# Patient Record
Sex: FEMALE | Race: WHITE | NOT HISPANIC OR LATINO | Employment: OTHER | URBAN - METROPOLITAN AREA
[De-identification: names, ages, dates, MRNs, and addresses within clinical notes are randomized per-mention and may not be internally consistent; named-entity substitution may affect disease eponyms.]

---

## 2017-01-03 DIAGNOSIS — S29.011S MUSCLE STRAIN OF CHEST WALL, SEQUELA: ICD-10-CM

## 2017-01-03 RX ORDER — CARISOPRODOL 350 MG/1
350 TABLET ORAL
Qty: 30 TAB | Refills: 0 | Status: SHIPPED | OUTPATIENT
Start: 2017-01-03 | End: 2017-02-02 | Stop reason: SDUPTHER

## 2017-01-03 NOTE — TELEPHONE ENCOUNTER
Pt appt was cancelled due to PCP vacation time. Pt states she is in the process of reapplying for her insurance and is not sure when it will be active again. She would like to have a refill of her medication. Please advise      Was the patient seen in the last year in this department? Yes     Does patient have an active prescription for medications requested? No     Received Request Via: Patient

## 2017-02-02 ENCOUNTER — OFFICE VISIT (OUTPATIENT)
Dept: MEDICAL GROUP | Facility: MEDICAL CENTER | Age: 57
End: 2017-02-02
Attending: FAMILY MEDICINE
Payer: MEDICAID

## 2017-02-02 ENCOUNTER — HOSPITAL ENCOUNTER (OUTPATIENT)
Facility: MEDICAL CENTER | Age: 57
End: 2017-02-02
Attending: FAMILY MEDICINE
Payer: MEDICAID

## 2017-02-02 VITALS
HEART RATE: 92 BPM | SYSTOLIC BLOOD PRESSURE: 118 MMHG | TEMPERATURE: 98.3 F | RESPIRATION RATE: 16 BRPM | BODY MASS INDEX: 25.83 KG/M2 | HEIGHT: 65 IN | DIASTOLIC BLOOD PRESSURE: 80 MMHG | OXYGEN SATURATION: 94 % | WEIGHT: 155 LBS

## 2017-02-02 DIAGNOSIS — M54.50 CHRONIC LOW BACK PAIN WITHOUT SCIATICA, UNSPECIFIED BACK PAIN LATERALITY: ICD-10-CM

## 2017-02-02 DIAGNOSIS — G89.29 CHRONIC LOW BACK PAIN WITHOUT SCIATICA, UNSPECIFIED BACK PAIN LATERALITY: ICD-10-CM

## 2017-02-02 DIAGNOSIS — S29.011S MUSCLE STRAIN OF CHEST WALL, SEQUELA: ICD-10-CM

## 2017-02-02 DIAGNOSIS — G89.29 CHRONIC LUMBAR PAIN: ICD-10-CM

## 2017-02-02 DIAGNOSIS — M54.50 CHRONIC LUMBAR PAIN: ICD-10-CM

## 2017-02-02 PROCEDURE — 99213 OFFICE O/P EST LOW 20 MIN: CPT | Performed by: FAMILY MEDICINE

## 2017-02-02 PROCEDURE — 80307 DRUG TEST PRSMV CHEM ANLYZR: CPT

## 2017-02-02 RX ORDER — CARISOPRODOL 350 MG/1
350 TABLET ORAL
Qty: 30 TAB | Refills: 2 | Status: SHIPPED | OUTPATIENT
Start: 2017-02-02 | End: 2017-05-26 | Stop reason: SDUPTHER

## 2017-02-02 RX ORDER — OXYCODONE HYDROCHLORIDE 10 MG/1
10 TABLET ORAL 3 TIMES DAILY PRN
Qty: 90 TAB | Refills: 0 | Status: SHIPPED | OUTPATIENT
Start: 2017-02-02 | End: 2017-03-02 | Stop reason: SDUPTHER

## 2017-02-02 NOTE — PROGRESS NOTES
"Subjective:      Paulette Toussaint is a 56 y.o. female who presents with No chief complaint on file.            HPI 1. Mid-low back pain-patient ports is not yet been able to schedule the CT scans of her thoracic and lumbar spine ordered by Dr. Shen. 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 She reports fluttering level of mid to low back pain requiring usually 3 and occasionally to oxycodone 10 mg immediate release tablets, along with one Soma daily. She notes occasional tingling into her left thigh just recently with no consistent radiation of pain into either lower extremity.  2. URI-patient ports three-day history of head congestion along with some yellow nasal discharge. Notes no significant sore throat and no cough up to this point.  . ROS negative for urinary incontinence, fecal incontinence, recent fever       Objective:     /80 mmHg  Pulse 92  Temp(Src) 36.8 °C (98.3 °F)  Resp 16  Ht 1.651 m (5' 5\")  Wt 70.308 kg (155 lb)  BMI 25.79 kg/m2  SpO2 94%  Breastfeeding? No     Physical Exam  Gen.-alert cooperative female in no acute distress  Back-nontender palpation of the bony midline and parasacral areas bilaterally. No overlying redness or swelling.  Lower extremities-intact light touch, intact strength. Normal gait          Assessment/Plan:     1. Muscle strain of chest wall, sequela    - carisoprodol (SOMA) 350 MG Tab; Take 1 Tab by mouth 1 time daily as needed for Muscle Spasms.  Dispense: 30 Tab; Refill: 2    2. Chronic low back pain without sciatica, unspecified back pain laterality  - oxycodone immediate release (ROXICODONE) 10 MG immediate release tablet; Take 1 Tab by mouth 3 times a day as needed for Moderate Pain.  Dispense: 90 Tab; Refill: 0    Plan: 1. Renew Soma 350 mg 1 daily, immediate release oxycodone 10 mg 3 times daily  2. Patient is encouraged to complete recently referred to colonoscopy (mother history of colon cancer, gets screened every 5 " years), and ordered CT scans of the lumbar and thoracic spine  3. Revisit in 3 months  4. For anxiety patient is being seen at Dr. Lindsey's office and they are prescribing her Xanax currently at half of a 0.5 mg Xanax daily  5. UDS

## 2017-02-02 NOTE — MR AVS SNAPSHOT
"        Paulette Toussaint   2017 1:10 PM   Office Visit   MRN: [id number]    [facility]   Dept Phone:  [phone]    Description:  Female : 1960   Provider:  Savage Wright M.D.           Reason for Visit     Back Pain           Allergies as of 2017     Allergen Noted Reactions    Seasonal 2014   Runny Nose, Itching    Sulfa Drugs 2014   Nausea    Pt states \"I get very sick, I was over 20 years ago\".    Sulfa Drugs 2015         You were diagnosed with     Muscle strain of chest wall, sequela   [972331]       Chronic low back pain without sciatica, unspecified back pain laterality   [7089951]         Vital Signs     Blood Pressure Pulse Temperature Respirations Height Weight    118/80 mmHg 92 36.8 °C (98.3 °F) 16 1.651 m (5' 5\") 70.308 kg (155 lb)    Body Mass Index Oxygen Saturation Breastfeeding? Smoking Status          25.79 kg/m2 94% No Current Every Day Smoker        Basic Information     Date Of Birth Sex Race Ethnicity Preferred Language    1960 Female White Non- English      Problem List              ICD-10-CM Priority Class Noted - Resolved    Pubic ramus fracture (CMS-HCC) S32.599A Medium  2014 - Present    Fracture of ribs, two S22.49XA Medium  2014 - Present    Chronic back pain (Chronic) M54.9, G89.29 Medium  2014 - Present    Lumbar burst fracture (CMS-[facility]) S32.001A   Unknown - Present    Depression F32.9   2015 - Present    Chronic lumbar pain M54.5, G89.29   2015 - Present    Acute sinusitis J01.90   2015 - Present    Osteoporosis M81.0   2016 - Present    Chest pain R07.9 High  2016 - Present    Tobacco abuse (Chronic) Z72.0   2016 - Present      Health Maintenance        Date Due Completion Dates    IMM DTaP/Tdap/Td Vaccine (1 - Tdap) 3/10/1980 ---    IMM PNEUMOCOCCAL 19-64 (ADULT) MEDIUM RISK SERIES (1 of 1 - PPSV23) 3/10/1980 ---    COLONOSCOPY 3/10/2011 ---    IMM INFLUENZA (1) " 9/1/2016 1/14/2014    MAMMOGRAM 8/2/2017 8/2/2016, 1/15/2016, 1/12/2016    PAP SMEAR 1/20/2019 1/20/2016            Current Immunizations     Influenza TIV (IM) 1/14/2014  9:15 PM      Below and/or attached are the medications your provider expects you to take. Review all of your home medications and newly ordered medications with your provider and/or pharmacist. Follow medication instructions as directed by your provider and/or pharmacist. Please keep your medication list with you and share with your provider. Update the information when medications are discontinued, doses are changed, or new medications (including over-the-counter products) are added; and carry medication information at all times in the event of emergency situations     Allergies:  SEASONAL - Runny Nose,Itching     SULFA DRUGS - Nausea     SULFA DRUGS - (reactions not documented)               Medications  Valid as of: February 02, 2017 -  2:21 PM    Generic Name Brand Name Tablet Size Instructions for use    Acetaminophen (Tab) TYLENOL 325 MG Take 2 Tabs by mouth every 6 hours as needed for Moderate Pain.        Alendronate Sodium (Tab) FOSAMAX 70 MG Take 1 Tab by mouth every 7 days.        Carisoprodol (Tab) SOMA 350 MG Take 350 mg by mouth 2 times a day. Indications: Musculoskeletal Pain        Carisoprodol (Tab) SOMA 350 MG Take 1 Tab by mouth 4 times a day.        Carisoprodol (Tab) SOMA 350 MG Take 1 Tab by mouth 1 time daily as needed for Muscle Spasms.        Cetirizine HCl (Tab) ZYRTEC 10 MG Take 10 mg by mouth every bedtime.        Ciprofloxacin HCl (Tab) CIPRO 500 MG Take 1 Tab by mouth 2 times a day.        Cyclobenzaprine HCl (Tab) FLEXERIL 10 MG Take 1 Tab by mouth 3 times a day as needed.        Estrogens, Conjugated (Cream) PREMARIN 0.625 MG/GM Apply 0.5 gram intravaginally twice weekly.        Fluconazole (Tab) DIFLUCAN 150 MG Take 1 Tab by mouth every day.        FLUoxetine HCl (Cap) PROZAC 10 MG Take 10 mg by mouth every day.         FLUoxetine HCl (Cap) PROZAC 20 MG Take 1 Cap by mouth every day.        FLUoxetine HCl (Cap) PROZAC 40 MG Take 1 Cap by mouth every day.        Fluticasone Propionate (Suspension) FLONASE 50 MCG/ACT Spray 2 Sprays in nose 2 times a day.        Lidocaine (Patch) LIDODERM 5 % Apply 1 Patch to skin as directed every 24 hours.        Meloxicam (Tab) MOBIC 7.5 MG Take 7.5 mg by mouth every day.        Meloxicam (Tab) MOBIC 7.5 MG Take 1 Tab by mouth every day.        Meloxicam (Tab) MOBIC 7.5 MG Take 15 mg by mouth every day.        Meloxicam (Tab) MOBIC 15 MG TAKE 1 TABLET BY MOUTH EVERY DAY        Methocarbamol (Tab) ROBAXIN 500 MG Take 1 Tab by mouth 3 times a day.        Multiple Vitamins-Minerals (Tab) THERAGRAN-M  Take 1 Tab by mouth every day.        OxyCODONE HCl (Tab) ROXICODONE 10 MG Take 0.5-1 Tabs by mouth every four hours as needed for Severe Pain ((Pain Scale 7-10)).        OxyCODONE HCl (Tab) ROXICODONE 10 MG Take 1 Tab by mouth 3 times a day as needed for Moderate Pain.        OxyCODONE HCl (Tab) ROXICODONE 10 MG Take 1 Tab by mouth 3 times a day as needed for Moderate Pain.        Probiotic Product   Take 1 Cap by mouth every bedtime.        Pseudoephedrine HCl   Take 2 Tabs by mouth every four hours as needed (For congestion).        Risedronate Sodium (Tab) ACTONEL 35 MG Take 1 Tab by mouth every 7 days.        .                 Medicines prescribed today were sent to:     [facility]ACY #[id number] - [place], [place] - [address] [address] [facility] [place] [address]    Phone: [phone] Fax: [phone]    Open 24 Hours?: No      Medication refill instructions:       If your prescription bottle indicates you have medication refills left, it is not necessary to call your provider’s office. Please contact your pharmacy and they will refill your medication.    If your prescription bottle indicates you do not have any refills left, you may request refills at any time through one of the following ways:  The online SNOBSWAP system (except Urgent Care), by calling your provider’s office, or by asking your pharmacy to contact your provider’s office with a refill request. Medication refills are processed only during regular business hours and may not be available until the next business day. Your provider may request additional information or to have a follow-up visit with you prior to refilling your medication.   *Please Note: Medication refills are assigned a new Rx number when refilled electronically. Your pharmacy may indicate that no refills were authorized even though a new prescription for the same medication is available at the pharmacy. Please request the medicine by name with the pharmacy before contacting your provider for a refill.        Your To Do List     Future Labs/Procedures Complete By [facility]    PAIN MANAGEMENT CONNIE, UR  As directed 2/2/2018    Comments:    Current Meds (name, sig, last dose):   Current outpatient prescriptions:   •  carisoprodol, 350 mg, Oral, QDAY PRN  •  oxycodone immediate release, 10 mg, Oral, TID PRN  •  oxycodone immediate release, 10 mg, Oral, TID PRN  •  meloxicam, TAKE 1 TABLET BY MOUTH EVERY DAY  •  methocarbamol, 500 mg, Oral, TID  •  cyclobenzaprine, 10 mg, Oral, TID PRN  •  lidocaine, 1 Patch, Transdermal, Q24HRS  •  risedronate, 35 mg, Oral, Q7 DAYS  •  fluconazole, 150 mg, Oral, DAILY  •  ciprofloxacin, 500 mg, Oral, BID  •  fluoxetine, 40 mg, Oral, DAILY  •  fluoxetine, 20 mg, Oral, DAILY  •  carisoprodol, 350 mg, Oral, 4X/DAY  •  alendronate, 70 mg, Oral, Q7 DAYS  •  meloxicam, 15 mg, Oral, DAILY, 3/31/2016 at 0900  •  therapeutic multivitamin-minerals, 1 Tab, Oral, DAILY, 3/31/2016 at 0900  •  fluticasone, 2 Spray, Nasal, BID, 3/31/2016 at 0900  •  cetirizine, 10 mg, Oral, QHS, 3/30/2016 at 2000  •  Probiotic Product (PROBIOTIC DAILY PO), 1 Cap, Oral, QHS, 3/30/2016 at 2000  •  Pseudoephedrine HCl (SUDAFED PO), 2 Tab, Oral, Q4HRS PRN, 3/31/2016 at 0900  •   acetaminophen, 650 mg, Oral, Q6HRS PRN  •  conjugated estrogen, Apply 0.5 gram intravaginally twice weekly.  •  meloxicam, 7.5 mg, Oral, DAILY  •  meloxicam, 7.5 mg, Oral, DAILY  •  oxycodone immediate release, 5-10 mg, Oral, Q4HRS PRN, 3/31/2016 at 1200  •  carisoprodol, 350 mg, Oral, BID, 3/31/2016 at 0900  •  fluoxetine, 10 mg, Oral, DAILY, 3/31/2016 at 0900         MyChart Status: Patient Declined

## 2017-02-06 ENCOUNTER — TELEPHONE (OUTPATIENT)
Dept: MEDICAL GROUP | Facility: MEDICAL CENTER | Age: 57
End: 2017-02-06

## 2017-02-06 RX ORDER — AZITHROMYCIN 250 MG/1
TABLET, FILM COATED ORAL
Qty: 6 TAB | Refills: 0 | Status: SHIPPED | OUTPATIENT
Start: 2017-02-06 | End: 2017-03-30

## 2017-02-07 NOTE — TELEPHONE ENCOUNTER
1. Caller Name: Yessica                                        Call Back Number: [phone] (home)         Patient approves a detailed voicemail message: yes    Pt called stating she is not feeling any better and would like to have an antibiotic called into her pharmacy. Please advise     Thank you

## 2017-02-08 LAB
6MAM UR QL: NOT DETECTED
7AMINOCLONAZEPAM UR QL: NOT DETECTED
A-OH ALPRAZ UR QL: PRESENT
ALPRAZ UR QL: PRESENT
AMPHET UR QL SCN: NOT DETECTED
ANNOTATION COMMENT IMP: NORMAL
ANNOTATION COMMENT IMP: NORMAL
BARBITURATES UR QL: NOT DETECTED
BUPRENORPHINE UR QL: NOT DETECTED
BZE UR QL: NOT DETECTED
CARBOXYTHC UR QL: PRESENT
CARISOPRODOL UR QL: PRESENT
CLONAZEPAM UR QL: NOT DETECTED
CODEINE UR QL: NOT DETECTED
DIAZEPAM UR QL: NOT DETECTED
ETHYL GLUCURONIDE UR QL: NORMAL
FENTANYL UR QL: NOT DETECTED
HYDROCODONE UR QL: NOT DETECTED
HYDROMORPHONE UR QL: NOT DETECTED
LORAZEPAM UR QL: NOT DETECTED
MDA UR QL: NOT DETECTED
MDEA UR QL: NOT DETECTED
MDMA UR QL: NOT DETECTED
MEPERIDINE UR QL: NOT DETECTED
METHADONE UR QL: NOT DETECTED
METHAMPHET UR QL: NOT DETECTED
MIDAZOLAM UR QL SCN: NOT DETECTED
MORPHINE UR QL: NOT DETECTED
NORBUPRENORPHINE UR QL CFM: NOT DETECTED
NORDIAZEPAM UR QL: PRESENT
NORFENTANYL UR QL: NOT DETECTED
NORHYDROCODONE UR QL CFM: NOT DETECTED
NOROXYCODONE UR QL CFM: PRESENT
NOROXYMORPH CO100 Q0458: NOT DETECTED
OXAZEPAM UR QL: PRESENT
OXYCODONE UR QL: PRESENT
OXYMORPHONE UR QL: PRESENT
PATHOLOGY STUDY: NORMAL
PCP UR QL: NOT DETECTED
PHENTERMINE UR QL: NOT DETECTED
PPAA UR QL: NOT DETECTED
PROPOXYPH UR QL: NOT DETECTED
SERVICE CMNT-IMP: NORMAL
TAPENADOL OSULF CO200 Q0473: NOT DETECTED
TAPENTADOL UR QL SCN: NOT DETECTED
TEMAZEPAM UR QL: PRESENT
TRAMADOL UR QL: NOT DETECTED
ZOLPIDEM UR QL: NOT DETECTED

## 2017-03-02 DIAGNOSIS — M54.50 CHRONIC LOW BACK PAIN WITHOUT SCIATICA, UNSPECIFIED BACK PAIN LATERALITY: ICD-10-CM

## 2017-03-02 DIAGNOSIS — G89.29 CHRONIC LOW BACK PAIN WITHOUT SCIATICA, UNSPECIFIED BACK PAIN LATERALITY: ICD-10-CM

## 2017-03-02 RX ORDER — OXYCODONE HYDROCHLORIDE 10 MG/1
10 TABLET ORAL 3 TIMES DAILY PRN
Qty: 90 TAB | Refills: 0 | Status: SHIPPED | OUTPATIENT
Start: 2017-03-02 | End: 2017-03-30 | Stop reason: SDUPTHER

## 2017-03-03 NOTE — TELEPHONE ENCOUNTER
Patient's urine drug screen shows metabolites of Xanax but also metabolites of Valium a second separate benzodiazepine. Combining benzodiazepine's along with opiates such as oxycodone results in a markedly elevated risk of respiratory depression. Patient will need to decide whether she is going to continue the benzodiazepines or the opiates. Use of a alternative anxiety medicine called buspirone would be medically safer in combination with the oxycodone. (She could ask her psychiatrist to substitute this for the Xanax, and she definitely not be using Xanax and Valium concurrently in any situation). Oxycodone prescription is refilled for the next 30 days, have patient come visit me prior to any subsequent refills.

## 2017-03-27 ENCOUNTER — HOSPITAL ENCOUNTER (OUTPATIENT)
Dept: RADIOLOGY | Facility: MEDICAL CENTER | Age: 57
End: 2017-03-27
Attending: NEUROLOGICAL SURGERY
Payer: MEDICAID

## 2017-03-27 DIAGNOSIS — M54.5 LOW BACK PAIN, UNSPECIFIED BACK PAIN LATERALITY, UNSPECIFIED CHRONICITY, WITH SCIATICA PRESENCE UNSPECIFIED: ICD-10-CM

## 2017-03-27 PROCEDURE — 72128 CT CHEST SPINE W/O DYE: CPT

## 2017-03-27 PROCEDURE — 72131 CT LUMBAR SPINE W/O DYE: CPT

## 2017-03-30 ENCOUNTER — OFFICE VISIT (OUTPATIENT)
Dept: MEDICAL GROUP | Facility: MEDICAL CENTER | Age: 57
End: 2017-03-30
Attending: FAMILY MEDICINE
Payer: MEDICAID

## 2017-03-30 VITALS
BODY MASS INDEX: 25.49 KG/M2 | DIASTOLIC BLOOD PRESSURE: 76 MMHG | WEIGHT: 153 LBS | HEIGHT: 65 IN | HEART RATE: 88 BPM | OXYGEN SATURATION: 96 % | TEMPERATURE: 98 F | SYSTOLIC BLOOD PRESSURE: 118 MMHG | RESPIRATION RATE: 16 BRPM

## 2017-03-30 DIAGNOSIS — G89.29 CHRONIC LOW BACK PAIN WITHOUT SCIATICA, UNSPECIFIED BACK PAIN LATERALITY: ICD-10-CM

## 2017-03-30 DIAGNOSIS — M54.50 CHRONIC LOW BACK PAIN WITHOUT SCIATICA, UNSPECIFIED BACK PAIN LATERALITY: ICD-10-CM

## 2017-03-30 DIAGNOSIS — S29.011S MUSCLE STRAIN OF CHEST WALL, SEQUELA: ICD-10-CM

## 2017-03-30 PROCEDURE — 99213 OFFICE O/P EST LOW 20 MIN: CPT | Performed by: FAMILY MEDICINE

## 2017-03-30 RX ORDER — BUPROPION HYDROCHLORIDE 150 MG/1
150 TABLET, EXTENDED RELEASE ORAL 2 TIMES DAILY
COMMUNITY
End: 2017-08-14

## 2017-03-30 RX ORDER — GABAPENTIN 300 MG/1
CAPSULE ORAL
Qty: 120 CAP | Refills: 6 | Status: SHIPPED
Start: 2017-03-30 | End: 2017-08-14

## 2017-03-30 RX ORDER — OXYCODONE HYDROCHLORIDE 10 MG/1
10 TABLET ORAL 3 TIMES DAILY PRN
Qty: 90 TAB | Refills: 0 | Status: SHIPPED | OUTPATIENT
Start: 2017-03-30 | End: 2017-05-01 | Stop reason: SDUPTHER

## 2017-03-30 RX ORDER — CARISOPRODOL 350 MG/1
350 TABLET ORAL
Qty: 30 TAB | Refills: 0 | Status: SHIPPED | OUTPATIENT
Start: 2017-03-30 | End: 2017-05-01 | Stop reason: SDUPTHER

## 2017-03-30 NOTE — MR AVS SNAPSHOT
"Paulette Toussaint   3/30/2017 2:10 PM   Office Visit   MRN: [id number]    Department:  [facility]   Dept Phone:  [phone]    Description:  Female : 1960   Provider:  Savage Wright M.D.           Reason for Visit     Follow-Up           Allergies as of 3/30/2017     Allergen Noted Reactions    Seasonal 2014   Runny Nose, Itching    Sulfa [facility] 2014   Nausea    Pt states \"I get very sick, I was over 20 years ago\".    Sulfa Drugs 2015         You were diagnosed with     Muscle strain of chest wall, sequela   [034136]       Chronic low back pain without sciatica, unspecified back pain laterality   [[id number]]         Vital Signs     Blood Pressure Pulse Temperature Respirations Height Weight    118/76 mmHg 88 36.7 °C (98 °F) 16 1.651 m (5' 5\") 69.4 kg (153 lb)    Body Mass Index Oxygen Saturation Breastfeeding? Smoking Status          25.46 kg/m2 96% No Current Every Day Smoker        Basic Information     Date Of Birth Sex Race Ethnicity Preferred Language    1960 Female White Non- English      Your appointments     May 01, 2017  2:10 PM   Established Patient with Savage Wright M.D.   [facility]    [address] [place] [id number] [phone]           You will be receiving a confirmation call a few days before your appointment from our automated call confirmation system.              Problem List              ICD-10-CM Priority Class Noted - Resolved    Pubic ramus fracture (CMS-HCC) S32.599A Medium  2014 - Present    Fracture of ribs, two S22.49XA Medium  2014 - Present    Chronic back pain (Chronic) M54.9, G89.29 Medium  2014 - Present    Lumbar burst fracture (CMS-HCC) S32.001A   Unknown - Present    Depression F32.9   2015 - Present    Chronic lumbar pain M54.5, G89.29   2015 - Present    Acute sinusitis J01.90   2015 - Present    Osteoporosis M81.0   2016 - Present    Chest " pain R07.9 High  4/1/2016 - Present    Tobacco abuse (Chronic) Z72.0   4/1/2016 - Present      Health Maintenance        Date Due Completion Dates    IMM DTaP/Tdap/Td Vaccine (1 - Tdap) 3/10/1980 ---    IMM PNEUMOCOCCAL 19-64 (ADULT) MEDIUM RISK SERIES (1 of 1 - PPSV23) 3/10/1980 ---    COLONOSCOPY 3/10/2011 ---    IMM INFLUENZA (1) 9/1/2016 1/14/2014    MAMMOGRAM 8/2/2017 8/2/2016, 1/15/2016, 1/12/2016    PAP SMEAR 1/20/2019 1/20/2016            Current Immunizations     Influenza TIV (IM) 1/14/2014  9:15 PM      Below and/or attached are the medications your provider expects you to take. Review all of your home medications and newly ordered medications with your provider and/or pharmacist. Follow medication instructions as directed by your provider and/or pharmacist. Please keep your medication list with you and share with your provider. Update the information when medications are discontinued, doses are changed, or new medications (including over-the-counter products) are added; and carry medication information at all times in the event of emergency situations     Allergies:  SEASONAL - Runny Nose,Itching     SULFA DRUGS - Nausea     SULFA DRUGS - (reactions not documented)               Medications  Valid as of: March 30, 2017 -  3:11 PM    Generic Name Brand Name Tablet Size Instructions for use    Acetaminophen (Tab) TYLENOL 325 MG Take 2 Tabs by mouth every 6 hours as needed for Moderate Pain.        Alendronate Sodium (Tab) FOSAMAX 70 MG Take 1 Tab by mouth every 7 days.        BuPROPion HCl (TABLET SR 12 HR) WELLBUTRIN- MG Take 150 mg by mouth 2 times a day.        Carisoprodol (Tab) SOMA 350 MG Take 350 mg by mouth 2 times a day. Indications: Musculoskeletal Pain        Carisoprodol (Tab) SOMA 350 MG Take 1 Tab by mouth 1 time daily as needed for Muscle Spasms.        Carisoprodol (Tab) SOMA 350 MG Take 1 Tab by mouth 1 time daily as needed for Muscle Spasms.        Cetirizine HCl (Tab) ZYRTEC 10 MG  Take 10 mg by mouth every bedtime.        Estrogens, Conjugated (Cream) PREMARIN 0.625 MG/GM Apply 0.5 gram intravaginally twice weekly.        Fluconazole (Tab) DIFLUCAN 150 MG Take 1 Tab by mouth every day.        FLUoxetine HCl (Cap) PROZAC 10 MG Take 10 mg by mouth every day.        Fluticasone Propionate (Suspension) FLONASE 50 MCG/ACT Spray 2 Sprays in nose 2 times a day.        Gabapentin (Cap) NEURONTIN 300 MG 1 by mouth daily at bedtime ×5 days, 1 by mouth twice a day ×5 days, 1 by mouth every morning and 2 at bedtime ×5 days, then 2 by mouth twice a day        Lidocaine (Patch) LIDODERM 5 % Apply 1 Patch to skin as directed every 24 hours.        Meloxicam (Tab) MOBIC 7.5 MG Take 15 mg by mouth every day.        Meloxicam (Tab) MOBIC 15 MG TAKE 1 TABLET BY MOUTH EVERY DAY        Multiple Vitamins-Minerals (Tab) THERAGRAN-M  Take 1 Tab by mouth every day.        OxyCODONE HCl (Tab) ROXICODONE 10 MG Take 1 Tab by mouth 3 times a day as needed for Moderate Pain.        OxyCODONE HCl (Tab) ROXICODONE 10 MG Take 1 Tab by mouth 3 times a day as needed for Moderate Pain.        Probiotic Product   Take 1 Cap by mouth every bedtime.        Pseudoephedrine HCl   Take 2 Tabs by mouth every four hours as needed (For congestion).        Risedronate Sodium (Tab) ACTONEL 35 MG Take 1 Tab by mouth every 7 days.        .                 Medicines prescribed today were sent to:     [facility]/PHARMACY #[id number] - [place] - [address] [address]    Phone: [phone] Fax: [phone]    Open 24 Hours?: No      Medication refill instructions:       If your prescription bottle indicates you have medication refills left, it is not necessary to call your provider’s office. Please contact your pharmacy and they will refill your medication.    If your prescription bottle indicates you do not have any refills left, you may request refills at any time through one of the following ways: The online  mSnapt system (except Urgent Care), by calling your provider’s office, or by asking your pharmacy to contact your provider’s office with a refill request. Medication refills are processed only during regular business hours and may not be available until the next business day. Your provider may request additional information or to have a follow-up visit with you prior to refilling your medication.   *Please Note: Medication refills are assigned a new Rx number when refilled electronically. Your pharmacy may indicate that no refills were authorized even though a new prescription for the same medication is available at the pharmacy. Please request the medicine by name with the pharmacy before contacting your provider for a refill.           MyChart Status: Patient Declined        Quit Tobacco Information     Do you want to quit using tobacco?    Quitting tobacco decreases risks of cancer, heart and lung disease, increases life expectancy, improves sense of taste and smell, and increases spending money, among other benefits.    If you are thinking about quitting, we can help.  • [employer] Quit Tobacco Program: [phone]  o Program occurs weekly for four weeks and includes pharmacist consultation on products to support quitting smoking or chewing tobacco. A provider referral is needed for pharmacist consultation.  • Tobacco Users Help Hotline: 9-991-QUIT-NOW (996-9881) or https://[place].quitlogix.org/  o Free, confidential telephone and online coaching for [place] residents. Sessions are designed on a schedule that is convenient for you. Eligible clients receive free nicotine replacement therapy.  • Nationally: www.smokefree.gov  o Information and professional assistance to support both immediate and long-term needs as you become, and remain, a non-smoker. Smokefree.gov allows you to choose the help that best fits your needs.

## 2017-03-30 NOTE — PROGRESS NOTES
"Subjective:      Paulette Toussaint is a 57 y.o. female who presents with Follow-Up            HPI 1. Chronic back pain-patient reports fluctuating level of mid to low back pain ranging from 6-10. She reports on occasion she is tearful and for several hours cannot complete her activities daily living until the back pain subsides. Overall she reports fair pain relief taking the oxycodone 10 mg immediate release 3 times daily along with a single Soma daily. Previous trials of Flexeril and Robaxin were ineffective. She has never had gabapentin. She met with Dr. Gregory's her back surgeon in November. Due to various events she just got her thoracic and lumbar CT scans completed several days ago.    ROS negative for urinary incontinence, fecal incontinence, shortness of breath       Objective:     /76 mmHg  Pulse 88  Temp(Src) 36.7 °C (98 °F)  Resp 16  Ht 1.651 m (5' 5\")  Wt 69.4 kg (153 lb)  BMI 25.46 kg/m2  SpO2 96%  Breastfeeding? No     Physical Exam  Gen.- alert, cooperative, in no acute distress  Neck- midline trachea, thyroid not enlarged or tender,supple, no cervical adenopathy  Chest-clear to auscultation and percussion with normal breath sounds. No retractions. Chest wall nontender  Cardiac- regular rhythm and rate. No murmur, thrill, or heave  Back-nontender to external palpation of the bony midline and paraspinous areas. Area of indicated tenderness currently is in the interscapular region bilaterally          Assessment/Plan:     1. Muscle strain of chest wall, sequela    - carisoprodol (SOMA) 350 MG Tab; Take 1 Tab by mouth 1 time daily as needed for Muscle Spasms.  Dispense: 30 Tab; Refill: 0    2. Chronic low back pain without sciatica, unspecified back pain laterality    - oxycodone immediate release (ROXICODONE) 10 MG immediate release tablet; Take 1 Tab by mouth 3 times a day as needed for Moderate Pain.  Dispense: 90 Tab; Refill: 0    Plan: 1. Renew oxycodone 10 mg immediate release 3 " times daily  2. Renew Soma 350 mg once daily  3. Thoracic spine and lumbar CTs were reviewed-patient will also review these with Dr. Gregory's  4. Revisit in one month  5. Trial of gabapentin 300 mg at at bedtime with gradual escalation of dose to 600 mg twice daily as tolerated

## 2017-05-01 ENCOUNTER — OFFICE VISIT (OUTPATIENT)
Dept: MEDICAL GROUP | Facility: MEDICAL CENTER | Age: 57
End: 2017-05-01
Attending: FAMILY MEDICINE
Payer: MEDICAID

## 2017-05-01 VITALS
RESPIRATION RATE: 16 BRPM | DIASTOLIC BLOOD PRESSURE: 68 MMHG | WEIGHT: 156 LBS | BODY MASS INDEX: 25.99 KG/M2 | OXYGEN SATURATION: 96 % | SYSTOLIC BLOOD PRESSURE: 112 MMHG | HEART RATE: 96 BPM | HEIGHT: 65 IN | TEMPERATURE: 99.1 F

## 2017-05-01 DIAGNOSIS — S29.011S MUSCLE STRAIN OF CHEST WALL, SEQUELA: ICD-10-CM

## 2017-05-01 DIAGNOSIS — G89.29 CHRONIC LOW BACK PAIN WITHOUT SCIATICA, UNSPECIFIED BACK PAIN LATERALITY: ICD-10-CM

## 2017-05-01 DIAGNOSIS — M54.50 CHRONIC LOW BACK PAIN WITHOUT SCIATICA, UNSPECIFIED BACK PAIN LATERALITY: ICD-10-CM

## 2017-05-01 PROCEDURE — 99213 OFFICE O/P EST LOW 20 MIN: CPT | Performed by: FAMILY MEDICINE

## 2017-05-01 RX ORDER — CARISOPRODOL 350 MG/1
350 TABLET ORAL
Qty: 30 TAB | Refills: 2 | Status: SHIPPED | OUTPATIENT
Start: 2017-05-01 | End: 2018-04-05 | Stop reason: SDUPTHER

## 2017-05-01 RX ORDER — OLANZAPINE 2.5 MG/1
2.5 TABLET, FILM COATED ORAL NIGHTLY
COMMUNITY
End: 2017-08-14

## 2017-05-01 RX ORDER — OXYCODONE HYDROCHLORIDE 10 MG/1
10 TABLET ORAL 3 TIMES DAILY PRN
Qty: 90 TAB | Refills: 0 | Status: SHIPPED | OUTPATIENT
Start: 2017-05-01 | End: 2017-05-26 | Stop reason: SDUPTHER

## 2017-05-01 NOTE — MR AVS SNAPSHOT
"Paulette Toussaint   2017 2:10 PM   Office Visit   MRN: [id number]    [facility]   Dept Phone:  [phone]    Description:  Female : 1960   Provider:  Savage Wright M.D.           Reason for Visit     Follow-Up           Allergies as of 2017     Allergen Noted Reactions    Seasonal 2014   Runny Nose, Itching    [facility] 2014   Nausea    Pt states \"I get very sick, I was over 20 years ago\".    [facility] 2015         You were diagnosed with     Chronic low back pain without sciatica, unspecified back pain laterality   [7743909]       Muscle strain of chest wall, sequela   [682117]         Vital Signs     Blood Pressure Pulse Temperature Respirations Height Weight    112/68 mmHg 96 37.3 °C (99.1 °F) 16 1.651 m (5' 5\") 70.761 kg (156 lb)    Body Mass Index Oxygen Saturation Breastfeeding? Smoking Status          25.96 kg/m2 96% No Current Every Day Smoker        Basic Information     Date Of Birth Sex Race Ethnicity Preferred Language    1960 Female White Non- English      Your appointments     May 26, 2017  2:10 PM   Established Patient with Savage Wright M.D.   [facility] ([facility])    [address] [place] [id number]   [phone]           You will be receiving a confirmation call a few days before your appointment from our automated call confirmation system.              Problem List              ICD-10-CM Priority Class Noted - Resolved    Pubic ramus fracture (CMS-HCC) S32.599A Medium  2014 - Present    Fracture of ribs, two S22.49XA Medium  2014 - Present    Chronic back pain (Chronic) M54.9, G89.29 Medium  2014 - Present    Lumbar burst fracture (CMS-HCC) S32.001A   Unknown - Present    Depression F32.9   2015 - Present    Chronic lumbar pain M54.5, G89.29   2015 - Present    Acute sinusitis J01.90   2015 - Present    Osteoporosis M81.0   2016 - Present    Chest " pain R07.9 High  4/1/2016 - Present    Tobacco abuse (Chronic) Z72.0   4/1/2016 - Present      Health Maintenance        Date Due Completion Dates    IMM DTaP/Tdap/Td Vaccine (1 - Tdap) 3/10/1980 ---    IMM PNEUMOCOCCAL 19-64 (ADULT) MEDIUM RISK SERIES (1 of 1 - PPSV23) 3/10/1980 ---    COLONOSCOPY 3/10/2011 ---    MAMMOGRAM 8/2/2017 8/2/2016, 1/15/2016    PAP SMEAR 1/20/2019 1/20/2016            Current Immunizations     Influenza TIV (IM) 1/14/2014  9:15 PM      Below and/or attached are the medications your provider expects you to take. Review all of your home medications and newly ordered medications with your provider and/or pharmacist. Follow medication instructions as directed by your provider and/or pharmacist. Please keep your medication list with you and share with your provider. Update the information when medications are discontinued, doses are changed, or new medications (including over-the-counter products) are added; and carry medication information at all times in the event of emergency situations     Allergies:  SEASONAL - Runny Nose,Itching     SULFA DRUGS - Nausea     SULFA DRUGS - (reactions not documented)               Medications  Valid as of: May 01, 2017 -  2:50 PM    Generic Name Brand Name Tablet Size Instructions for use    Acetaminophen (Tab) TYLENOL 325 MG Take 2 Tabs by mouth every 6 hours as needed for Moderate Pain.        Alendronate Sodium (Tab) FOSAMAX 70 MG Take 1 Tab by mouth every 7 days.        BuPROPion HCl (TABLET SR 12 HR) WELLBUTRIN- MG Take 150 mg by mouth 2 times a day.        Carisoprodol (Tab) SOMA 350 MG Take 350 mg by mouth 2 times a day. Indications: Musculoskeletal Pain        Carisoprodol (Tab) SOMA 350 MG Take 1 Tab by mouth 1 time daily as needed for Muscle Spasms.        Carisoprodol (Tab) SOMA 350 MG Take 1 Tab by mouth 1 time daily as needed for Muscle Spasms.        Cetirizine HCl (Tab) ZYRTEC 10 MG Take 10 mg by mouth every bedtime.        Estrogens,  Conjugated (Cream) PREMARIN 0.625 MG/GM Apply 0.5 gram intravaginally twice weekly.        Fluconazole (Tab) DIFLUCAN 150 MG Take 1 Tab by mouth every day.        FLUoxetine HCl (Cap) PROZAC 10 MG Take 10 mg by mouth every day.        Fluticasone Propionate (Suspension) FLONASE 50 MCG/ACT Spray 2 Sprays in nose 2 times a day.        Gabapentin (Cap) NEURONTIN 300 MG 1 by mouth daily at bedtime ×5 days, 1 by mouth twice a day ×5 days, 1 by mouth every morning and 2 at bedtime ×5 days, then 2 by mouth twice a day        Lidocaine (Patch) LIDODERM 5 % Apply 1 Patch to skin as directed every 24 hours.        Meloxicam (Tab) MOBIC 7.5 MG Take 15 mg by mouth every day.        Meloxicam (Tab) MOBIC 15 MG TAKE 1 TABLET BY MOUTH EVERY DAY        Multiple Vitamins-Minerals (Tab) THERAGRAN-M  Take 1 Tab by mouth every day.        OxyCODONE HCl (Tab) ROXICODONE 10 MG Take 1 Tab by mouth 3 times a day as needed for Moderate Pain.        OxyCODONE HCl (Tab) ROXICODONE 10 MG Take 1 Tab by mouth 3 times a day as needed for Moderate Pain.        Probiotic Product   Take 1 Cap by mouth every bedtime.        Pseudoephedrine HCl   Take 2 Tabs by mouth every four hours as needed (For congestion).        Risedronate Sodium (Tab) ACTONEL 35 MG Take 1 Tab by mouth every 7 days.        .                 Medicines prescribed today were sent to:     [facility]/PHARMACY #[id number] - [place], [place] - [address] [address]    Phone: [phone] Fax: [phone]    Open 24 Hours?: No      Medication refill instructions:       If your prescription bottle indicates you have medication refills left, it is not necessary to call your provider’s office. Please contact your pharmacy and they will refill your medication.    If your prescription bottle indicates you do not have any refills left, you may request refills at any time through one of the following ways: The online Freebee system (except Urgent Care), by calling your  provider’s office, or by asking your pharmacy to contact your provider’s office with a refill request. Medication refills are processed only during regular business hours and may not be available until the next business day. Your provider may request additional information or to have a follow-up visit with you prior to refilling your medication.   *Please Note: Medication refills are assigned a new Rx number when refilled electronically. Your pharmacy may indicate that no refills were authorized even though a new prescription for the same medication is available at the pharmacy. Please request the medicine by name with the pharmacy before contacting your provider for a refill.           MyChart Status: Patient Declined        Quit Tobacco Information     Do you want to quit using tobacco?    Quitting tobacco decreases risks of cancer, heart and lung disease, increases life expectancy, improves sense of taste and smell, and increases spending money, among other benefits.    If you are thinking about quitting, we can help.  • [facility] Quit Tobacco Program: 302.181.7608  o Program occurs weekly for four weeks and includes pharmacist consultation on products to support quitting smoking or chewing tobacco. A provider referral is needed for pharmacist consultation.  • Tobacco Users Help Hotline: 6-769-QUIT-NOW (161-1089) or https://[place].quitlogix.org/  o Free, confidential telephone and online coaching for [place] residents. Sessions are designed on a schedule that is convenient for you. Eligible clients receive free nicotine replacement therapy.  • Nationally: www.smokefree.gov  o Information and professional assistance to support both immediate and long-term needs as you become, and remain, a non-smoker. Smokefree.gov allows you to choose the help that best fits your needs.

## 2017-05-02 NOTE — PROGRESS NOTES
"Subjective:      Paulette Toussaint is a 57 y.o. female who presents with Follow-Up            HPI1.Chronic back pain- pt met with Dr Boateng, and his note is also reviewed. He notes stablility, no evidence of hoped for fusion at 2 points, but the hardware is secure. He is not enthusiastic over more surgery, and pt even less so. She did not start trial of gabapentin yet since her psych meds recently changed, adding olanzapine to her fluoxetine and Wellbutrin. PT initial visit is set for this week.  Notes pain mid to lower spine, without sciatica. Pain worse with more activity. Pt starting Nutrisystem to lose 15lbs.    ROSNeg for urinary or fecal incontinence, constipation,  sweats       Objective:     /68 mmHg  Pulse 96  Temp(Src) 37.3 °C (99.1 °F)  Resp 16  Ht 1.651 m (5' 5\")  Wt 70.761 kg (156 lb)  BMI 25.96 kg/m2  SpO2 96%  Breastfeeding? No     Physical Exam  Gen- alert cooperative female in no acute distress  Back- 1 +tender T6-L5 in midline.   Psych- briefly tearful discussing narcotic reduction. Ox 4, normal grooming, normal speech, good eye contact          Assessment/Plan:     1. Chronic low back pain without sciatica, unspecified back pain laterality-  No effective pain management referral contractually available to manage pain medications.    - oxycodone immediate release (ROXICODONE) 10 MG immediate release tablet; Take 1 Tab by mouth 3 times a day as needed for Moderate Pain.  Dispense: 90 Tab; Refill: 0    2. Muscle strain of chest wall, sequela    - carisoprodol (SOMA) 350 MG Tab; Take 1 Tab by mouth 1 time daily as needed for Muscle Spasms.  Dispense: 30 Tab; Refill: 2      Plan: 1. Begin PT trial  2. Begin gabapentin trial  3. Renew TID oxycodone this month, quantity reduction next month  4. Renew Soma 1 daily  "

## 2017-05-26 ENCOUNTER — OFFICE VISIT (OUTPATIENT)
Dept: MEDICAL GROUP | Facility: MEDICAL CENTER | Age: 57
End: 2017-05-26
Attending: FAMILY MEDICINE
Payer: MEDICAID

## 2017-05-26 VITALS
HEIGHT: 65 IN | OXYGEN SATURATION: 96 % | SYSTOLIC BLOOD PRESSURE: 120 MMHG | WEIGHT: 158 LBS | DIASTOLIC BLOOD PRESSURE: 72 MMHG | RESPIRATION RATE: 16 BRPM | BODY MASS INDEX: 26.33 KG/M2 | HEART RATE: 88 BPM | TEMPERATURE: 97.9 F

## 2017-05-26 DIAGNOSIS — G89.29 CHRONIC LOW BACK PAIN WITHOUT SCIATICA, UNSPECIFIED BACK PAIN LATERALITY: ICD-10-CM

## 2017-05-26 DIAGNOSIS — E78.2 MIXED HYPERLIPIDEMIA: ICD-10-CM

## 2017-05-26 DIAGNOSIS — M54.50 CHRONIC LOW BACK PAIN WITHOUT SCIATICA, UNSPECIFIED BACK PAIN LATERALITY: ICD-10-CM

## 2017-05-26 DIAGNOSIS — R53.83 FATIGUE, UNSPECIFIED TYPE: ICD-10-CM

## 2017-05-26 DIAGNOSIS — R73.01 FASTING HYPERGLYCEMIA: ICD-10-CM

## 2017-05-26 DIAGNOSIS — M54.50 CHRONIC MIDLINE LOW BACK PAIN WITHOUT SCIATICA: ICD-10-CM

## 2017-05-26 DIAGNOSIS — D64.9 NORMOCYTIC NORMOCHROMIC ANEMIA: ICD-10-CM

## 2017-05-26 DIAGNOSIS — S29.011S MUSCLE STRAIN OF CHEST WALL, SEQUELA: ICD-10-CM

## 2017-05-26 DIAGNOSIS — G89.29 CHRONIC MIDLINE LOW BACK PAIN WITHOUT SCIATICA: ICD-10-CM

## 2017-05-26 PROCEDURE — 99213 OFFICE O/P EST LOW 20 MIN: CPT | Performed by: FAMILY MEDICINE

## 2017-05-26 RX ORDER — CARISOPRODOL 350 MG/1
350 TABLET ORAL
Qty: 30 TAB | Refills: 2 | Status: SHIPPED | OUTPATIENT
Start: 2017-05-26 | End: 2017-07-28 | Stop reason: SDUPTHER

## 2017-05-26 RX ORDER — OXYCODONE HYDROCHLORIDE 10 MG/1
10 TABLET ORAL 3 TIMES DAILY PRN
Qty: 80 TAB | Refills: 0 | Status: SHIPPED | OUTPATIENT
Start: 2017-05-26 | End: 2017-06-26 | Stop reason: SDUPTHER

## 2017-05-26 ASSESSMENT — PAIN SCALES - GENERAL: PAINLEVEL: 3=SLIGHT PAIN

## 2017-05-26 NOTE — MR AVS SNAPSHOT
"Paulette Toussaint   2017 2:10 PM   Office Visit   MRN: [id number]    Department:  [facility]   Dept Phone:  [phone]    Description:  Female : 1960   Provider:  Savage Wright M.D.           Reason for Visit     Follow-Up med refill      Allergies as of 2017     Allergen Noted Reactions    Seasonal 2014   Runny Nose, Itching    [facility] 2014   Nausea    Pt states \"I get very sick, I was over 20 years ago\".    [facility] 2015         You were diagnosed with     Mixed hyperlipidemia   [272.2.ICD-9-CM]       Chronic midline low back pain without sciatica   [2037447]       Fasting hyperglycemia   [229231]       Normocytic normochromic anemia   [161229]       Fatigue, unspecified type   [1772443]       Muscle strain of chest wall, sequela   [778189]       Chronic low back pain without sciatica, unspecified back pain laterality   [3004309]         Vital Signs     Blood Pressure Pulse Temperature Respirations Height Weight    120/72 mmHg 88 36.6 °C (97.9 °F) 16 1.651 m (5' 5\") 71.668 kg (158 lb)    Body Mass Index Oxygen Saturation Breastfeeding? Smoking Status          26.29 kg/m2 96% No Current Every Day Smoker        Basic Information     Date Of Birth Sex Race Ethnicity Preferred Language    1960 Female White Non- English      Your appointments     2017  4:30 PM   Established Patient with Savage Wright M.D.   The [facility] ([facility])    [address] [place] [id number]   [phone]           You will be receiving a confirmation call a few days before your appointment from our automated call confirmation system.              Problem List              ICD-10-CM Priority Class Noted - Resolved    Pubic ramus fracture (CMS-HCC) S32.599A Medium  2014 - Present    Fracture of ribs, two S22.49XA Medium  2014 - Present    Chronic back pain (Chronic) M54.9, G89.29 Medium  2014 - Present    Lumbar burst " fracture (CMS-[facility]) S32.001A   Unknown - Present    Depression F32.9   12/11/2015 - Present    Chronic lumbar pain M54.5, G89.29   12/11/2015 - Present    Acute sinusitis J01.90   12/13/2015 - Present    Osteoporosis M81.0   2/18/2016 - Present    Chest pain R07.9 High  4/1/2016 - Present    Tobacco abuse (Chronic) Z72.0   4/1/2016 - Present    Mixed hyperlipidemia E78.2   5/26/2017 - Present      Health Maintenance        Date Due Completion Dates    IMM DTaP/Tdap/Td Vaccine (1 - Tdap) 3/10/1980 ---    IMM PNEUMOCOCCAL 19-64 (ADULT) MEDIUM RISK SERIES (1 of 1 - PPSV23) 3/10/1980 ---    COLONOSCOPY 3/10/2011 ---    MAMMOGRAM 8/2/2017 8/2/2016, 1/15/2016    PAP SMEAR 1/20/2019 1/20/2016            Current Immunizations     Influenza TIV (IM) 1/14/2014  9:15 PM      Below and/or attached are the medications your provider expects you to take. Review all of your home medications and newly ordered medications with your provider and/or pharmacist. Follow medication instructions as directed by your provider and/or pharmacist. Please keep your medication list with you and share with your provider. Update the information when medications are discontinued, doses are changed, or new medications (including over-the-counter products) are added; and carry medication information at all times in the event of emergency situations     Allergies:  SEASONAL - Runny Nose,Itching     SULFA DRUGS - Nausea     SULFA DRUGS - (reactions not documented)               Medications  Valid as of: May 26, 2017 -  3:03 PM    Generic Name Brand Name Tablet Size Instructions for use    Acetaminophen (Tab) TYLENOL 325 MG Take 2 Tabs by mouth every 6 hours as needed for Moderate Pain.        Alendronate Sodium (Tab) FOSAMAX 70 MG Take 1 Tab by mouth every 7 days.        BuPROPion HCl (TABLET SR 12 HR) WELLBUTRIN- MG Take 150 mg by mouth 2 times a day.        Carisoprodol (Tab) SOMA 350 MG Take 350 mg by mouth 2 times a day. Indications:  Musculoskeletal Pain        Carisoprodol (Tab) SOMA 350 MG Take 1 Tab by mouth 1 time daily as needed for Muscle Spasms.        Carisoprodol (Tab) SOMA 350 MG Take 1 Tab by mouth 1 time daily as needed for Muscle Spasms.        Cetirizine HCl (Tab) ZYRTEC 10 MG Take 10 mg by mouth every bedtime.        Estrogens, Conjugated (Cream) PREMARIN 0.625 MG/GM Apply 0.5 gram intravaginally twice weekly.        Fluconazole (Tab) DIFLUCAN 150 MG Take 1 Tab by mouth every day.        FLUoxetine HCl (Cap) PROZAC 10 MG Take 10 mg by mouth every day.        Fluticasone Propionate (Suspension) FLONASE 50 MCG/ACT Spray 2 Sprays in nose 2 times a day.        Gabapentin (Cap) NEURONTIN 300 MG 1 by mouth daily at bedtime ×5 days, 1 by mouth twice a day ×5 days, 1 by mouth every morning and 2 at bedtime ×5 days, then 2 by mouth twice a day        Lidocaine (Patch) LIDODERM 5 % Apply 1 Patch to skin as directed every 24 hours.        Meloxicam (Tab) MOBIC 7.5 MG Take 15 mg by mouth every day.        Meloxicam (Tab) MOBIC 15 MG TAKE 1 TABLET BY MOUTH EVERY DAY        Multiple Vitamins-Minerals (Tab) THERAGRAN-M  Take 1 Tab by mouth every day.        OLANZapine (Tab) ZYPREXA 2.5 MG Take 2.5 mg by mouth every evening.        OxyCODONE HCl (Tab) ROXICODONE 10 MG Take 1 Tab by mouth 3 times a day as needed for Moderate Pain.        OxyCODONE HCl (Tab) ROXICODONE 10 MG Take 1 Tab by mouth 3 times a day as needed for Moderate Pain.        Probiotic Product   Take 1 Cap by mouth every bedtime.        Pseudoephedrine HCl   Take 2 Tabs by mouth every four hours as needed (For congestion).        Risedronate Sodium (Tab) ACTONEL 35 MG Take 1 Tab by mouth every 7 days.        .                 Medicines prescribed today were sent to:     [facility]/PHARMACY #[id number] - [place], [place] - [address] [facility]    [address] [place] [address]    Phone: [phone] Fax: [phone]    Open 24 Hours?: No      Medication refill instructions:       If your  prescription bottle indicates you have medication refills left, it is not necessary to call your provider’s office. Please contact your pharmacy and they will refill your medication.    If your prescription bottle indicates you do not have any refills left, you may request refills at any time through one of the following ways: The online [employer] system (except Urgent Care), by calling your provider’s office, or by asking your pharmacy to contact your provider’s office with a refill request. Medication refills are processed only during regular business hours and may not be available until the next business day. Your provider may request additional information or to have a follow-up visit with you prior to refilling your medication.   *Please Note: Medication refills are assigned a new Rx number when refilled electronically. Your pharmacy may indicate that no refills were authorized even though a new prescription for the same medication is available at the pharmacy. Please request the medicine by name with the pharmacy before contacting your provider for a refill.        Your To Do List     Future Labs/Procedures Complete By Expires    CBC WITH DIFFERENTIAL  As directed 5/27/2018    COMP METABOLIC PANEL  As directed 5/27/2018    LIPID PROFILE  As directed 5/27/2018    TSH  As directed 5/27/2018    VITAMIN D,25 HYDROXY  As directed 5/26/2018         Appscend Access Code: [email or web address]  Expires: 6/25/2017  3:03 PM    App[employer]end  A secure, online tool to manage your health information     [employer]’s Appscend® is a secure, online tool that connects you to your personalized health information from the privacy of your home -- day or night - making it very easy for you to manage your healthcare. Once the activation process is completed, you can even access your medical information using the Appscend elaine, which is available for free in the Apple Elaine store or Google Play store.     Appscend provides the following levels of  access (as shown below):   My Chart Features   [facility] Doctor [facility]  Specialists [facility]  Urgent  Care Non-[facility]  Primary Care  Doctor   Email your healthcare team securely and privately 24/7 X X X    Manage appointments: schedule your next appointment; view details of past/upcoming appointments X      Request prescription refills. X      View recent personal medical records, including lab and immunizations X X X X   View health record, including health history, allergies, medications X X X X   Read reports about your outpatient visits, procedures, consult and ER notes X X X X   See your discharge summary, which is a recap of your hospital and/or ER visit that includes your diagnosis, lab results, and care plan. X X       How to register for "[employer]:  1. Go to  [email or web address].  2. Click on the Sign Up Now box, which takes you to the New Member Sign Up page. You will need to provide the following information:  a. Enter your "[employer] Access Code exactly as it appears at the top of this page. (You will not need to use this code after you’ve completed the sign-up process. If you do not sign up before the expiration date, you must request a new code.)   b. Enter your date of birth.   c. Enter your home email address.   d. Click Submit, and follow the next screen’s instructions.  3. Create a "[employer] ID. This will be your "[employer] login ID and cannot be changed, so think of one that is secure and easy to remember.  4. Create a "[employer] password. You can change your password at any time.  5. Enter your Password Reset Question and Answer. This can be used at a later time if you forget your password.   6. Enter your e-mail address. This allows you to receive e-mail notifications when new information is available in "[employer].  7. Click Sign Up. You can now view your health information.    For assistance activating your "[employer] account, call [phone]        Quit Tobacco Information     Do you want to  quit using tobacco?    Quitting tobacco decreases risks of cancer, heart and lung disease, increases life expectancy, improves sense of taste and smell, and increases spending money, among other benefits.    If you are thinking about quitting, we can help.  • [facility] Quit Tobacco Program: 700.457.7492  o Program occurs weekly for four weeks and includes pharmacist consultation on products to support quitting smoking or chewing tobacco. A provider referral is needed for pharmacist consultation.  • Tobacco Users Help Hotline: 4-220-QUIT-NOW (707-9561) or https://[place].quitlogix.org/  o Free, confidential telephone and online coaching for [place] residents. Sessions are designed on a schedule that is convenient for you. Eligible clients receive free nicotine replacement therapy.  • Nationally: www.smokefree.gov  o Information and professional assistance to support both immediate and long-term needs as you become, and remain, a non-smoker. Smokefree.gov allows you to choose the help that best fits your needs.

## 2017-05-27 NOTE — PROGRESS NOTES
"Subjective:      Paulette Toussaint is a 57 y.o. female who presents with Follow-Up            HPI 1. Chronic back pain-patient reports that possibly 10 days a month she is getting by with 2 instead of 3 doses of oxycodone 10 mg immediate release. She is still taking a single dose of Soma at bedtime which assists with relaxation and sleep by her report. She denies current constipation, confusion, unexplained diaphoresis. She has been attending physical therapy 3 visits so far which she finds is helpful. They're working on her core strengthening along with giving her electrical stimulation and heat at the end of each session.    ROS negative for urinary incontinence, near syncope, chest pain       Objective:     /72 mmHg  Pulse 88  Temp(Src) 36.6 °C (97.9 °F)  Resp 16  Ht 1.651 m (5' 5\")  Wt 71.668 kg (158 lb)  BMI 26.29 kg/m2  SpO2 96%  Breastfeeding? No     Physical Exam  Gen.- alert, cooperative, in no acute distress  Neck- midline trachea, thyroid not enlarged or tender,supple, no cervical adenopathy  Chest-clear to auscultation and percussion with normal breath sounds. No retractions. Chest wall nontender  Cardiac- regular rhythm and rate. No murmur, thrill, or heave  Back-1+ tender from T6-T10 in the midline and 1+ tender in the parathoracic areas bilaterally          Assessment/Plan:     1. Mixed hyperlipidemia    - LIPID PROFILE; Future  - COMP METABOLIC PANEL; Future    2. Chronic midline low back pain without sciatica    - VITAMIN D,25 HYDROXY; Future    3. Fasting hyperglycemia    4. Normocytic normochromic anemia CBC WITH DIFFERENTIAL; Future    5. Fatigue, unspecified type    - TSH; Future    6. Muscle strain of chest wall, sequela    - carisoprodol (SOMA) 350 MG Tab; Take 1 Tab by mouth 1 time daily as needed for Muscle Spasms.  Dispense: 30 Tab; Refill: 2    7. Chronic low back pain without sciatica, unspecified back pain laterality    - oxycodone immediate release (ROXICODONE) 10 MG " immediate release tablet; Take 1 Tab by mouth 3 times a day as needed for Moderate Pain.  Dispense: 80 Tab; Refill: 0     Plan: 1. Reduce oxycodone 10 mg to 80 tablets per month, continue Soma 30 tablets per month  2. Continue physical therapy  3. Update numerous labs-previous findings of anemia, hyperglycemia, elevated LDL  4. Revisit one month

## 2017-06-19 RX ORDER — MELOXICAM 15 MG/1
TABLET ORAL
Qty: 30 TAB | Refills: 3 | Status: SHIPPED | OUTPATIENT
Start: 2017-06-19 | End: 2019-08-07

## 2017-06-26 ENCOUNTER — OFFICE VISIT (OUTPATIENT)
Dept: MEDICAL GROUP | Facility: MEDICAL CENTER | Age: 57
End: 2017-06-26
Attending: FAMILY MEDICINE
Payer: MEDICAID

## 2017-06-26 VITALS
TEMPERATURE: 98.8 F | SYSTOLIC BLOOD PRESSURE: 140 MMHG | WEIGHT: 155 LBS | BODY MASS INDEX: 25.83 KG/M2 | HEART RATE: 84 BPM | HEIGHT: 65 IN | RESPIRATION RATE: 16 BRPM | OXYGEN SATURATION: 98 % | DIASTOLIC BLOOD PRESSURE: 78 MMHG

## 2017-06-26 DIAGNOSIS — G89.29 CHRONIC LOW BACK PAIN WITHOUT SCIATICA, UNSPECIFIED BACK PAIN LATERALITY: ICD-10-CM

## 2017-06-26 DIAGNOSIS — S32.001S LUMBAR BURST FRACTURE, SEQUELA: ICD-10-CM

## 2017-06-26 DIAGNOSIS — Z79.891 USE OF OPIATES FOR THERAPEUTIC PURPOSES: ICD-10-CM

## 2017-06-26 DIAGNOSIS — M54.50 CHRONIC LOW BACK PAIN WITHOUT SCIATICA, UNSPECIFIED BACK PAIN LATERALITY: ICD-10-CM

## 2017-06-26 PROCEDURE — 99212 OFFICE O/P EST SF 10 MIN: CPT | Performed by: FAMILY MEDICINE

## 2017-06-26 PROCEDURE — 99214 OFFICE O/P EST MOD 30 MIN: CPT | Performed by: FAMILY MEDICINE

## 2017-06-26 RX ORDER — OXYCODONE HYDROCHLORIDE 10 MG/1
10 TABLET ORAL 2 TIMES DAILY PRN
Qty: 60 TAB | Refills: 0 | Status: SHIPPED | OUTPATIENT
Start: 2017-06-26 | End: 2017-07-13 | Stop reason: SDUPTHER

## 2017-06-26 ASSESSMENT — PAIN SCALES - GENERAL: PAINLEVEL: NO PAIN

## 2017-06-26 NOTE — MR AVS SNAPSHOT
"Paulette Toussaint   2017 4:30 PM   Office Visit   MRN: [id number]    [facility]   Dept Phone:  [phone]    Description:  Female : 1960   Provider:  Savage Wright M.D.           Reason for Visit     Follow-Up           Allergies as of 2017     Allergen Noted Reactions    Seasonal 2014   Runny Nose, Itching    Sulfa Drugs 2014   Nausea    Pt states \"I get very sick, I was over 20 years ago\".    Sulfa Drugs 2015         You were diagnosed with     Chronic low back pain without sciatica, unspecified back pain laterality   [[id number]]         Vital Signs     Blood Pressure Pulse Temperature Respirations Height Weight    140/78 mmHg 84 37.1 °C (98.8 °F) 16 1.651 m (5' 5\") 70.308 kg (155 lb)    Body Mass Index Oxygen Saturation Smoking Status             25.79 kg/m2 98% Current Every Day Smoker         Basic Information     Date Of Birth Sex Race Ethnicity Preferred Language    1960 Female White Non- English      Problem List              ICD-10-CM Priority Class Noted - Resolved    Pubic ramus fracture (CMS-HCC) S32.599A Medium  2014 - Present    Fracture of ribs, two S22.49XA Medium  2014 - Present    Chronic back pain (Chronic) M54.9, G89.29 Medium  2014 - Present    Lumbar burst fracture (CMS-HCC) S32.001A   Unknown - Present    Depression F32.9   2015 - Present    Chronic lumbar pain M54.5, G89.29   2015 - Present    Acute sinusitis J01.90   2015 - Present    Osteoporosis M81.0   2016 - Present    Chest pain R07.9 High  2016 - Present    Tobacco abuse (Chronic) Z72.0   2016 - Present    Mixed hyperlipidemia E78.2   2017 - Present      Health Maintenance        Date Due Completion Dates    IMM DTaP/Tdap/Td Vaccine (1 - Tdap) 3/10/1980 ---    IMM PNEUMOCOCCAL 19-64 (ADULT) MEDIUM RISK SERIES (1 of 1 - PPSV23) 3/10/1980 ---    COLONOSCOPY 3/10/2011 ---    MAMMOGRAM 2017, " 1/15/2016    PAP SMEAR 1/20/2019 1/20/2016            Current Immunizations     Influenza TIV (IM) 1/14/2014  9:15 PM      Below and/or attached are the medications your provider expects you to take. Review all of your home medications and newly ordered medications with your provider and/or pharmacist. Follow medication instructions as directed by your provider and/or pharmacist. Please keep your medication list with you and share with your provider. Update the information when medications are discontinued, doses are changed, or new medications (including over-the-counter products) are added; and carry medication information at all times in the event of emergency situations     Allergies:  SEASONAL - Runny Nose,Itching     SULFA DRUGS - Nausea     SULFA DRUGS - (reactions not documented)               Medications  Valid as of: June 26, 2017 -  5:47 PM    Generic Name Brand Name Tablet Size Instructions for use    Acetaminophen (Tab) TYLENOL 325 MG Take 2 Tabs by mouth every 6 hours as needed for Moderate Pain.        Alendronate Sodium (Tab) FOSAMAX 70 MG Take 1 Tab by mouth every 7 days.        BuPROPion HCl (TABLET SR 12 HR) WELLBUTRIN- MG Take 150 mg by mouth 2 times a day.        Carisoprodol (Tab) SOMA 350 MG Take 350 mg by mouth 2 times a day. Indications: Musculoskeletal Pain        Carisoprodol (Tab) SOMA 350 MG Take 1 Tab by mouth 1 time daily as needed for Muscle Spasms.        Carisoprodol (Tab) SOMA 350 MG Take 1 Tab by mouth 1 time daily as needed for Muscle Spasms.        Cetirizine HCl (Tab) ZYRTEC 10 MG Take 10 mg by mouth every bedtime.        Estrogens, Conjugated (Cream) PREMARIN 0.625 MG/GM Apply 0.5 gram intravaginally twice weekly.        Fluconazole (Tab) DIFLUCAN 150 MG Take 1 Tab by mouth every day.        FLUoxetine HCl (Cap) PROZAC 10 MG Take 10 mg by mouth every day.        Fluticasone Propionate (Suspension) FLONASE 50 MCG/ACT Spray 2 Sprays in nose 2 times a day.        Gabapentin  (Cap) NEURONTIN 300 MG 1 by mouth daily at bedtime ×5 days, 1 by mouth twice a day ×5 days, 1 by mouth every morning and 2 at bedtime ×5 days, then 2 by mouth twice a day        Lidocaine (Patch) LIDODERM 5 % Apply 1 Patch to skin as directed every 24 hours.        Meloxicam (Tab) MOBIC 15 MG TAKE 1 TABLET BY MOUTH EVERY DAY        Multiple Vitamins-Minerals (Tab) THERAGRAN-M  Take 1 Tab by mouth every day.        OLANZapine (Tab) ZYPREXA 2.5 MG Take 2.5 mg by mouth every evening.        OxyCODONE HCl (Tab) ROXICODONE 10 MG Take 1 Tab by mouth 3 times a day as needed for Moderate Pain.        OxyCODONE HCl (Tab) ROXICODONE 10 MG Take 1 Tab by mouth 2 times a day as needed for Moderate Pain.        Probiotic Product   Take 1 Cap by mouth every bedtime.        Pseudoephedrine HCl   Take 2 Tabs by mouth every four hours as needed (For congestion).        Risedronate Sodium (Tab) ACTONEL 35 MG Take 1 Tab by mouth every 7 days.        .                 Medicines prescribed today were sent to:     [id number] - [place], [place] - [address] [address]    Phone: [phone] Fax: [phone]    Open 24 Hours?: No      Medication refill instructions:       If your prescription bottle indicates you have medication refills left, it is not necessary to call your provider’s office. Please contact your pharmacy and they will refill your medication.    If your prescription bottle indicates you do not have any refills left, you may request refills at any time through one of the following ways: The online [employer] system (except Urgent Care), by calling your provider’s office, or by asking your pharmacy to contact your provider’s office with a refill request. Medication refills are processed only during regular business hours and may not be available until the next business day. Your provider may request additional information or to have a follow-up visit with you prior to refilling your  medication.   *Please Note: Medication refills are assigned a new Rx number when refilled electronically. Your pharmacy may indicate that no refills were authorized even though a new prescription for the same medication is available at the pharmacy. Please request the medicine by name with the pharmacy before contacting your provider for a refill.           IR Diagnostyx Access Code: [email or web address]  Expires: 7/26/2017  5:47 PM    IR Diagnostyx  A secure, online tool to manage your health information     [employer]’s IR Diagnostyx® is a secure, online tool that connects you to your personalized health information from the privacy of your home -- day or night - making it very easy for you to manage your healthcare. Once the activation process is completed, you can even access your medical information using the IR Diagnostyx elaine, which is available for free in the Apple Elaine store or Google Play store.     IR Diagnostyx provides the following levels of access (as shown below):   My Chart Features   [facility] Primary Care Doctor [facility]  Specialists [facility]  Urgent  Care Non-[facility]  Primary Care  Doctor   Email your healthcare team securely and privately 24/7 X X X    Manage appointments: schedule your next appointment; view details of past/upcoming appointments X      Request prescription refills. X      View recent personal medical records, including lab and immunizations X X X X   View health record, including health history, allergies, medications X X X X   Read reports about your outpatient visits, procedures, consult and ER notes X X X X   See your discharge summary, which is a recap of your hospital and/or ER visit that includes your diagnosis, lab results, and care plan. X X       How to register for IR Diagnostyx:  1. Go to  [email or web address].  2. Click on the Sign Up Now box, which takes you to the New Member Sign Up page. You will need to provide the following information:  a. Enter your IR Diagnostyx Access Code exactly as it appears at the  top of this page. (You will not need to use this code after you’ve completed the sign-up process. If you do not sign up before the expiration date, you must request a new code.)   b. Enter your date of birth.   c. Enter your home email address.   d. Click Submit, and follow the next screen’s instructions.  3. Create a Ringly ID. This will be your Ringly login ID and cannot be changed, so think of one that is secure and easy to remember.  4. Create a Vintt password. You can change your password at any time.  5. Enter your Password Reset Question and Answer. This can be used at a later time if you forget your password.   6. Enter your e-mail address. This allows you to receive e-mail notifications when new information is available in Ringly.  7. Click Sign Up. You can now view your health information.    For assistance activating your Ringly account, call [phone]        Quit Tobacco Information     Do you want to quit using tobacco?    Quitting tobacco decreases risks of cancer, heart and lung disease, increases life expectancy, improves sense of taste and smell, and increases spending money, among other benefits.    If you are thinking about quitting, we can help.  • [facility] Quit Tobacco Program: [phone]  o Program occurs weekly for four weeks and includes pharmacist consultation on products to support quitting smoking or chewing tobacco. A provider referral is needed for pharmacist consultation.  • Tobacco Users Help Hotline: 6-800-QUIT-NOW (806-2664) or https://[place].quitlogix.org/  o Free, confidential telephone and online coaching for [place] residents. Sessions are designed on a schedule that is convenient for you. Eligible clients receive free nicotine replacement therapy.  • Nationally: www.smokefree.gov  o Information and professional assistance to support both immediate and long-term needs as you become, and remain, a non-smoker. Smokefree.gov allows you to choose the help that best fits  your needs.

## 2017-06-27 NOTE — PROGRESS NOTES
Subjective:      Paulette Toussaint is a 57 y.o. female who presents with Follow-Up            HPI 1. Chronic low back pain-patient notes more mid to upper low back pain with frequent muscle spasms more left-sided than right-sided at about T10-L1 level recently. She is status post previous L1 burst fracture with 2 subsequent falls involving her back most recently 2015. She denies any pain radiating from her back down and either leg. She denies urinary or fecal incontinence . Patient reports continued benefit with ongoing current course of physical therapy for core strengthening. She has only been taking gabapentin very infrequently recently. She reports she is compliant taking meloxicam 15 mg  2. Chronic use of opiates for therapeutic purposes-patient reports being continuously prescribed opiates since her lumbar burst fracture, 2013, with installation of hardware at that time. Patient currently reports that many days she has taken only 2 separate oxycodone 10 mg immediate release doses for back pain. She denies current constipation, unexplained diaphoresis, runny nose. She does see a psychiatric provider for mood disorder.    ROS negative for dyspnea, cough, chest pain, black stools, bloody stools   Social history-single, not working  Current medication-  Prior to Admission medications    Medication Sig Start Date End Date Taking? Authorizing Provider   oxycodone immediate release (ROXICODONE) 10 MG immediate release tablet Take 1 Tab by mouth 2 times a day as needed for Moderate Pain. 6/26/17  Yes Savage Wright M.D.   meloxicam (MOBIC) 15 MG tablet TAKE 1 TABLET BY MOUTH EVERY DAY 6/19/17   Savage Wright M.D.   carisoprodol (SOMA) 350 MG Tab Take 1 Tab by mouth 1 time daily as needed for Muscle Spasms. 5/26/17   Savage Wright M.D.   carisoprodol (SOMA) 350 MG Tab Take 1 Tab by mouth 1 time daily as needed for Muscle Spasms. 5/1/17   Savage Wright M.D.   olanzapine (ZYPREXA) 2.5 MG Tab Take  2.5 mg by mouth every evening.    Not In System Provider   buPROPion SR (WELLBUTRIN-SR) 150 MG TABLET SR 12 HR sustained-release tablet Take 150 mg by mouth 2 times a day.    Not In System Provider   gabapentin (NEURONTIN) 300 MG Cap 1 by mouth daily at bedtime ×5 days, 1 by mouth twice a day ×5 days, 1 by mouth every morning and 2 at bedtime ×5 days, then 2 by mouth twice a day 3/30/17   Savage Wright M.D.   oxycodone immediate release (ROXICODONE) 10 MG immediate release tablet Take 1 Tab by mouth 3 times a day as needed for Moderate Pain. 11/22/16   Savage Wright M.D.   lidocaine (LIDODERM) 5 % Patch Apply 1 Patch to skin as directed every 24 hours. 7/25/16   Savage Wright M.D.   risedronate (ACTONEL) 35 MG Tab Take 1 Tab by mouth every 7 days. 6/27/16   Savage Wright M.D.   fluconazole (DIFLUCAN) 150 MG tablet Take 1 Tab by mouth every day. 6/13/16   Savage Wright M.D.   alendronate (FOSAMAX) 70 MG Tab Take 1 Tab by mouth every 7 days. 4/4/16   Savage Wright M.D.   therapeutic multivitamin-minerals (THERAGRAN-M) Tab Take 1 Tab by mouth every day.    Not In System Provider   fluticasone (FLONASE) 50 MCG/ACT nasal spray Spray 2 Sprays in nose 2 times a day.    Not In System Provider   cetirizine (ZYRTEC) 10 MG Tab Take 10 mg by mouth every bedtime.    Not In System Provider   Probiotic Product (PROBIOTIC DAILY PO) Take 1 Cap by mouth every bedtime.    Not In System Provider   Pseudoephedrine HCl (SUDAFED PO) Take 2 Tabs by mouth every four hours as needed (For congestion).    Not In System Provider   acetaminophen (TYLENOL) 325 MG Tab Take 2 Tabs by mouth every 6 hours as needed for Moderate Pain. 4/1/16   GRACE Timmons.P.R.N.   conjugated estrogen (PREMARIN) 0.625 MG/GM Cream Apply 0.5 gram intravaginally twice weekly. 1/21/16   DARLENE Waters.   carisoprodol (SOMA) 350 MG TABS Take 350 mg by mouth 2 times a day. Indications: Musculoskeletal Pain    [facility] Triage Protocol, M.D.  "  fluoxetine (PROZAC) 10 MG CAPS Take 10 mg by mouth every day.    Er Triage Protocol, M.D.            Objective:     /78 mmHg  Pulse 84  Temp(Src) 37.1 °C (98.8 °F)  Resp 16  Ht 1.651 m (5' 5\")  Wt 70.308 kg (155 lb)  BMI 25.79 kg/m2  SpO2 98%     Physical Exam  Gen.- alert, cooperative, in no acute distress  Neck- midline trachea, thyroid not enlarged or tender,supple, no cervical adenopathy  Chest-clear to auscultation and percussion with normal breath sounds. No retractions. Chest wall nontender  Cardiac- regular rhythm and rate. No murmur, thrill, or heave  Back-mildly tender at T10 in the midline. Overlying redness or swelling.  Lower extremities-intact light touch. Intact strength.    Psych-normal affect with good eye contact. Normal grooming. Oriented x4.         Assessment/Plan:     1. Chronic low back pain without sciatica, unspecified back pain laterality    - oxycodone immediate release (ROXICODONE) 10 MG immediate release tablet; Take 1 Tab by mouth 2 times a day as needed for Moderate Pain.  Dispense: 60 Tab; Refill: 0    2. Lumbar burst fracture, sequela      3. Use of opiates for therapeutic purposes -extensive discussion was held regarding the conceptual differences between maintaining narcotic doses long after acute pain has healed versus adopting a chronic pain approach or we will focus on maximizing patient's daily activities and engagement despite some lingering back discomfort, and tapering opiates completely off.    Patient was seen for 30 minutes face to face of which, 25 minutes was spent counseling regarding the above mentioned problems.   Plan: 1. Encourage daily twice a day dosing of gabapentin 300-600 mg rather than when necessary  2. Reduce oxycodone 10 mg-#60  3. Continue physical therapy  4. Continue meloxicam  5. Revisit in one month  "

## 2017-07-13 ENCOUNTER — TELEPHONE (OUTPATIENT)
Dept: MEDICAL GROUP | Facility: MEDICAL CENTER | Age: 57
End: 2017-07-13

## 2017-07-13 DIAGNOSIS — M54.50 CHRONIC LOW BACK PAIN WITHOUT SCIATICA, UNSPECIFIED BACK PAIN LATERALITY: ICD-10-CM

## 2017-07-13 DIAGNOSIS — G89.29 CHRONIC LOW BACK PAIN WITHOUT SCIATICA, UNSPECIFIED BACK PAIN LATERALITY: ICD-10-CM

## 2017-07-13 RX ORDER — OXYCODONE HYDROCHLORIDE 10 MG/1
10 TABLET ORAL 2 TIMES DAILY PRN
Qty: 60 TAB | Refills: 0 | Status: SHIPPED | OUTPATIENT
Start: 2017-07-13 | End: 2017-08-14

## 2017-07-18 ENCOUNTER — HOSPITAL ENCOUNTER (OUTPATIENT)
Dept: LAB | Facility: MEDICAL CENTER | Age: 57
End: 2017-07-18
Attending: FAMILY MEDICINE
Payer: MEDICAID

## 2017-07-18 DIAGNOSIS — E78.2 MIXED HYPERLIPIDEMIA: ICD-10-CM

## 2017-07-18 DIAGNOSIS — D64.9 NORMOCYTIC NORMOCHROMIC ANEMIA: ICD-10-CM

## 2017-07-18 DIAGNOSIS — G89.29 CHRONIC MIDLINE LOW BACK PAIN WITHOUT SCIATICA: ICD-10-CM

## 2017-07-18 DIAGNOSIS — R53.83 FATIGUE, UNSPECIFIED TYPE: ICD-10-CM

## 2017-07-18 DIAGNOSIS — M54.50 CHRONIC MIDLINE LOW BACK PAIN WITHOUT SCIATICA: ICD-10-CM

## 2017-07-18 LAB
25(OH)D3 SERPL-MCNC: 27 NG/ML (ref 30–100)
ALBUMIN SERPL BCP-MCNC: 4.6 G/DL (ref 3.2–4.9)
ALBUMIN/GLOB SERPL: 1.5 G/DL
ALP SERPL-CCNC: 114 U/L (ref 30–99)
ALT SERPL-CCNC: 14 U/L (ref 2–50)
ANION GAP SERPL CALC-SCNC: 9 MMOL/L (ref 0–11.9)
AST SERPL-CCNC: 21 U/L (ref 12–45)
BASOPHILS # BLD AUTO: 0.3 % (ref 0–1.8)
BASOPHILS # BLD: 0.03 K/UL (ref 0–0.12)
BILIRUB SERPL-MCNC: 0.5 MG/DL (ref 0.1–1.5)
BUN SERPL-MCNC: 9 MG/DL (ref 8–22)
CALCIUM SERPL-MCNC: 10.1 MG/DL (ref 8.5–10.5)
CHLORIDE SERPL-SCNC: 99 MMOL/L (ref 96–112)
CHOLEST SERPL-MCNC: 217 MG/DL (ref 100–199)
CO2 SERPL-SCNC: 26 MMOL/L (ref 20–33)
CREAT SERPL-MCNC: 0.55 MG/DL (ref 0.5–1.4)
EOSINOPHIL # BLD AUTO: 0.16 K/UL (ref 0–0.51)
EOSINOPHIL NFR BLD: 1.3 % (ref 0–6.9)
ERYTHROCYTE [DISTWIDTH] IN BLOOD BY AUTOMATED COUNT: 42.6 FL (ref 35.9–50)
GFR SERPL CREATININE-BSD FRML MDRD: >60 ML/MIN/1.73 M 2
GLOBULIN SER CALC-MCNC: 3 G/DL (ref 1.9–3.5)
GLUCOSE SERPL-MCNC: 90 MG/DL (ref 65–99)
HCT VFR BLD AUTO: 41.9 % (ref 37–47)
HDLC SERPL-MCNC: 45 MG/DL
HGB BLD-MCNC: 14.2 G/DL (ref 12–16)
IMM GRANULOCYTES # BLD AUTO: 0.04 K/UL (ref 0–0.11)
IMM GRANULOCYTES NFR BLD AUTO: 0.3 % (ref 0–0.9)
LDLC SERPL CALC-MCNC: 143 MG/DL
LYMPHOCYTES # BLD AUTO: 2.19 K/UL (ref 1–4.8)
LYMPHOCYTES NFR BLD: 18.4 % (ref 22–41)
MCH RBC QN AUTO: 31.3 PG (ref 27–33)
MCHC RBC AUTO-ENTMCNC: 33.9 G/DL (ref 33.6–35)
MCV RBC AUTO: 92.5 FL (ref 81.4–97.8)
MONOCYTES # BLD AUTO: 0.56 K/UL (ref 0–0.85)
MONOCYTES NFR BLD AUTO: 4.7 % (ref 0–13.4)
NEUTROPHILS # BLD AUTO: 8.93 K/UL (ref 2–7.15)
NEUTROPHILS NFR BLD: 75 % (ref 44–72)
NRBC # BLD AUTO: 0 K/UL
NRBC BLD AUTO-RTO: 0 /100 WBC
PLATELET # BLD AUTO: 412 K/UL (ref 164–446)
PMV BLD AUTO: 9.7 FL (ref 9–12.9)
POTASSIUM SERPL-SCNC: 4.6 MMOL/L (ref 3.6–5.5)
PROT SERPL-MCNC: 7.6 G/DL (ref 6–8.2)
RBC # BLD AUTO: 4.53 M/UL (ref 4.2–5.4)
SODIUM SERPL-SCNC: 134 MMOL/L (ref 135–145)
TRIGL SERPL-MCNC: 145 MG/DL (ref 0–149)
TSH SERPL DL<=0.005 MIU/L-ACNC: 0.62 UIU/ML (ref 0.3–3.7)
WBC # BLD AUTO: 11.9 K/UL (ref 4.8–10.8)

## 2017-07-18 PROCEDURE — 82306 VITAMIN D 25 HYDROXY: CPT

## 2017-07-18 PROCEDURE — 80053 COMPREHEN METABOLIC PANEL: CPT

## 2017-07-18 PROCEDURE — 80061 LIPID PANEL: CPT

## 2017-07-18 PROCEDURE — 84443 ASSAY THYROID STIM HORMONE: CPT

## 2017-07-18 PROCEDURE — 36415 COLL VENOUS BLD VENIPUNCTURE: CPT

## 2017-07-18 PROCEDURE — 85025 COMPLETE CBC W/AUTO DIFF WBC: CPT

## 2017-07-25 ENCOUNTER — TELEPHONE (OUTPATIENT)
Dept: MEDICAL GROUP | Facility: MEDICAL CENTER | Age: 57
End: 2017-07-25

## 2017-07-25 NOTE — TELEPHONE ENCOUNTER
Which specific GI symptoms have been increasing lately that raises her suspicions for esophageal problems?

## 2017-07-25 NOTE — TELEPHONE ENCOUNTER
----- Message from Savage Wright M.D. sent at 7/24/2017  6:42 PM PDT -----  Vitamin D level is 3 points low at 27. Suggest patient consider taking an over-the-counter vitamin D supplement of 500 units daily to improve bone health. Thyroid level and triglycerides are normal. 17 points high on total cholesterol and 43 points high and undesirable LDL cholesterol. Recommend reduction in dietary eggs, cheese, red meat but not complete avoidance. Normal kidney and liver function. Mild elevation of white blood cell count of uncertain clinical significance. No anemia and normal platelet count. Can discuss at next visit

## 2017-07-25 NOTE — TELEPHONE ENCOUNTER
Pt states she had a consultation with the GI office, and they are not able to do the procedures on the same day. She will further discuss her symptoms with you at her next appointment.

## 2017-07-25 NOTE — TELEPHONE ENCOUNTER
1. Caller Name: Paulette                                        Call Back Number: [phone] (home)         Patient approves a detailed voicemail message: yes    Pt would like to have endoscopy done at the same time as her colonoscopy as she has been having an increase in gastric symptoms. She states she spoke with the GI office and they need to have a referral placed. Please advise     Thank you

## 2017-07-28 ENCOUNTER — OFFICE VISIT (OUTPATIENT)
Dept: MEDICAL GROUP | Facility: MEDICAL CENTER | Age: 57
End: 2017-07-28
Attending: FAMILY MEDICINE
Payer: MEDICAID

## 2017-07-28 VITALS
SYSTOLIC BLOOD PRESSURE: 142 MMHG | WEIGHT: 151 LBS | OXYGEN SATURATION: 96 % | TEMPERATURE: 98.1 F | HEART RATE: 84 BPM | RESPIRATION RATE: 16 BRPM | BODY MASS INDEX: 25.13 KG/M2 | DIASTOLIC BLOOD PRESSURE: 84 MMHG

## 2017-07-28 DIAGNOSIS — S29.011S MUSCLE STRAIN OF CHEST WALL, SEQUELA: ICD-10-CM

## 2017-07-28 DIAGNOSIS — G89.29 CHRONIC MIDLINE LOW BACK PAIN WITHOUT SCIATICA: ICD-10-CM

## 2017-07-28 DIAGNOSIS — M54.50 CHRONIC MIDLINE LOW BACK PAIN WITHOUT SCIATICA: ICD-10-CM

## 2017-07-28 DIAGNOSIS — K21.9 GASTROESOPHAGEAL REFLUX DISEASE WITHOUT ESOPHAGITIS: ICD-10-CM

## 2017-07-28 PROCEDURE — 99212 OFFICE O/P EST SF 10 MIN: CPT | Performed by: FAMILY MEDICINE

## 2017-07-28 PROCEDURE — 99213 OFFICE O/P EST LOW 20 MIN: CPT | Performed by: FAMILY MEDICINE

## 2017-07-28 RX ORDER — CARISOPRODOL 350 MG/1
350 TABLET ORAL
Qty: 30 TAB | Refills: 1 | Status: SHIPPED | OUTPATIENT
Start: 2017-07-28 | End: 2017-08-14

## 2017-07-28 ASSESSMENT — PAIN SCALES - GENERAL: PAINLEVEL: NO PAIN

## 2017-07-28 NOTE — MR AVS SNAPSHOT
"Paulette Toussaint   2017 1:30 PM   Office Visit   MRN: [id number]    [facility]   Dept Phone:  [phone]    Description:  Female : 1960   Provider:  Savage Wright M.D.           Reason for Visit     Follow-Up           Allergies as of 2017     Allergen Noted Reactions    Seasonal 2014   Runny Nose, Itching    Sulfa Drugs 2014   Nausea    Pt states \"I get very sick, I was over 20 years ago\".    Sulfa Drugs 2015         You were diagnosed with     Muscle strain of chest wall, sequela   [398505]       Gastroesophageal reflux disease without esophagitis   [240963]       Chronic midline low back pain without sciatica   [1037240]         Vital Signs     Blood Pressure Pulse Temperature Respirations Weight Oxygen Saturation    142/84 mmHg 84 36.7 °C (98.1 °F) 16 68.493 kg (151 lb) 96%    Breastfeeding? Smoking Status                No Current Every Day Smoker          Basic Information     Date Of Birth Sex Race Ethnicity Preferred Language    1960 Female White Non- English      Problem List              ICD-10-CM Priority Class Noted - Resolved    Pubic ramus fracture (CMS-HCC) S32.599A Medium  2014 - Present    Fracture of ribs, two S22.49XA Medium  2014 - Present    Chronic back pain (Chronic) M54.9, G89.29 Medium  2014 - Present    Lumbar burst fracture (CMS-HCC) S32.001A   Unknown - Present    Depression F32.9   2015 - Present    Chronic lumbar pain M54.5, G89.29   2015 - Present    Acute sinusitis J01.90   2015 - Present    Osteoporosis M81.0   2016 - Present    Chest pain R07.9 High  2016 - Present    Tobacco abuse (Chronic) Z72.0   2016 - Present    Mixed hyperlipidemia E78.2   2017 - Present    Use of opiates for therapeutic purposes Z79.891   2017 - Present      Health Maintenance        Date Due Completion Dates    IMM DTaP/Tdap/Td Vaccine (1 - Tdap) 3/10/1980 ---    IMM " PNEUMOCOCCAL 19-64 (ADULT) MEDIUM RISK SERIES (1 of 1 - PPSV23) 3/10/1980 ---    MAMMOGRAM 8/2/2017 8/2/2016, 1/15/2016    IMM INFLUENZA (1) 9/1/2017 1/14/2014    PAP SMEAR 1/20/2019 1/20/2016    COLONOSCOPY 7/26/2022 7/26/2017            Current Immunizations     Influenza TIV (IM) 1/14/2014  9:15 PM      Below and/or attached are the medications your provider expects you to take. Review all of your home medications and newly ordered medications with your provider and/or pharmacist. Follow medication instructions as directed by your provider and/or pharmacist. Please keep your medication list with you and share with your provider. Update the information when medications are discontinued, doses are changed, or new medications (including over-the-counter products) are added; and carry medication information at all times in the event of emergency situations     Allergies:  SEASONAL - Runny Nose,Itching     SULFA DRUGS - Nausea     SULFA DRUGS - (reactions not documented)               Medications  Valid as of: July 28, 2017 -  2:23 PM    Generic Name Brand Name Tablet Size Instructions for use    Acetaminophen (Tab) TYLENOL 325 MG Take 2 Tabs by mouth every 6 hours as needed for Moderate Pain.        Alendronate Sodium (Tab) FOSAMAX 70 MG Take 1 Tab by mouth every 7 days.        BuPROPion HCl (TABLET SR 12 HR) WELLBUTRIN- MG Take 150 mg by mouth 2 times a day.        Carisoprodol (Tab) SOMA 350 MG Take 350 mg by mouth 2 times a day. Indications: Musculoskeletal Pain        Carisoprodol (Tab) SOMA 350 MG Take 1 Tab by mouth 1 time daily as needed for Muscle Spasms.        Carisoprodol (Tab) SOMA 350 MG Take 1 Tab by mouth 1 time daily as needed for Muscle Spasms.        Cetirizine HCl (Tab) ZYRTEC 10 MG Take 10 mg by mouth every bedtime.        Estrogens, Conjugated (Cream) PREMARIN 0.625 MG/GM Apply 0.5 gram intravaginally twice weekly.        Fluconazole (Tab) DIFLUCAN 150 MG Take 1 Tab by mouth every day.          FLUoxetine HCl (Cap) PROZAC 10 MG Take 10 mg by mouth every day.        Fluticasone Propionate (Suspension) FLONASE 50 MCG/ACT Spray 2 Sprays in nose 2 times a day.        Gabapentin (Cap) NEURONTIN 300 MG 1 by mouth daily at bedtime ×5 days, 1 by mouth twice a day ×5 days, 1 by mouth every morning and 2 at bedtime ×5 days, then 2 by mouth twice a day        Lidocaine (Patch) LIDODERM 5 % Apply 1 Patch to skin as directed every 24 hours.        Meloxicam (Tab) MOBIC 15 MG TAKE 1 TABLET BY MOUTH EVERY DAY        Multiple Vitamins-Minerals (Tab) THERAGRAN-M  Take 1 Tab by mouth every day.        OLANZapine (Tab) ZYPREXA 2.5 MG Take 2.5 mg by mouth every evening.        OxyCODONE HCl (Tab) ROXICODONE 10 MG Take 1 Tab by mouth 3 times a day as needed for Moderate Pain.        OxyCODONE HCl (Tab) ROXICODONE 10 MG Take 1 Tab by mouth 2 times a day as needed for Moderate Pain.        Probiotic Product   Take 1 Cap by mouth every bedtime.        Pseudoephedrine HCl   Take 2 Tabs by mouth every four hours as needed (For congestion).        Risedronate Sodium (Tab) ACTONEL 35 MG Take 1 Tab by mouth every 7 days.        .                 Medicines prescribed today were sent to:     [id number] - [place], [place] - [address] [address]    Phone: [phone] Fax: [phone]    Open 24 Hours?: No      Medication refill instructions:       If your prescription bottle indicates you have medication refills left, it is not necessary to call your provider’s office. Please contact your pharmacy and they will refill your medication.    If your prescription bottle indicates you do not have any refills left, you may request refills at any time through one of the following ways: The online [email or web address] system (except Urgent Care), by calling your provider’s office, or by asking your pharmacy to contact your provider’s office with a refill request. Medication refills are processed only during regular  business hours and may not be available until the next business day. Your provider may request additional information or to have a follow-up visit with you prior to refilling your medication.   *Please Note: Medication refills are assigned a new Rx number when refilled electronically. Your pharmacy may indicate that no refills were authorized even though a new prescription for the same medication is available at the pharmacy. Please request the medicine by name with the pharmacy before contacting your provider for a refill.           Code Kingdoms Access Code: [email or web address]  Expires: 8/27/2017  2:23 PM    Code Kingdoms  A secure, online tool to manage your health information     [employer]’s Code Kingdoms® is a secure, online tool that connects you to your personalized health information from the privacy of your home -- day or night - making it very easy for you to manage your healthcare. Once the activation process is completed, you can even access your medical information using the Code Kingdoms elaine, which is available for free in the Apple Elaine store or Google Play store.     Code Kingdoms provides the following levels of access (as shown below):   My Chart Features   [facility] Doctor [facility]  Specialists [facility]  Urgent  Care Non-[facility]  Primary Care  Doctor   Email your healthcare team securely and privately 24/7 X X X    Manage appointments: schedule your next appointment; view details of past/upcoming appointments X      Request prescription refills. X      View recent personal medical records, including lab and immunizations X X X X   View health record, including health history, allergies, medications X X X X   Read reports about your outpatient visits, procedures, consult and ER notes X X X X   See your discharge summary, which is a recap of your hospital and/or ER visit that includes your diagnosis, lab results, and care plan. X X       How to register for Code Kingdoms:  1. Go to  [email or web address].  2. Click on the  Sign Up Now box, which takes you to the New Member Sign Up page. You will need to provide the following information:  a. Enter your [employer] Access Code exactly as it appears at the top of this page. (You will not need to use this code after you’ve completed the sign-up process. If you do not sign up before the expiration date, you must request a new code.)   b. Enter your date of birth.   c. Enter your home email address.   d. Click Submit, and follow the next screen’s instructions.  3. Create a Cedexis ID. This will be your Cedexis login ID and cannot be changed, so think of one that is secure and easy to remember.  4. Create a Cedexis password. You can change your password at any time.  5. Enter your Password Reset Question and Answer. This can be used at a later time if you forget your password.   6. Enter your e-mail address. This allows you to receive e-mail notifications when new information is available in Cedexis.  7. Click Sign Up. You can now view your health information.    For assistance activating your Cedexis account, call [phone]        Quit Tobacco Information     Do you want to quit using tobacco?    Quitting tobacco decreases risks of cancer, heart and lung disease, increases life expectancy, improves sense of taste and smell, and increases spending money, among other benefits.    If you are thinking about quitting, we can help.  • [facility] Quit Tobacco Program: 246.531.2975  o Program occurs weekly for four weeks and includes pharmacist consultation on products to support quitting smoking or chewing tobacco. A provider referral is needed for pharmacist consultation.  • Tobacco Users Help Hotline: 6-800-QUIT-NOW (558-5259) or https://[place].quitlogix.org/  o Free, confidential telephone and online coaching for [place] residents. Sessions are designed on a schedule that is convenient for you. Eligible clients receive free nicotine replacement therapy.  • Nationally:  www.smokefree.gov  o Information and professional assistance to support both immediate and long-term needs as you become, and remain, a non-smoker. Smokefree.gov allows you to choose the help that best fits your needs.

## 2017-07-29 NOTE — PROGRESS NOTES
Subjective:      Paulette Toussaint is a 57 y.o. female who presents with Follow-Up            HPI 1. Chronic back pain-patient reports that she completely discontinued her oxycodone as part of a self weaning process 2 weeks ago. She reports mild ongoing mid to lower back pains but not significantly worse than when she was taking the medication. She denies any pain radiating down in either leg. Not experiencing any urinary or fecal incontinence.  2. GERD-patient reports ongoing daily episodes of epigastric pain and some burning. Not expressing substernal burning or water brash. She did recently meet with GI who suggested she start taking a over-the-counter acid reducing medication. She has access to Prevacid and started that about 3 days ago with some success. She is not reporting black or bloody stools. Recent colonoscopy was negative for polyps and is to be repeated in 5 years.    ROS negative for chest pain, dyspnea, palpitations       Objective:     /84 mmHg  Pulse 84  Temp(Src) 36.7 °C (98.1 °F)  Resp 16  Wt 68.493 kg (151 lb)  SpO2 96%  Breastfeeding? No     Physical Exam  Gen.- alert, cooperative, in no acute distress  Neck- midline trachea, thyroid not enlarged or tender,supple, no cervical adenopathy  Chest-clear to auscultation and percussion with normal breath sounds. No retractions. Chest wall nontender  Cardiac- regular rhythm and rate. No murmur, thrill, or heave  Abdomen- normal bowel sounds, soft without guarding. Liver and spleen not enlarged, no palpable masses or tenderness.          Assessment/Plan:     1. Muscle strain of chest wall, sequela    - carisoprodol (SOMA) 350 MG Tab; Take 1 Tab by mouth 1 time daily as needed for Muscle Spasms.  Dispense: 30 Tab; Refill: 1    2. Gastroesophageal reflux disease without esophagitis      3. Chronic midline low back pain without sciatica    Plan: 1. Renew Soma 350 mg 1 by mouth daily at bedtime as needed  2. Continue Prevacid  3. Follow-up  if needed for persistent or increasing epigastric/abdominal pain

## 2017-08-14 ENCOUNTER — HOSPITAL ENCOUNTER (EMERGENCY)
Facility: MEDICAL CENTER | Age: 57
End: 2017-08-14
Attending: EMERGENCY MEDICINE
Payer: MEDICAID

## 2017-08-14 ENCOUNTER — TELEPHONE (OUTPATIENT)
Dept: MEDICAL GROUP | Facility: MEDICAL CENTER | Age: 57
End: 2017-08-14

## 2017-08-14 ENCOUNTER — APPOINTMENT (OUTPATIENT)
Dept: RADIOLOGY | Facility: MEDICAL CENTER | Age: 57
End: 2017-08-14
Attending: EMERGENCY MEDICINE
Payer: MEDICAID

## 2017-08-14 VITALS
TEMPERATURE: 97.6 F | OXYGEN SATURATION: 95 % | DIASTOLIC BLOOD PRESSURE: 86 MMHG | WEIGHT: 145.5 LBS | SYSTOLIC BLOOD PRESSURE: 122 MMHG | HEART RATE: 73 BPM | BODY MASS INDEX: 24.24 KG/M2 | RESPIRATION RATE: 20 BRPM | HEIGHT: 65 IN

## 2017-08-14 DIAGNOSIS — R06.02 SHORTNESS OF BREATH: ICD-10-CM

## 2017-08-14 LAB
ALBUMIN SERPL BCP-MCNC: 4.7 G/DL (ref 3.2–4.9)
ALBUMIN/GLOB SERPL: 1.4 G/DL
ALP SERPL-CCNC: 110 U/L (ref 30–99)
ALT SERPL-CCNC: 13 U/L (ref 2–50)
ANION GAP SERPL CALC-SCNC: 10 MMOL/L (ref 0–11.9)
AST SERPL-CCNC: 20 U/L (ref 12–45)
BASOPHILS # BLD AUTO: 0.4 % (ref 0–1.8)
BASOPHILS # BLD: 0.05 K/UL (ref 0–0.12)
BILIRUB SERPL-MCNC: 0.3 MG/DL (ref 0.1–1.5)
BNP SERPL-MCNC: 39 PG/ML (ref 0–100)
BUN SERPL-MCNC: 10 MG/DL (ref 8–22)
CALCIUM SERPL-MCNC: 10.4 MG/DL (ref 8.5–10.5)
CHLORIDE SERPL-SCNC: 102 MMOL/L (ref 96–112)
CO2 SERPL-SCNC: 23 MMOL/L (ref 20–33)
COMMENT 1642: NORMAL
CREAT SERPL-MCNC: 0.75 MG/DL (ref 0.5–1.4)
EOSINOPHIL # BLD AUTO: 0.09 K/UL (ref 0–0.51)
EOSINOPHIL NFR BLD: 0.7 % (ref 0–6.9)
ERYTHROCYTE [DISTWIDTH] IN BLOOD BY AUTOMATED COUNT: 44 FL (ref 35.9–50)
GFR SERPL CREATININE-BSD FRML MDRD: >60 ML/MIN/1.73 M 2
GLOBULIN SER CALC-MCNC: 3.3 G/DL (ref 1.9–3.5)
GLUCOSE SERPL-MCNC: 92 MG/DL (ref 65–99)
HCT VFR BLD AUTO: 42 % (ref 37–47)
HGB BLD-MCNC: 14.2 G/DL (ref 12–16)
IMM GRANULOCYTES # BLD AUTO: 0.05 K/UL (ref 0–0.11)
IMM GRANULOCYTES NFR BLD AUTO: 0.4 % (ref 0–0.9)
LYMPHOCYTES # BLD AUTO: 2.78 K/UL (ref 1–4.8)
LYMPHOCYTES NFR BLD: 21.4 % (ref 22–41)
MCH RBC QN AUTO: 31.6 PG (ref 27–33)
MCHC RBC AUTO-ENTMCNC: 33.8 G/DL (ref 33.6–35)
MCV RBC AUTO: 93.3 FL (ref 81.4–97.8)
MONOCYTES # BLD AUTO: 0.52 K/UL (ref 0–0.85)
MONOCYTES NFR BLD AUTO: 4 % (ref 0–13.4)
MORPHOLOGY BLD-IMP: NORMAL
NEUTROPHILS # BLD AUTO: 9.49 K/UL (ref 2–7.15)
NEUTROPHILS NFR BLD: 73.1 % (ref 44–72)
NRBC # BLD AUTO: 0 K/UL
NRBC BLD AUTO-RTO: 0 /100 WBC
PLATELET # BLD AUTO: 281 K/UL (ref 164–446)
PLATELET BLD QL SMEAR: NORMAL
PMV BLD AUTO: 10.2 FL (ref 9–12.9)
POTASSIUM SERPL-SCNC: 4.6 MMOL/L (ref 3.6–5.5)
PROT SERPL-MCNC: 8 G/DL (ref 6–8.2)
RBC # BLD AUTO: 4.5 M/UL (ref 4.2–5.4)
RBC BLD AUTO: NORMAL
SODIUM SERPL-SCNC: 135 MMOL/L (ref 135–145)
WBC # BLD AUTO: 13 K/UL (ref 4.8–10.8)

## 2017-08-14 PROCEDURE — 96374 THER/PROPH/DIAG INJ IV PUSH: CPT | Mod: XU

## 2017-08-14 PROCEDURE — 700101 HCHG RX REV CODE 250: Performed by: EMERGENCY MEDICINE

## 2017-08-14 PROCEDURE — 700117 HCHG RX CONTRAST REV CODE 255: Performed by: EMERGENCY MEDICINE

## 2017-08-14 PROCEDURE — 80053 COMPREHEN METABOLIC PANEL: CPT

## 2017-08-14 PROCEDURE — 94640 AIRWAY INHALATION TREATMENT: CPT

## 2017-08-14 PROCEDURE — 83880 ASSAY OF NATRIURETIC PEPTIDE: CPT

## 2017-08-14 PROCEDURE — 87040 BLOOD CULTURE FOR BACTERIA: CPT

## 2017-08-14 PROCEDURE — 85025 COMPLETE CBC W/AUTO DIFF WBC: CPT

## 2017-08-14 PROCEDURE — 93005 ELECTROCARDIOGRAM TRACING: CPT | Performed by: EMERGENCY MEDICINE

## 2017-08-14 PROCEDURE — 36415 COLL VENOUS BLD VENIPUNCTURE: CPT

## 2017-08-14 PROCEDURE — 99285 EMERGENCY DEPT VISIT HI MDM: CPT

## 2017-08-14 PROCEDURE — 71275 CT ANGIOGRAPHY CHEST: CPT

## 2017-08-14 PROCEDURE — 700111 HCHG RX REV CODE 636 W/ 250 OVERRIDE (IP): Performed by: EMERGENCY MEDICINE

## 2017-08-14 PROCEDURE — 71010 DX-CHEST-LIMITED (1 VIEW): CPT

## 2017-08-14 RX ORDER — ALPRAZOLAM 0.5 MG/1
0.5 TABLET ORAL NIGHTLY PRN
COMMUNITY
End: 2019-01-28

## 2017-08-14 RX ORDER — ONDANSETRON 2 MG/ML
4 INJECTION INTRAMUSCULAR; INTRAVENOUS ONCE
Status: COMPLETED | OUTPATIENT
Start: 2017-08-14 | End: 2017-08-14

## 2017-08-14 RX ORDER — GUAIFENESIN 600 MG/1
600 TABLET, EXTENDED RELEASE ORAL EVERY 12 HOURS
COMMUNITY
End: 2019-01-28

## 2017-08-14 RX ADMIN — IOHEXOL 100 ML: 350 INJECTION, SOLUTION INTRAVENOUS at 21:00

## 2017-08-14 RX ADMIN — ONDANSETRON 4 MG: 2 INJECTION INTRAMUSCULAR; INTRAVENOUS at 19:17

## 2017-08-14 RX ADMIN — ALBUTEROL SULFATE 2.5 MG: 2.5 SOLUTION RESPIRATORY (INHALATION) at 18:47

## 2017-08-14 ASSESSMENT — COPD QUESTIONNAIRES
DURING THE PAST 4 WEEKS HOW MUCH DID YOU FEEL SHORT OF BREATH: NONE/LITTLE OF THE TIME
HAVE YOU SMOKED AT LEAST 100 CIGARETTES IN YOUR ENTIRE LIFE: YES
DO YOU EVER COUGH UP ANY MUCUS OR PHLEGM?: NO/ONLY WITH OCCASIONAL COLDS OR INFECTIONS
COPD SCREENING SCORE: 3

## 2017-08-14 ASSESSMENT — LIFESTYLE VARIABLES: EVER_SMOKED: YES

## 2017-08-14 ASSESSMENT — PAIN SCALES - GENERAL: PAINLEVEL_OUTOF10: 6

## 2017-08-14 NOTE — ED AVS SNAPSHOT
8/14/2017    Paulette Toussaint  [address] [facility] [address]    Dear Paulette:    [facility] wants to ensure your discharge home is safe and you or your loved ones have had all of your questions answered regarding your care after you leave the hospital.    Below is a list of resources and contact information should you have any questions regarding your hospital stay, follow-up instructions, or active medical symptoms.    Questions or Concerns Regarding… Contact   Medical Questions Related to Your Discharge  (7 days a week, 8am-5pm) Contact a Nurse Care Coordinator   [phone]   Medical Questions Not Related to Your Discharge  (24 hours a day / 7 days a week)  Contact the Nurse Health Line   [phone]    Medications or Discharge Instructions Refer to your discharge packet   or contact your [facility] Primary Care Provider   [phone]   Follow-up Appointment(s) Schedule your appointment via [employer]   or contact Scheduling [phone]   Billing Review your statement via [employer]  or contact Billing [phone]   Medical Records Review your records via [employer]   or contact Medical Records [phone]     You may receive a telephone call within two days of discharge. This call is to make certain you understand your discharge instructions and have the opportunity to have any questions answered. You can also easily access your medical information, test results and upcoming appointments via the [employer] free online health management tool. You can learn more and sign up at [employer]. For assistance setting up your [employer] account, please call [phone].    Once again, we want to ensure your discharge home is safe and that you have a clear understanding of any next steps in your care. If you have any questions or concerns, please do not hesitate to contact us, we are here for you. Thank you for choosing [facility] for your healthcare needs.    Sincerely,    Your [facility] Healthcare Team

## 2017-08-14 NOTE — ED AVS SNAPSHOT
Home Care Instructions                                                                                                                Paulette Toussaint   MRN: [id number]    Department:  [facility], Emergency Dept   Date of Visit:  8/14/2017            [facility], Emergency Dept    [address] [address]    Phone:  [phone]      You were seen by     Kevin Govea M.D.      Your Diagnosis Was     Shortness of breath     R06.02       These are the medications you received during your hospitalization from 08/14/2017 1537 to 08/14/2017 2141     Date/Time Order Dose Route Action    08/14/2017 1917 ondansetron (ZOFRAN) syringe/vial injection 4 mg 4 mg Intravenous Given    08/14/2017 1847 albuterol (PROVENTIL) 2.5mg/0.5ml nebulizer solution 2.5 mg 2.5 mg Nebulization Given    08/14/2017 2100 iohexol (OMNIPAQUE) 350 mg/mL 100 mL Intravenous Given      Follow-up Information     1. Schedule an appointment as soon as possible for a visit with Savage Wright M.D..    Specialty:  Family Medicine    Contact information    [address]  [address] [address] [phone]        Medication Information     Review all of your home medications and newly ordered medications with your primary doctor and/or pharmacist as soon as possible. Follow medication instructions as directed by your doctor and/or pharmacist.     Please keep your complete medication list with you and share with your physician. Update the information when medications are discontinued, doses are changed, or new medications (including over-the-counter products) are added; and carry medication information at all times in the event of emergency situations.               Medication List      ASK your doctor about these medications        Instructions    Morning Afternoon Evening Bedtime    alprazolam 0.5 MG Tabs   Commonly known as:  XANAX        Take 0.5 mg by mouth at bedtime as needed for Sleep.      Dose:  0.5 mg                        carisoprodol 350 MG Tabs   Commonly known as:  SOMA        Take 1 Tab by mouth 1 time daily as needed for Muscle Spasms.   Dose:  350 mg                        cetirizine 10 MG Tabs   Commonly known as:  ZYRTEC        Take 10 mg by mouth every bedtime.   Dose:  10 mg                        fluticasone 50 MCG/ACT nasal spray   Commonly known as:  FLONASE        Spray 2 Sprays in nose 2 times a day.   Dose:  2 Spray                        guaifenesin  MG Tb12   Commonly known as:  MUCINEX        Take 600 mg by mouth every 12 hours.   Dose:  600 mg                        meloxicam 15 MG tablet   Commonly known as:  MOBIC        TAKE 1 TABLET BY MOUTH EVERY DAY                        PROBIOTIC DAILY PO        Take 1 Cap by mouth every bedtime.   Dose:  1 Cap                        SUDAFED PO        Take 2 Tabs by mouth every four hours as needed (For congestion).   Dose:  2 Tab                        therapeutic multivitamin-minerals Tabs        Take 1 Tab by mouth every day.   Dose:  1 Tab                                Procedures and tests performed during your visit     BLOOD CULTURE    BTYPE NATRIURETIC PEPTIDE    CARDIAC MONITORING    CBC WITH DIFFERENTIAL    COMP METABOLIC PANEL    CONSENT FOR CONTRAST INJECTION    CT-CTA CHEST PULMONARY ARTERY W/ RECONS    DIFFERENTIAL COMMENT    DX-CHEST-LIMITED (1 VIEW)    EKG (NOW)    ESTIMATED GFR    MORPHOLOGY    O2 Protocol    OLD EKG    PERIPHERAL SMEAR REVIEW    PLATELET ESTIMATE    SALINE LOCK        Discharge Instructions       Shortness of Breath  Shortness of breath means you have trouble breathing. It could also mean that you have a medical problem. You should get immediate medical care for shortness of breath.  CAUSES   · Not enough oxygen in the air such as with high altitudes or a smoke-filled room.  · Certain lung diseases, infections, or problems.  · Heart disease or conditions, such as angina or heart failure.  · Low  red blood cells (anemia).  · Poor physical fitness, which can cause shortness of breath when you exercise.  · Chest or back injuries or stiffness.  · Being overweight.  · Smoking.  · Anxiety, which can make you feel like you are not getting enough air.  DIAGNOSIS   Serious medical problems can often be found during your physical exam. Tests may also be done to determine why you are having shortness of breath. Tests may include:  · Chest X-rays.  · Lung function tests.  · Blood tests.  · An electrocardiogram (ECG).  · An ambulatory electrocardiogram. An ambulatory ECG records your heartbeat patterns over a 24-hour period.  · Exercise testing.  · A transthoracic echocardiogram (TTE). During echocardiography, sound waves are used to evaluate how blood flows through your heart.  · A transesophageal echocardiogram (ABRAN).  · Imaging scans.  Your health care provider may not be able to find a cause for your shortness of breath after your exam. In this case, it is important to have a follow-up exam with your health care provider as directed.   TREATMENT   Treatment for shortness of breath depends on the cause of your symptoms and can vary greatly.  HOME CARE INSTRUCTIONS   · Do not smoke. Smoking is a common cause of shortness of breath. If you smoke, ask for help to quit.  · Avoid being around chemicals or things that may bother your breathing, such as paint fumes and dust.  · Rest as needed. Slowly resume your usual activities.  · If medicines were prescribed, take them as directed for the full length of time directed. This includes oxygen and any inhaled medicines.  · Keep all follow-up appointments as directed by your health care provider.  SEEK MEDICAL CARE IF:   · Your condition does not improve in the time expected.  · You have a hard time doing your normal activities even with rest.  · You have any new symptoms.  SEEK IMMEDIATE MEDICAL CARE IF:   · Your shortness of breath gets worse.  · You feel light-headed,  faint, or develop a cough not controlled with medicines.  · You start coughing up blood.  · You have pain with breathing.  · You have chest pain or pain in your arms, shoulders, or abdomen.  · You have a fever.  · You are unable to walk up stairs or exercise the way you normally do.  MAKE SURE YOU:  · Understand these instructions.  · Will watch your condition.  · Will get help right away if you are not doing well or get worse.     This information is not intended to replace advice given to you by your health care provider. Make sure you discuss any questions you have with your health care provider.     Document Released: 09/12/2002 Document Revised: 12/23/2014 Document Reviewed: 03/04/2013  [employer] Interactive Patient Education ©2016 [employer]            Patient Information     Patient Information    Following emergency treatment: all patient requiring follow-up care must return either to a private physician or a clinic if your condition worsens before you are able to obtain further medical attention, please return to the emergency room.     Billing Information    At [facility], we work to make the billing process streamlined for our patients.  Our Representatives are here to answer any questions you may have regarding your hospital bill.  If you have insurance coverage and have supplied your insurance information to us, we will submit a claim to your insurer on your behalf.  Should you have any questions regarding your bill, we can be reached online or by phone as follows:  Online: You are able pay your bills online or live chat with our representatives about any billing questions you may have. We are here to help Monday - Friday from 8:00am to 7:30pm and 9:00am - 12:00pm on Saturdays.  Please visit [email or web address]  for more information.   Phone:  [phone] or [phone]    Please note that your emergency physician, surgeon, pathologist, radiologist,  anesthesiologist, and other specialists are not employed by [facility] and will therefore bill separately for their services.  Please contact them directly for any questions concerning their bills at the numbers below:     Emergency Physician Services:  [phone]  [facility]:  [phone]  [facility]:  [phone]  [facility]:  [phone]    1. Your final bill may vary from the amount quoted upon discharge if all procedures are not complete at that time, or if your doctor has additional procedures of which we are not aware. You will receive an additional bill if you return to the Emergency Department at [facility] for suture removal regardless of the facility of which the sutures were placed.     2. Please arrange for settlement of this account at the emergency registration.    3. All self-pay accounts are due in full at the time of treatment.  If you are unable to meet this obligation then payment is expected within 4-5 days.     4. If you have had radiology studies (CT, X-ray, Ultrasound, MRI), you have received a preliminary result during your emergency department visit. Please contact the radiology department [phone] to receive a copy of your final result. Please discuss the Final result with your primary physician or with the follow up physician provided.     Crisis Hotline:  [place] Crisis Hotline:  5-477-JUTRLPR or 1-729.768.2472  [place] Crisis Hotline:    1-490.103.2464 or 453-744-8765         ED Discharge Follow Up Questions    1. In order to provide you with very good care, we would like to follow up with a phone call in the next few days.  May we have your permission to contact you?     YES /  NO    2. What is the best phone number to call you? (       )_____-__________    3. What is the best time to call you?      Morning  /  Afternoon  /  Evening                   Patient Signature:   ____________________________________________________________    Date:  ____________________________________________________________

## 2017-08-14 NOTE — ED NOTES
"Chief Complaint   Patient presents with   • Shortness of Breath     worse w/ exertion and inspiration   • T-5000 GLF     2wks ago   • Weakness     \"everything just hurts and I feel bad.\"   • Diarrhea     x1mo, pt states she has taken abx for a sinus infection in the past month and was exposed to someone w/ c-diff       "

## 2017-08-14 NOTE — ED NOTES
"Pt amb to triage after ekg.  Chief Complaint   Patient presents with   • Shortness of Breath     worse w/ exertion and inspiration   • T-5000 GLF     2wks ago   • Weakness     \"everything just hurts and I feel bad.\"       "

## 2017-08-14 NOTE — TELEPHONE ENCOUNTER
1. Caller Name: Paulette                                         Call Back Number: [phone] (home)       Patient approves a detailed voicemail message: yes    2. What are the patient's symptoms (location & severity)? diarrhea    3. Is this a new symptom Yes    4. When did it start? After pt's colonoscopy    5. Action taken per Active Symptom Guide: Emergency Department recommended Pt is currently waitng to be seen at the ER    6. Patient agrees to recommended action per active symptom guide

## 2017-08-14 NOTE — ED AVS SNAPSHOT
Propers Access Code: [email or web address]  Expires: 8/27/2017  2:23 PM    Propers  A secure, online tool to manage your health information     [employer] Propers® is a secure, online tool that connects you to your personalized health information from the privacy of your home -- day or night - making it very easy for you to manage your healthcare. Once the activation process is completed, you can even access your medical information using the Propers elaine, which is available for free in the Apple Elaine store or Google Play store.     Propers provides the following levels of access (as shown below):   My Chart Features   [facility] Primary Care Doctor [facility]  Specialists [facility]  Urgent  Care Non-[facility]  Primary Care  Doctor   Email your healthcare team securely and privately 24/7 X X X X   Manage appointments: schedule your next appointment; view details of past/upcoming appointments X      Request prescription refills. X      View recent personal medical records, including lab and immunizations X X X X   View health record, including health history, allergies, medications X X X X   Read reports about your outpatient visits, procedures, consult and ER notes X X X X   See your discharge summary, which is a recap of your hospital and/or ER visit that includes your diagnosis, lab results, and care plan. X X       How to register for Propers:  1. Go to  [email or web address].  2. Click on the Sign Up Now box, which takes you to the New Member Sign Up page. You will need to provide the following information:  a. Enter your Propers Access Code exactly as it appears at the top of this page. (You will not need to use this code after you’ve completed the sign-up process. If you do not sign up before the expiration date, you must request a new code.)   b. Enter your date of birth.   c. Enter your home email address.   d. Click Submit, and follow the next screen’s instructions.  3. Create a Propers ID. This will be your Propers  login ID and cannot be changed, so think of one that is secure and easy to remember.  4. Create a [employer] password. You can change your password at any time.  5. Enter your Password Reset Question and Answer. This can be used at a later time if you forget your password.   6. Enter your e-mail address. This allows you to receive e-mail notifications when new information is available in Growish.  7. Click Sign Up. You can now view your health information.    For assistance activating your [employer] account, call [phone]

## 2017-08-15 NOTE — DISCHARGE INSTRUCTIONS
Shortness of Breath  Shortness of breath means you have trouble breathing. It could also mean that you have a medical problem. You should get immediate medical care for shortness of breath.  CAUSES   · Not enough oxygen in the air such as with high altitudes or a smoke-filled room.  · Certain lung diseases, infections, or problems.  · Heart disease or conditions, such as angina or heart failure.  · Low red blood cells (anemia).  · Poor physical fitness, which can cause shortness of breath when you exercise.  · Chest or back injuries or stiffness.  · Being overweight.  · Smoking.  · Anxiety, which can make you feel like you are not getting enough air.  DIAGNOSIS   Serious medical problems can often be found during your physical exam. Tests may also be done to determine why you are having shortness of breath. Tests may include:  · Chest X-rays.  · Lung function tests.  · Blood tests.  · An electrocardiogram (ECG).  · An ambulatory electrocardiogram. An ambulatory ECG records your heartbeat patterns over a 24-hour period.  · Exercise testing.  · A transthoracic echocardiogram (TTE). During echocardiography, sound waves are used to evaluate how blood flows through your heart.  · A transesophageal echocardiogram (ABRAN).  · Imaging scans.  Your health care provider may not be able to find a cause for your shortness of breath after your exam. In this case, it is important to have a follow-up exam with your health care provider as directed.   TREATMENT   Treatment for shortness of breath depends on the cause of your symptoms and can vary greatly.  HOME CARE INSTRUCTIONS   · Do not smoke. Smoking is a common cause of shortness of breath. If you smoke, ask for help to quit.  · Avoid being around chemicals or things that may bother your breathing, such as paint fumes and dust.  · Rest as needed. Slowly resume your usual activities.  · If medicines were prescribed, take them as directed for the full length of time directed. This  includes oxygen and any inhaled medicines.  · Keep all follow-up appointments as directed by your health care provider.  SEEK MEDICAL CARE IF:   · Your condition does not improve in the time expected.  · You have a hard time doing your normal activities even with rest.  · You have any new symptoms.  SEEK IMMEDIATE MEDICAL CARE IF:   · Your shortness of breath gets worse.  · You feel light-headed, faint, or develop a cough not controlled with medicines.  · You start coughing up blood.  · You have pain with breathing.  · You have chest pain or pain in your arms, shoulders, or abdomen.  · You have a fever.  · You are unable to walk up stairs or exercise the way you normally do.  MAKE SURE YOU:  · Understand these instructions.  · Will watch your condition.  · Will get help right away if you are not doing well or get worse.     This information is not intended to replace advice given to you by your health care provider. Make sure you discuss any questions you have with your health care provider.     Document Released: 09/12/2002 Document Revised: 12/23/2014 Document Reviewed: 03/04/2013  [employer] Interactive Patient Education ©2016 [employer]

## 2017-08-15 NOTE — ED NOTES
ERP at bedside. All lines and monitors DC'd.  Discharge instructions given, questions answered.  Instructed not to drive after pain meds and pt verbalizes understanding. Pt states all belongings in possession.  Escorted to lobby by this nurse with family and steady gait.

## 2017-08-15 NOTE — ED PROVIDER NOTES
"ED Provider Note    Scribed for Kevin Govea M.D. by Jen Zheng. 8/14/2017, 6:04 PM.    Primary care provider: Savage Wright M.D.  Means of arrival: Walk-In  History obtained from: Patient  History limited by: None    CHIEF COMPLAINT  Chief Complaint   Patient presents with   • Shortness of Breath     worse w/ exertion and inspiration   • T-5000 GLF     2wks ago   • Weakness     \"everything just hurts and I feel bad.\"   • Diarrhea     x1mo, pt states she has taken abx for a sinus infection in the past month and was exposed to someone w/ c-diff       HPI  Paulette Toussaint is a 57 y.o. female who presents to the Emergency Department complaining of diarrhea, SOB and rib pain exacerbated with taking a breath onset couple weeks. Patient notes her rib pain started just under her left breast and radiates up to her left shoulder, and started after having a mechanical ground level fall onto her back. Associated symptoms include cold like symptoms, loss of appetite, emesis, nausea, lightheaded, weak. She notes approximately 2-3 episodes of diarrhea and loose stools per day. She tried taking Pepto Bismol and an antacid with no relief. Per patient, she had a colonoscopy in July due to a family history of colon cancer which indicated no abnormalities.  Additionally, patient notes a recent GLF a couple weeks ago just prior to her symptoms. She notes slipping on a rock and falling straight on her back and has been achy and sore since then. Confirms a past medical history of chronic back pain. Confirms smoking approximately a pack a day. Denies a past medical history of COPD or blood clots. Denies any recent surgeries or hormone medications.        REVIEW OF SYSTEMS  Patient denies fever, vision change, sore throat, chest pain, dysuria, focal muscle pain, rashes, or neurologic deficits. Positive for diarrhea, SOB, rib pain, loss of appetite, emesis, nausea, lightheadedness, weakness, GLF.     PAST MEDICAL " "HISTORY   has a past medical history of Anxiety; Chronic back pain; History of spinal fusion; Fall; Lumbar burst fracture (CMS-HCC) (2013); and Depression (2013).    SURGICAL HISTORY   has past surgical history that includes other orthopedic surgery (2009); other orthopedic surgery; lumbar fusion posterior (2013); primary c section; and rotator cuff repair (Right, Mar 2009).    SOCIAL HISTORY  Social History   Substance Use Topics   • Smoking status: Current Every Day Smoker -- 1.00 packs/day     Types: Cigarettes     Start date: 01/13/1979   • Smokeless tobacco: Never Used   • Alcohol Use: 0.0 oz/week     0 Standard drinks or equivalent per week      Comment: socially      History   Drug Use   • Yes     Comment: occ edible THC       FAMILY HISTORY  Family History   Problem Relation Age of Onset   • Cancer Mother 45     colon   • Heart Disease Brother 45     bypass   • Diabetes Neg Hx    • Stroke Neg Hx        CURRENT MEDICATIONS  Reviewed.  See Encounter Summary.     ALLERGIES  Allergies   Allergen Reactions   • Seasonal Runny Nose and Itching   • Sulfa Drugs Nausea     Pt states \"I get very sick, I was over 20 years ago\".   • Sulfa Drugs        PHYSICAL EXAM  VITAL SIGNS: /86 mmHg  Pulse 94  Temp(Src) 36.4 °C (97.6 °F) (Temporal)  Resp 28  Ht 1.651 m (5' 5\")  Wt 66 kg (145 lb 8.1 oz)  BMI 24.21 kg/m2  SpO2 94%     Pulse ox interpretation: I interpret this pulse ox as borderline low.  Constitutional: Alert in no apparent distress.  HENT: Head normocephalic, atraumatic, Bilateral external ears normal, Nose normal.  Eyes: Pupils are equal, extraocular movements intact, Conjunctiva normal, Non-icteric.   Neck: Normal range of motion, Supple, No stridor.   Lymphatic: No lymphadenopathy noted.   Cardiovascular: Regular rate and rhythm. No rubs, murmurs, or gallops.  Thorax & Lungs: End expiratory wheezing. No acute respiratory distress, No increased work of breathing.   Abdomen: Soft, Non tender, " Non-distended, No pulsatile masses, No peritoneal signs.  Skin: Warm, Dry, No erythema, No rash.   Back: No bony tenderness, No CVA tenderness.   Extremities: Intact distal pulses, No edema, No tenderness, No cyanosis.    Musculoskeletal: Good range of motion in all major joints. No tenderness to palpation or major deformities noted.   Neurologic: Alert , Normal motor function, Normal sensory function, No focal deficits noted.   Psychiatric: Affect normal, Judgment normal, Mood normal.       DIAGNOSTIC STUDIES / PROCEDURES     LABS  Results for orders placed or performed during the hospital encounter of 08/14/17   BTYPE NATRIURETIC PEPTIDE   Result Value Ref Range    B Natriuretic Peptide 39 0 - 100 pg/mL   CBC WITH DIFFERENTIAL   Result Value Ref Range    WBC 13.0 (H) 4.8 - 10.8 K/uL    RBC 4.50 4.20 - 5.40 M/uL    Hemoglobin 14.2 12.0 - 16.0 g/dL    Hematocrit 42.0 37.0 - 47.0 %    MCV 93.3 81.4 - 97.8 fL    MCH 31.6 27.0 - 33.0 pg    MCHC 33.8 33.6 - 35.0 g/dL    RDW 44.0 35.9 - 50.0 fL    Platelet Count 281 164 - 446 K/uL    MPV 10.2 9.0 - 12.9 fL    Neutrophils-Polys 73.10 (H) 44.00 - 72.00 %    Lymphocytes 21.40 (L) 22.00 - 41.00 %    Monocytes 4.00 0.00 - 13.40 %    Eosinophils 0.70 0.00 - 6.90 %    Basophils 0.40 0.00 - 1.80 %    Immature Granulocytes 0.40 0.00 - 0.90 %    Nucleated RBC 0.00 /100 WBC    Lymphs (Absolute) 2.78 1.00 - 4.80 K/uL    Monos (Absolute) 0.52 0.00 - 0.85 K/uL    Eos (Absolute) 0.09 0.00 - 0.51 K/uL    Baso (Absolute) 0.05 0.00 - 0.12 K/uL    Immature Granulocytes (abs) 0.05 0.00 - 0.11 K/uL    NRBC (Absolute) 0.00 K/uL    Neutrophils (Absolute) 9.49 (H) 2.00 - 7.15 K/uL   COMP METABOLIC PANEL   Result Value Ref Range    Sodium 135 135 - 145 mmol/L    Potassium 4.6 3.6 - 5.5 mmol/L    Chloride 102 96 - 112 mmol/L    Co2 23 20 - 33 mmol/L    Anion Gap 10.0 0.0 - 11.9    Glucose 92 65 - 99 mg/dL    Bun 10 8 - 22 mg/dL    Creatinine 0.75 0.50 - 1.40 mg/dL    Calcium 10.4 8.5 - 10.5 mg/dL     AST(SGOT) 20 12 - 45 U/L    ALT(SGPT) 13 2 - 50 U/L    Alkaline Phosphatase 110 (H) 30 - 99 U/L    Total Bilirubin 0.3 0.1 - 1.5 mg/dL    Albumin 4.7 3.2 - 4.9 g/dL    Total Protein 8.0 6.0 - 8.2 g/dL    Globulin 3.3 1.9 - 3.5 g/dL    A-G Ratio 1.4 g/dL   ESTIMATED GFR   Result Value Ref Range    GFR If African American >60 >60 mL/min/1.73 m 2    GFR If Non African American >60 >60 mL/min/1.73 m 2   PERIPHERAL SMEAR REVIEW   Result Value Ref Range    Peripheral Smear Review see below    PLATELET ESTIMATE   Result Value Ref Range    Plt Estimation Normal    MORPHOLOGY   Result Value Ref Range    RBC Morphology Normal    DIFFERENTIAL COMMENT   Result Value Ref Range    Comments-Diff see below    EKG (NOW)   Result Value Ref Range    Report       [facility] Emergency Dept.    Test Date:  2017  Pt Name:    EDUARDO DOTSON             Department: ER  MRN:        [id number]                      Room:  Gender:     F                            Technician: [id number]  :        [id number]                   Requested By:ER TRIAGE PROTOCOL  Order #:    [id number]                    Reading MD:    Measurements  Intervals                                Axis  Rate:       94                           P:          60  WY:         128                          QRS:        89  QRSD:       76                           T:          52  QT:         344  QTc:        431    Interpretive Statements  SINUS RHYTHM  BASELINE WANDER IN LEAD(S) II,III,aVF  Compared to ECG 2016 06:35:20  No significant changes       All labs were reviewed by me.    EKG  12 Lead EKG interpreted by me to show:  Normal sinus rhythm  Rate 94  Intervals: Normal  No ST-T wave changes suggestive of ischemia  Impression: Normal EKG     RADIOLOGY  CT-CTA CHEST PULMONARY ARTERY W/ RECONS   Final Result         1. No CT evidence of pulmonary embolism.      2. A 3 mm nodule in the right middle lobe.   Low Risk: No routine follow-up      High Risk:  Optional CT at 12 months      Comments: Nodules less than 6 mm do not require routine follow-up, but certain patients at high risk with suspicious nodule morphology, upper lobe location, or both may warrant 12-month follow-up.      Low Risk - Minimal or absent history of smoking and of other known risk factors.      High Risk - History of smoking or of other known risk factors.      Note: These recommendations do not apply to lung cancer screening, patients with immunosuppression, or patients with known primary cancer.      Fleischner Society 2017 Guidelines for Management of Incidentally Detected Pulmonary Nodules in Adults      DX-CHEST-LIMITED (1 VIEW)   Final Result      1.  No acute cardiac or pulmonary abnormalities are identified.        The radiologist's interpretation of all radiological studies have been reviewed by me.    COURSE & MEDICAL DECISION MAKING  Pertinent Labs & Imaging studies reviewed. (See chart for details)    6:04 PM - Patient seen and examined at bedside. Informed the patient that she will be given a breathing treatment as well as a CT of the chest to rule out blood clot of the lung. Patient will be treated with Zofran 4 mg, Proventil 2.5 mg. Ordered CT-CTA chest, DX-chest, estimated GFR, peripheral smear review, platelet estimate, morphology, differential comment, BNP, blood culture, CBC with differential, CMP, EKG to evaluate her symptoms.     9:38 PM - Recheck. Updated patient on her lab and radiology results which indicated no abnormal findings however showed a 3 mm nodule on her lung. She was reassured not to worry about this. Further addressed any questions and concerns. She will be discharged home at this time.     Decision Making:  This is a 57 y.o. year old female who presents with shortness of breath, chest pain, and diarrhea.  The patient's chest pain sounds as though it's pleuritic in nature, and located over the chest wall.  Regarding this, considerations included pulmonary  embolism versus rib fracture.  CT scan of the chest, however, shows no evidence of pulmonary embolism, or rib fracture.  Regarding the possibility of MI, the patient is low risk with a heart score of 2, with a normal EKG and troponin.  I, troponin normal limits as well, and uncertain as to the exact cause of the patient's diarrhea, however, it may be secondary to a virus.  The patient was instructed to attempt to take Imodium for her ongoing diarrhea, and follow-up with her primary care doctor.  Patient was instructed to return here for fevers, abdominal pain, or any new or worsening symptoms.    I reviewed prescription monitoring program for patient's narcotic use before prescribing a scheduled drug.The patient will not drink alcohol nor drive with prescribed medications. The patient will return for new or worsening symptoms and is stable at the time of discharge.    The patient is referred to a primary physician for blood pressure management, diabetic screening, and for all other preventative health concerns.    DISPOSITION:  Patient will be discharged home in stable condition with an information sheet on shortness of breath.    FOLLOW UP:  Savage Wright M.D.  [address] [place] [id number]  [phone]    Schedule an appointment as soon as possible for a visit        OUTPATIENT MEDICATIONS:  New Prescriptions    No medications on file           FINAL IMPRESSION  1. Shortness of breath          Jen HOOK (Donna), am scribing for, and in the presence of, Kevin Govea M.D..    Electronically signed by: Jen Augustin), 8/14/2017    Kevin HOOK M.D. personally performed the services described in this documentation, as scribed by Jen Zheng in my presence, and it is both accurate and complete.    The note accurately reflects work and decisions made by me.  Kevin Govea  8/14/2017  11:32 PM

## 2017-08-17 ENCOUNTER — TELEPHONE (OUTPATIENT)
Dept: MEDICAL GROUP | Facility: MEDICAL CENTER | Age: 57
End: 2017-08-17

## 2017-08-18 RX ORDER — ALBUTEROL SULFATE 90 UG/1
2 AEROSOL, METERED RESPIRATORY (INHALATION) EVERY 6 HOURS PRN
Qty: 8.5 G | Refills: 2 | Status: SHIPPED
Start: 2017-08-18 | End: 2018-08-27 | Stop reason: SDUPTHER

## 2017-08-18 NOTE — TELEPHONE ENCOUNTER
Pt called stating she was seen in the hospital and needs an Rx for an albuterol inhaler. The ER provider was supposed to write it but he did not. Please advise     Thank you

## 2017-08-19 LAB
BACTERIA BLD CULT: NORMAL
SIGNIFICANT IND 70042: NORMAL
SITE SITE: NORMAL
SOURCE SOURCE: NORMAL

## 2017-08-29 ENCOUNTER — TELEPHONE (OUTPATIENT)
Dept: MEDICAL GROUP | Facility: MEDICAL CENTER | Age: 57
End: 2017-08-29

## 2017-08-30 NOTE — TELEPHONE ENCOUNTER
Suggest office visit. US is low yield for causes of diarrhea, which tend to be microscopic at the gut lining level.  Dr LYNCH

## 2017-08-30 NOTE — TELEPHONE ENCOUNTER
1. Caller Name: Paulette                                         Call Back Number: [phone] (home)         Patient approves a detailed voicemail message: yes    Pt called stating she is still having persistent diarrhea, and she also fells as if something may be under her rt breast as it has become painful and hard to take a full breath. Pt is interested in having an ultrasound ordered to find out why she continues to have diarrhea.  Please advise

## 2017-08-31 LAB — EKG IMPRESSION: NORMAL

## 2017-09-01 ENCOUNTER — OFFICE VISIT (OUTPATIENT)
Dept: MEDICAL GROUP | Facility: MEDICAL CENTER | Age: 57
End: 2017-09-01
Attending: FAMILY MEDICINE
Payer: MEDICAID

## 2017-09-01 VITALS
WEIGHT: 145 LBS | RESPIRATION RATE: 16 BRPM | SYSTOLIC BLOOD PRESSURE: 138 MMHG | TEMPERATURE: 99 F | DIASTOLIC BLOOD PRESSURE: 88 MMHG | HEIGHT: 65 IN | BODY MASS INDEX: 24.16 KG/M2 | HEART RATE: 100 BPM | OXYGEN SATURATION: 98 %

## 2017-09-01 DIAGNOSIS — R19.7 ACUTE DIARRHEA: ICD-10-CM

## 2017-09-01 PROCEDURE — 99212 OFFICE O/P EST SF 10 MIN: CPT | Performed by: FAMILY MEDICINE

## 2017-09-01 PROCEDURE — 99213 OFFICE O/P EST LOW 20 MIN: CPT | Performed by: FAMILY MEDICINE

## 2017-09-01 ASSESSMENT — PAIN SCALES - GENERAL: PAINLEVEL: 4=SLIGHT-MODERATE PAIN

## 2017-09-01 NOTE — PROGRESS NOTES
"Subjective:      Paulette Toussaint is a 57 y.o. female who presents with Diarrhea            HPI1. Acute diarrhea-  Pt completed unremarkable colonoscopyOn 7/26/17. She reports she had slightly loose stools prior to her colonoscopy. However since then she's had 3-5 watery brown stools per day. Some of these are explosive enough that she has had mild incontinence attempting to get to the bathroom. There've been no black, bloody, white stools. She is not reporting any fever. Has not had any recent travel out of the country or been drinking groundwater unpurified. She did have a niece who had Clostridium difficile and was hospitalized however that was November 2016 and patient's personal contact with her was minimal. Patient reports she did take 2 days of Imodium about a week ago and diarrhea slowed but she felt bloated and uncomfortable almost like she needed to \"let the diarrhea out\". The bloating has subsided as the diarrhea returned off that medication. She denies chronic history of diarrhea in the past.    ROS negative for nausea, emesis, rash       Objective:     /88   Pulse 100   Temp 37.2 °C (99 °F)   Resp 16   Ht 1.651 m (5' 5\")   Wt 65.8 kg (145 lb)   SpO2 98%   BMI 24.13 kg/m²       Physical Exam   Gen.- alert, cooperative, in no acute distress  Neck- midline trachea, thyroid not enlarged or tender,supple, no cervical adenopathy  Chest-clear to auscultation and percussion with normal breath sounds. No retractions. Chest wall nontender  Cardiac- regular rhythm and rate. No murmur, thrill, or heave  Abdomen- normal bowel sounds, soft without guarding. Liver and spleen not enlarged, no palpable masses or tenderness.          Assessment/Plan:     1. Acute diarrhea    - STOOL WBC'S  - CRYPTO/GIARDIA RAPID ASSAY; Future  - CULTURE STOOL; Future   Plan: 1. Stool tests as above  2. Continue to avoid lactose products while pursuing generous fluid intake      "

## 2017-09-05 ENCOUNTER — HOSPITAL ENCOUNTER (OUTPATIENT)
Facility: MEDICAL CENTER | Age: 57
End: 2017-09-05
Attending: FAMILY MEDICINE
Payer: MEDICAID

## 2017-09-05 DIAGNOSIS — R19.7 ACUTE DIARRHEA: ICD-10-CM

## 2017-09-05 LAB
G LAMBLIA+C PARVUM AG STL QL RAPID: NORMAL
SIGNIFICANT IND 70042: NORMAL
SITE SITE: NORMAL
SOURCE SOURCE: NORMAL
WBC STL QL MICRO: ABNORMAL

## 2017-09-05 PROCEDURE — 87899 AGENT NOS ASSAY W/OPTIC: CPT

## 2017-09-05 PROCEDURE — 89055 LEUKOCYTE ASSESSMENT FECAL: CPT

## 2017-09-05 PROCEDURE — 87045 FECES CULTURE AEROBIC BACT: CPT

## 2017-09-05 PROCEDURE — 87046 STOOL CULTR AEROBIC BACT EA: CPT

## 2017-09-05 PROCEDURE — 87329 GIARDIA AG IA: CPT

## 2017-09-05 PROCEDURE — 87328 CRYPTOSPORIDIUM AG IA: CPT

## 2017-09-06 LAB
E COLI SXT1+2 STL IA: NORMAL
SIGNIFICANT IND 70042: NORMAL
SITE SITE: NORMAL
SOURCE SOURCE: NORMAL

## 2017-09-07 ENCOUNTER — TELEPHONE (OUTPATIENT)
Dept: MEDICAL GROUP | Facility: MEDICAL CENTER | Age: 57
End: 2017-09-07

## 2017-09-08 LAB
BACTERIA STL CULT: NORMAL
E COLI SXT1+2 STL IA: NORMAL
SIGNIFICANT IND 70042: NORMAL
SITE SITE: NORMAL
SOURCE SOURCE: NORMAL

## 2017-09-08 NOTE — TELEPHONE ENCOUNTER
----- Message from Savage Wright M.D. sent at 9/7/2017  9:19 AM PDT -----  Few white blood cells seen on the stool smear which is not significant. Tests for Shigella, parasites were negative. Bacterial stool culture is not complete yet. At this point no significant stool abnormality to explain recurrent diarrhea found.

## 2017-09-08 NOTE — TELEPHONE ENCOUNTER
Phone Number Called: [phone] (home)       Message:Pt informed of the following: Few white blood cells seen on the stool smear which is not significant. Tests for Shigella, parasites were negative. Bacterial stool culture is not complete yet. At this point no significant stool abnormality to explain recurrent diarrhea found.     Left Message for patient to call back: N\A

## 2017-09-11 ENCOUNTER — TELEPHONE (OUTPATIENT)
Dept: MEDICAL GROUP | Facility: MEDICAL CENTER | Age: 57
End: 2017-09-11

## 2017-09-11 DIAGNOSIS — R19.7 DIARRHEA, UNSPECIFIED TYPE: ICD-10-CM

## 2018-01-09 ENCOUNTER — HOSPITAL ENCOUNTER (OUTPATIENT)
Dept: RADIOLOGY | Facility: MEDICAL CENTER | Age: 58
End: 2018-01-09
Attending: NEUROLOGICAL SURGERY
Payer: MEDICAID

## 2018-01-09 DIAGNOSIS — M54.5 LOW BACK PAIN, UNSPECIFIED BACK PAIN LATERALITY, UNSPECIFIED CHRONICITY, WITH SCIATICA PRESENCE UNSPECIFIED: ICD-10-CM

## 2018-01-09 PROCEDURE — 72110 X-RAY EXAM L-2 SPINE 4/>VWS: CPT

## 2018-01-09 PROCEDURE — 72074 X-RAY EXAM THORAC SPINE4/>VW: CPT

## 2018-02-06 ENCOUNTER — HOSPITAL ENCOUNTER (OUTPATIENT)
Dept: RADIOLOGY | Facility: MEDICAL CENTER | Age: 58
End: 2018-02-06
Attending: NURSE PRACTITIONER
Payer: MEDICAID

## 2018-02-06 DIAGNOSIS — M54.16 LUMBAR RADICULOPATHY: ICD-10-CM

## 2018-02-06 DIAGNOSIS — R26.89 LIMP: ICD-10-CM

## 2018-02-06 PROCEDURE — 72158 MRI LUMBAR SPINE W/O & W/DYE: CPT

## 2018-02-06 PROCEDURE — 72157 MRI CHEST SPINE W/O & W/DYE: CPT

## 2018-02-06 PROCEDURE — 700117 HCHG RX CONTRAST REV CODE 255: Performed by: NURSE PRACTITIONER

## 2018-02-06 PROCEDURE — A9579 GAD-BASE MR CONTRAST NOS,1ML: HCPCS | Performed by: NURSE PRACTITIONER

## 2018-02-06 RX ADMIN — GADODIAMIDE 15 ML: 287 INJECTION INTRAVENOUS at 16:42

## 2018-04-05 ENCOUNTER — OFFICE VISIT (OUTPATIENT)
Dept: MEDICAL GROUP | Facility: MEDICAL CENTER | Age: 58
End: 2018-04-05
Attending: FAMILY MEDICINE
Payer: MEDICAID

## 2018-04-05 ENCOUNTER — TELEPHONE (OUTPATIENT)
Dept: MEDICAL GROUP | Facility: MEDICAL CENTER | Age: 58
End: 2018-04-05

## 2018-04-05 VITALS
RESPIRATION RATE: 16 BRPM | OXYGEN SATURATION: 97 % | SYSTOLIC BLOOD PRESSURE: 128 MMHG | BODY MASS INDEX: 26.33 KG/M2 | HEART RATE: 88 BPM | DIASTOLIC BLOOD PRESSURE: 82 MMHG | TEMPERATURE: 98.5 F | HEIGHT: 65 IN | WEIGHT: 158 LBS

## 2018-04-05 DIAGNOSIS — K59.1 FUNCTIONAL DIARRHEA: ICD-10-CM

## 2018-04-05 DIAGNOSIS — K64.4 EXTERNAL HEMORRHOIDS: ICD-10-CM

## 2018-04-05 DIAGNOSIS — M81.0 OSTEOPOROSIS WITHOUT CURRENT PATHOLOGICAL FRACTURE, UNSPECIFIED OSTEOPOROSIS TYPE: ICD-10-CM

## 2018-04-05 DIAGNOSIS — R10.12 LEFT UPPER QUADRANT PAIN: ICD-10-CM

## 2018-04-05 DIAGNOSIS — S29.011S MUSCLE STRAIN OF CHEST WALL, SEQUELA: ICD-10-CM

## 2018-04-05 PROCEDURE — 99212 OFFICE O/P EST SF 10 MIN: CPT | Performed by: FAMILY MEDICINE

## 2018-04-05 PROCEDURE — 99214 OFFICE O/P EST MOD 30 MIN: CPT | Performed by: FAMILY MEDICINE

## 2018-04-05 RX ORDER — CARISOPRODOL 350 MG/1
350 TABLET ORAL
Qty: 30 TAB | Refills: 2 | Status: SHIPPED | OUTPATIENT
Start: 2018-04-05 | End: 2018-05-05

## 2018-04-05 ASSESSMENT — PATIENT HEALTH QUESTIONNAIRE - PHQ9: CLINICAL INTERPRETATION OF PHQ2 SCORE: 0

## 2018-04-06 NOTE — ASSESSMENT & PLAN NOTE
Patient notes persistent left upper quadrant pain over the past 6 months. She did have an upper endoscopy with GI and that was notable for Bhat's esophagitis. They do not feel they have explained the left upper quadrant pain and suggested to consider a CT scan of the abdomen. Patient is not reporting any emesis. She denies dyspnea or significant cough.

## 2018-04-06 NOTE — ASSESSMENT & PLAN NOTE
Patient was noted to have osteoporosis on DEXA findings in 2016. She also has sustained a T11 burst fracture along with wrist fracture 2 separate occasions always associated with moderate falls or trauma. She did try taking biphosphonate's approximately 2 years ago and had significant nausea. Has been taking a calcium and vitamin D supplementation.

## 2018-04-06 NOTE — TELEPHONE ENCOUNTER
Lipid patient know I did order a steroid containing salve for the rectal problem. Also she is due by our records for an updated mammogram-May we go ahead and place an order for her with radiology?

## 2018-04-06 NOTE — ASSESSMENT & PLAN NOTE
Patient reports reports continued pain in the midline lower thoracic spine just above the lumbar spine. She is followed by Dr. Pond and actually sees him again tomorrow. She requests a refill of her muscle relaxant, Soma 350 mg 1 daily when necessary. She is off opiates.

## 2018-04-06 NOTE — ASSESSMENT & PLAN NOTE
Patient notes recurrent perianal swelling consistent with external hemorrhoids over the past 2 weeks. At times they are pruritic and at other times tender. Patient has used Preparation H which is helped only with the itching. She will note occasional bright red blood on the outside of the stool or on tissue paper. She denies any unexplained weight loss or pelvic pain.

## 2018-04-06 NOTE — ASSESSMENT & PLAN NOTE
Patient has completed an extensive GI evaluation for persistent diarrhea. This included a normal colonoscopy. She had a recent consultation with a physician[facility] at [facility] recommended regular use of Imodium. Patient reports that using probiotics and fiber has been very helpful and then she also schedules her activities to not be very far from a bathroom. She said no recent bloody stools. Reports the diarrhea is intermittent but occurring at least 50% of days. No fecal

## 2018-04-06 NOTE — PROGRESS NOTES
Chief Complaint   Patient presents with   • GI Problem       HISTORY OF PRESENT ILLNESS: Patient is a 58 y.o. female established patient who presents today to follow-up on osteoporosis, external hemorrhoids, functional diarrhea, left upper quadrant pain, persistent thoracic pain        Osteoporosis  Patient was noted to have osteoporosis on DEXA findings in 2016. She also has sustained a T11 burst fracture along with wrist fracture 2 separate occasions always associated with moderate falls or trauma. She did try taking biphosphonate's approximately 2 years ago and had significant nausea. Has been taking a calcium and vitamin D supplementation.    External hemorrhoids  Patient notes recurrent perianal swelling consistent with external hemorrhoids over the past 2 weeks. At times they are pruritic and at other times tender. Patient has used Preparation H which is helped only with the itching. She will note occasional bright red blood on the outside of the stool or on tissue paper. She denies any unexplained weight loss or pelvic pain.    Functional diarrhea  Patient has completed an extensive GI evaluation for persistent diarrhea. This included a normal colonoscopy. She had a recent consultation with a physician's [facility] at [facility] recommended regular use of Imodium. Patient reports that using probiotics and fiber has been very helpful and then she also schedules her activities to not be very far from a bathroom. She said no recent bloody stools. Reports the diarrhea is intermittent but occurring at least 50% of days. No fecal    Left upper quadrant pain  Patient notes persistent left upper quadrant pain over the past 6 months. She did have an upper endoscopy with GI and that was notable for Bhat's esophagitis. They do not feel they have explained the left upper quadrant pain and suggested to consider a CT scan of the abdomen. Patient is not reporting any emesis. She denies dyspnea or significant  cough.    Chronic back pain  Patient reports reports continued pain in the midline lower thoracic spine just above the lumbar spine. She is followed by Dr. Pond and actually sees him again tomorrow. She requests a refill of her muscle relaxant, Soma 350 mg 1 daily when necessary. She is off opiates.    Social history-single, not working  Patient Active Problem List    Diagnosis Date Noted   • Chest pain 04/01/2016     Priority: High   • Pubic ramus fracture (CMS-[facility]) 01/13/2014     Priority: Medium   • Fracture of ribs, two 01/13/2014     Priority: Medium   • Chronic back pain 01/13/2014     Priority: Medium   • External hemorrhoids 04/05/2018   • Left upper quadrant pain 04/05/2018   • Functional diarrhea 04/05/2018   • Use of opiates for therapeutic purposes 06/26/2017   • Mixed hyperlipidemia 05/26/2017   • Tobacco abuse 04/01/2016   • Osteoporosis 02/18/2016   • Acute sinusitis 12/13/2015   • Depression 12/11/2015   • Chronic lumbar pain 12/11/2015   • Lumbar burst fracture (CMS-HCC)        Allergies:Seasonal; Sulfa drugs; and Sulfa drugs    Current Outpatient Prescriptions   Medication Sig Dispense Refill   • carisoprodol (SOMA) 350 MG Tab Take 1 Tab by mouth 1 time daily as needed for Muscle Spasms for up to 30 days. 30 Tab 2   • albuterol 108 (90 Base) MCG/ACT Aero Soln inhalation aerosol Inhale 2 Puffs by mouth every 6 hours as needed for Shortness of Breath. 8.5 g 2   • guaifenesin LA (MUCINEX) 600 MG TABLET SR 12 HR Take 600 mg by mouth every 12 hours.     • alprazolam (XANAX) 0.5 MG Tab Take 0.5 mg by mouth at bedtime as needed for Sleep.     • meloxicam (MOBIC) 15 MG tablet TAKE 1 TABLET BY MOUTH EVERY DAY 30 Tab 3   • therapeutic multivitamin-minerals (THERAGRAN-M) Tab Take 1 Tab by mouth every day.     • fluticasone (FLONASE) 50 MCG/ACT nasal spray Spray 2 Sprays in nose 2 times a day.     • cetirizine (ZYRTEC) 10 MG Tab Take 10 mg by mouth every bedtime.     • Probiotic Product (PROBIOTIC DAILY PO)  "Take 1 Cap by mouth every bedtime.     • Pseudoephedrine HCl (SUDAFED PO) Take 2 Tabs by mouth every four hours as needed (For congestion).       No current facility-administered medications for this visit.        Social History   Substance Use Topics   • Smoking status: Current Every Day Smoker     Packs/day: 1.00     Types: Cigarettes     Start date: 1/13/1979   • Smokeless tobacco: Never Used   • Alcohol use 0.0 oz/week      Comment: socially       Family History   Problem Relation Age of Onset   • Cancer Mother 45     colon   • Heart Disease Brother 45     bypass   • Diabetes Neg Hx    • Stroke Neg Hx        ROS:  Review of Systems   Constitutional: Negative for fever, chills, weight loss and malaise/fatigue.   Eyes: Negative for blurred vision.   Respiratory: Negative for cough, sputum production, shortness of breath and wheezing.    Cardiovascular: Negative for chest pain, palpitations, orthopnea and leg swelling.   Gastrointestinal: Negative for heartburn, nausea, vomiting and abdominal pain.   Endo/Heme/Allergies: Does not bruise/bleed easily.                Exam:  Blood pressure 128/82, pulse 88, temperature 36.9 °C (98.5 °F), resp. rate 16, height 1.651 m (5' 5\"), weight 71.7 kg (158 lb), SpO2 97 %, not currently breastfeeding.  General:  Well nourished, well developed female in NAD  Head is grossly normal.  Neck: Supple without JVD or bruit. Thyroid is not enlarged. Trachea is midline.  Pulmonary: Clear to ausculation .  Normal effort. No rales, ronchi, or wheezing.  Cardiovascular: Regular rate and rhythm without murmur.  Abdomen-Abdomen is soft, normal bowel sounds, no masses, guarding, ororganomegaly, with 1+ tenderness in the upper lateral left upper quadrant. No rebound tenderness or palpable mass  Lower extremities- neg for pretibial edema, redness, tenderness.      Please note that this dictation was created using voice recognition software. I have made every reasonable attempt to correct obvious " errors, but I expect that there are errors of grammar and possibly content that I did not discover before finalizing the note.    Assessment/Plan:  1. Functional diarrhea     2. Left upper quadrant pain  CT-ABDOMEN WITH & W/O   3. Osteoporosis without current pathological fracture, unspecified osteoporosis type  DS-BONE DENSITY STUDY (DEXA)   4. Muscle strain of chest wall, sequela  carisoprodol (SOMA) 350 MG Tab   5. External hemorrhoids        Plan: 1. Update DEXA scan  2. CT scan of the abdomen with and without contrast to evaluate left upper quadrant pain  3. Soma renewed  4. Trial of Anusol HC cream for symptomatic relief

## 2018-04-13 ENCOUNTER — TELEPHONE (OUTPATIENT)
Dept: MEDICAL GROUP | Facility: MEDICAL CENTER | Age: 58
End: 2018-04-13

## 2018-04-13 DIAGNOSIS — F33.41 RECURRENT MAJOR DEPRESSIVE DISORDER, IN PARTIAL REMISSION (HCC): ICD-10-CM

## 2018-04-13 NOTE — TELEPHONE ENCOUNTER
1. Caller Name: Paulette                                         Call Back Number: [phone] (home)         Patient approves a detailed voicemail message: yes    2. SPECIFIC Action To Be Taken: Please order a full lab panel. Pt is requesting to have labs drawn before her appointment    3. Diagnosis/Clinical Reason for Request: Upcoming appointment    4. Specialty & Provider Name/Lab/Imaging Location: [facility]    5. Is appointment scheduled for requested order/referral: no    Patient informed they will receive a return phone call from the office ONLY if there are any questions before processing their request. Advised to call back if they haven't received a call from the referral department in 5 days.

## 2018-04-16 ENCOUNTER — HOSPITAL ENCOUNTER (OUTPATIENT)
Dept: RADIOLOGY | Facility: MEDICAL CENTER | Age: 58
End: 2018-04-16
Attending: FAMILY MEDICINE
Payer: MEDICAID

## 2018-04-16 DIAGNOSIS — M81.0 OSTEOPOROSIS WITHOUT CURRENT PATHOLOGICAL FRACTURE, UNSPECIFIED OSTEOPOROSIS TYPE: ICD-10-CM

## 2018-04-16 PROCEDURE — 77080 DXA BONE DENSITY AXIAL: CPT

## 2018-04-20 ENCOUNTER — TELEPHONE (OUTPATIENT)
Dept: MEDICAL GROUP | Facility: MEDICAL CENTER | Age: 58
End: 2018-04-20

## 2018-04-20 NOTE — TELEPHONE ENCOUNTER
----- Message from Savage Wright M.D. sent at 4/19/2018  6:52 PM PDT -----  DEXA scan is consistent with osteopenia not osteoporosis. The bone density in the femur has improved 10% and in the forearm has improved 2.3%. Would continue current therapy.

## 2018-04-25 ENCOUNTER — HOSPITAL ENCOUNTER (OUTPATIENT)
Dept: RADIOLOGY | Facility: MEDICAL CENTER | Age: 58
End: 2018-04-25
Attending: FAMILY MEDICINE
Payer: MEDICAID

## 2018-04-25 DIAGNOSIS — R10.12 LEFT UPPER QUADRANT PAIN: ICD-10-CM

## 2018-04-25 PROCEDURE — 74160 CT ABDOMEN W/CONTRAST: CPT

## 2018-04-25 PROCEDURE — 700117 HCHG RX CONTRAST REV CODE 255: Performed by: FAMILY MEDICINE

## 2018-04-25 RX ADMIN — IOHEXOL 100 ML: 350 INJECTION, SOLUTION INTRAVENOUS at 14:58

## 2018-04-25 RX ADMIN — IOHEXOL 50 ML: 240 INJECTION, SOLUTION INTRATHECAL; INTRAVASCULAR; INTRAVENOUS; ORAL at 14:58

## 2018-04-30 ENCOUNTER — TELEPHONE (OUTPATIENT)
Dept: MEDICAL GROUP | Facility: MEDICAL CENTER | Age: 58
End: 2018-04-30

## 2018-04-30 ENCOUNTER — PATIENT MESSAGE (OUTPATIENT)
Dept: MEDICAL GROUP | Facility: MEDICAL CENTER | Age: 58
End: 2018-04-30

## 2018-04-30 DIAGNOSIS — Z12.39 SCREENING FOR MALIGNANT NEOPLASM OF BREAST: ICD-10-CM

## 2018-04-30 NOTE — TELEPHONE ENCOUNTER
----- Message from Savage Wright M.D. sent at 4/27/2018  7:27 AM PDT -----  Overall CT scan of the abdomen did not show any serious abnormality. Several colonic diverticula are seen. This finding is painless unless one gets infected-which we are not seeing. Persistent probable small cyst is again noted in the liver which is over on the right side of the upper abdomen and would not be expected to cause any pain. Bile channels are less dilated than on previous studies and there is no obvious obstruction seen. They also would not be strongly suspected with left sided abdominal pain since they do not extend into that direction. No further studies are recommended at this time. Can discuss at next visit

## 2018-04-30 NOTE — TELEPHONE ENCOUNTER
[facility] message with the following result note sent:         Overall CT scan of the abdomen did not show any serious abnormality. Several colonic diverticula are seen. This finding is painless unless one gets infected-which we are not seeing. Persistent probable small cyst is again noted in the liver which is over on the right side of the upper abdomen and would not be expected to cause any pain. Bile channels are less dilated than on previous studies and there is no obvious obstruction seen. They also would not be strongly suspected with left sided abdominal pain since they do not extend into that direction. No further studies are recommended at this time. Can discuss at next visit

## 2018-05-01 ENCOUNTER — HOSPITAL ENCOUNTER (OUTPATIENT)
Dept: LAB | Facility: MEDICAL CENTER | Age: 58
End: 2018-05-01
Attending: FAMILY MEDICINE
Payer: MEDICAID

## 2018-05-01 DIAGNOSIS — F33.41 RECURRENT MAJOR DEPRESSIVE DISORDER, IN PARTIAL REMISSION (HCC): ICD-10-CM

## 2018-05-01 LAB
ALBUMIN SERPL BCP-MCNC: 4.7 G/DL (ref 3.2–4.9)
ALBUMIN/GLOB SERPL: 1.5 G/DL
ALP SERPL-CCNC: 92 U/L (ref 30–99)
ALT SERPL-CCNC: 22 U/L (ref 2–50)
ANION GAP SERPL CALC-SCNC: 10 MMOL/L (ref 0–11.9)
AST SERPL-CCNC: 21 U/L (ref 12–45)
BASOPHILS # BLD AUTO: 0.4 % (ref 0–1.8)
BASOPHILS # BLD: 0.05 K/UL (ref 0–0.12)
BILIRUB SERPL-MCNC: 0.4 MG/DL (ref 0.1–1.5)
BUN SERPL-MCNC: 9 MG/DL (ref 8–22)
CALCIUM SERPL-MCNC: 9.8 MG/DL (ref 8.5–10.5)
CHLORIDE SERPL-SCNC: 100 MMOL/L (ref 96–112)
CO2 SERPL-SCNC: 24 MMOL/L (ref 20–33)
CREAT SERPL-MCNC: 0.69 MG/DL (ref 0.5–1.4)
EOSINOPHIL # BLD AUTO: 0.15 K/UL (ref 0–0.51)
EOSINOPHIL NFR BLD: 1.3 % (ref 0–6.9)
ERYTHROCYTE [DISTWIDTH] IN BLOOD BY AUTOMATED COUNT: 44.8 FL (ref 35.9–50)
GLOBULIN SER CALC-MCNC: 3.1 G/DL (ref 1.9–3.5)
GLUCOSE SERPL-MCNC: 131 MG/DL (ref 65–99)
HCT VFR BLD AUTO: 44.1 % (ref 37–47)
HGB BLD-MCNC: 14.9 G/DL (ref 12–16)
IMM GRANULOCYTES # BLD AUTO: 0.04 K/UL (ref 0–0.11)
IMM GRANULOCYTES NFR BLD AUTO: 0.3 % (ref 0–0.9)
LYMPHOCYTES # BLD AUTO: 2.35 K/UL (ref 1–4.8)
LYMPHOCYTES NFR BLD: 19.9 % (ref 22–41)
MCH RBC QN AUTO: 31.5 PG (ref 27–33)
MCHC RBC AUTO-ENTMCNC: 33.8 G/DL (ref 33.6–35)
MCV RBC AUTO: 93.2 FL (ref 81.4–97.8)
MONOCYTES # BLD AUTO: 0.45 K/UL (ref 0–0.85)
MONOCYTES NFR BLD AUTO: 3.8 % (ref 0–13.4)
NEUTROPHILS # BLD AUTO: 8.77 K/UL (ref 2–7.15)
NEUTROPHILS NFR BLD: 74.3 % (ref 44–72)
NRBC # BLD AUTO: 0 K/UL
NRBC BLD-RTO: 0 /100 WBC
PLATELET # BLD AUTO: 462 K/UL (ref 164–446)
PMV BLD AUTO: 9.5 FL (ref 9–12.9)
POTASSIUM SERPL-SCNC: 4.2 MMOL/L (ref 3.6–5.5)
PROT SERPL-MCNC: 7.8 G/DL (ref 6–8.2)
RBC # BLD AUTO: 4.73 M/UL (ref 4.2–5.4)
SODIUM SERPL-SCNC: 134 MMOL/L (ref 135–145)
WBC # BLD AUTO: 11.8 K/UL (ref 4.8–10.8)

## 2018-05-01 PROCEDURE — 80053 COMPREHEN METABOLIC PANEL: CPT

## 2018-05-01 PROCEDURE — 85025 COMPLETE CBC W/AUTO DIFF WBC: CPT

## 2018-05-01 PROCEDURE — 36415 COLL VENOUS BLD VENIPUNCTURE: CPT

## 2018-05-02 ENCOUNTER — TELEPHONE (OUTPATIENT)
Dept: MEDICAL GROUP | Facility: MEDICAL CENTER | Age: 58
End: 2018-05-02

## 2018-05-02 NOTE — TELEPHONE ENCOUNTER
----- Message from Savage Wright M.D. sent at 5/2/2018  3:48 PM PDT -----  Normal liver, kidney function.  Unremarkable red cell , white cell levels

## 2018-05-17 ENCOUNTER — HOSPITAL ENCOUNTER (OUTPATIENT)
Dept: RADIOLOGY | Facility: MEDICAL CENTER | Age: 58
End: 2018-05-17
Attending: NURSE PRACTITIONER
Payer: MEDICAID

## 2018-05-17 DIAGNOSIS — M54.6 PAIN IN THORACIC SPINE: ICD-10-CM

## 2018-05-17 DIAGNOSIS — M54.5 LOW BACK PAIN, UNSPECIFIED BACK PAIN LATERALITY, UNSPECIFIED CHRONICITY, WITH SCIATICA PRESENCE UNSPECIFIED: ICD-10-CM

## 2018-05-17 PROCEDURE — 72110 X-RAY EXAM L-2 SPINE 4/>VWS: CPT

## 2018-05-17 PROCEDURE — 72074 X-RAY EXAM THORAC SPINE4/>VW: CPT

## 2018-07-03 ENCOUNTER — OFFICE VISIT (OUTPATIENT)
Dept: MEDICAL GROUP | Facility: MEDICAL CENTER | Age: 58
End: 2018-07-03
Attending: NURSE PRACTITIONER
Payer: MEDICAID

## 2018-07-03 VITALS
DIASTOLIC BLOOD PRESSURE: 80 MMHG | HEART RATE: 92 BPM | OXYGEN SATURATION: 93 % | RESPIRATION RATE: 14 BRPM | WEIGHT: 156 LBS | BODY MASS INDEX: 25.99 KG/M2 | HEIGHT: 65 IN | TEMPERATURE: 97.7 F | SYSTOLIC BLOOD PRESSURE: 120 MMHG

## 2018-07-03 DIAGNOSIS — J01.91 ACUTE RECURRENT SINUSITIS, UNSPECIFIED LOCATION: ICD-10-CM

## 2018-07-03 DIAGNOSIS — R06.83 SNORING: ICD-10-CM

## 2018-07-03 DIAGNOSIS — B37.31 VAGINAL YEAST INFECTION: ICD-10-CM

## 2018-07-03 DIAGNOSIS — R53.83 FATIGUE, UNSPECIFIED TYPE: ICD-10-CM

## 2018-07-03 PROCEDURE — 99214 OFFICE O/P EST MOD 30 MIN: CPT | Performed by: NURSE PRACTITIONER

## 2018-07-03 PROCEDURE — 99213 OFFICE O/P EST LOW 20 MIN: CPT | Performed by: NURSE PRACTITIONER

## 2018-07-03 RX ORDER — AMOXICILLIN AND CLAVULANATE POTASSIUM 875; 125 MG/1; MG/1
1 TABLET, FILM COATED ORAL 2 TIMES DAILY
Qty: 20 TAB | Refills: 0 | Status: SHIPPED | OUTPATIENT
Start: 2018-07-03 | End: 2018-08-10

## 2018-07-03 RX ORDER — FLUCONAZOLE 150 MG/1
TABLET ORAL
Qty: 2 TAB | Refills: 0 | Status: SHIPPED | OUTPATIENT
Start: 2018-07-03 | End: 2019-01-28

## 2018-07-03 NOTE — ASSESSMENT & PLAN NOTE
Reports snoring reported to her by family when camping together a week ago.  Also they noticed she had some abnormal breathing when sleeping.  Hx of deviated septum as well which may be a factor.  Pt reports no morning headaches, but does have daytime fatigue  And reports does not feel rested in am upon awakening.    Discussed sleep study referral.

## 2018-07-03 NOTE — PROGRESS NOTES
Chief Complaint:   Chief Complaint   Patient presents with   • Sinusitis       HPI:  Paulette is here today for concern about possible Sinus Infection.  Her PCP, Dr Wright is not available to see her today.    Her PMH includes  Depression  Sinus infections  Chronic back pain  Lumbar Burst fx  Pubic Ramus fx  Lumbar Fusion  Right Rotator Cuff Surgery  Rib fx's  Tobacco abuse  LUQ Abd pain  Opiate use for therapeutic purposes    Established with  Psychiatry- Dr Nuñez    Review of Records:  4/8/18 Clinic visit w Dr Wright r/t abd pain, Dexa scan order, CT abd ordered.   RX Soma and Anusol cream.    [place]  Report:   6/8/18 Xanax 1 mg # 20 by Dr Nuñez  5/16/18 Soma 350 mg # 30 by Dr Wright  Similar entries in report  17 RX and 4 Prescribing Practitioners.    Acute sinusitis/sinus pressure, ear pressure  Pt reports in early May had green discharge and less so now.  States snoring recently and nasal congestion. Has itchy ear pains and has post nasal drip  Is taking Sudafed and Zyrtec daily. Is also using Flonase.   Has inhaler at home uses periodically.  Tearing of eyes more than normal   Smoker  Hx of Sinus infections in past.  Hx of Seasonal Allergies  Reports daily fatigue and reports not rested in morning when wakes up.    SNOring  Reports snoring reported to her by family when camping together a week ago.  Also they noticed she had some abnormal breathing when sleeping.  Hx of deviated septum as well which may be a factor.  Pt reports no morning headaches, but does have daytime fatigue  And reports does not feel rested in am upon awakening.  Discussed sleep study referral.    Patient Active Problem List    Diagnosis Date Noted   • Chest pain 04/01/2016     Priority: High   • Pubic ramus fracture (HCC) 01/13/2014     Priority: Medium   • Fracture of ribs, two 01/13/2014     Priority: Medium   • Chronic back pain 01/13/2014     Priority: Medium   • Snoring 07/03/2018   • External hemorrhoids 04/05/2018    • Left upper quadrant pain 04/05/2018   • Functional diarrhea 04/05/2018   • Use of opiates for therapeutic purposes 06/26/2017   • Mixed hyperlipidemia 05/26/2017   • Tobacco abuse 04/01/2016   • Osteoporosis 02/18/2016   • Acute sinusitis 12/13/2015   • Depression 12/11/2015   • Chronic lumbar pain 12/11/2015   • Lumbar burst fracture (HCC)        Allergies:Seasonal; Sulfa drugs; and Sulfa drugs    Medicines as of today:  Current Outpatient Prescriptions   Medication Sig Dispense Refill   • amoxicillin-clavulanate (AUGMENTIN) 875-125 MG Tab Take 1 Tab by mouth 2 times a day. 20 Tab 0   • fluconazole (DIFLUCAN) 150 MG tablet Take one tablet if yeast infection symptoms after antibiotic use. May repeat in 3 days if symptoms persist 2 Tab 0   • hydrocortisone rectal (PROCTOZONE HC) 2.5 % Cream Apply thinly twice daily to symptomatic external hemorrhoids 30 g 2   • albuterol 108 (90 Base) MCG/ACT Aero Soln inhalation aerosol Inhale 2 Puffs by mouth every 6 hours as needed for Shortness of Breath. 8.5 g 2   • guaifenesin LA (MUCINEX) 600 MG TABLET SR 12 HR Take 600 mg by mouth every 12 hours.     • alprazolam (XANAX) 0.5 MG Tab Take 0.5 mg by mouth at bedtime as needed for Sleep.     • meloxicam (MOBIC) 15 MG tablet TAKE 1 TABLET BY MOUTH EVERY DAY 30 Tab 3   • therapeutic multivitamin-minerals (THERAGRAN-M) Tab Take 1 Tab by mouth every day.     • fluticasone (FLONASE) 50 MCG/ACT nasal spray Spray 2 Sprays in nose 2 times a day.     • cetirizine (ZYRTEC) 10 MG Tab Take 10 mg by mouth every bedtime.     • Probiotic Product (PROBIOTIC DAILY PO) Take 1 Cap by mouth every bedtime.     • Pseudoephedrine HCl (SUDAFED PO) Take 2 Tabs by mouth every four hours as needed (For congestion).       No current facility-administered medications for this visit.        Social History   Substance Use Topics   • Smoking status: Current Every Day Smoker     Packs/day: 1.00     Types: Cigarettes     Start date: 1/13/1979   • Smokeless  "tobacco: Never Used   • Alcohol use 0.0 oz/week      Comment: socially       Past Medical History:   Diagnosis Date   • Anxiety    • Chronic back pain    • Depression 2013   • Fall    • History of spinal fusion     T10-L3   • Lumbar burst fracture (HCC) 2013    L1 burst, tx'd with rods/screws Cardale       Family History   Problem Relation Age of Onset   • Cancer Mother 45     colon   • Heart Disease Brother 45     bypass   • Diabetes Neg Hx    • Stroke Neg Hx        ROS:  Review of Systems   See HPI Above    Exam:  Blood pressure 120/80, pulse 92, temperature 36.5 °C (97.7 °F), resp. rate 14, height 1.651 m (5' 5\"), weight 70.8 kg (156 lb), SpO2 93 %. Body mass index is 25.96 kg/m².    General:  Well nourished, well developed female in NAD  HENT:Head is grossly normal. PERRL. Slight tenderness over left maxillary sinus, otherwise negative.  Posterior Pharynx wnl. Ear canals clear, TM's w flattening of light reflection consistent w fluid behind TM's.  TMS non red.   Neck: Supple. Trachea is midline. No lymph node swelling or tenderness to anterior neck  Pulmonary: Clear to ausculation .  Normal effort. No rales, ronchi, or wheezing.   Cardiovascular: Regular rate and rhythm.  Abdomen-Abdomen is soft  Upper extremities-Good ROM  Lower extremities- neg for edema  Neuro- A & O x 4. Speech clear and appropriate.    Current medications, allergies, and problem list reviewed with patient and updated in [facility] today.    Assessment/Plan:  1. Acute recurrent sinusitis, unspecified location  amoxicillin-clavulanate (AUGMENTIN) 875-125 MG Tab  Continue Flonase, Zyrtec, Sudafed.   2. Fatigue, unspecified type  REFERRAL TO SLEEP STUDIES   3. Snoring  REFERRAL TO SLEEP STUDIES   4. Vaginal yeast infection  fluconazole (DIFLUCAN) 150 MG tablet prn post antibiotics if any yeast infection symptoms.   Return/call if not improving.  Return in about 5 weeks (around 8/7/2018) for w Dr Heath for review of Sleep study.  "

## 2018-07-03 NOTE — ASSESSMENT & PLAN NOTE
Pt reports in early May had green discharge and less so now.  States snoring recently and nasal congestion. Has itchy ear pains and has post nasal drip  Is taking Sudafed and Zyrtec daily. Is also using Flonase.   Has inhaler at home uses periodically.  Tearing of eyes more than normal   Smoker  Hx of Sinus infections in past.  Hx of Seasonal Allergies    Reports daily fatigue and reports not rested in morning when wakes up.

## 2018-07-30 ENCOUNTER — PATIENT MESSAGE (OUTPATIENT)
Dept: MEDICAL GROUP | Facility: MEDICAL CENTER | Age: 58
End: 2018-07-30

## 2018-07-30 DIAGNOSIS — D72.828 OTHER ELEVATED WHITE BLOOD CELL (WBC) COUNT: ICD-10-CM

## 2018-08-10 ENCOUNTER — OFFICE VISIT (OUTPATIENT)
Dept: MEDICAL GROUP | Facility: MEDICAL CENTER | Age: 58
End: 2018-08-10
Attending: INTERNAL MEDICINE
Payer: MEDICAID

## 2018-08-10 ENCOUNTER — HOSPITAL ENCOUNTER (OUTPATIENT)
Dept: LAB | Facility: MEDICAL CENTER | Age: 58
End: 2018-08-10
Attending: FAMILY MEDICINE
Payer: MEDICAID

## 2018-08-10 VITALS
OXYGEN SATURATION: 96 % | BODY MASS INDEX: 25.49 KG/M2 | RESPIRATION RATE: 16 BRPM | SYSTOLIC BLOOD PRESSURE: 128 MMHG | HEIGHT: 65 IN | TEMPERATURE: 98.1 F | WEIGHT: 153 LBS | HEART RATE: 100 BPM | DIASTOLIC BLOOD PRESSURE: 82 MMHG

## 2018-08-10 DIAGNOSIS — D72.828 OTHER ELEVATED WHITE BLOOD CELL (WBC) COUNT: ICD-10-CM

## 2018-08-10 DIAGNOSIS — J30.2 SEASONAL ALLERGIC RHINITIS, UNSPECIFIED TRIGGER: ICD-10-CM

## 2018-08-10 DIAGNOSIS — D72.829 LEUKOCYTOSIS, UNSPECIFIED TYPE: ICD-10-CM

## 2018-08-10 LAB
BASOPHILS # BLD AUTO: 0.3 % (ref 0–1.8)
BASOPHILS # BLD: 0.04 K/UL (ref 0–0.12)
EOSINOPHIL # BLD AUTO: 0.05 K/UL (ref 0–0.51)
EOSINOPHIL NFR BLD: 0.4 % (ref 0–6.9)
ERYTHROCYTE [DISTWIDTH] IN BLOOD BY AUTOMATED COUNT: 43 FL (ref 35.9–50)
HCT VFR BLD AUTO: 44.6 % (ref 37–47)
HGB BLD-MCNC: 15.4 G/DL (ref 12–16)
IMM GRANULOCYTES # BLD AUTO: 0.04 K/UL (ref 0–0.11)
IMM GRANULOCYTES NFR BLD AUTO: 0.3 % (ref 0–0.9)
LYMPHOCYTES # BLD AUTO: 2.52 K/UL (ref 1–4.8)
LYMPHOCYTES NFR BLD: 21.4 % (ref 22–41)
MCH RBC QN AUTO: 32.1 PG (ref 27–33)
MCHC RBC AUTO-ENTMCNC: 34.5 G/DL (ref 33.6–35)
MCV RBC AUTO: 92.9 FL (ref 81.4–97.8)
MONOCYTES # BLD AUTO: 0.56 K/UL (ref 0–0.85)
MONOCYTES NFR BLD AUTO: 4.8 % (ref 0–13.4)
NEUTROPHILS # BLD AUTO: 8.57 K/UL (ref 2–7.15)
NEUTROPHILS NFR BLD: 72.8 % (ref 44–72)
NRBC # BLD AUTO: 0 K/UL
NRBC BLD-RTO: 0 /100 WBC
PLATELET # BLD AUTO: 459 K/UL (ref 164–446)
PMV BLD AUTO: 10 FL (ref 9–12.9)
RBC # BLD AUTO: 4.8 M/UL (ref 4.2–5.4)
WBC # BLD AUTO: 11.8 K/UL (ref 4.8–10.8)

## 2018-08-10 PROCEDURE — 99212 OFFICE O/P EST SF 10 MIN: CPT | Performed by: INTERNAL MEDICINE

## 2018-08-10 PROCEDURE — 99214 OFFICE O/P EST MOD 30 MIN: CPT | Performed by: INTERNAL MEDICINE

## 2018-08-10 PROCEDURE — 36415 COLL VENOUS BLD VENIPUNCTURE: CPT

## 2018-08-10 PROCEDURE — 85025 COMPLETE CBC W/AUTO DIFF WBC: CPT

## 2018-08-10 RX ORDER — OMEPRAZOLE 20 MG/1
20 CAPSULE, DELAYED RELEASE ORAL DAILY
Qty: 30 CAP | Status: SHIPPED | DISCHARGE
Start: 2018-08-10 | End: 2018-09-10 | Stop reason: SDUPTHER

## 2018-08-10 ASSESSMENT — PAIN SCALES - GENERAL: PAINLEVEL: NO PAIN

## 2018-08-10 NOTE — PROGRESS NOTES
Subjective:   Paulette Toussaint is a 58 y.o. female here today for elevated white blood cell count, concerned that her sinus infection has not resolved    Seasonal allergies  Patient reports she was recently treated for a sinus infection with a 10 day course of Augmentin.  States that she is feeling better but not completely back to her normal self.  She is currently taking Flonase off and on although she is not using it properly.  She is taking Zyrtec nightly.  She is doing sinus rinses intermittently.  She reports itchy watery eyes, itchy ears, and a scratchy dry throat.    Leukocytosis  Patient has had intermittently elevated white blood cell count in the past.  Most recently, on May 1 she had WBC count of 11.8.  Since 2014, she has had about half of her WBC in the normal range and the rest above normal.  She is concerned for an underlying problem that could be causing this.  She has not been anemic.  She did have thrombocytosis these labs but this is not a persistent trend.  Patient has a CBC ordered by her PCP for follow-up but she has not obtained it yet.       Current medicines (including changes today)  Current Outpatient Prescriptions   Medication Sig Dispense Refill   • omeprazole (PRILOSEC) 20 MG delayed-release capsule Take 1 Cap by mouth every day. 30 Cap    • fluconazole (DIFLUCAN) 150 MG tablet Take one tablet if yeast infection symptoms after antibiotic use. May repeat in 3 days if symptoms persist 2 Tab 0   • hydrocortisone rectal (PROCTOZONE HC) 2.5 % Cream Apply thinly twice daily to symptomatic external hemorrhoids 30 g 2   • albuterol 108 (90 Base) MCG/ACT Aero Soln inhalation aerosol Inhale 2 Puffs by mouth every 6 hours as needed for Shortness of Breath. 8.5 g 2   • guaifenesin LA (MUCINEX) 600 MG TABLET SR 12 HR Take 600 mg by mouth every 12 hours.     • alprazolam (XANAX) 0.5 MG Tab Take 0.5 mg by mouth at bedtime as needed for Sleep.     • meloxicam (MOBIC) 15 MG tablet TAKE 1 TABLET BY  "MOUTH EVERY DAY 30 Tab 3   • therapeutic multivitamin-minerals (THERAGRAN-M) Tab Take 1 Tab by mouth every day.     • fluticasone (FLONASE) 50 MCG/ACT nasal spray Spray 2 Sprays in nose 2 times a day.     • cetirizine (ZYRTEC) 10 MG Tab Take 10 mg by mouth every bedtime.     • Probiotic Product (PROBIOTIC DAILY PO) Take 1 Cap by mouth every bedtime.     • Pseudoephedrine HCl (SUDAFED PO) Take 2 Tabs by mouth every four hours as needed (For congestion).       No current facility-administered medications for this visit.      She  has a past medical history of Anxiety; Chronic back pain; Depression (2013); Fall; History of spinal fusion; and Lumbar burst fracture (HCC) (2013).    ROS   As above in HPI     Objective:     Blood pressure 128/82, pulse 100, temperature 36.7 °C (98.1 °F), resp. rate 16, height 1.651 m (5' 5\"), weight 69.4 kg (153 lb), SpO2 96 %, not currently breastfeeding. Body mass index is 25.46 kg/m².   Physical Exam:  Constitutional: Alert, no distress.  Skin: Warm, dry, good turgor, no rashes in visible areas.  Eye: Equal, round and reactive, conjunctiva clear, lids normal.  ENMT: Lips without lesions, good dentition, oropharynx clear, TM's clear bilaterally  Neck: Trachea midline, no masses, no thyromegaly. No cervical or supraclavicular lymphadenopathy      Results and Imaging:   Lab Results   Component Value Date/Time    WBC 11.8 (H) 05/01/2018 02:39 PM    RBC 4.73 05/01/2018 02:39 PM    HEMOGLOBIN 14.9 05/01/2018 02:39 PM    HEMATOCRIT 44.1 05/01/2018 02:39 PM    MCV 93.2 05/01/2018 02:39 PM    MCH 31.5 05/01/2018 02:39 PM    MCHC 33.8 05/01/2018 02:39 PM    MPV 9.5 05/01/2018 02:39 PM    NEUTSPOLYS 74.30 (H) 05/01/2018 02:39 PM    LYMPHOCYTES 19.90 (L) 05/01/2018 02:39 PM    MONOCYTES 3.80 05/01/2018 02:39 PM    EOSINOPHILS 1.30 05/01/2018 02:39 PM    BASOPHILS 0.40 05/01/2018 02:39 PM          Assessment and Plan:   The following treatment plan was discussed    1. Seasonal allergic rhinitis, " unspecified trigger  Symptoms are most consistent with seasonal allergies most likely exacerbated by the current smoke in the air.  I instructed her on proper use of her Flonase which she was not using correctly.  She will continue with her nasal rinses.  We also discussed that she can take an additional Zyrtec in the mornings if needed.  -Continue Flonase, increase Zyrtec to twice daily while symptomatic, continue nasal rinses    2. Leukocytosis, unspecified type  Unclear etiology.  White blood cell count has waxed and waned in the past.  It is very mildly elevated on her last labs.  She has a CBC ordered by her PCP which she will complete.  If normal, reassurance will be provided.  If abnormal, patient wishes to see a blood specialist  -Follow-up CBC        Followup: Return if symptoms worsen or fail to improve.

## 2018-08-10 NOTE — ASSESSMENT & PLAN NOTE
Patient reports she was recently treated for a sinus infection with a 10 day course of Augmentin.  States that she is feeling better but not completely back to her normal self.  She is currently taking Flonase off and on although she is not using it properly.  She is taking Zyrtec nightly.  She is doing sinus rinses intermittently.  She reports itchy watery eyes, itchy ears, and a scratchy dry throat.

## 2018-08-10 NOTE — ASSESSMENT & PLAN NOTE
Patient has had intermittently elevated white blood cell count in the past.  Most recently, on May 1 she had WBC count of 11.8.  Since 2014, she has had about half of her WBC in the normal range and the rest above normal.  She is concerned for an underlying problem that could be causing this.  She has not been anemic.  She did have thrombocytosis these labs but this is not a persistent trend.  Patient has a CBC ordered by her PCP for follow-up but she has not obtained it yet.

## 2018-08-13 ENCOUNTER — TELEPHONE (OUTPATIENT)
Dept: MEDICAL GROUP | Facility: MEDICAL CENTER | Age: 58
End: 2018-08-13

## 2018-08-13 NOTE — TELEPHONE ENCOUNTER
"----- Message from Savage Wright M.D. sent at 8/13/2018  7:45 AM PDT -----  Minimal elevation of white blood cell count is again seen. Distribution of white blood cells show no significant clinical changes to indicate a situation of illness. No anemia. Minimal elevation of platelets also reflects rather unfocused borderlines between \"normal\" and abnormal. Overall this appears to be a stable CBC and not pointing to a significant abnormality condition. No evidence of leukemia  "

## 2018-09-10 RX ORDER — OMEPRAZOLE 20 MG/1
20 CAPSULE, DELAYED RELEASE ORAL DAILY
Qty: 30 CAP | Refills: 6 | Status: SHIPPED | OUTPATIENT
Start: 2018-09-10 | End: 2019-01-28

## 2018-10-08 DIAGNOSIS — M62.838 MUSCLE SPASM: ICD-10-CM

## 2018-10-08 RX ORDER — CARISOPRODOL 350 MG/1
TABLET ORAL
Qty: 30 TAB | Refills: 0 | Status: SHIPPED | OUTPATIENT
Start: 2018-10-08 | End: 2018-11-07

## 2018-10-30 ENCOUNTER — HOSPITAL ENCOUNTER (OUTPATIENT)
Dept: RADIOLOGY | Facility: MEDICAL CENTER | Age: 58
End: 2018-10-30
Attending: PHYSICIAN ASSISTANT
Payer: MEDICAID

## 2018-10-30 DIAGNOSIS — F32.A DEPRESSIVE DISORDER: ICD-10-CM

## 2018-10-30 DIAGNOSIS — R10.9 ABDOMINAL PAIN, UNSPECIFIED ABDOMINAL LOCATION: ICD-10-CM

## 2018-10-30 DIAGNOSIS — K22.70 BARRETT'S ESOPHAGUS WITHOUT DYSPLASIA: ICD-10-CM

## 2018-10-30 DIAGNOSIS — F41.9 ANXIETY: ICD-10-CM

## 2018-10-30 PROCEDURE — 700117 HCHG RX CONTRAST REV CODE 255: Performed by: PHYSICIAN ASSISTANT

## 2018-10-30 PROCEDURE — 74160 CT ABDOMEN W/CONTRAST: CPT

## 2018-10-30 RX ADMIN — IOHEXOL 50 ML: 240 INJECTION, SOLUTION INTRATHECAL; INTRAVASCULAR; INTRAVENOUS; ORAL at 14:30

## 2018-10-30 RX ADMIN — IOHEXOL 100 ML: 350 INJECTION, SOLUTION INTRAVENOUS at 14:30

## 2018-11-01 ENCOUNTER — HOSPITAL ENCOUNTER (OUTPATIENT)
Dept: RADIOLOGY | Facility: MEDICAL CENTER | Age: 58
End: 2018-11-01
Attending: PHYSICIAN ASSISTANT
Payer: MEDICAID

## 2018-11-01 DIAGNOSIS — F01.53 VASCULAR DEMENTIA WITH DEPRESSED MOOD (HCC): ICD-10-CM

## 2018-11-01 DIAGNOSIS — R10.9 STOMACH ACHE: ICD-10-CM

## 2018-11-01 DIAGNOSIS — K22.719 BARRETT'S ESOPHAGUS WITH DYSPLASIA: ICD-10-CM

## 2018-11-01 DIAGNOSIS — F45.8 ANXIETY HYPERVENTILATION: ICD-10-CM

## 2018-11-01 DIAGNOSIS — F41.9 ANXIETY HYPERVENTILATION: ICD-10-CM

## 2018-11-01 PROCEDURE — 71046 X-RAY EXAM CHEST 2 VIEWS: CPT

## 2019-01-10 ENCOUNTER — OFFICE VISIT (OUTPATIENT)
Dept: MEDICAL GROUP | Facility: MEDICAL CENTER | Age: 59
End: 2019-01-10
Attending: FAMILY MEDICINE
Payer: MEDICARE

## 2019-01-10 VITALS
OXYGEN SATURATION: 94 % | SYSTOLIC BLOOD PRESSURE: 124 MMHG | BODY MASS INDEX: 27.16 KG/M2 | HEART RATE: 98 BPM | DIASTOLIC BLOOD PRESSURE: 80 MMHG | HEIGHT: 65 IN | TEMPERATURE: 98.2 F | RESPIRATION RATE: 16 BRPM | WEIGHT: 163 LBS

## 2019-01-10 DIAGNOSIS — Q25.46 TORTUOSITY OF AORTIC ARCH: ICD-10-CM

## 2019-01-10 DIAGNOSIS — R93.89 ABNORMAL CXR (CHEST X-RAY): ICD-10-CM

## 2019-01-10 DIAGNOSIS — D72.829 LEUKOCYTOSIS, UNSPECIFIED TYPE: ICD-10-CM

## 2019-01-10 DIAGNOSIS — R74.8 ALKALINE PHOSPHATASE RAISED: ICD-10-CM

## 2019-01-10 PROCEDURE — 99213 OFFICE O/P EST LOW 20 MIN: CPT | Performed by: FAMILY MEDICINE

## 2019-01-10 RX ORDER — VORTIOXETINE 20 MG/1
TABLET, FILM COATED ORAL
Refills: 2 | COMMUNITY
Start: 2018-12-28 | End: 2019-02-19

## 2019-01-10 RX ORDER — ATOMOXETINE 40 MG/1
CAPSULE ORAL
Refills: 1 | COMMUNITY
Start: 2018-12-15 | End: 2019-08-07

## 2019-01-10 RX ORDER — BREXPIPRAZOLE 2 MG/1
2 TABLET ORAL DAILY
COMMUNITY
Start: 2019-01-02 | End: 2019-08-07

## 2019-01-10 RX ORDER — ARIPIPRAZOLE 10 MG/1
TABLET ORAL
COMMUNITY
End: 2019-01-28

## 2019-01-10 RX ORDER — ALBUTEROL SULFATE 90 UG/1
AEROSOL, METERED RESPIRATORY (INHALATION)
COMMUNITY
End: 2020-10-07 | Stop reason: SDUPTHER

## 2019-01-10 RX ORDER — ZOLPIDEM TARTRATE 5 MG/1
TABLET ORAL
Refills: 1 | COMMUNITY
Start: 2018-11-25 | End: 2019-08-07

## 2019-01-10 RX ORDER — BREXPIPRAZOLE 0.5 MG/1
1 TABLET ORAL
Refills: 0 | COMMUNITY
Start: 2018-10-18 | End: 2019-01-28

## 2019-01-10 RX ORDER — CARISOPRODOL 350 MG/1
TABLET ORAL
COMMUNITY
End: 2019-05-30 | Stop reason: SDUPTHER

## 2019-01-10 RX ORDER — ALPRAZOLAM 1 MG/1
TABLET ORAL
COMMUNITY
End: 2019-01-28

## 2019-01-10 RX ORDER — ALPRAZOLAM 1 MG/1
TABLET ORAL
Refills: 1 | COMMUNITY
Start: 2018-12-24 | End: 2019-08-07

## 2019-01-10 RX ORDER — ESOMEPRAZOLE MAGNESIUM 20 MG/1
20 CAPSULE, DELAYED RELEASE ORAL
Refills: 11 | COMMUNITY
Start: 2018-11-26 | End: 2019-01-28

## 2019-01-10 ASSESSMENT — PATIENT HEALTH QUESTIONNAIRE - PHQ9
SUM OF ALL RESPONSES TO PHQ QUESTIONS 1-9: 16
SUM OF ALL RESPONSES TO PHQ9 QUESTIONS 1 AND 2: 4
3. TROUBLE FALLING OR STAYING ASLEEP OR SLEEPING TOO MUCH: MORE THAN HALF THE DAYS
7. TROUBLE CONCENTRATING ON THINGS, SUCH AS READING THE NEWSPAPER OR WATCHING TELEVISION: NEARLY EVERY DAY
8. MOVING OR SPEAKING SO SLOWLY THAT OTHER PEOPLE COULD HAVE NOTICED. OR THE OPPOSITE, BEING SO FIGETY OR RESTLESS THAT YOU HAVE BEEN MOVING AROUND A LOT MORE THAN USUAL: NOT AT ALL
1. LITTLE INTEREST OR PLEASURE IN DOING THINGS: MORE THAN HALF THE DAYS
5. POOR APPETITE OR OVEREATING: MORE THAN HALF THE DAYS
9. THOUGHTS THAT YOU WOULD BE BETTER OFF DEAD, OR OF HURTING YOURSELF: NOT AT ALL
4. FEELING TIRED OR HAVING LITTLE ENERGY: NEARLY EVERY DAY
6. FEELING BAD ABOUT YOURSELF - OR THAT YOU ARE A FAILURE OR HAVE LET YOURSELF OR YOUR FAMILY DOWN: MORE THAN HALF THE DAYS
2. FEELING DOWN, DEPRESSED, IRRITABLE, OR HOPELESS: MORE THAN HALF THE DAYS

## 2019-01-10 ASSESSMENT — PAIN SCALES - GENERAL: PAINLEVEL: NO PAIN

## 2019-01-10 NOTE — PROGRESS NOTES
Chief Complaint:   Chief Complaint   Patient presents with   • Results     cxr, labs        HPI: Established patient of Dr. Mukesh Caldwell Bushra Toussaint is a 58 y.o. female who presents for follow-up on lab work results in about     Leukocytosis, unspecified type  Back in August patient blood cells were elevated, she said she has been having persistent mild elevation of white blood cells for a long time now would like this to be addressed and to recheck it again patient denies fever she reports sometimes hot flashes and night sweats at night denies symptoms suggestive of systemic infection by GI symptoms denies upper respiratory symptoms.    Abnormal CXR (chest x-ray)    Patient says she reviewed in her my chart and so that she has abnormal chest x-ray, her chest x-ray reviewed and discussed with her today there is no significant abnormality or findings in the month of the heart there is a mention of tortuous thoracic aorta.  Patient denies chest pain or palpitations or other symptoms at this time.       Alkaline phosphatase raised    History of elevated alkaline phosphatase, the most recent one that was checked back in May 2018 was within normal limits with normal liver enzymes patient would like this to be rechecked again.        Past medical history, family history, social history and medications reviewed and updated in the record.  Today  Current medications, problem list and allergies reviewed in [facility] today  Health maintenance topics are reviewed and updated.    Patient Active Problem List    Diagnosis Date Noted   • Chest pain 04/01/2016     Priority: High   • Pubic ramus fracture (HCC) 01/13/2014     Priority: Medium   • Fracture of ribs, two 01/13/2014     Priority: Medium   • Chronic back pain 01/13/2014     Priority: Medium   • Seasonal allergies 08/10/2018   • Leukocytosis 08/10/2018   • Snoring 07/03/2018   • External hemorrhoids 04/05/2018   • Left upper quadrant pain 04/05/2018   • Functional  diarrhea 04/05/2018   • Use of opiates for therapeutic purposes 06/26/2017   • Mixed hyperlipidemia 05/26/2017   • Tobacco abuse 04/01/2016   • Osteoporosis 02/18/2016   • Acute sinusitis 12/13/2015   • Depression 12/11/2015   • Chronic lumbar pain 12/11/2015   • Lumbar burst fracture (HCC)      Family History   Problem Relation Age of Onset   • Cancer Mother 45        colon   • Heart Disease Brother 45        bypass   • Diabetes Neg Hx    • Stroke Neg Hx      Social History     Social History   • Marital status:      Spouse name: N/A   • Number of children: N/A   • Years of education: N/A     Occupational History   • Not on file.     Social History Main Topics   • Smoking status: Current Every Day Smoker     Packs/day: 1.00     Types: Cigarettes     Start date: 1/13/1979   • Smokeless tobacco: Never Used   • Alcohol use 0.0 oz/week      Comment: socially   • Drug use: Yes      Comment: occ edible THC   • Sexual activity: No      Comment: , ret      Other Topics Concern   • Not on file     Social History Narrative    ** Merged History Encounter **            Current Outpatient Prescriptions   Medication Sig Dispense Refill   • atomoxetine (STRATTERA) 40 MG capsule TAKE 1 CAPSULE BY MOUTH EVERY DAY IN THE MORNING  1   • REXULTI 2 MG Tab Take 2 mg by mouth every day.     • carisoprodol (SOMA) 350 MG Tab carisoprodol 350 mg tablet   Take 1 tablet as needed by oral route.     • esomeprazole (NEXIUM) 20 MG capsule esomeprazole magnesium 20 mg capsule,delayed release   Take 1 capsule every day by oral route.     • TRINTELLIX 20 MG Tab TAKE 1 TABLET BY MOUTH EVERY MORNING WITH MEALS  2   • cetirizine (ZYRTEC) 10 MG Tab Take 10 mg by mouth every bedtime.     • ALPRAZolam (XANAX) 1 MG Tab TAKE 1 TABLET BY MOUTH EVERY DAY AS NEEDED F41.1  1   • ARIPiprazole (ABILIFY) 10 MG Tab aripiprazole 10 mg tablet   Take 1 tablet every day by oral route.     • REXULTI 0.5 MG Tab Take 1 Tab by mouth every  day.  0   • [facility] ESOMEPRAZOLE MAGNESIUM 20 MG capsule Take 20 mg by mouth every day.  11   • Vortioxetine HBr (TRINTELLIX) 10 MG Tab Trintellix 10 mg tablet   Take 1 tablet every day by oral route.     • zolpidem (AMBIEN) 5 MG Tab TAKE 1 TABLET BY MOUTH EVERY DAY AT BEDTIME AS NEEDED FOR 30 DAYS F51  1   • ALPRAZolam (XANAX) 1 MG Tab alprazolam 1 mg tablet   Take 1 tablet every day by oral route.     • albuterol (VENTOLIN HFA) 108 (90 Base) MCG/ACT Aero Soln inhalation aerosol Ventolin HFA 90 mcg/actuation aerosol inhaler   Inhale 1 puff as needed by inhalation route.     • omeprazole (PRILOSEC) 20 MG delayed-release capsule Take 1 Cap by mouth every day. (Patient not taking: Reported on 1/10/2019) 30 Cap 6   • albuterol 108 (90 Base) MCG/ACT Aero Soln inhalation aerosol Inhale 2 Puffs by mouth every 6 hours as needed for Shortness of Breath. (Patient not taking: Reported on 1/10/2019) 1 Inhaler 3   • fluconazole (DIFLUCAN) 150 MG tablet Take one tablet if yeast infection symptoms after antibiotic use. May repeat in 3 days if symptoms persist (Patient not taking: Reported on 1/10/2019) 2 Tab 0   • hydrocortisone rectal (PROCTOZONE HC) 2.5 % Cream Apply thinly twice daily to symptomatic external hemorrhoids (Patient not taking: Reported on 1/10/2019) 30 g 2   • guaifenesin LA (MUCINEX) 600 MG TABLET SR 12 HR Take 600 mg by mouth every 12 hours.     • alprazolam (XANAX) 0.5 MG Tab Take 0.5 mg by mouth at bedtime as needed for Sleep.     • meloxicam (MOBIC) 15 MG tablet TAKE 1 TABLET BY MOUTH EVERY DAY (Patient not taking: Reported on 1/10/2019) 30 Tab 3   • therapeutic multivitamin-minerals (THERAGRAN-M) Tab Take 1 Tab by mouth every day.     • fluticasone (FLONASE) 50 MCG/ACT nasal spray Spray 2 Sprays in nose 2 times a day.     • Probiotic Product (PROBIOTIC DAILY PO) Take 1 Cap by mouth every bedtime.     • Pseudoephedrine HCl (SUDAFED PO) Take 2 Tabs by mouth every four hours as needed (For congestion).       No  "current facility-administered medications for this visit.          Review Of Systems  As documented in HPI above  PHYSICAL EXAMINATION:    /80 (BP Location: Left arm, Patient Position: Sitting, BP Cuff Size: Adult)   Pulse 98   Temp 36.8 °C (98.2 °F) (Temporal)   Resp 16   Ht 1.651 m (5' 5\")   Wt 73.9 kg (163 lb)   SpO2 94%   BMI 27.12 kg/m²   Gen.: Well-developed, well-nourished, no apparent distress, pleasant and cooperative with the examination  HEENT: Normocephalic/atraumatic, sinuses nontender with palpation, TMs clear, nares patent with pink mucosa and clear rhinorrhea, oropharynx clear  Neck: No JVD or bruits, no adenopathy  Cor: Regular rate and rhythm without murmur gallop or rub    Extremities: No cyanosis, clubbing or edema          ASSESSMENT/Plan:  1. Leukocytosis, unspecified type   unclear etiology, patient not on any type of steroids, she denies any symptoms suggestive of infection, discussed with the patient and reassured to repeat another CBC and follow-up with PCP as directed.  CBC WITH DIFFERENTIAL   2. Abnormal CXR (chest x-ray)   patient reassured that since she is asymptomatic this is an incidental finding in the chest x-ray I think at this point she needs further action watchful waiting would be the best choice at this time.  Follow-up with PCP for further discussion also.   3. Tortuosity of aortic arch   incidental finding on the chest x-ray without symptoms watchful waiting   4. Alkaline phosphatase raised   recheck liver enzymes.  Asymptomatic    COMP METABOLIC PANEL       Please note that this dictation was created using voice recognition software. I have made every reasonable attempt to correct obvious errors but there may be errors of grammar and content that I may have overlooked prior to finalization of this note.       "

## 2019-01-16 ENCOUNTER — HOSPITAL ENCOUNTER (OUTPATIENT)
Dept: LAB | Facility: MEDICAL CENTER | Age: 59
End: 2019-01-16
Attending: FAMILY MEDICINE
Payer: MEDICARE

## 2019-01-16 DIAGNOSIS — D72.829 LEUKOCYTOSIS, UNSPECIFIED TYPE: ICD-10-CM

## 2019-01-16 DIAGNOSIS — R74.8 ALKALINE PHOSPHATASE RAISED: ICD-10-CM

## 2019-01-16 LAB
ALBUMIN SERPL BCP-MCNC: 4.6 G/DL (ref 3.2–4.9)
ALBUMIN/GLOB SERPL: 1.4 G/DL
ALP SERPL-CCNC: 94 U/L (ref 30–99)
ALT SERPL-CCNC: 13 U/L (ref 2–50)
ANION GAP SERPL CALC-SCNC: 7 MMOL/L (ref 0–11.9)
AST SERPL-CCNC: 16 U/L (ref 12–45)
BASOPHILS # BLD AUTO: 0.3 % (ref 0–1.8)
BASOPHILS # BLD: 0.03 K/UL (ref 0–0.12)
BILIRUB SERPL-MCNC: 0.3 MG/DL (ref 0.1–1.5)
BUN SERPL-MCNC: 14 MG/DL (ref 8–22)
CALCIUM SERPL-MCNC: 10 MG/DL (ref 8.5–10.5)
CHLORIDE SERPL-SCNC: 102 MMOL/L (ref 96–112)
CO2 SERPL-SCNC: 26 MMOL/L (ref 20–33)
CREAT SERPL-MCNC: 0.68 MG/DL (ref 0.5–1.4)
EOSINOPHIL # BLD AUTO: 0.15 K/UL (ref 0–0.51)
EOSINOPHIL NFR BLD: 1.5 % (ref 0–6.9)
ERYTHROCYTE [DISTWIDTH] IN BLOOD BY AUTOMATED COUNT: 44.3 FL (ref 35.9–50)
GLOBULIN SER CALC-MCNC: 3.2 G/DL (ref 1.9–3.5)
GLUCOSE SERPL-MCNC: 106 MG/DL (ref 65–99)
HCT VFR BLD AUTO: 41.7 % (ref 37–47)
HGB BLD-MCNC: 14 G/DL (ref 12–16)
IMM GRANULOCYTES # BLD AUTO: 0.02 K/UL (ref 0–0.11)
IMM GRANULOCYTES NFR BLD AUTO: 0.2 % (ref 0–0.9)
LYMPHOCYTES # BLD AUTO: 3.46 K/UL (ref 1–4.8)
LYMPHOCYTES NFR BLD: 34.5 % (ref 22–41)
MCH RBC QN AUTO: 31.3 PG (ref 27–33)
MCHC RBC AUTO-ENTMCNC: 33.6 G/DL (ref 33.6–35)
MCV RBC AUTO: 93.3 FL (ref 81.4–97.8)
MONOCYTES # BLD AUTO: 0.51 K/UL (ref 0–0.85)
MONOCYTES NFR BLD AUTO: 5.1 % (ref 0–13.4)
NEUTROPHILS # BLD AUTO: 5.85 K/UL (ref 2–7.15)
NEUTROPHILS NFR BLD: 58.4 % (ref 44–72)
NRBC # BLD AUTO: 0 K/UL
NRBC BLD-RTO: 0 /100 WBC
PLATELET # BLD AUTO: 426 K/UL (ref 164–446)
PMV BLD AUTO: 9.6 FL (ref 9–12.9)
POTASSIUM SERPL-SCNC: 4 MMOL/L (ref 3.6–5.5)
PROT SERPL-MCNC: 7.8 G/DL (ref 6–8.2)
RBC # BLD AUTO: 4.47 M/UL (ref 4.2–5.4)
SODIUM SERPL-SCNC: 135 MMOL/L (ref 135–145)
WBC # BLD AUTO: 10 K/UL (ref 4.8–10.8)

## 2019-01-16 PROCEDURE — 80053 COMPREHEN METABOLIC PANEL: CPT

## 2019-01-16 PROCEDURE — 36415 COLL VENOUS BLD VENIPUNCTURE: CPT

## 2019-01-16 PROCEDURE — 85025 COMPLETE CBC W/AUTO DIFF WBC: CPT

## 2019-01-28 ENCOUNTER — HOSPITAL ENCOUNTER (OUTPATIENT)
Dept: RADIOLOGY | Facility: MEDICAL CENTER | Age: 59
End: 2019-01-28
Attending: FAMILY MEDICINE
Payer: MEDICARE

## 2019-01-28 ENCOUNTER — OFFICE VISIT (OUTPATIENT)
Dept: MEDICAL GROUP | Facility: MEDICAL CENTER | Age: 59
End: 2019-01-28
Attending: FAMILY MEDICINE
Payer: MEDICARE

## 2019-01-28 VITALS
RESPIRATION RATE: 16 BRPM | SYSTOLIC BLOOD PRESSURE: 132 MMHG | TEMPERATURE: 97.5 F | HEIGHT: 65 IN | WEIGHT: 157 LBS | OXYGEN SATURATION: 94 % | DIASTOLIC BLOOD PRESSURE: 84 MMHG | HEART RATE: 108 BPM | BODY MASS INDEX: 26.16 KG/M2

## 2019-01-28 DIAGNOSIS — M54.6 CHRONIC BILATERAL THORACIC BACK PAIN: Chronic | ICD-10-CM

## 2019-01-28 DIAGNOSIS — M79.671 CHRONIC FOOT PAIN, RIGHT: ICD-10-CM

## 2019-01-28 DIAGNOSIS — G89.29 CHRONIC FOOT PAIN, RIGHT: ICD-10-CM

## 2019-01-28 DIAGNOSIS — G89.29 CHRONIC BILATERAL THORACIC BACK PAIN: Chronic | ICD-10-CM

## 2019-01-28 DIAGNOSIS — R07.89 OTHER CHEST PAIN: ICD-10-CM

## 2019-01-28 DIAGNOSIS — N39.498 OTHER URINARY INCONTINENCE: ICD-10-CM

## 2019-01-28 DIAGNOSIS — F32.9 MAJOR DEPRESSION, CHRONIC: ICD-10-CM

## 2019-01-28 DIAGNOSIS — K21.9 GASTROESOPHAGEAL REFLUX DISEASE WITHOUT ESOPHAGITIS: ICD-10-CM

## 2019-01-28 PROBLEM — R32 ABSENCE OF BLADDER CONTINENCE: Status: ACTIVE | Noted: 2019-01-28

## 2019-01-28 PROBLEM — Z79.891 USE OF OPIATES FOR THERAPEUTIC PURPOSES: Status: RESOLVED | Noted: 2017-06-26 | Resolved: 2019-01-28

## 2019-01-28 PROCEDURE — 73630 X-RAY EXAM OF FOOT: CPT | Mod: RT

## 2019-01-28 PROCEDURE — G0438 PPPS, INITIAL VISIT: HCPCS | Performed by: FAMILY MEDICINE

## 2019-01-28 PROCEDURE — 71101 X-RAY EXAM UNILAT RIBS/CHEST: CPT | Mod: LT

## 2019-01-28 PROCEDURE — 99213 OFFICE O/P EST LOW 20 MIN: CPT | Mod: 25 | Performed by: FAMILY MEDICINE

## 2019-01-28 ASSESSMENT — ACTIVITIES OF DAILY LIVING (ADL): BATHING_REQUIRES_ASSISTANCE: 1

## 2019-01-28 ASSESSMENT — PATIENT HEALTH QUESTIONNAIRE - PHQ9
SUM OF ALL RESPONSES TO PHQ QUESTIONS 1-9: 20
5. POOR APPETITE OR OVEREATING: 3 - NEARLY EVERY DAY
CLINICAL INTERPRETATION OF PHQ2 SCORE: 6

## 2019-01-28 ASSESSMENT — ENCOUNTER SYMPTOMS: GENERAL WELL-BEING: GOOD

## 2019-01-28 NOTE — PROGRESS NOTES
No chief complaint on file.        HPI:  Paulette Toussaint is a 58 y.o. here for Medicare Annual Wellness Visit     Patient Active Problem List    Diagnosis Date Noted   • Chest pain 04/01/2016     Priority: High   • Pubic ramus fracture (HCC) 01/13/2014     Priority: Medium   • Fracture of ribs, two 01/13/2014     Priority: Medium   • Chronic back pain 01/13/2014     Priority: Medium   • Seasonal allergies 08/10/2018   • Leukocytosis 08/10/2018   • Snoring 07/03/2018   • External hemorrhoids 04/05/2018   • Left upper quadrant pain 04/05/2018   • Functional diarrhea 04/05/2018   • Use of opiates for therapeutic purposes 06/26/2017   • Mixed hyperlipidemia 05/26/2017   • Tobacco abuse 04/01/2016   • Osteoporosis 02/18/2016   • Acute sinusitis 12/13/2015   • Depression 12/11/2015   • Chronic lumbar pain 12/11/2015   • Lumbar burst fracture (HCC)        Current Outpatient Prescriptions   Medication Sig Dispense Refill   • ALPRAZolam (XANAX) 1 MG Tab TAKE 1 TABLET BY MOUTH EVERY DAY AS NEEDED F41.1  1   • atomoxetine (STRATTERA) 40 MG capsule TAKE 1 CAPSULE BY MOUTH EVERY DAY IN THE MORNING  1   • REXULTI 2 MG Tab Take 2 mg by mouth every day.     • REXULTI 0.5 MG Tab Take 1 Tab by mouth every day.  0   • carisoprodol (SOMA) 350 MG Tab carisoprodol 350 mg tablet   Take 1 tablet as needed by oral route.     • [facility] ESOMEPRAZOLE MAGNESIUM 20 MG capsule Take 20 mg by mouth every day.  11   • esomeprazole (NEXIUM) 20 MG capsule esomeprazole magnesium 20 mg capsule,delayed release   Take 1 capsule every day by oral route.     • Vortioxetine HBr (TRINTELLIX) 10 MG Tab Trintellix 10 mg tablet   Take 1 tablet every day by oral route.     • TRINTELLIX 20 MG Tab TAKE 1 TABLET BY MOUTH EVERY MORNING WITH MEALS  2   • zolpidem (AMBIEN) 5 MG Tab TAKE 1 TABLET BY MOUTH EVERY DAY AT BEDTIME AS NEEDED FOR 30 DAYS F51  1   • ALPRAZolam (XANAX) 1 MG Tab alprazolam 1 mg tablet   Take 1 tablet every day by oral route.     • albuterol  (VENTOLIN HFA) 108 (90 Base) MCG/ACT Aero Soln inhalation aerosol Ventolin HFA 90 mcg/actuation aerosol inhaler   Inhale 1 puff as needed by inhalation route.     • omeprazole (PRILOSEC) 20 MG delayed-release capsule Take 1 Cap by mouth every day. (Patient not taking: Reported on 1/10/2019) 30 Cap 6   • albuterol 108 (90 Base) MCG/ACT Aero Soln inhalation aerosol Inhale 2 Puffs by mouth every 6 hours as needed for Shortness of Breath. (Patient not taking: Reported on 1/10/2019) 1 Inhaler 3   • fluconazole (DIFLUCAN) 150 MG tablet Take one tablet if yeast infection symptoms after antibiotic use. May repeat in 3 days if symptoms persist (Patient not taking: Reported on 1/10/2019) 2 Tab 0   • hydrocortisone rectal (PROCTOZONE HC) 2.5 % Cream Apply thinly twice daily to symptomatic external hemorrhoids (Patient not taking: Reported on 1/10/2019) 30 g 2   • guaifenesin LA (MUCINEX) 600 MG TABLET SR 12 HR Take 600 mg by mouth every 12 hours.     • alprazolam (XANAX) 0.5 MG Tab Take 0.5 mg by mouth at bedtime as needed for Sleep.     • meloxicam (MOBIC) 15 MG tablet TAKE 1 TABLET BY MOUTH EVERY DAY (Patient not taking: Reported on 1/10/2019) 30 Tab 3   • therapeutic multivitamin-minerals (THERAGRAN-M) Tab Take 1 Tab by mouth every day.     • fluticasone (FLONASE) 50 MCG/ACT nasal spray Spray 2 Sprays in nose 2 times a day.     • cetirizine (ZYRTEC) 10 MG Tab Take 10 mg by mouth every bedtime.     • Probiotic Product (PROBIOTIC DAILY PO) Take 1 Cap by mouth every bedtime.     • Pseudoephedrine HCl (SUDAFED PO) Take 2 Tabs by mouth every four hours as needed (For congestion).       No current facility-administered medications for this visit.             Current supplements as per medication list.       Allergies: Seasonal; Sulfa drugs; and Sulfa drugs    Current social contact/activities: Family     She  reports that she has been smoking Cigarettes.  She started smoking about 40 years ago. She has been smoking about 1.00  pack per day. She has never used smokeless tobacco. She reports that she drinks alcohol. She reports that she uses drugs.  Ready to quit: Not Answered  Counseling given: Not Answered      DPA/Advanced Directive:  Patient does not have an Advanced Directive.  A packet and workshop information was given on Advanced Directives.    ROS:    Gait: Uses no assistive device  Ostomy: No  Other tubes: No  Amputations: No  Chronic oxygen use: No  Last eye exam: 4 years  Wears hearing aids: No   : Reports urinary leakage during the last 6 months that has somewhat interfered with their daily activities or sleep.    Screening:    Depression Screening    Little interest or pleasure in doing things?  3 - nearly every day  Feeling down, depressed , or hopeless? 3 - nearly every day  Trouble falling or staying asleep, or sleeping too much?  0 - not at all  Feeling tired or having little energy?  3 - nearly every day  Poor appetite or overeating?  3 - nearly every day  Feeling bad about yourself - or that you are a failure or have let yourself or your family down? 2 - more than half the days  Trouble concentrating on things, such as reading the newspaper or watching television? 3 - nearly every day  Moving or speaking so slowly that other people could have noticed.  Or the opposite - being so fidgety or restless that you have been moving around a lot more than usual?  3 - nearly every day  Thoughts that you would be better off dead, or of hurting yourself?  0 - not at all  Patient Health Questionnaire Score: 20    If depressive symptoms identified deferred to follow up visit unless specifically addressed in assessment and plan.    Interpretation of PHQ-9 Total Score   Score Severity   1-4 No Depression   5-9 Mild Depression   10-14 Moderate Depression   15-19 Moderately Severe Depression   20-27 Severe Depression      Screening for Cognitive Impairment    Three Minute Recall (leader, season, table) 3/3    Miles clock face with all 12  numbers and set the hands to show 10 past 11.       Cognitive concerns identified deferred for follow up unless specifically addressed in assessment and plan.    Fall Risk Assessment    Has the patient had two or more falls in the last year or any fall with injury in the last year?  Yes    Safety Assessment    Throw rugs on floor.  Yes  Handrails on all stairs.  No  Good lighting in all hallways.     Difficulty hearing.  No  Patient counseled about all safety risks that were identified.    Functional Assessment ADLs    Are there any barriers preventing you from cooking for yourself or meeting nutritional needs?  Yes.    Are there any barriers preventing you from driving safely or obtaining transportation?  No.    Are there any barriers preventing you from using a telephone or calling for help?  No.    Are there any barriers preventing you from shopping?  No.    Are there any barriers preventing you from taking care of your own finances?  No.    Are there any barriers preventing you from managing your medications?  No.    Are there any barriers preventing you from showering, bathing or dressing yourself? Yes.    Are you currently engaging in any exercise or physical activity?  Yes.     What is your perception of your health?  Good.      Health Maintenance Summary                IMM DTaP/Tdap/Td Vaccine Overdue 3/10/1980     IMM PNEUMOCOCCAL 19-64 (ADULT) MEDIUM RISK SERIES Overdue 3/10/1980     IMM ZOSTER VACCINES Overdue 3/10/2011     PAP SMEAR Overdue 1/20/2019      Done 1/20/2016 PATHOLOGY GYN SPECIMEN    IMM INFLUENZA Postponed 1/10/2020 Originally 9/1/2018. Patient Refused     Done 1/14/2014 Imm Admin: Influenza TIV (IM)    MAMMOGRAM Next Due 1/4/2020      Done 1/4/2019 [id number]     Patient has more history with this topic...    COLONOSCOPY Next Due 7/26/2022      Done 7/26/2017 REFERRAL TO GI FOR COLONOSCOPY          Patient Care Team:  Savage Wright M.D. as PCP - General (Family  "Medicine)  Savage Wright M.D. (Family Medicine)      Social History   Substance Use Topics   • Smoking status: Current Every Day Smoker     Packs/day: 1.00     Types: Cigarettes     Start date: 1/13/1979   • Smokeless tobacco: Never Used   • Alcohol use 0.0 oz/week      Comment: socially     Family History   Problem Relation Age of Onset   • Cancer Mother 45        colon   • Heart Disease Brother 45        bypass   • Diabetes Neg Hx    • Stroke Neg Hx      She  has a past medical history of Anxiety; Chronic back pain; Depression (2013); Fall; History of spinal fusion; and Lumbar burst fracture (HCC) (2013).   Past Surgical History:   Procedure Laterality Date   • LUMBAR FUSION POSTERIOR  2013    rods and screw placement   • ROTATOR CUFF REPAIR Right Mar 2009   • OTHER ORTHOPEDIC SURGERY  2009    Rotator cuff  surgery Right   • OTHER ORTHOPEDIC SURGERY      back surgery   • PRIMARY C SECTION      x2       Exam:   Blood pressure 132/84, pulse (!) 108, temperature 36.4 °C (97.5 °F), temperature source Temporal, resp. rate 16, height 1.651 m (5' 5\"), weight 71.2 kg (157 lb), SpO2 94 %, not currently breastfeeding. Body mass index is 26.13 kg/m².    Hearing excellent.    Dentition good  Alert, oriented in no acute distress.  Eye contact is good, speech goal directed, affect calm    Assessment and Plan. The following treatment and monitoring plan is recommended:    1 other chest pain  2.  Chronic bilateral thoracic back pain  3.  Major depression, chronic-being treated with Nadege Palazio  4.  Urinary incontinence  5.  GERD-controlled with  esomeprazole  6.  Chronic right foot pain  7.  Increased fall risk-patient did have a single fall with injury in the past year.  Current ambulation and stability are appropriate.  Patient is not using any assistive aids.  Services suggested:   Health Care Screening: Age-appropriate preventive services recommended by USPTF and ACIP covered by Medicare were discussed today. Services " ordered if indicated and agreed upon by the patient.  Referrals offered: Community-based lifestyle interventions to reduce health risks and promote self-management and wellness, fall prevention, nutrition, physical activity, tobacco-use cessation, weight loss, and mental health services as per orders if indicated.    Discussion today about general wellness and lifestyle habits:    · Prevent falls and reduce trip hazards; Cautioned about securing or removing rugs.  · Have a working fire alarm and carbon monoxide detector;   · Engage in regular physical activity and social activities     Follow-up: 1.  X-ray of the right foot  2.  X-ray of the left ribs  3.  Revisit regarding atypical chest pain, right foot pain, urinary incontinence

## 2019-01-29 ENCOUNTER — TELEPHONE (OUTPATIENT)
Dept: MEDICAL GROUP | Facility: MEDICAL CENTER | Age: 59
End: 2019-01-29

## 2019-01-29 NOTE — TELEPHONE ENCOUNTER
----- Message from Savage Wright M.D. sent at 1/29/2019  1:10 PM PST -----  Rib x-rays were unremarkable with no fracture.  Foot x-ray was similarly not revealing.  Patient may go ahead and make her own podiatry appointment if she desires for persistent foot pain.

## 2019-01-29 NOTE — TELEPHONE ENCOUNTER
Phone Number Called: [phone] (home)       Message: Left voicemail for patient to call back for xray results.    Left Message for patient to call back: yes

## 2019-02-12 ENCOUNTER — OFFICE VISIT (OUTPATIENT)
Dept: MEDICAL GROUP | Facility: MEDICAL CENTER | Age: 59
End: 2019-02-12
Attending: FAMILY MEDICINE
Payer: MEDICARE

## 2019-02-12 VITALS
OXYGEN SATURATION: 96 % | TEMPERATURE: 96.9 F | HEIGHT: 65 IN | SYSTOLIC BLOOD PRESSURE: 134 MMHG | HEART RATE: 102 BPM | BODY MASS INDEX: 26.82 KG/M2 | DIASTOLIC BLOOD PRESSURE: 98 MMHG | RESPIRATION RATE: 18 BRPM | WEIGHT: 161 LBS

## 2019-02-12 DIAGNOSIS — Z01.419 WELL WOMAN EXAM: ICD-10-CM

## 2019-02-12 PROCEDURE — 99213 OFFICE O/P EST LOW 20 MIN: CPT | Performed by: FAMILY MEDICINE

## 2019-02-12 PROCEDURE — 99999 PR NO CHARGE: CPT | Performed by: FAMILY MEDICINE

## 2019-02-13 NOTE — PROGRESS NOTES
Chief Complaint:   Chief Complaint   Patient presents with   • Other     Patient is here to see if she is due for her well woman exam       HPI: Established patient of Dr. Mukesh Caldwell Buhsra Norbert is a 58 y.o. female who presents for      Well woman exam  Patient is here because she is not sure if she is due for her cervical cancer screening sexually active abnormal Pap, denies abnormal vaginal bleeding or abnormal vaginal discharge her last Pap was done and was normal and she had 2 previous normal Paps previous to that each consecutive year.  She for every 3 years if she qualifies for that.  Patient denies breast mass or nipple discharge recently with her was without evidence of cancer or it is benign and negative    Past medical history, family history, social history and medications reviewed and updated in the record.  Today  Current medications, problem list and allergies reviewed in [facility] today  Health maintenance topics are reviewed and updated.  Today    Patient Active Problem List    Diagnosis Date Noted   • Chest pain 04/01/2016     Priority: High   • Pubic ramus fracture (HCC) 01/13/2014     Priority: Medium   • Fracture of ribs, two 01/13/2014     Priority: Medium   • Chronic back pain 01/13/2014     Priority: Medium   • Gastroesophageal reflux disease without esophagitis 01/28/2019   • Absence of bladder continence 01/28/2019   • Seasonal allergies 08/10/2018   • Leukocytosis 08/10/2018   • Snoring 07/03/2018   • External hemorrhoids 04/05/2018   • Left upper quadrant pain 04/05/2018   • Functional diarrhea 04/05/2018   • Mixed hyperlipidemia 05/26/2017   • Tobacco abuse 04/01/2016   • Osteoporosis 02/18/2016   • Acute sinusitis 12/13/2015   • Depression 12/11/2015   • Chronic lumbar pain 12/11/2015   • Lumbar burst fracture (HCC)      Family History   Problem Relation Age of Onset   • Cancer Mother 45        colon   • Heart Disease Brother 45        bypass   • Diabetes Neg Hx    • Stroke Neg Hx       Social History     Social History   • Marital status:      Spouse name: N/A   • Number of children: N/A   • Years of education: N/A     Occupational History   • Not on file.     Social History Main Topics   • Smoking status: Current Every Day Smoker     Packs/day: 1.00     Types: Cigarettes     Start date: 1/13/1979   • Smokeless tobacco: Never Used   • Alcohol use 0.0 oz/week      Comment: socially   • Drug use: Yes      Comment: occ edible THC   • Sexual activity: No      Comment: , ret      Other Topics Concern   • Not on file     Social History Narrative    ** Merged History Encounter **          Current Outpatient Prescriptions   Medication Sig Dispense Refill   • ALPRAZolam (XANAX) 1 MG Tab TAKE 1 TABLET BY MOUTH EVERY DAY AS NEEDED F41.1  1   • atomoxetine (STRATTERA) 40 MG capsule TAKE 1 CAPSULE BY MOUTH EVERY DAY IN THE MORNING  1   • REXULTI 2 MG Tab Take 2 mg by mouth every day.     • carisoprodol (SOMA) 350 MG Tab carisoprodol 350 mg tablet   Take 1 tablet as needed by oral route.     • esomeprazole (NEXIUM) 20 MG capsule esomeprazole magnesium 20 mg capsule,delayed release   Take 1 capsule every day by oral route.     • TRINTELLIX 20 MG Tab TAKE 1 TABLET BY MOUTH EVERY MORNING WITH MEALS  2   • zolpidem (AMBIEN) 5 MG Tab TAKE 1 TABLET BY MOUTH EVERY DAY AT BEDTIME AS NEEDED FOR 30 DAYS F51  1   • albuterol (VENTOLIN HFA) 108 (90 Base) MCG/ACT Aero Soln inhalation aerosol Ventolin HFA 90 mcg/actuation aerosol inhaler   Inhale 1 puff as needed by inhalation route.     • albuterol 108 (90 Base) MCG/ACT Aero Soln inhalation aerosol Inhale 2 Puffs by mouth every 6 hours as needed for Shortness of Breath. (Patient not taking: Reported on 1/10/2019) 1 Inhaler 3   • meloxicam (MOBIC) 15 MG tablet TAKE 1 TABLET BY MOUTH EVERY DAY (Patient not taking: Reported on 1/10/2019) 30 Tab 3   • therapeutic multivitamin-minerals (THERAGRAN-M) Tab Take 1 Tab by mouth every day.     •  "fluticasone (FLONASE) 50 MCG/ACT nasal spray Spray 2 Sprays in nose 2 times a day.     • cetirizine (ZYRTEC) 10 MG Tab Take 10 mg by mouth every bedtime.     • Probiotic Product (PROBIOTIC DAILY PO) Take 1 Cap by mouth every bedtime.     • Pseudoephedrine HCl (SUDAFED PO) Take 2 Tabs by mouth every four hours as needed (For congestion).       No current facility-administered medications for this visit.      Patient    Review Of Systems  As documented in HPI above  PHYSICAL EXAMINATION:    /98 (BP Location: Left arm, Patient Position: Sitting, BP Cuff Size: Adult)   Pulse (!) 102   Temp 36.1 °C (96.9 °F) (Temporal)   Resp 18   Ht 1.651 m (5' 5\")   Wt 73 kg (161 lb)   SpO2 96%   BMI 26.79 kg/m²   Gen.: Well-developed, well-nourished, no apparent distress, pleasant and cooperative with the examination  HEENT: Normocephalic/atraumatic,            ASSESSMENT/Plan:  1. Well woman exam   patient and advised to do her Pap years since she is low risk no need to do Pap today patient needs to come back when she is due for complete previous, discussed with patient today.  Follow-up with PCP.       Please note that this dictation was created using voice recognition software. I have made every reasonable attempt to correct obvious errors but there may be errors of grammar and content that I may have overlooked prior to finalization of this note.       "

## 2019-02-19 ENCOUNTER — OFFICE VISIT (OUTPATIENT)
Dept: MEDICAL GROUP | Facility: MEDICAL CENTER | Age: 59
End: 2019-02-19
Attending: FAMILY MEDICINE
Payer: MEDICARE

## 2019-02-19 VITALS
TEMPERATURE: 97.5 F | BODY MASS INDEX: 26.82 KG/M2 | RESPIRATION RATE: 16 BRPM | HEIGHT: 65 IN | SYSTOLIC BLOOD PRESSURE: 126 MMHG | WEIGHT: 161 LBS | DIASTOLIC BLOOD PRESSURE: 82 MMHG | OXYGEN SATURATION: 97 % | HEART RATE: 108 BPM

## 2019-02-19 DIAGNOSIS — M54.2 NECK DISCOMFORT: ICD-10-CM

## 2019-02-19 PROCEDURE — 99213 OFFICE O/P EST LOW 20 MIN: CPT | Performed by: FAMILY MEDICINE

## 2019-02-20 NOTE — PROGRESS NOTES
"Subjective:      Paulette Toussaint is a 58 y.o. female who presents with No chief complaint on file.            HPI 1.  Neck discomfort-patient reports that for probably at least several decades she feels a persistent full sensation or pressure in her mid anterior neck.  On the other hand she reports that swallowing is continuing unimpeded and she does not choke on solids or liquids.  She has had no recent emesis since adjusting her antidepressant medication 6 weeks ago.  She did undergo colonoscopy in July 2017 but does not appear to have undergone upper endoscopy at that time      ROS negative for focal numbness, local shoulder pain, dyspnea       Objective:     /82   Pulse (!) 108   Temp 36.4 °C (97.5 °F) (Temporal)   Resp 16   Ht 1.651 m (5' 5\")   Wt 73 kg (161 lb)   SpO2 97%   BMI 26.79 kg/m²      Physical Exam  Gen.- alert, cooperative, in no acute distress  Neck- midline trachea, thyroid not enlarged or tender,supple, no cervical adenopathy  Chest-clear to auscultation and percussion with normal breath sounds. No retractions. Chest wall nontender  Cardiac- regular rhythm and rate. No murmur, thrill, or heave  Axillae-negative for masses or tenderness          Assessment/Plan:     1. Neck discomfort      Plan: 1.  Barium swallow to evaluate esophageal patency/function  "

## 2019-02-28 ENCOUNTER — NON-PROVIDER VISIT (OUTPATIENT)
Dept: MEDICAL GROUP | Facility: MEDICAL CENTER | Age: 59
End: 2019-02-28
Attending: FAMILY MEDICINE
Payer: MEDICARE

## 2019-02-28 VITALS — SYSTOLIC BLOOD PRESSURE: 122 MMHG | DIASTOLIC BLOOD PRESSURE: 88 MMHG

## 2019-02-28 PROCEDURE — 99211 OFF/OP EST MAY X REQ PHY/QHP: CPT | Performed by: FAMILY MEDICINE

## 2019-02-28 NOTE — PROGRESS NOTES
Paulette Toussaint is a 58 y.o. female here for a non-provider visit for BP check. Patient reports that she was at her acupuncture visit yesterday 2/27/19 and her blood pressure was taken with a BP machine. Blood pressure was at 180 98 in their office. Patient wanted to have her bp checked at her PCP's office.     Vitals:    02/28/19 1449   BP: 122/88   BP Location: Left arm   Patient Position: Sitting   BP Cuff Size: Adult     If abnormal, was an in office provider notified today? Not Indicated  Routed to PCP? Yes

## 2019-05-07 ENCOUNTER — PATIENT OUTREACH (OUTPATIENT)
Dept: HEALTH INFORMATION MANAGEMENT | Facility: OTHER | Age: 59
End: 2019-05-07

## 2019-05-07 NOTE — PROGRESS NOTES
Outcome: Left Message    Please transfer to Patient Outreach Team at [phone] when patient returns call.    WebIZ Checked & Epic Updated:  no    HealthConnect Verified: yes    Attempt # 1

## 2019-06-19 ENCOUNTER — TELEPHONE (OUTPATIENT)
Dept: MEDICAL GROUP | Facility: MEDICAL CENTER | Age: 59
End: 2019-06-19

## 2019-06-19 DIAGNOSIS — Z82.49 FAM HX-ISCHEM HEART DISEASE: ICD-10-CM

## 2019-06-20 PROBLEM — Z82.49 FAM HX-ISCHEM HEART DISEASE: Status: ACTIVE | Noted: 2019-06-20

## 2019-07-08 ENCOUNTER — TELEPHONE (OUTPATIENT)
Dept: MEDICAL GROUP | Facility: MEDICAL CENTER | Age: 59
End: 2019-07-08

## 2019-07-08 DIAGNOSIS — R93.1 ELEVATED CORONARY ARTERY CALCIUM SCORE: ICD-10-CM

## 2019-07-08 NOTE — TELEPHONE ENCOUNTER
Coronary calcium score is elevated at 600.  This is a significant elevation and warrants further evaluation probably treadmill with imaging.  We will set up a cardiology consultation to see which direction they feel would be most productive.

## 2019-08-07 ENCOUNTER — OFFICE VISIT (OUTPATIENT)
Dept: CARDIOLOGY | Facility: MEDICAL CENTER | Age: 59
End: 2019-08-07
Payer: MEDICARE

## 2019-08-07 VITALS
BODY MASS INDEX: 24.02 KG/M2 | DIASTOLIC BLOOD PRESSURE: 84 MMHG | OXYGEN SATURATION: 94 % | HEART RATE: 110 BPM | SYSTOLIC BLOOD PRESSURE: 120 MMHG | HEIGHT: 65 IN | WEIGHT: 144.18 LBS

## 2019-08-07 DIAGNOSIS — I25.10 CORONARY ARTERIOSCLEROSIS: ICD-10-CM

## 2019-08-07 DIAGNOSIS — R07.2 PRECORDIAL PAIN: ICD-10-CM

## 2019-08-07 LAB — EKG IMPRESSION: NORMAL

## 2019-08-07 PROCEDURE — 99204 OFFICE O/P NEW MOD 45 MIN: CPT | Performed by: INTERNAL MEDICINE

## 2019-08-07 PROCEDURE — 93000 ELECTROCARDIOGRAM COMPLETE: CPT | Performed by: INTERNAL MEDICINE

## 2019-08-07 RX ORDER — CYCLOBENZAPRINE HCL 5 MG
5-10 TABLET ORAL 3 TIMES DAILY PRN
COMMUNITY
End: 2020-01-13

## 2019-08-07 RX ORDER — ZOLPIDEM TARTRATE 10 MG/1
TABLET ORAL
Refills: 0 | COMMUNITY
Start: 2019-06-03 | End: 2021-10-14

## 2019-08-07 RX ORDER — GABAPENTIN 400 MG/1
400 CAPSULE ORAL 3 TIMES DAILY
COMMUNITY
End: 2020-01-13 | Stop reason: SDUPTHER

## 2019-08-07 RX ORDER — DULOXETIN HYDROCHLORIDE 20 MG/1
30 CAPSULE, DELAYED RELEASE ORAL 2 TIMES DAILY
Refills: 0 | COMMUNITY
Start: 2019-06-10 | End: 2020-05-13

## 2019-08-07 RX ORDER — FLUOXETINE HYDROCHLORIDE 40 MG/1
CAPSULE ORAL
Refills: 1 | COMMUNITY
Start: 2019-07-15 | End: 2019-08-07

## 2019-08-07 RX ORDER — TRAZODONE HYDROCHLORIDE 100 MG/1
100 TABLET ORAL NIGHTLY
COMMUNITY
End: 2020-01-13

## 2019-08-07 RX ORDER — PRAVASTATIN SODIUM 20 MG
20 TABLET ORAL DAILY
Qty: 90 TAB | Refills: 3 | Status: SHIPPED | OUTPATIENT
Start: 2019-08-07 | End: 2020-06-17

## 2019-08-07 RX ORDER — HYDROXYZINE 50 MG/1
50 TABLET, FILM COATED ORAL 3 TIMES DAILY PRN
COMMUNITY
End: 2020-01-13

## 2019-08-07 NOTE — PROGRESS NOTES
"CARDIOLOGY NEW PATIENT CONSULTATION    PCP: Savage Wright M.D.  Primary Cardiologist: Tony Sawyer M.D.    1. Coronary arteriosclerosis - elevated coronary artery calcium score (>600). Asymptomatic.   - I advised starting pravastatin 20 mg daily  - Stress MPI ordered for further risk stratification, given asymptomatic status will defer invasive management to high risk findings.      Follow up with Tony Sawyer M.D. in 1 year     Chief Complaint   Patient presents with   • Chest Pain       History: Paulette Toussaint is a 59 y.o. female with history of active tobacco use presenting for evaluation of Abnormal coronary artery calcium score.  She is concerned because her mother suffered cardiac death in her 60s, and was worried about her personal risk of cardiovascular disease.  Her health is mostly marked by chronic back pain with history of extensive spinal surgery, as well as depression and anxiety; which tend to fluctuate with back symptoms.  She is able to attend to her own household; perform cleaning and running errands without cardiovascular symptom.  She does not engage in regular aerobic exercise.  She still smokes half to 1 pack cigarettes daily    ROS:   All other systems reviewed and negative except as per the HPI    Past Medical History:   Diagnosis Date   • Anxiety    • Chronic back pain    • Depression 2013   • Fall    • History of spinal fusion     T10-L3   • Lumbar burst fracture (HCC) 2013    L1 burst, tx'd with rods/screws [place]     Past Surgical History:   Procedure Laterality Date   • LUMBAR FUSION POSTERIOR  2013    rods and screw placement   • ROTATOR CUFF REPAIR Right Mar 2009   • OTHER ORTHOPEDIC SURGERY  2009    Rotator cuff  surgery Right   • OTHER ORTHOPEDIC SURGERY      back surgery   • PRIMARY C SECTION      x2     Allergies   Allergen Reactions   • Seasonal Runny Nose and Itching   • Sulfa Drugs Nausea     Pt states \"I get very sick, I was over 20 years ago\".   • Sulfa " Drugs      Outpatient Encounter Medications as of 8/7/2019   Medication Sig Dispense Refill   • DULoxetine (CYMBALTA) 20 MG Cap DR Particles Take 30 mg by mouth 2 Times a Day.  0   • gabapentin (NEURONTIN) 400 MG Cap Take 400 mg by mouth 3 times a day.     • hydrOXYzine HCl (ATARAX) 50 MG Tab Take 50 mg by mouth 3 times a day as needed for Itching.     • cyclobenzaprine (FLEXERIL) 5 MG tablet Take 5-10 mg by mouth 3 times a day as needed.     • traZODone (DESYREL) 100 MG Tab Take 100 mg by mouth every evening.     • pravastatin (PRAVACHOL) 20 MG Tab Take 1 Tab by mouth every day. 90 Tab 3   • albuterol (VENTOLIN HFA) 108 (90 Base) MCG/ACT Aero Soln inhalation aerosol Ventolin HFA 90 mcg/actuation aerosol inhaler   Inhale 1 puff as needed by inhalation route.     • therapeutic multivitamin-minerals (THERAGRAN-M) Tab Take 1 Tab by mouth every day.     • fluticasone (FLONASE) 50 MCG/ACT nasal spray Spray 2 Sprays in nose 2 times a day.     • cetirizine (ZYRTEC) 10 MG Tab Take 10 mg by mouth every bedtime.     • Probiotic Product (PROBIOTIC DAILY PO) Take 1 Cap by mouth every bedtime.     • Pseudoephedrine HCl (SUDAFED PO) Take 2 Tabs by mouth every four hours as needed (For congestion).     • zolpidem (AMBIEN) 10 MG Tab TAKE 1 TABLET BY MOUTH EVERY DAY AT BEDTIME AS NEEDED FOR INSOMNIA FOR 30 DAYS *F51.01*  0   • [DISCONTINUED] fluoxetine (PROZAC) 40 MG capsule TAKE 1 CAPSULE BY MOUTH EVERYDAY AT BEDTIME  1   • [DISCONTINUED] ALPRAZolam (XANAX) 1 MG Tab TAKE 1 TABLET BY MOUTH EVERY DAY AS NEEDED F41.1  1   • [DISCONTINUED] atomoxetine (STRATTERA) 40 MG capsule TAKE 1 CAPSULE BY MOUTH EVERY DAY IN THE MORNING  1   • [DISCONTINUED] REXULTI 2 MG Tab Take 2 mg by mouth every day.     • [DISCONTINUED] esomeprazole (NEXIUM) 20 MG capsule esomeprazole magnesium 20 mg capsule,delayed release   Take 1 capsule every day by oral route.     • [DISCONTINUED] zolpidem (AMBIEN) 5 MG Tab TAKE 1 TABLET BY MOUTH EVERY DAY AT BEDTIME AS  NEEDED FOR 30 DAYS F51  1   • [DISCONTINUED] albuterol 108 (90 Base) MCG/ACT Aero Soln inhalation aerosol Inhale 2 Puffs by mouth every 6 hours as needed for Shortness of Breath. (Patient not taking: Reported on 1/10/2019) 1 Inhaler 3   • [DISCONTINUED] meloxicam (MOBIC) 15 MG tablet TAKE 1 TABLET BY MOUTH EVERY DAY (Patient not taking: Reported on 1/10/2019) 30 Tab 3     No facility-administered encounter medications on file as of 8/7/2019.        Family History   Problem Relation Age of Onset   • Cancer Mother 45        colon   • Heart Disease Brother 45        bypass   • Diabetes Neg Hx    • Stroke Neg Hx      Social History     Socioeconomic History   • Marital status:      Spouse name: Not on file   • Number of children: Not on file   • Years of education: Not on file   • Highest education level: Not on file   Occupational History   • Not on file   Social Needs   • Financial resource strain: Not on file   • Food insecurity:     Worry: Not on file     Inability: Not on file   • Transportation needs:     Medical: Not on file     Non-medical: Not on file   Tobacco Use   • Smoking status: Current Every Day Smoker     Packs/day: 1.00     Types: Cigarettes     Start date: 1/13/1979   • Smokeless tobacco: Never Used   Substance and Sexual Activity   • Alcohol use: Yes     Alcohol/week: 0.0 oz     Comment: socially   • Drug use: Yes     Comment: occ edible THC   • Sexual activity: Never     Comment: , ret    Lifestyle   • Physical activity:     Days per week: Not on file     Minutes per session: Not on file   • Stress: Not on file   Relationships   • Social connections:     Talks on phone: Not on file     Gets together: Not on file     Attends Buddhism service: Not on file     Active member of club or organization: Not on file     Attends meetings of clubs or organizations: Not on file     Relationship status: Not on file   • Intimate partner violence:     Fear of current or ex  "partner: Not on file     Emotionally abused: Not on file     Physically abused: Not on file     Forced sexual activity: Not on file   Other Topics Concern   • Not on file   Social History Narrative    ** Merged History Encounter **            PE:  /84 (BP Location: Left arm, Patient Position: Sitting, BP Cuff Size: Adult)   Pulse (!) 110   Ht 1.651 m (5' 5\")   Wt 65.4 kg (144 lb 2.9 oz)   SpO2 94%   BMI 23.99 kg/m²   GEN: Well appearing  HEENT: Symmetric face. Anicteric sclerae. Moist mucus membranes  NECK: No JVD. No lymphadenopathy  CARDIAC:  Normal PMI, regular, normal S1, S2.   VASCULATURE: Normal carotid amplitude without bruit.   RESP: Clear to auscultation bilaterally  ABD: Soft, non-tender, non-distended  EXT: No  edema, no clubbing or cyanosis  SKIN: Warm and dry  NEURO: No gross deficits   PSYCH: Appropriate affect, participates in conversation    Studies  Lab Results   Component Value Date/Time    CHOLSTRLTOT 217 (H) 07/18/2017 12:15 PM     (H) 07/18/2017 12:15 PM    HDL 45 07/18/2017 12:15 PM    TRIGLYCERIDE 145 07/18/2017 12:15 PM       Lab Results   Component Value Date/Time    SODIUM 135 01/16/2019 03:55 PM    POTASSIUM 4.0 01/16/2019 03:55 PM    CHLORIDE 102 01/16/2019 03:55 PM    CO2 26 01/16/2019 03:55 PM    GLUCOSE 106 (H) 01/16/2019 03:55 PM    BUN 14 01/16/2019 03:55 PM    CREATININE 0.68 01/16/2019 03:55 PM     Lab Results   Component Value Date/Time    ALKPHOSPHAT 94 01/16/2019 03:55 PM    ASTSGOT 16 01/16/2019 03:55 PM    ALTSGPT 13 01/16/2019 03:55 PM    TBILIRUBIN 0.3 01/16/2019 03:55 PM      Lab Results   Component Value Date/Time    BNPBTYPENAT 39 08/14/2017 03:57 PM       For this encounter I directly reviewed ECG tracings and medical records the ECG is unremarkable showing sinus rhythm.  Coronary calcium score is elevated with calcium deposits in both the LAD and circumflex territories.  Lipids show total cholesterol of 191 and an HDL of 45, LDL of 119 triglycerides " 133

## 2019-08-13 ENCOUNTER — APPOINTMENT (OUTPATIENT)
Dept: RADIOLOGY | Facility: MEDICAL CENTER | Age: 59
End: 2019-08-13
Attending: INTERNAL MEDICINE
Payer: MEDICARE

## 2019-08-13 ENCOUNTER — TELEPHONE (OUTPATIENT)
Dept: CARDIOLOGY | Facility: MEDICAL CENTER | Age: 59
End: 2019-08-13

## 2019-08-13 NOTE — TELEPHONE ENCOUNTER
CALI/sunshine    Pt calling to discuss stress testing, she cancelled today's appt.  She also wants to discuss calcium scoring test that resulted in alarming findings.  Pt is asking you to call her this afternoon, [phone].

## 2019-08-13 NOTE — TELEPHONE ENCOUNTER
Returned pt's call. She apologized for cancelling today's stress test, but she said some things came up and she just couldn't make it. Provided her with image schedulers number and she says she will call to re schedule. She also wanted clarification because her copy of her calcium score results recommend a nuc med study, and she wanted to know if this was the same thing as a stress test. Confirmed for her that it is. She was also very concerned because she saw that her calcium score places her at the 90th percentile of women her age. She interpreted this as having a 90% chance of having a cardiac event. Explained the percentile and reassured her. Pt is appreciative of call.

## 2019-08-23 DIAGNOSIS — M62.838 MUSCLE SPASM: ICD-10-CM

## 2019-08-23 RX ORDER — CARISOPRODOL 350 MG/1
TABLET ORAL
Qty: 30 TAB | Refills: 2 | Status: SHIPPED | OUTPATIENT
Start: 2019-08-23 | End: 2019-09-23

## 2019-09-11 ENCOUNTER — HOSPITAL ENCOUNTER (OUTPATIENT)
Facility: MEDICAL CENTER | Age: 59
End: 2019-09-11
Attending: PHYSICIAN ASSISTANT
Payer: MEDICARE

## 2019-09-11 LAB
C DIFF DNA SPEC QL NAA+PROBE: NEGATIVE
C DIFF TOX GENS STL QL NAA+PROBE: NEGATIVE
STOOL OTHER ELEMENTS 1951: NORMAL
WBC STL QL MICRO: NORMAL

## 2019-09-11 PROCEDURE — 83993 ASSAY FOR CALPROTECTIN FECAL: CPT

## 2019-09-11 PROCEDURE — 89055 LEUKOCYTE ASSESSMENT FECAL: CPT

## 2019-09-11 PROCEDURE — 87493 C DIFF AMPLIFIED PROBE: CPT

## 2019-09-14 LAB — CALPROTECTIN STL-MCNT: <16 UG/G

## 2020-01-10 ENCOUNTER — TELEPHONE (OUTPATIENT)
Dept: MEDICAL GROUP | Facility: PHYSICIAN GROUP | Age: 60
End: 2020-01-10

## 2020-01-10 RX ORDER — DIAZEPAM 10 MG/1
10 TABLET ORAL EVERY 12 HOURS PRN
COMMUNITY
End: 2022-05-23

## 2020-01-10 SDOH — HEALTH STABILITY: MENTAL HEALTH: HOW OFTEN DO YOU HAVE A DRINK CONTAINING ALCOHOL?: NOT ASKED

## 2020-01-10 NOTE — TELEPHONE ENCOUNTER
Future Appointments       Provider Department Center    1/13/2020 2:10 PM Chloé Larios D.O. [facility]        NEW PATIENT VISIT PRE-VISIT PLANNING    1.  [facility] Patient is checked in Patient Demographics? YES    2.  Immunizations were updated in [facility] using WebIZ?: Yes       •  Web Iz Recommendations: FLU, MMR , TDAP, VARICELLA (Chicken Pox)  and SHINGRIX (Shingles)    3.  Is this appointment scheduled as a Hospital Follow-Up? No    4.  Patient is due for the following Health Maintenance Topics:   Health Maintenance Due   Topic Date Due   • Annual Wellness Visit  1960   • HEPATITIS C SCREENING  1960   • IMM PNEUMOCOCCAL VACCINE: 0-64 Years (1 of 1 - PPSV23) 03/10/1967   • IMM DTaP/Tdap/Td Vaccine (1 - Tdap) 03/10/1972   • IMM ZOSTER VACCINES (1 of 2) 03/10/2011   • IMM INFLUENZA (1) 09/01/2019   • MAMMOGRAM  01/04/2020     5. Orders for overdue Health Maintenance topics pended in Pre-Charting? NO    6.  Reviewed/Updated the following with patient:   •   Preferred Pharmacy? YES       •   Preferred Lab? YES       •   Preferred Communication? YES       •   Allergies? YES       •   Medications? YES. Was Abstract Encounter opened and chart updated? YES       •   Social History? YES. Was Abstract Encounter opened and chart updated? YES       •   Family History (document living status of immediate family members and if + hx of cancer, diabetes, hypertension, hyperlipidemia, heart attack, stroke) YES. Was Abstract Encounter opened and chart updated? YES    7.  Updated Care Team?       •   DME Company (gait device, O2, CPAP, etc.) YES       •   Other Specialists (eye doctor, derm, GYN, cardiology, endo, etc): YES    8.  Patient was informed to arrive 15 min prior to their   scheduled appointment and bring in their medication bottles.

## 2020-01-13 ENCOUNTER — OFFICE VISIT (OUTPATIENT)
Dept: MEDICAL GROUP | Facility: PHYSICIAN GROUP | Age: 60
End: 2020-01-13
Payer: MEDICARE

## 2020-01-13 VITALS
HEART RATE: 105 BPM | TEMPERATURE: 97.6 F | WEIGHT: 157 LBS | BODY MASS INDEX: 26.16 KG/M2 | DIASTOLIC BLOOD PRESSURE: 86 MMHG | OXYGEN SATURATION: 94 % | HEIGHT: 65 IN | SYSTOLIC BLOOD PRESSURE: 138 MMHG

## 2020-01-13 DIAGNOSIS — Z11.59 NEED FOR HEPATITIS C SCREENING TEST: ICD-10-CM

## 2020-01-13 DIAGNOSIS — Z23 NEED FOR VACCINATION: ICD-10-CM

## 2020-01-13 DIAGNOSIS — M54.50 CHRONIC MIDLINE LOW BACK PAIN WITHOUT SCIATICA: ICD-10-CM

## 2020-01-13 DIAGNOSIS — I25.83 CORONARY ATHEROSCLEROSIS DUE TO LIPID RICH PLAQUE: ICD-10-CM

## 2020-01-13 DIAGNOSIS — Z00.00 PREVENTATIVE HEALTH CARE: ICD-10-CM

## 2020-01-13 DIAGNOSIS — I25.10 CORONARY ATHEROSCLEROSIS DUE TO LIPID RICH PLAQUE: ICD-10-CM

## 2020-01-13 DIAGNOSIS — R23.2 HOT FLASHES: ICD-10-CM

## 2020-01-13 DIAGNOSIS — Z72.0 TOBACCO ABUSE: Chronic | ICD-10-CM

## 2020-01-13 DIAGNOSIS — N89.8 VAGINAL DRYNESS: ICD-10-CM

## 2020-01-13 DIAGNOSIS — G89.29 CHRONIC MIDLINE LOW BACK PAIN WITHOUT SCIATICA: ICD-10-CM

## 2020-01-13 DIAGNOSIS — F33.41 RECURRENT MAJOR DEPRESSIVE DISORDER, IN PARTIAL REMISSION (HCC): ICD-10-CM

## 2020-01-13 PROBLEM — R06.83 SNORING: Status: RESOLVED | Noted: 2018-07-03 | Resolved: 2020-01-13

## 2020-01-13 PROBLEM — R32 ABSENCE OF BLADDER CONTINENCE: Status: RESOLVED | Noted: 2019-01-28 | Resolved: 2020-01-13

## 2020-01-13 PROBLEM — R10.12 LEFT UPPER QUADRANT PAIN: Status: RESOLVED | Noted: 2018-04-05 | Resolved: 2020-01-13

## 2020-01-13 PROCEDURE — G0009 ADMIN PNEUMOCOCCAL VACCINE: HCPCS | Performed by: FAMILY MEDICINE

## 2020-01-13 PROCEDURE — 99204 OFFICE O/P NEW MOD 45 MIN: CPT | Mod: 25 | Performed by: FAMILY MEDICINE

## 2020-01-13 PROCEDURE — 8041 PR SCP AHA: Performed by: FAMILY MEDICINE

## 2020-01-13 PROCEDURE — 90732 PPSV23 VACC 2 YRS+ SUBQ/IM: CPT | Performed by: FAMILY MEDICINE

## 2020-01-13 RX ORDER — GABAPENTIN 400 MG/1
400 CAPSULE ORAL 3 TIMES DAILY
Qty: 90 CAP | Refills: 3 | Status: SHIPPED | OUTPATIENT
Start: 2020-01-13 | End: 2020-03-30

## 2020-01-13 NOTE — PROGRESS NOTES
"CC: Back pain    HISTORY OF THE PRESENT ILLNESS: Patient is a 59 y.o. female. This pleasant patient is here today to establish care and discuss health problems below.    Patient is here to establish care today.  Her primary concern is her chronic low back pain.  She has a history of a lumbar burst fracture and has hardware in her spine.  She notes that she was previously on gabapentin and Soma.  She has been out of the gabapentin for a couple of weeks now and would like to get refills on that.  She also notes that she was in [facility] and she was taken off Soma which she previously was taken for her back pain.  She is wondering if she can get back on the Soma as the Flexeril is not helping.    As above, she does have a history of depression.  She follows closely with psychiatry.  She is currently on duloxetine, Valium and zolpidem.  States she wants to try Ashwanganda root instead of medications.  Feels symptoms are fairly well controlled at this time compared to last summer but notes that she just feels \"flat\".    Also notes some postmenopausal symptoms including night sweats, hot flashes and vaginal dryness.  Wants to get her hormones checked.    Continues to smoke.  Open to pneumonia vaccine today but no other vaccines.    Also noted to have coronary atherosclerosis and high calcium score.  She was seen by Dr. Cruz in cardiology for this.  She was started on a statin and continues this medication.  She was supposed to undergo a stress test due to her high risk calcium score, but has not done that so far.  States that she has been taking liquid tumeric and co-Q10 instead.    Allergies: Seasonal and Sulfa drugs    Current Outpatient Medications Ordered in Epic   Medication Sig Dispense Refill   • gabapentin (NEURONTIN) 400 MG Cap Take 1 Cap by mouth 3 times a day. 90 Cap 3   • diazepam (VALIUM) 10 MG tablet Take 10 mg by mouth every 12 hours as needed for Anxiety.     • " DULoxetine (CYMBALTA) 20 MG Cap DR Particles Take 30 mg by mouth 2 Times a Day.  0   • zolpidem (AMBIEN) 10 MG Tab TAKE 1 TABLET BY MOUTH EVERY DAY AT BEDTIME AS NEEDED FOR INSOMNIA FOR 30 DAYS *F51.01*  0   • pravastatin (PRAVACHOL) 20 MG Tab Take 1 Tab by mouth every day. 90 Tab 3   • albuterol (VENTOLIN HFA) 108 (90 Base) MCG/ACT Aero Soln inhalation aerosol Ventolin HFA 90 mcg/actuation aerosol inhaler   Inhale 1 puff as needed by inhalation route.     • therapeutic multivitamin-minerals (THERAGRAN-M) Tab Take 1 Tab by mouth every day.     • fluticasone (FLONASE) 50 MCG/ACT nasal spray Spray 2 Sprays in nose 2 times a day.     • cetirizine (ZYRTEC) 10 MG Tab Take 10 mg by mouth every bedtime.     • Probiotic Product (PROBIOTIC DAILY PO) Take 1 Cap by mouth every bedtime.       No current Epic-ordered facility-administered medications on file.        Past Medical History:   Diagnosis Date   • Anxiety    • Chronic back pain    • Depression 2013   • Fall    • History of spinal fusion     T10-L3   • Lumbar burst fracture (HCC) 2013    L1 burst, tx'd with rods/screws [place]       Past Surgical History:   Procedure Laterality Date   • LUMBAR FUSION POSTERIOR  2013    rods and screw placement   • ROTATOR CUFF REPAIR Right Mar 2009   • OTHER ORTHOPEDIC SURGERY  2009    Rotator cuff  surgery Right   • OTHER ORTHOPEDIC SURGERY      back surgery   • PRIMARY C SECTION      x2       Social History     Tobacco Use   • Smoking status: Current Every Day Smoker     Packs/day: 1.00     Types: Cigarettes     Start date: 1/13/1979   • Smokeless tobacco: Never Used   Substance Use Topics   • Alcohol use: Not Currently     Alcohol/week: 0.0 oz     Comment: socially   • Drug use: Not Currently     Comment: occ edible THC       Social History     Patient does not qualify to have social determinant information on file (likely too young).   Social History Narrative    ** Merged History Encounter **            Family History  "  Problem Relation Age of Onset   • Cancer Mother 45        colon   • Heart Disease Half-brother         Heart Bypass @ 45   • Diabetes Neg Hx    • Stroke Neg Hx        ROS:     - Constitutional: Negative for fever, chills, unexpected weight change, and fatigue/generalized weakness.     - HEENT: Negative for headaches, vision changes, hearing changes, ear pain, ear discharge, rhinorrhea, sinus congestion, sore throat, and neck pain.      - Respiratory: Positive for occasional dyspnea.  Negative for cough, sputum production, chest congestion,  wheezing, and crackles.      - Cardiovascular: Negative for chest pain, palpitations, orthopnea, PND, and bilateral lower extremity edema.     - Gastrointestinal: Negative for heartburn, nausea, vomiting, abdominal pain, hematochezia, melena, diarrhea, constipation, and greasy/foul-smelling stools.     - Genitourinary: Negative for dysuria, polyuria, hematuria, pyuria, urinary urgency, and urinary incontinence.     - Musculoskeletal: Positive for chronic low back pain.      - Skin: Negative for rash, itching, cyanotic skin color change.     - Neurological: Negative for dizziness, tingling, tremors, focal sensory deficit, focal weakness and headaches.     - Endo/Heme/Allergies: Does not bruise/bleed easily. No polyuria or polydipsia.    Exam: /86 (BP Location: Right arm, Patient Position: Sitting, BP Cuff Size: Adult)   Pulse (!) 105   Temp 36.4 °C (97.6 °F) (Temporal)   Ht 1.651 m (5' 5\")   Wt 71.2 kg (157 lb)   SpO2 94%  Body mass index is 26.13 kg/m².    General: Well appearing, NAD  HEENT: Normocephalic. Conjunctiva clear, lids without ptosis, pupils equal and reactive to light accommodation, ears normal shape and contour, canals are clear bilaterally, tympanic membranes without bulging or erythema and normal cone of light, oropharynx is without erythema, edema or exudates.   Neck: Supple without JVD. No thyromegaly or nodules  Pulmonary: Clear to ausculation.  " Normal effort. No rales, ronchi, or wheezing.  Cardiovascular: Regular rate and rhythm without murmur, rubs or gallop.   Abdomen: Soft, nontender, nondistended. Normal bowel sounds. Liver and spleen are not palpable. No rebound or guarding  Neurologic: normal gait  Lymph: No cervical, supraclavicular lymph nodes are palpable  Skin: Warm and dry.  No obvious lesions.  Musculoskeletal:  No extremity cyanosis, clubbing, or edema.  Psych: Normal mood and affect. Alert and oriented. Judgment and insight is normal.    Please note that this dictation was created using voice recognition software. I have made every reasonable attempt to correct obvious errors, but I expect that there are errors of grammar and possibly content that I did not discover before finalizing the note.      Assessment/Plan  Paulette was seen today for establish care and fatigue.    Diagnoses and all orders for this visit:    Chronic midline low back pain without sciatica  Chronic issue.  Will restart gabapentin at this time.  Discussed with patient that Soma is not appropriate long-term but Acacian to be on, particularly in combination with zolpidem and Valium.  Did offer alternative muscle relaxer such as baclofen or Robaxin, but patient declined.  -     gabapentin (NEURONTIN) 400 MG Cap; Take 1 Cap by mouth 3 times a day.    Need for hepatitis C screening test  -     HCV Scrn ( 9213-8529 1xLife); Future    Tobacco abuse  Chronic issue, recommend smoking cessation.    Need for vaccination  -     PneumoVax PPV23 =>1yo    Hot flashes  Vaginal dryness   chronic issues.  Checking hormone levels as below.  -     TSH+PRL+FSH+TESTT+LH+T4F+DH    Preventative health care  -     CBC WITH DIFFERENTIAL; Future  -     Comp Metabolic Panel; Future  -     Lipid Profile; Future  -     VITAMIN D,25 HYDROXY; Future    Recurrent major depressive disorder, in partial remission (HCC)  Chronic issue, in partial remission.  Follows with psychiatry.  Advised patient  that taking ashwaganda root can interact with Valium and zolpidem in terms of making her more drowsy so she should contact her psychiatrist regarding this prior to starting it.    Coronary atherosclerosis due to lipid rich plaque  Chronic issue, on statin per cardiology.  Recommend that she does proceed with stress test which had previously been ordered.    Follow up in 6 months or sooner if needed.     Chloé Larios, DO  [facility]

## 2020-01-15 ENCOUNTER — HOSPITAL ENCOUNTER (OUTPATIENT)
Dept: LAB | Facility: MEDICAL CENTER | Age: 60
End: 2020-01-15
Attending: FAMILY MEDICINE
Payer: MEDICARE

## 2020-01-15 DIAGNOSIS — Z11.59 NEED FOR HEPATITIS C SCREENING TEST: ICD-10-CM

## 2020-01-15 DIAGNOSIS — Z00.00 PREVENTATIVE HEALTH CARE: ICD-10-CM

## 2020-01-15 LAB
25(OH)D3 SERPL-MCNC: 27 NG/ML (ref 30–100)
ALBUMIN SERPL BCP-MCNC: 4.7 G/DL (ref 3.2–4.9)
ALBUMIN/GLOB SERPL: 1.8 G/DL
ALP SERPL-CCNC: 80 U/L (ref 30–99)
ALT SERPL-CCNC: 18 U/L (ref 2–50)
ANION GAP SERPL CALC-SCNC: 8 MMOL/L (ref 0–11.9)
AST SERPL-CCNC: 21 U/L (ref 12–45)
BILIRUB SERPL-MCNC: 0.5 MG/DL (ref 0.1–1.5)
BUN SERPL-MCNC: 11 MG/DL (ref 8–22)
CALCIUM SERPL-MCNC: 9.3 MG/DL (ref 8.5–10.5)
CHLORIDE SERPL-SCNC: 102 MMOL/L (ref 96–112)
CHOLEST SERPL-MCNC: 183 MG/DL (ref 100–199)
CO2 SERPL-SCNC: 27 MMOL/L (ref 20–33)
CREAT SERPL-MCNC: 0.71 MG/DL (ref 0.5–1.4)
DHEA-S SERPL-MCNC: 87.6 UG/DL (ref 18.9–205)
FSH SERPL-ACNC: 48.1 MIU/ML
GLOBULIN SER CALC-MCNC: 2.6 G/DL (ref 1.9–3.5)
GLUCOSE SERPL-MCNC: 106 MG/DL (ref 65–99)
HCV AB S/CO SERPL IA: NEGATIVE
HDLC SERPL-MCNC: 52 MG/DL
LDLC SERPL CALC-MCNC: 102 MG/DL
LH SERPL-ACNC: 30 IU/L
POTASSIUM SERPL-SCNC: 4.6 MMOL/L (ref 3.6–5.5)
PROLACTIN SERPL-MCNC: 4.62 NG/ML (ref 2.8–26)
PROT SERPL-MCNC: 7.3 G/DL (ref 6–8.2)
SODIUM SERPL-SCNC: 137 MMOL/L (ref 135–145)
T4 FREE SERPL-MCNC: 0.71 NG/DL (ref 0.53–1.43)
TRIGL SERPL-MCNC: 147 MG/DL (ref 0–149)
TSH SERPL DL<=0.005 MIU/L-ACNC: 2.21 UIU/ML (ref 0.38–5.33)

## 2020-01-15 PROCEDURE — 83001 ASSAY OF GONADOTROPIN (FSH): CPT

## 2020-01-15 PROCEDURE — 82306 VITAMIN D 25 HYDROXY: CPT

## 2020-01-15 PROCEDURE — 85025 COMPLETE CBC W/AUTO DIFF WBC: CPT

## 2020-01-15 PROCEDURE — 82627 DEHYDROEPIANDROSTERONE: CPT

## 2020-01-15 PROCEDURE — 84403 ASSAY OF TOTAL TESTOSTERONE: CPT

## 2020-01-15 PROCEDURE — 84439 ASSAY OF FREE THYROXINE: CPT

## 2020-01-15 PROCEDURE — G0472 HEP C SCREEN HIGH RISK/OTHER: HCPCS

## 2020-01-15 PROCEDURE — 36415 COLL VENOUS BLD VENIPUNCTURE: CPT

## 2020-01-15 PROCEDURE — 83002 ASSAY OF GONADOTROPIN (LH): CPT

## 2020-01-15 PROCEDURE — 84443 ASSAY THYROID STIM HORMONE: CPT

## 2020-01-15 PROCEDURE — 80053 COMPREHEN METABOLIC PANEL: CPT

## 2020-01-15 PROCEDURE — 84146 ASSAY OF PROLACTIN: CPT

## 2020-01-15 PROCEDURE — 80061 LIPID PANEL: CPT

## 2020-01-16 LAB
BASOPHILS # BLD AUTO: 0.7 % (ref 0–1.8)
BASOPHILS # BLD: 0.07 K/UL (ref 0–0.12)
EOSINOPHIL # BLD AUTO: 0.21 K/UL (ref 0–0.51)
EOSINOPHIL NFR BLD: 2 % (ref 0–6.9)
ERYTHROCYTE [DISTWIDTH] IN BLOOD BY AUTOMATED COUNT: 46.1 FL (ref 35.9–50)
HCT VFR BLD AUTO: 42.7 % (ref 37–47)
HGB BLD-MCNC: 14 G/DL (ref 12–16)
IMM GRANULOCYTES # BLD AUTO: 0.03 K/UL (ref 0–0.11)
IMM GRANULOCYTES NFR BLD AUTO: 0.3 % (ref 0–0.9)
LYMPHOCYTES # BLD AUTO: 2.65 K/UL (ref 1–4.8)
LYMPHOCYTES NFR BLD: 24.8 % (ref 22–41)
MCH RBC QN AUTO: 31.9 PG (ref 27–33)
MCHC RBC AUTO-ENTMCNC: 32.8 G/DL (ref 33.6–35)
MCV RBC AUTO: 97.3 FL (ref 81.4–97.8)
MONOCYTES # BLD AUTO: 0.57 K/UL (ref 0–0.85)
MONOCYTES NFR BLD AUTO: 5.3 % (ref 0–13.4)
NEUTROPHILS # BLD AUTO: 7.17 K/UL (ref 2–7.15)
NEUTROPHILS NFR BLD: 66.9 % (ref 44–72)
NRBC # BLD AUTO: 0.02 K/UL
NRBC BLD-RTO: 0.2 /100 WBC
PLATELET # BLD AUTO: 420 K/UL (ref 164–446)
PMV BLD AUTO: 10 FL (ref 9–12.9)
RBC # BLD AUTO: 4.39 M/UL (ref 4.2–5.4)
WBC # BLD AUTO: 10.7 K/UL (ref 4.8–10.8)

## 2020-01-19 LAB — TESTOST SERPL-MCNC: 30 NG/DL (ref 9–55)

## 2020-03-06 ENCOUNTER — PATIENT OUTREACH (OUTPATIENT)
Dept: HEALTH INFORMATION MANAGEMENT | Facility: OTHER | Age: 60
End: 2020-03-06

## 2020-03-06 NOTE — PROGRESS NOTES
Called member to schedule AHA/AWV appointment and introduce SCPPA program. No answer LM with call back number [phone]

## 2020-03-12 ENCOUNTER — PATIENT OUTREACH (OUTPATIENT)
Dept: MEDICAL GROUP | Facility: PHYSICIAN GROUP | Age: 60
End: 2020-03-12

## 2020-03-12 ENCOUNTER — TELEPHONE (OUTPATIENT)
Dept: MEDICAL GROUP | Facility: PHYSICIAN GROUP | Age: 60
End: 2020-03-12

## 2020-03-12 DIAGNOSIS — G89.29 CHRONIC MIDLINE LOW BACK PAIN WITHOUT SCIATICA: ICD-10-CM

## 2020-03-12 DIAGNOSIS — S32.001S CLOSED BURST FRACTURE OF LUMBAR VERTEBRA, SEQUELA: ICD-10-CM

## 2020-03-12 DIAGNOSIS — F33.41 RECURRENT MAJOR DEPRESSIVE DISORDER, IN PARTIAL REMISSION (HCC): ICD-10-CM

## 2020-03-12 DIAGNOSIS — M54.50 CHRONIC MIDLINE LOW BACK PAIN WITHOUT SCIATICA: ICD-10-CM

## 2020-03-12 SDOH — ECONOMIC STABILITY: FOOD INSECURITY: WITHIN THE PAST 12 MONTHS, YOU WORRIED THAT YOUR FOOD WOULD RUN OUT BEFORE YOU GOT MONEY TO BUY MORE.: NEVER TRUE

## 2020-03-12 SDOH — ECONOMIC STABILITY: FOOD INSECURITY: WITHIN THE PAST 12 MONTHS, THE FOOD YOU BOUGHT JUST DIDN'T LAST AND YOU DIDN'T HAVE MONEY TO GET MORE.: NEVER TRUE

## 2020-03-12 SDOH — ECONOMIC STABILITY: TRANSPORTATION INSECURITY
IN THE PAST 12 MONTHS, HAS LACK OF TRANSPORTATION KEPT YOU FROM MEETINGS, WORK, OR FROM GETTING THINGS NEEDED FOR DAILY LIVING?: NO

## 2020-03-12 SDOH — ECONOMIC STABILITY: TRANSPORTATION INSECURITY
IN THE PAST 12 MONTHS, HAS THE LACK OF TRANSPORTATION KEPT YOU FROM MEDICAL APPOINTMENTS OR FROM GETTING MEDICATIONS?: NO

## 2020-03-12 NOTE — TELEPHONE ENCOUNTER
Good morning Dr Larios,      Pt is requesting a referral for a back specialist. She stated that she is experiencing back pain frequently and might possibly need a MRI. She is also requesting a referral for Therapy (counseling) as well. Please advise.    Thank you     Connie DORANTES

## 2020-03-12 NOTE — PROGRESS NOTES
Patient called requesting referral to spine specialist as well as therapy.  She also requested MRI, I will leave this up to spine specialist.  Alerted patient that referrals have been made and encouraged her to schedule them.  Provided information on psychology today database for counselors if she would like to review particular specialties.

## 2020-03-30 ENCOUNTER — TELEPHONE (OUTPATIENT)
Dept: MEDICAL GROUP | Facility: PHYSICIAN GROUP | Age: 60
End: 2020-03-30

## 2020-03-30 RX ORDER — GABAPENTIN 800 MG/1
800 TABLET ORAL 3 TIMES DAILY
Qty: 90 TAB | Refills: 5 | Status: SHIPPED | OUTPATIENT
Start: 2020-03-30 | End: 2020-04-29

## 2020-03-30 NOTE — TELEPHONE ENCOUNTER
1. Caller Name: Paulette Toussaint                          Call Back Number: [phone] (home)         How would the patient prefer to be contacted with a response: Phone call do NOT leave a detailed message    Pt states during her last office visit with you, you advised for her to give you a call to up the dose of Gabapentin if necessary. Pt states she has been taking current dose and not helping. She would like you to send a higher dose. Please advise.

## 2020-04-01 NOTE — PROGRESS NOTES
1. HealthConnect Verified: yes    2. Verify PCP: yes    3. Review and add  to Care Team: yes        5. Reviewed/Updated the following with patient:       •   Communication Preference Obtained? YES  • MyChart Activation: already active       •   E-Mail Address Obtained? YES       •   Appointment Day and Time Preferences? YES       •   Preferred Pharmacy? YES       •   Preferred Lab? YES          Called member to introduce myself as their SCP  and schedule AHA/AWV. Member had no questions at this time, direct phone number given for future contact.

## 2020-04-29 RX ORDER — GABAPENTIN 800 MG/1
TABLET ORAL
Qty: 270 TAB | Refills: 1 | Status: SHIPPED | OUTPATIENT
Start: 2020-04-29 | End: 2021-01-05

## 2020-05-01 ENCOUNTER — TELEPHONE (OUTPATIENT)
Dept: CARDIOLOGY | Facility: MEDICAL CENTER | Age: 60
End: 2020-05-01

## 2020-05-01 DIAGNOSIS — R93.1 ELEVATED CORONARY ARTERY CALCIUM SCORE: ICD-10-CM

## 2020-05-01 DIAGNOSIS — Z12.39 BREAST CANCER SCREENING: ICD-10-CM

## 2020-05-01 DIAGNOSIS — I25.10 CORONARY ARTERIOSCLEROSIS: ICD-10-CM

## 2020-05-01 NOTE — TELEPHONE ENCOUNTER
Stress Test   Received: Today   Message Contents   CHRIS Lopez!    I just scheduled this pt for a 1 yr fv w/BE for August and she said something about needing a stress test before hand and quickly looking with her on the phone, didn't see anything. Can you double check for me and see if she does need one, and please give her a call. Thank you ma'am!      Noted that at 8/7/19 BE appt he mentioned wanting a stress MPI, but this order must have cancelled after pt cancelled her 8/13/20 appt for this. Order placed again. Called pt and informed her that the order has been placed. Transferred her to 8100 to arrange appt.

## 2020-05-07 ENCOUNTER — HOSPITAL ENCOUNTER (OUTPATIENT)
Dept: RADIOLOGY | Facility: MEDICAL CENTER | Age: 60
End: 2020-05-07
Attending: INTERNAL MEDICINE
Payer: MEDICARE

## 2020-05-07 DIAGNOSIS — I25.10 CORONARY ARTERIOSCLEROSIS: ICD-10-CM

## 2020-05-07 DIAGNOSIS — R93.1 ELEVATED CORONARY ARTERY CALCIUM SCORE: ICD-10-CM

## 2020-05-07 PROCEDURE — A9502 TC99M TETROFOSMIN: HCPCS

## 2020-05-07 PROCEDURE — 700111 HCHG RX REV CODE 636 W/ 250 OVERRIDE (IP)

## 2020-05-07 RX ORDER — REGADENOSON 0.08 MG/ML
0.4 INJECTION, SOLUTION INTRAVENOUS ONCE
Status: COMPLETED | OUTPATIENT
Start: 2020-05-07 | End: 2020-05-07

## 2020-05-07 RX ORDER — AMINOPHYLLINE 25 MG/ML
INJECTION, SOLUTION INTRAVENOUS
Status: COMPLETED
Start: 2020-05-07 | End: 2020-05-07

## 2020-05-07 RX ORDER — REGADENOSON 0.08 MG/ML
INJECTION, SOLUTION INTRAVENOUS
Status: COMPLETED
Start: 2020-05-07 | End: 2020-05-07

## 2020-05-07 RX ADMIN — REGADENOSON 0.4 MG: 0.08 INJECTION, SOLUTION INTRAVENOUS at 12:37

## 2020-05-07 RX ADMIN — AMINOPHYLLINE 100 MG: 25 INJECTION, SOLUTION INTRAVENOUS at 12:45

## 2020-05-12 ENCOUNTER — TELEPHONE (OUTPATIENT)
Dept: MEDICAL GROUP | Facility: PHYSICIAN GROUP | Age: 60
End: 2020-05-12

## 2020-05-12 RX ORDER — DULOXETIN HYDROCHLORIDE 60 MG/1
CAPSULE, DELAYED RELEASE ORAL EVERY MORNING
COMMUNITY
Start: 2020-03-30 | End: 2021-10-14

## 2020-05-12 NOTE — TELEPHONE ENCOUNTER
ANNUAL WELLNESS VISIT PRE-VISIT PLANNING WITHOUT OUTREACH    1.  Reviewed note from last office visit with PCP: YES    2.  If any orders were placed at last visit, do we have Results/Consult Notes?       •  Labs - Labs were not ordered at last office visit.       •  Imaging - Imaging ordered, NOT completed. Patient advised to complete prior to next appointment.       •  Referrals - No referrals were ordered at last office visit.    3.  Immunizations were updated in [facility] using WebIZ?: Yes       •  WebIZ Recommendations: SHINGRIX (Shingles)        •  Is patient due for Tdap? NO       •  Is patient due for Shingrix? YES. Patient was notified of copay/out of pocket cost.     4.  Patient is due for the following Health Maintenance Topics:   Health Maintenance Due   Topic Date Due   • Annual Wellness Visit  1960   • MAMMOGRAM  01/04/2020       5.  Reviewed/Updated the following with patient:       •   Preferred Pharmacy? YES       •   Preferred Lab? YES       •   Preferred Communication? YES       •   Allergies? YES       •   Medications? YES. Was Abstract Encounter opened and chart updated? YES       •   Social History? YES. Was Abstract Encounter opened and chart updated? YES       •   Family History (document living status of immediate family members and if + hx of  cancer, diabetes, hypertension, hyperlipidemia, heart attack, stroke) YES. Was Abstract Encounter opened and chart updated? YES    6.  Care Team Updated:       •   DME Company (gait device, O2, CPAP, etc.): NO       •   Other Specialists (eye doctor, derm, GYN, cardiology, endo, etc): YES    7. Orders for overdue Health Maintenance topics pended in Pre-Charting? NO    8.  Patient has the following Care Path diagnoses on Problem List:  DEPRESSION     Is patient seeing a counselor, psychiatrist or other healthcare provider regarding their mental health? Yes, CareTeam was updated.    Depression Screen (PHQ-2/PHQ-9) 4/5/2018 1/10/2019 1/28/2019   PHQ-2  Total Score - 4 -   PHQ-2 Total Score 0 - 6   PHQ-9 Total Score - 16 -   PHQ-9 Total Score - - 20       Interpretation of PHQ-9 Total Score   Score Severity   1-4 No Depression   5-9 Mild Depression   10-14 Moderate Depression   15-19 Moderately Severe Depression   20-27 Severe Depression      9.  Patient was advised: “This is a free wellness visit. The provider will screen for medical conditions to help you stay healthy. If you have other concerns to address you may be asked to discuss these at a separate visit or there may be an additional fee.”     10.  Patient was informed to arrive 15 min prior to their scheduled appointment and bring in their medication bottles.

## 2020-05-12 NOTE — PROGRESS NOTES
1. Attempt #:1    2. HealthConnect Verified: yes    3. Verify PCP: yes    4. Review Care Team: yes      6. Reviewed/Updated the following with patient:       •   Communication Preference Obtained? YES       •   Preferred Pharmacy? YES       •   Preferred Lab? YES       •   Family History (document living status of immediate family members and if + hx of cancer, diabetes, hypertension, hyperlipidemia, heart attack, stroke) YES    7. Annual Wellness Visit Scheduling  · Scheduling Status:Scheduled     8. Care Gap Scheduling (Attempt to Schedule EACH Overdue Care Gap!)     Health Maintenance Due   Topic Date Due   • Annual Wellness Visit  1960   • MAMMOGRAM  01/04/2020        Scheduled patient for Annual Wellness Visit     9. [employer] Activation: already active      13. Patient was advised: “This is a free wellness visit. The provider will screen for medical conditions to help you stay healthy. If you have other concerns to address you may be asked to discuss these at a separate visit or there may be an additional fee.”     14. Patient was informed to arrive 15 min prior to their scheduled appointment and bring in their medication bottles.

## 2020-05-13 ENCOUNTER — OFFICE VISIT (OUTPATIENT)
Dept: MEDICAL GROUP | Facility: PHYSICIAN GROUP | Age: 60
End: 2020-05-13
Payer: MEDICARE

## 2020-05-13 ENCOUNTER — HOSPITAL ENCOUNTER (OUTPATIENT)
Facility: MEDICAL CENTER | Age: 60
End: 2020-05-13
Attending: FAMILY MEDICINE
Payer: MEDICARE

## 2020-05-13 VITALS
HEART RATE: 76 BPM | OXYGEN SATURATION: 97 % | HEIGHT: 65 IN | WEIGHT: 166 LBS | BODY MASS INDEX: 27.66 KG/M2 | TEMPERATURE: 97.7 F | SYSTOLIC BLOOD PRESSURE: 110 MMHG | DIASTOLIC BLOOD PRESSURE: 62 MMHG

## 2020-05-13 DIAGNOSIS — Z00.00 MEDICARE ANNUAL WELLNESS VISIT, SUBSEQUENT: Primary | ICD-10-CM

## 2020-05-13 DIAGNOSIS — M54.50 CHRONIC MIDLINE LOW BACK PAIN WITHOUT SCIATICA: ICD-10-CM

## 2020-05-13 DIAGNOSIS — F33.41 RECURRENT MAJOR DEPRESSIVE DISORDER, IN PARTIAL REMISSION (HCC): ICD-10-CM

## 2020-05-13 DIAGNOSIS — R32 URINARY INCONTINENCE, UNSPECIFIED TYPE: ICD-10-CM

## 2020-05-13 DIAGNOSIS — E78.2 MIXED HYPERLIPIDEMIA: ICD-10-CM

## 2020-05-13 DIAGNOSIS — G89.29 CHRONIC MIDLINE THORACIC BACK PAIN: ICD-10-CM

## 2020-05-13 DIAGNOSIS — G89.29 CHRONIC MIDLINE LOW BACK PAIN WITHOUT SCIATICA: ICD-10-CM

## 2020-05-13 DIAGNOSIS — M54.6 CHRONIC MIDLINE THORACIC BACK PAIN: ICD-10-CM

## 2020-05-13 DIAGNOSIS — Z98.1 HISTORY OF SPINAL FUSION: ICD-10-CM

## 2020-05-13 DIAGNOSIS — K21.9 GASTROESOPHAGEAL REFLUX DISEASE WITHOUT ESOPHAGITIS: ICD-10-CM

## 2020-05-13 DIAGNOSIS — M81.0 OSTEOPOROSIS, UNSPECIFIED OSTEOPOROSIS TYPE, UNSPECIFIED PATHOLOGICAL FRACTURE PRESENCE: ICD-10-CM

## 2020-05-13 DIAGNOSIS — K59.1 FUNCTIONAL DIARRHEA: ICD-10-CM

## 2020-05-13 DIAGNOSIS — Z98.1 ARTHRODESIS STATUS: ICD-10-CM

## 2020-05-13 DIAGNOSIS — Z72.0 TOBACCO ABUSE: Chronic | ICD-10-CM

## 2020-05-13 DIAGNOSIS — J30.2 SEASONAL ALLERGIES: ICD-10-CM

## 2020-05-13 DIAGNOSIS — K64.4 EXTERNAL HEMORRHOIDS: ICD-10-CM

## 2020-05-13 DIAGNOSIS — Z82.49 FAMILY HISTORY OF ISCHEMIC HEART DISEASE: ICD-10-CM

## 2020-05-13 DIAGNOSIS — S32.001S CLOSED BURST FRACTURE OF LUMBAR VERTEBRA, SEQUELA: ICD-10-CM

## 2020-05-13 PROBLEM — D72.829 LEUKOCYTOSIS: Status: RESOLVED | Noted: 2018-08-10 | Resolved: 2020-05-13

## 2020-05-13 LAB
APPEARANCE UR: CLEAR
BILIRUB UR QL STRIP.AUTO: NEGATIVE
COLOR UR: YELLOW
GLUCOSE UR STRIP.AUTO-MCNC: NEGATIVE MG/DL
KETONES UR STRIP.AUTO-MCNC: NEGATIVE MG/DL
LEUKOCYTE ESTERASE UR QL STRIP.AUTO: NEGATIVE
MICRO URNS: NORMAL
NITRITE UR QL STRIP.AUTO: NEGATIVE
PH UR STRIP.AUTO: 6 [PH] (ref 5–8)
PROT UR QL STRIP: NEGATIVE MG/DL
RBC UR QL AUTO: NEGATIVE
SP GR UR STRIP.AUTO: 1.01
UROBILINOGEN UR STRIP.AUTO-MCNC: 0.2 MG/DL

## 2020-05-13 PROCEDURE — G0439 PPPS, SUBSEQ VISIT: HCPCS | Performed by: FAMILY MEDICINE

## 2020-05-13 PROCEDURE — 87086 URINE CULTURE/COLONY COUNT: CPT

## 2020-05-13 PROCEDURE — 81003 URINALYSIS AUTO W/O SCOPE: CPT

## 2020-05-13 ASSESSMENT — PATIENT HEALTH QUESTIONNAIRE - PHQ9
5. POOR APPETITE OR OVEREATING: 2 - MORE THAN HALF THE DAYS
CLINICAL INTERPRETATION OF PHQ2 SCORE: 2
SUM OF ALL RESPONSES TO PHQ QUESTIONS 1-9: 9

## 2020-05-13 ASSESSMENT — ACTIVITIES OF DAILY LIVING (ADL): BATHING_REQUIRES_ASSISTANCE: 1

## 2020-05-13 ASSESSMENT — FIBROSIS 4 INDEX: FIB4 SCORE: 0.7

## 2020-05-13 ASSESSMENT — ENCOUNTER SYMPTOMS: GENERAL WELL-BEING: GOOD

## 2020-05-13 NOTE — PROGRESS NOTES
Chief Complaint   Patient presents with   • Annual Wellness Visit     HC AWV         HPI:  Paulette is a 60 y.o. here for Medicare Annual Wellness Visit    Patient is here today for annual wellness visit but she has multiple other concerns.    Her main concern is her urinary incontinence and history of back issues.  She notes that over the past 6 months she has had worsening urge incontinence.  States that her urine will fully come out and that she occasionally has no control.  She also notes that her back pain has become worse around this time.  Pain is located in both upper back and the thoracic spine as well as lower back, midline.  She has had history of lumbar fusion in both her lumbar spine and thoracic spine.  She does note that her legs do not seem as strong as they once were, but they are not giving out or buckling.  She has no numbness or tingling in the legs.    Other than that she reports doing very well.  From a psychiatric standpoint she continues to receive her medications Valium, zolpidem and Cymbalta from her psychiatrist and reports her mood to be stable.  She continues on pravastatin without side effects.    Patient Active Problem List    Diagnosis Date Noted   • Recurrent major depressive disorder, in partial remission (HCC) 01/13/2020   • Fam hx-ischem heart disease 06/20/2019   • Gastroesophageal reflux disease without esophagitis 01/28/2019   • Seasonal allergies 08/10/2018   • External hemorrhoids 04/05/2018   • Functional diarrhea 04/05/2018   • Mixed hyperlipidemia 05/26/2017   • Tobacco abuse 04/01/2016   • Osteoporosis 02/18/2016   • Chronic lumbar pain 12/11/2015       Current Outpatient Medications   Medication Sig Dispense Refill   • DULoxetine (CYMBALTA) 60 MG Cap DR Particles delayed-release capsule Take  by mouth every morning. Take 1 capsule by mouth every morning     • gabapentin (NEURONTIN) 800 MG tablet TAKE 1 TABLET BY MOUTH THREE TIMES A  Tab 1   • diazepam (VALIUM) 10  MG tablet Take 10 mg by mouth every 12 hours as needed for Anxiety.     • zolpidem (AMBIEN) 10 MG Tab TAKE 1 TABLET BY MOUTH EVERY DAY AT BEDTIME AS NEEDED FOR INSOMNIA FOR 30 DAYS *F51.01*  0   • pravastatin (PRAVACHOL) 20 MG Tab Take 1 Tab by mouth every day. 90 Tab 3   • albuterol (VENTOLIN HFA) 108 (90 Base) MCG/ACT Aero Soln inhalation aerosol Ventolin HFA 90 mcg/actuation aerosol inhaler   Inhale 1 puff as needed by inhalation route.     • therapeutic multivitamin-minerals (THERAGRAN-M) Tab Take 1 Tab by mouth every day.     • fluticasone (FLONASE) 50 MCG/ACT nasal spray Spray 2 Sprays in nose 2 times a day.     • cetirizine (ZYRTEC) 10 MG Tab Take 10 mg by mouth every bedtime.     • Probiotic Product (PROBIOTIC DAILY PO) Take 1 Cap by mouth every bedtime.       No current facility-administered medications for this visit.         Patient is taking medications as noted in medication list.  Current supplements as per medication list.     Allergies: Seasonal and Sulfa drugs    Current social contact/activities: READING AND HANGING OUT WITH DOG AND HANGING OUT WITH FAMILY OCCASIONAL     Is patient current with immunizations? No, due for SHINGRIX (Shingles). Patient is interested in receiving NONE today.    She  reports that she has been smoking cigarettes. She started smoking about 41 years ago. She has been smoking about 1.00 pack per day. She has never used smokeless tobacco. She reports previous alcohol use. She reports current drug use. Drugs: Marijuana and Inhaled.  Ready to quit: Not Answered  Counseling given: Not Answered        DPA/Advanced directive: Patient does not have an Advanced Directive.  A packet and workshop information was given on Advanced Directives.    ROS:    Gait: Uses no assistive device   Ostomy: No   Other tubes: No   Amputations: No   Chronic oxygen use No   Last eye exam X1 YEAR   Wears hearing aids: No   : Reports urinary leakage during the last 6 months that has interfered a lot  with their daily activities or sleep.      Screening:    Annual Health Assessment Questions:    1.  Are you currently engaging in any exercise or physical activity? Yes    2.  How would you describe your mood or emotional well-being today? good    3.  Have you had any falls in the last year? No    4.  Have you noticed any problems with your balance or had difficulty walking? No    5.  In the last six months have you experienced any leakage of urine? Yes    6. DPA/Advanced Directive: Patient does not have an Advanced Directive.  A packet and workshop information was given on Advanced Directives.    Depression Screening    Little interest or pleasure in doing things?  2 - more than half the days  Feeling down, depressed, or hopeless? 0 - not at all  Trouble falling or staying asleep, or sleeping too much?  1 - several days  Feeling tired or having little energy?  3 - nearly every day  Poor appetite or overeating?  2 - more than half the days  Feeling bad about yourself - or that you are a failure or have let yourself or your family down? 0 - not at all  Trouble concentrating on things, such as reading the newspaper or watching television? 1 - several days  Moving or speaking so slowly that other people could have noticed.  Or the opposite - being so fidgety or restless that you have been moving around a lot more than usual?  0 - not at all  Thoughts that you would be better off dead, or of hurting yourself?  0 - not at all  Patient Health Questionnaire Score: 9      If depressive symptoms identified deferred to follow up visit unless specifically addressed in assessment and plan.    Interpretation of PHQ-9 Total Score   Score Severity   1-4 No Depression   5-9 Mild Depression   10-14 Moderate Depression   15-19 Moderately Severe Depression   20-27 Severe Depression      Screening for Cognitive Impairment    Three Minute Recall (village, kitchen, baby)  3/3    Draw clock face with all 12 numbers and set the hands to  show 10 past 10.  Yes    If cognitive concerns identified, deferred for follow up unless specifically addressed in assessment and plan.    Fall Risk Assessment    Has the patient had two or more falls in the last year or any fall with injury in the last year?  No  If fall risk identified, deferred for follow up unless specifically addressed in assessment and plan.      Safety Assessment    Throw rugs on floor.  No  Handrails on all stairs.  Yes  Good lighting in all hallways.  Yes  Difficulty hearing.  No  Patient counseled about all safety risks that were identified.    Functional Assessment ADLs    Are there any barriers preventing you from cooking for yourself or meeting nutritional needs?  Yes.    Are there any barriers preventing you from driving safely or obtaining transportation?  Yes.    Are there any barriers preventing you from using a telephone or calling for help?  Yes.    Are there any barriers preventing you from shopping?  Yes.    Are there any barriers preventing you from taking care of your own finances?  Yes.    Are there any barriers preventing you from managing your medications?    Yes.    Are there any barriers preventing you from showering, bathing or dressing yourself?  Yes.    Are you currently engaging in any exercise or physical activity?  Yes.  YOGA AND WALKING   What is your perception of your health?  Good.    Health Maintenance Summary                Annual Wellness Visit Overdue 1960     MAMMOGRAM Overdue 1/4/2020      Done 1/4/2019 [id number]     Patient has more history with this topic...    IMM ZOSTER VACCINES Postponed 6/13/2020 Originally 3/10/2011. System: vaccine not available, other system reasons    IMM INFLUENZA Postponed 1/13/2021 Originally 9/1/2020. Patient Refused     Done 1/14/2014 Imm Admin: Influenza TIV (IM)    IMM DTaP/Tdap/Td Vaccine Postponed 1/13/2021 Originally 3/10/1980. Patient Refused    PAP SMEAR Next Due 2/12/2022      Done 2/12/2019 due  "next year , low risk can do it every 3 years     Patient has more history with this topic...    COLONOSCOPY Next Due 7/26/2022      Done 7/26/2017 REFERRAL TO GI FOR COLONOSCOPY          Patient Care Team:  Chloé Larios D.O. as PCP - General (Family Medicine)  Cedric Nuñez M.D. (Psychiatry)  Connie Malcolm C.N.A. as [facility]   Tony Sawyer M.D. as Consulting Physician (Interventional Cardiology)  [facility] (Inactive) as Consulting Physician      Social History     Tobacco Use   • Smoking status: Current Every Day Smoker     Packs/day: 1.00     Types: Cigarettes     Start date: 1/13/1979   • Smokeless tobacco: Never Used   Substance Use Topics   • Alcohol use: Not Currently     Alcohol/week: 0.0 oz     Comment: socially   • Drug use: Yes     Types: Marijuana, Inhaled     Comment: occ edible THC     Family History   Problem Relation Age of Onset   • Cancer Mother 45        colon   • Heart Disease Mother         HEART ATTACK   • Heart Disease Half-brother         Heart Bypass @ 45/stent   • No Known Problems Half-brother    • No Known Problems Half-brother    • Diabetes Neg Hx    • Stroke Neg Hx      She  has a past medical history of Anxiety, Chronic back pain, Depression (2013), Fall, History of spinal fusion, and Lumbar burst fracture (HCC) (2013).   Past Surgical History:   Procedure Laterality Date   • LUMBAR FUSION POSTERIOR  2013    rods and screw placement   • ROTATOR CUFF REPAIR Right Mar 2009   • OTHER ORTHOPEDIC SURGERY  2009    Rotator cuff  surgery Right   • OTHER ORTHOPEDIC SURGERY      back surgery   • PRIMARY C SECTION      x2         Exam:     /62 (BP Location: Left arm, Patient Position: Sitting, BP Cuff Size: Adult)   Pulse 76   Temp 36.5 °C (97.7 °F)   Ht 1.651 m (5' 5\")   Wt 75.3 kg (166 lb)   SpO2 97%  Body mass index is 27.62 kg/m².    Hearing excellent.    Dentition good  Alert, oriented in no acute distress.  Eye " contact is good, speech goal directed, affect calm  Musculoskeletal, lower extremity strength is 5 out of 5 on the right and 4+ out of 5 on the left.  Neuro: Lower extremity reflexes are 2+ bilaterally.      Assessment and Plan. The following treatment and monitoring plan is recommended:    1. Medicare annual wellness visit, subsequent     2. Urinary incontinence, unspecified type   new uncontrolled problem for the patient.  It sounds to be urge incontinence but she is not interested in medication.  Given her history of back issues, I think it is reasonable to update her MRI since her back pain has been worsening as well.  We did discuss the option of pelvic floor physical therapy and she is open to this idea as well assuming the back x-rays look normal.  I have given her the name of a pelvic floor physical therapist in [facility].  Also checking urinalysis and urine culture today.    URINALYSIS    URINE CULTURE(NEW)   4. Chronic midline low back pain without sciatica   patient has chronic back pain which is recently worsened.  She would like to get updated imaging and is likely going to follow-up with her spine specialist in the future.  MRI thoracic and lumbar spine ordered today.    MR-LUMBAR SPINE-W/O   5. Chronic midline thoracic back pain  MR-THORACIC SPINE-W/O   6. History of spinal fusion  MR-THORACIC SPINE-W/O    MR-LUMBAR SPINE-W/O   7. Recurrent major depressive disorder, in partial remission (HCC)   chronic issue, patient reports under good control.  Follows with psychiatry.   8. Fam hx-ischem heart disease   patient reportedly had nuclear stress test which was negative.   9. Gastroesophageal reflux disease without esophagitis   chronic issue he denies any current problems.   10. Seasonal allergies   chronic issue, no current concerns.   11. Functional diarrhea   chronic issue, no current concerns.   12. External hemorrhoids   chronic issue, no current concerns.   13. Mixed hyperlipidemia   chronic issue,  continue statin.   14. Osteoporosis, unspecified osteoporosis type, unspecified pathological fracture presence   chronic issue, no current concerns.   15. Closed burst fracture of lumbar vertebra, sequela   previous issue, no concerns.   16. Tobacco abuse   chronic issue, not interested in quitting.         Services suggested: No services needed at this time  Health Care Screening recommendations as per orders if indicated.  Referrals offered: PT/OT/Nutrition counseling/Behavioral Health/Smoking cessation as per orders if indicated.    Discussion today about general wellness and lifestyle habits:    · Prevent falls and reduce trip hazards; Cautioned about securing or removing rugs.  · Have a working fire alarm and carbon monoxide detector;   · Engage in regular physical activity and social activities       Follow-up: Return in about 1 year (around 5/13/2021), or if symptoms worsen or fail to improve.

## 2020-05-15 ENCOUNTER — HOSPITAL ENCOUNTER (OUTPATIENT)
Dept: RADIOLOGY | Facility: MEDICAL CENTER | Age: 60
End: 2020-05-15
Payer: MEDICARE

## 2020-05-16 LAB
BACTERIA UR CULT: NORMAL
SIGNIFICANT IND 70042: NORMAL
SITE SITE: NORMAL
SOURCE SOURCE: NORMAL

## 2020-05-18 ENCOUNTER — APPOINTMENT (OUTPATIENT)
Dept: RADIOLOGY | Facility: MEDICAL CENTER | Age: 60
End: 2020-05-18
Attending: FAMILY MEDICINE
Payer: MEDICARE

## 2020-05-18 DIAGNOSIS — Z98.1 HISTORY OF SPINAL FUSION: ICD-10-CM

## 2020-05-18 DIAGNOSIS — G89.29 CHRONIC MIDLINE THORACIC BACK PAIN: ICD-10-CM

## 2020-05-18 DIAGNOSIS — M54.6 CHRONIC MIDLINE THORACIC BACK PAIN: ICD-10-CM

## 2020-05-18 DIAGNOSIS — G89.29 CHRONIC MIDLINE LOW BACK PAIN WITHOUT SCIATICA: ICD-10-CM

## 2020-05-18 DIAGNOSIS — M54.50 CHRONIC MIDLINE LOW BACK PAIN WITHOUT SCIATICA: ICD-10-CM

## 2020-05-18 PROCEDURE — 72146 MRI CHEST SPINE W/O DYE: CPT

## 2020-05-18 PROCEDURE — 72148 MRI LUMBAR SPINE W/O DYE: CPT

## 2020-05-21 ENCOUNTER — HOSPITAL ENCOUNTER (OUTPATIENT)
Dept: RADIOLOGY | Facility: MEDICAL CENTER | Age: 60
End: 2020-05-21
Attending: FAMILY MEDICINE
Payer: MEDICARE

## 2020-05-21 DIAGNOSIS — Z12.39 BREAST CANCER SCREENING: ICD-10-CM

## 2020-05-21 DIAGNOSIS — Z12.31 VISIT FOR SCREENING MAMMOGRAM: ICD-10-CM

## 2020-05-21 PROCEDURE — 76641 ULTRASOUND BREAST COMPLETE: CPT

## 2020-05-21 PROCEDURE — 77067 SCR MAMMO BI INCL CAD: CPT

## 2020-06-17 DIAGNOSIS — I25.10 CORONARY ARTERIOSCLEROSIS: ICD-10-CM

## 2020-06-17 DIAGNOSIS — R07.2 PRECORDIAL PAIN: ICD-10-CM

## 2020-06-17 RX ORDER — PRAVASTATIN SODIUM 20 MG
TABLET ORAL
Qty: 100 TAB | Refills: 1 | Status: SHIPPED | OUTPATIENT
Start: 2020-06-17 | End: 2020-10-27

## 2020-08-11 ENCOUNTER — OFFICE VISIT (OUTPATIENT)
Dept: CARDIOLOGY | Facility: MEDICAL CENTER | Age: 60
End: 2020-08-11
Payer: MEDICARE

## 2020-08-11 VITALS
HEIGHT: 65 IN | SYSTOLIC BLOOD PRESSURE: 130 MMHG | OXYGEN SATURATION: 93 % | DIASTOLIC BLOOD PRESSURE: 84 MMHG | RESPIRATION RATE: 16 BRPM | BODY MASS INDEX: 27.16 KG/M2 | WEIGHT: 163 LBS | HEART RATE: 100 BPM

## 2020-08-11 DIAGNOSIS — E78.5 DYSLIPIDEMIA: ICD-10-CM

## 2020-08-11 DIAGNOSIS — I25.10 CORONARY ARTERIOSCLEROSIS: ICD-10-CM

## 2020-08-11 DIAGNOSIS — Z72.0 TOBACCO ABUSE: Chronic | ICD-10-CM

## 2020-08-11 LAB — EKG IMPRESSION: NORMAL

## 2020-08-11 PROCEDURE — 99213 OFFICE O/P EST LOW 20 MIN: CPT | Performed by: INTERNAL MEDICINE

## 2020-08-11 PROCEDURE — 93000 ELECTROCARDIOGRAM COMPLETE: CPT | Performed by: INTERNAL MEDICINE

## 2020-08-11 ASSESSMENT — FIBROSIS 4 INDEX: FIB4 SCORE: 0.7

## 2020-08-11 NOTE — PROGRESS NOTES
"CARDIOLOGY OUTPATIENT FOLLOWUP    PCP: Chloé Larios D.O.    1. Coronary arteriosclerosis  Elevated coronary calcium score with negative stress MPI.  No ongoing symptoms of angina.  -Continue statin therapy    2. Dyslipidemia  Adequately controlled with statin therapy    3. Tobacco abuse  She is contemplating quitting but not planning on action until the first of the year; after the election is over.  She smoked 1 pack of cigarettes daily and I strongly encouraged her to try reducing tobacco intake.  I highlighted the ongoing risks of tobacco use.  She was resistant to setting a near-term quit date.      Follow up with Tony Sawyer M.D. in 1 year at her request    Chief Complaint   Patient presents with   • Chest Pain       History: Paulette Toussaint is a 60 y.o. female with a past medical history of Tobacco use and spine disease presenting for follow up of coronary arterial sclerosis.  Over the past year she completed stress testing which showed no evidence of ischemia.  She has had no clear symptoms of ischemic heart disease but does report a left precordial \"congestion.\"  She has a hard time defining this further but does state that she can often change positions to alleviate the symptoms.  In general she feels well when physically exerting herself.  She is very much engaged politically and finds smoking a habit that helps her cope with the stress.  She is still smoking 1 pack daily and understands the need to quit but still feels she enjoys smoking.  She does not experience claudication.    ROS:  All other systems reviewed and negative except as per the HPI    PE:  /84 (BP Location: Right arm, Patient Position: Sitting, BP Cuff Size: Adult)   Pulse 100   Resp 16   Ht 1.651 m (5' 5\")   Wt 73.9 kg (163 lb)   SpO2 93%   BMI 27.12 kg/m²   Gen: Well appearing  HEENT: Symmetric face. Anicteric sclerae. Moist mucus membranes  NECK: No JVD. No lymphadenopathy  CARDIAC: Normal PMI, regular, " "normal S1, S2.  VASCULATURE: Normal carotid amplitude without bruit.   RESP: Clear to auscultation bilaterally  ABD: Soft, non-tender, non-distended  EXT: No edema, no clubbing or cyanosis  SKIN: Warm and dry  NEURO: No gross deficits  PSYCH: Appropriate affect, participates in conversation    Past Medical History:   Diagnosis Date   • Anxiety    • Chronic back pain    • Depression 2013   • Fall    • History of spinal fusion     T10-L3   • Lumbar burst fracture (HCC) 2013    L1 burst, tx'd with rods/screws [place]     Allergies   Allergen Reactions   • Seasonal Runny Nose and Itching   • Sulfa Drugs Nausea     Pt states \"I get very sick, I was over 20 years ago\".     Outpatient Encounter Medications as of 8/11/2020   Medication Sig Dispense Refill   • pravastatin (PRAVACHOL) 20 MG Tab TAKE 1 TABLET BY MOUTH EVERY  Tab 1   • DULoxetine (CYMBALTA) 60 MG Cap DR Particles delayed-release capsule Take  by mouth every morning. Take 1 capsule by mouth every morning     • gabapentin (NEURONTIN) 800 MG tablet TAKE 1 TABLET BY MOUTH THREE TIMES A  Tab 1   • diazepam (VALIUM) 10 MG tablet Take 10 mg by mouth every 12 hours as needed for Anxiety.     • zolpidem (AMBIEN) 10 MG Tab TAKE 1 TABLET BY MOUTH EVERY DAY AT BEDTIME AS NEEDED FOR INSOMNIA FOR 30 DAYS *F51.01*  0   • albuterol (VENTOLIN HFA) 108 (90 Base) MCG/ACT Aero Soln inhalation aerosol Ventolin HFA 90 mcg/actuation aerosol inhaler   Inhale 1 puff as needed by inhalation route.     • therapeutic multivitamin-minerals (THERAGRAN-M) Tab Take 1 Tab by mouth every day.     • fluticasone (FLONASE) 50 MCG/ACT nasal spray Spray 2 Sprays in nose 2 times a day.     • cetirizine (ZYRTEC) 10 MG Tab Take 10 mg by mouth every bedtime.     • Probiotic Product (PROBIOTIC DAILY PO) Take 1 Cap by mouth every bedtime.       No facility-administered encounter medications on file as of 8/11/2020.      Social History     Socioeconomic History   • Marital status: "      Spouse name: Not on file   • Number of children: Not on file   • Years of education: Not on file   • Highest education level: Not on file   Occupational History   • Not on file   Social Needs   • Financial resource strain: Not on file   • Food insecurity     Worry: Never true     Inability: Never true   • Transportation needs     Medical: No     Non-medical: No   Tobacco Use   • Smoking status: Current Every Day Smoker     Packs/day: 1.00     Types: Cigarettes     Start date: 1/13/1979   • Smokeless tobacco: Never Used   Substance and Sexual Activity   • Alcohol use: Not Currently     Alcohol/week: 0.0 oz     Comment: socially   • Drug use: Yes     Types: Marijuana, Inhaled     Comment: occ edible THC   • Sexual activity: Never     Comment: , ret    Lifestyle   • Physical activity     Days per week: Not on file     Minutes per session: Not on file   • Stress: Not on file   Relationships   • Social connections     Talks on phone: Not on file     Gets together: Not on file     Attends Yarsani service: Not on file     Active member of club or organization: Not on file     Attends meetings of clubs or organizations: Not on file     Relationship status: Not on file   • Intimate partner violence     Fear of current or ex partner: Not on file     Emotionally abused: Not on file     Physically abused: Not on file     Forced sexual activity: Not on file   Other Topics Concern   • Not on file   Social History Narrative    ** Merged History Encounter **            Studies  Lab Results   Component Value Date/Time    CHOLSTRLTOT 183 01/15/2020 12:09 PM     (H) 01/15/2020 12:09 PM    HDL 52 01/15/2020 12:09 PM    TRIGLYCERIDE 147 01/15/2020 12:09 PM       Lab Results   Component Value Date/Time    SODIUM 137 01/15/2020 12:09 PM    POTASSIUM 4.6 01/15/2020 12:09 PM    CHLORIDE 102 01/15/2020 12:09 PM    CO2 27 01/15/2020 12:09 PM    GLUCOSE 106 (H) 01/15/2020 12:09 PM    BUN 11  01/15/2020 12:09 PM    CREATININE 0.71 01/15/2020 12:09 PM     Lab Results   Component Value Date/Time    ALKPHOSPHAT 80 01/15/2020 12:09 PM    ASTSGOT 21 01/15/2020 12:09 PM    ALTSGPT 18 01/15/2020 12:09 PM    TBILIRUBIN 0.5 01/15/2020 12:09 PM        For this encounter I directly reviewed ECG tracings and medical records I agree with the interpretations in the electronic health record

## 2020-10-07 RX ORDER — ALBUTEROL SULFATE 90 UG/1
AEROSOL, METERED RESPIRATORY (INHALATION)
Qty: 3 EACH | Refills: 1 | Status: SHIPPED | OUTPATIENT
Start: 2020-10-07 | End: 2021-10-14 | Stop reason: SDUPTHER

## 2020-10-26 DIAGNOSIS — I25.10 CORONARY ARTERIOSCLEROSIS: ICD-10-CM

## 2020-10-26 DIAGNOSIS — R07.2 PRECORDIAL PAIN: ICD-10-CM

## 2020-10-27 RX ORDER — PRAVASTATIN SODIUM 20 MG
TABLET ORAL
Qty: 100 TAB | Refills: 3 | Status: SHIPPED | OUTPATIENT
Start: 2020-10-27 | End: 2021-12-28

## 2021-01-05 RX ORDER — GABAPENTIN 800 MG/1
TABLET ORAL
Qty: 270 TAB | Refills: 1 | Status: SHIPPED | OUTPATIENT
Start: 2021-01-05 | End: 2021-07-20

## 2021-04-07 ENCOUNTER — TELEPHONE (OUTPATIENT)
Dept: MEDICAL GROUP | Facility: PHYSICIAN GROUP | Age: 61
End: 2021-04-07

## 2021-04-07 NOTE — TELEPHONE ENCOUNTER
NEW TO YOUESTABLISHED PATIENT PRE-VISIT PLANNING     Patient was NOT contacted to complete PVP.     Note: Patient will not be contacted if there is no indication to call.     1.  Reviewed notes from the last few office visits within the medical group: Yes    2.  If any orders were placed at last visit or intended to be done for this visit (i.e. 6 mos follow-up), do we have Results/Consult Notes?         •  Labs - Labs were not ordered at last office visit.  Note: If patient appointment is for lab review and patient did not complete labs, check with provider if OK to reschedule patient until labs completed.       •  Imaging - Imaging was not ordered at last office visit.       •  Referrals - No referrals were ordered at last office visit.    3. Is this appointment scheduled as a Hospital Follow-Up? No    4.  Immunizations were updated in Epic using Reconcile Outside Information activity? Yes    5.  Patient is due for the following Health Maintenance Topics:   Health Maintenance Due   Topic Date Due   • COVID-19 Vaccine (1) Never done   • IMM DTaP/Tdap/Td Vaccine (1 - Tdap) Never done   • IMM ZOSTER VACCINES (1 of 2) Never done       6.  AHA (Pulse[employer]) form printed for Provider? Email sent to [facility] requesting form

## 2021-04-08 ENCOUNTER — OFFICE VISIT (OUTPATIENT)
Dept: MEDICAL GROUP | Facility: PHYSICIAN GROUP | Age: 61
End: 2021-04-08
Payer: MEDICARE

## 2021-04-08 VITALS
TEMPERATURE: 98.8 F | SYSTOLIC BLOOD PRESSURE: 134 MMHG | BODY MASS INDEX: 23.49 KG/M2 | WEIGHT: 141 LBS | DIASTOLIC BLOOD PRESSURE: 82 MMHG | HEIGHT: 65 IN | HEART RATE: 96 BPM | RESPIRATION RATE: 16 BRPM | OXYGEN SATURATION: 97 %

## 2021-04-08 DIAGNOSIS — E55.9 VITAMIN D DEFICIENCY: ICD-10-CM

## 2021-04-08 DIAGNOSIS — K21.9 GASTROESOPHAGEAL REFLUX DISEASE WITHOUT ESOPHAGITIS: ICD-10-CM

## 2021-04-08 DIAGNOSIS — M54.50 CHRONIC BILATERAL LOW BACK PAIN WITHOUT SCIATICA: ICD-10-CM

## 2021-04-08 DIAGNOSIS — F41.8 OTHER SPECIFIED ANXIETY DISORDERS: ICD-10-CM

## 2021-04-08 DIAGNOSIS — F51.01 PRIMARY INSOMNIA: ICD-10-CM

## 2021-04-08 DIAGNOSIS — F43.21 SITUATIONAL DEPRESSION: ICD-10-CM

## 2021-04-08 DIAGNOSIS — G89.29 CHRONIC BILATERAL LOW BACK PAIN WITHOUT SCIATICA: ICD-10-CM

## 2021-04-08 DIAGNOSIS — R53.83 OTHER FATIGUE: ICD-10-CM

## 2021-04-08 DIAGNOSIS — Z72.0 TOBACCO ABUSE: Chronic | ICD-10-CM

## 2021-04-08 DIAGNOSIS — R73.9 HYPERGLYCEMIA: ICD-10-CM

## 2021-04-08 DIAGNOSIS — M85.88 OSTEOPENIA OF LUMBAR SPINE: ICD-10-CM

## 2021-04-08 DIAGNOSIS — E78.2 MIXED HYPERLIPIDEMIA: ICD-10-CM

## 2021-04-08 PROBLEM — F33.41 RECURRENT MAJOR DEPRESSIVE DISORDER, IN PARTIAL REMISSION (HCC): Status: RESOLVED | Noted: 2020-01-13 | Resolved: 2021-04-08

## 2021-04-08 PROCEDURE — 99214 OFFICE O/P EST MOD 30 MIN: CPT | Performed by: INTERNAL MEDICINE

## 2021-04-08 ASSESSMENT — FIBROSIS 4 INDEX: FIB4 SCORE: 0.71

## 2021-04-08 NOTE — PROGRESS NOTES
Annual Health Assessment Questions:    1.  Are you currently engaging in any exercise or physical activity? No    2.  How would you describe your mood or emotional well-being today? anxious    3.  Have you had any falls in the last year? No    4.  Have you noticed any problems with your balance or had difficulty walking? Yes    5.  In the last six months have you experienced any leakage of urine? Yes occasionally when out of the house    6. DPA/Advanced Directive: Patient does not have an Advanced Directive.  A packet and workshop information was given on Advanced Directives.

## 2021-04-09 NOTE — PROGRESS NOTES
CC: Establish medical care.  Previous patient of Dr. Larios.    HPI:  Paulette presents with the following    1. Chronic bilateral low back pain without sciatica  Patient has a history of chronic back pain, status post lumbar surgery in 2013 in 2014.  Patient requesting a referral to physical therapy to help with balance and strength and improve lower back discomfort.  Patient is not followed by orthopedics or neurosurgeon.  Lumbar MRI completed in May 2020.  Uses gabapentin for pain.    2. Hyperglycemia  Noted elevated blood sugars in the past.  No PMH of diabetes.    3. Other specified anxiety disorders  Patient has a history of situational depression and anxiety after moving in with her father in 2013.  Due to family issues and increased stress, patient moved out of her home in 2019 and was admitted to [facility] for 1 week.  Patient was discharged with Cymbalta and Valium.  Her situation has since resolved and weaned herself off of Cymbalta.  Patient uses Valium as needed for anxiety.  Patient requesting a referral to behavioral health.    4. Situational depression  Refer to #3.    5. Gastroesophageal reflux disease without esophagitis  Patient treats symptoms of GERD with diet.  Patient has followed up with GI and completed EGD and colonoscopy which were negative.  Patient will use nexium only as needed.    6. Mixed hyperlipidemia  Treating dyslipidemia with Pravachol 20 mg.  Patient is due for lab work.  Patient was noted to have an elevated coronary calcium score, negative stress test.  Positive family history of CAD.    7. Primary insomnia  Patient uses Ambien as needed but uses melatonin nightly.    8. Osteopenia of lumbar spine  Most recent DEXA scan in 2018 which showed osteopenia.  Patient would like to update her bone density.  Takes calcium and vitamin D on occasion.      9. Tobacco abuse  Patient is a pack-a-day smoker for the last 40 years.      Patient Active Problem List    Diagnosis Date  Noted   • Hyperglycemia 04/08/2021   • Other specified anxiety disorders 04/08/2021   • Situational depression 04/08/2021   • Primary insomnia 04/08/2021   • Osteopenia of lumbar spine 04/08/2021   • Vitamin D deficiency 04/08/2021   • Coronary arteriosclerosis 08/11/2020   • Fam hx-ischem heart disease 06/20/2019   • Gastroesophageal reflux disease without esophagitis 01/28/2019   • Seasonal allergies 08/10/2018   • External hemorrhoids 04/05/2018   • Functional diarrhea 04/05/2018   • Mixed hyperlipidemia 05/26/2017   • Tobacco abuse 04/01/2016   • Chronic lumbar pain 12/11/2015       Current Outpatient Medications   Medication Sig Dispense Refill   • gabapentin (NEURONTIN) 800 MG tablet TAKE 1 TABLET BY MOUTH THREE TIMES A  Tab 1   • pravastatin (PRAVACHOL) 20 MG Tab TAKE 1 TABLET BY MOUTH EVERY  Tab 3   • albuterol (VENTOLIN HFA) 108 (90 Base) MCG/ACT Aero Soln inhalation aerosol Inhale 1 to 2 puffs every 4 hours as needed for shortness of breath 3 Each 1   • diazepam (VALIUM) 10 MG tablet Take 10 mg by mouth every 12 hours as needed for Anxiety.     • therapeutic multivitamin-minerals (THERAGRAN-M) Tab Take 1 Tab by mouth every day.     • fluticasone (FLONASE) 50 MCG/ACT nasal spray Spray 2 Sprays in nose 2 times a day.     • cetirizine (ZYRTEC) 10 MG Tab Take 10 mg by mouth every bedtime.     • Probiotic Product (PROBIOTIC DAILY PO) Take 1 Cap by mouth every bedtime.     • esomeprazole (NEXIUM) 20 MG capsule esomeprazole magnesium 20 mg capsule,delayed release   Take 1 capsule every day by oral route.     • DULoxetine (CYMBALTA) 60 MG Cap DR Particles delayed-release capsule Take  by mouth every morning. Take 1 capsule by mouth every morning     • zolpidem (AMBIEN) 10 MG Tab TAKE 1 TABLET BY MOUTH EVERY DAY AT BEDTIME AS NEEDED FOR INSOMNIA FOR 30 DAYS *F51.01*  0     No current facility-administered medications for this visit.         Allergies as of 04/08/2021 - Reviewed 04/08/2021   Allergen  Reaction Noted   • Seasonal Runny Nose and Itching 01/13/2014   • Sulfa drugs Nausea 01/13/2014        Social History     Socioeconomic History   • Marital status:      Spouse name: Not on file   • Number of children: Not on file   • Years of education: Not on file   • Highest education level: Not on file   Occupational History   • Not on file   Tobacco Use   • Smoking status: Current Every Day Smoker     Packs/day: 1.00     Types: Cigarettes     Start date: 1/13/1979   • Smokeless tobacco: Never Used   Substance and Sexual Activity   • Alcohol use: Not Currently     Alcohol/week: 0.0 oz     Comment: socially   • Drug use: Yes     Types: Marijuana, Inhaled     Comment: occ edible THC   • Sexual activity: Never     Comment: , ret    Other Topics Concern   • Not on file   Social History Narrative    ** Merged History Encounter **          Social Determinants of Health     Financial Resource Strain:    • Difficulty of Paying Living Expenses:    Food Insecurity:    • Worried About Running Out of Food in the Last Year:    • Ran Out of Food in the Last Year:    Transportation Needs:    • Lack of Transportation (Medical):    • Lack of Transportation (Non-Medical):    Physical Activity:    • Days of Exercise per Week:    • Minutes of Exercise per Session:    Stress:    • Feeling of Stress :    Social Connections:    • Frequency of Communication with Friends and Family:    • Frequency of Social Gatherings with Friends and Family:    • Attends Restoration[employer] Services:    • Active Member of Clubs or Organizations:    • Attends Club or Organization Meetings:    • Marital Status:    Intimate Partner Violence:    • Fear of Current or Ex-Partner:    • Emotionally Abused:    • Physically Abused:    • Sexually Abused:        Family History   Problem Relation Age of Onset   • Cancer Mother 45        colon   • Heart Disease Mother         HEART ATTACK   • Heart Disease Half-brother         Heart Bypass @  "45/stent   • No Known Problems Half-brother    • No Known Problems Half-brother    • Diabetes Neg Hx    • Stroke Neg Hx        Past Surgical History:   Procedure Laterality Date   • LUMBAR FUSION POSTERIOR  2013    rods and screw placement   • ROTATOR CUFF REPAIR Right Mar 2009   • OTHER ORTHOPEDIC SURGERY  2009    Rotator cuff  surgery Right   • OTHER ORTHOPEDIC SURGERY      back surgery   • PRIMARY C SECTION      x2       ROS:  Denies any Headache,Chest pain,  Shortness of breath,  Abdominal pain, Changes of bowel or bladder, Lower ext edema, Fevers, Nights sweats, Weight Changes, Focal weakness or numbness.  All other systems are negative.    /82 (BP Location: Left arm, Patient Position: Sitting, BP Cuff Size: Adult)   Pulse 96   Temp 37.1 °C (98.8 °F) (Temporal)   Resp 16   Ht 1.651 m (5' 5\")   Wt 64 kg (141 lb)   SpO2 97%   BMI 23.46 kg/m²      Constitutional: Alert, no distress, well-groomed.  Skin: Warm, dry, good turgor, no rashes in visible areas.  Eye: Equal, round and reactive, conjunctiva clear, lids normal.  ENMT: Lips without lesions, good dentition, moist mucous membranes.  Neck: Trachea midline, no masses, no thyromegaly.  Respiratory: Unlabored respiratory effort, no cough.  Abdomen: Soft, no gross masses.  MSK: Normal gait, moves all extremities.  Neuro: Grossly non-focal. No cranial nerve deficit. Strength and sensation intact.   Psych: Alert and oriented x3, normal affect and mood.    Assessment and Plan.   61 y.o. female presenting with the following.     1. Chronic bilateral low back pain without sciatica    - REFERRAL TO PHYSICAL THERAPY    2. Hyperglycemia    - HEMOGLOBIN A1C; Future  - Comp Metabolic Panel; Future    3. Other specified anxiety disorders  Continue Valium as needed    - REFERRAL TO BEHAVIORAL HEALTH    4. Situational depression  Patient stable off of Cymbalta.    5. Gastroesophageal reflux disease without esophagitis  Stable.  Continue PPI.  Diet and lifestyle " modifications.    6. Mixed hyperlipidemia    - Lipid Profile; Future    7. Primary insomnia  Sleep hygiene.  Melatonin.  Ambien as needed    8. Osteopenia of lumbar spine  Briefly discussed weightbearing exercise.    - DS-BONE DENSITY STUDY (DEXA); Future    9. Vitamin D deficiency    - VITAMIN D,25 HYDROXY; Future    10. Other fatigue    - CBC WITH DIFFERENTIAL; Future  - TSH; Future    11. Tobacco abuse  Briefly counseled on smoking cessation.

## 2021-04-14 ENCOUNTER — HOSPITAL ENCOUNTER (OUTPATIENT)
Dept: LAB | Facility: MEDICAL CENTER | Age: 61
End: 2021-04-14
Attending: INTERNAL MEDICINE
Payer: MEDICARE

## 2021-04-14 DIAGNOSIS — R53.83 OTHER FATIGUE: ICD-10-CM

## 2021-04-14 DIAGNOSIS — R73.9 HYPERGLYCEMIA: ICD-10-CM

## 2021-04-14 DIAGNOSIS — E78.2 MIXED HYPERLIPIDEMIA: ICD-10-CM

## 2021-04-14 DIAGNOSIS — E55.9 VITAMIN D DEFICIENCY: ICD-10-CM

## 2021-04-14 LAB
ALBUMIN SERPL BCP-MCNC: 4.9 G/DL (ref 3.2–4.9)
ALBUMIN/GLOB SERPL: 2 G/DL
ALP SERPL-CCNC: 100 U/L (ref 30–99)
ALT SERPL-CCNC: 19 U/L (ref 2–50)
ANION GAP SERPL CALC-SCNC: 9 MMOL/L (ref 7–16)
AST SERPL-CCNC: 21 U/L (ref 12–45)
BASOPHILS # BLD AUTO: 0.3 % (ref 0–1.8)
BASOPHILS # BLD: 0.03 K/UL (ref 0–0.12)
BILIRUB SERPL-MCNC: 0.3 MG/DL (ref 0.1–1.5)
BUN SERPL-MCNC: 7 MG/DL (ref 8–22)
CALCIUM SERPL-MCNC: 9.8 MG/DL (ref 8.5–10.5)
CHLORIDE SERPL-SCNC: 98 MMOL/L (ref 96–112)
CHOLEST SERPL-MCNC: 161 MG/DL (ref 100–199)
CO2 SERPL-SCNC: 27 MMOL/L (ref 20–33)
CREAT SERPL-MCNC: 0.49 MG/DL (ref 0.5–1.4)
EOSINOPHIL # BLD AUTO: 0.14 K/UL (ref 0–0.51)
EOSINOPHIL NFR BLD: 1.6 % (ref 0–6.9)
ERYTHROCYTE [DISTWIDTH] IN BLOOD BY AUTOMATED COUNT: 44.7 FL (ref 35.9–50)
EST. AVERAGE GLUCOSE BLD GHB EST-MCNC: 114 MG/DL
GLOBULIN SER CALC-MCNC: 2.5 G/DL (ref 1.9–3.5)
GLUCOSE SERPL-MCNC: 94 MG/DL (ref 65–99)
HBA1C MFR BLD: 5.6 % (ref 4–5.6)
HCT VFR BLD AUTO: 43.1 % (ref 37–47)
HDLC SERPL-MCNC: 59 MG/DL
HGB BLD-MCNC: 14.4 G/DL (ref 12–16)
IMM GRANULOCYTES # BLD AUTO: 0.02 K/UL (ref 0–0.11)
IMM GRANULOCYTES NFR BLD AUTO: 0.2 % (ref 0–0.9)
LDLC SERPL CALC-MCNC: 81 MG/DL
LYMPHOCYTES # BLD AUTO: 2.25 K/UL (ref 1–4.8)
LYMPHOCYTES NFR BLD: 25.8 % (ref 22–41)
MCH RBC QN AUTO: 32.7 PG (ref 27–33)
MCHC RBC AUTO-ENTMCNC: 33.4 G/DL (ref 33.6–35)
MCV RBC AUTO: 97.7 FL (ref 81.4–97.8)
MONOCYTES # BLD AUTO: 0.53 K/UL (ref 0–0.85)
MONOCYTES NFR BLD AUTO: 6.1 % (ref 0–13.4)
NEUTROPHILS # BLD AUTO: 5.76 K/UL (ref 2–7.15)
NEUTROPHILS NFR BLD: 66 % (ref 44–72)
NRBC # BLD AUTO: 0 K/UL
NRBC BLD-RTO: 0 /100 WBC
PLATELET # BLD AUTO: 401 K/UL (ref 164–446)
PMV BLD AUTO: 10.2 FL (ref 9–12.9)
POTASSIUM SERPL-SCNC: 4.6 MMOL/L (ref 3.6–5.5)
PROT SERPL-MCNC: 7.4 G/DL (ref 6–8.2)
RBC # BLD AUTO: 4.41 M/UL (ref 4.2–5.4)
SODIUM SERPL-SCNC: 134 MMOL/L (ref 135–145)
TRIGL SERPL-MCNC: 103 MG/DL (ref 0–149)
WBC # BLD AUTO: 8.7 K/UL (ref 4.8–10.8)

## 2021-04-14 PROCEDURE — 80061 LIPID PANEL: CPT

## 2021-04-14 PROCEDURE — 36415 COLL VENOUS BLD VENIPUNCTURE: CPT

## 2021-04-14 PROCEDURE — 80053 COMPREHEN METABOLIC PANEL: CPT

## 2021-04-14 PROCEDURE — 83036 HEMOGLOBIN GLYCOSYLATED A1C: CPT

## 2021-04-14 PROCEDURE — 84443 ASSAY THYROID STIM HORMONE: CPT

## 2021-04-14 PROCEDURE — 82306 VITAMIN D 25 HYDROXY: CPT

## 2021-04-14 PROCEDURE — 85025 COMPLETE CBC W/AUTO DIFF WBC: CPT

## 2021-04-15 LAB — TSH SERPL DL<=0.005 MIU/L-ACNC: 1.79 UIU/ML (ref 0.38–5.33)

## 2021-04-16 LAB — 25(OH)D3 SERPL-MCNC: 38 NG/ML (ref 30–80)

## 2021-04-21 ENCOUNTER — HOSPITAL ENCOUNTER (OUTPATIENT)
Dept: RADIOLOGY | Facility: MEDICAL CENTER | Age: 61
End: 2021-04-21
Attending: INTERNAL MEDICINE
Payer: MEDICARE

## 2021-04-21 DIAGNOSIS — M85.88 OSTEOPENIA OF LUMBAR SPINE: ICD-10-CM

## 2021-04-21 PROCEDURE — 77080 DXA BONE DENSITY AXIAL: CPT

## 2021-05-12 ENCOUNTER — OFFICE VISIT (OUTPATIENT)
Dept: MEDICAL GROUP | Facility: PHYSICIAN GROUP | Age: 61
End: 2021-05-12
Payer: MEDICARE

## 2021-05-12 VITALS
TEMPERATURE: 97 F | BODY MASS INDEX: 24.8 KG/M2 | HEIGHT: 63 IN | OXYGEN SATURATION: 96 % | WEIGHT: 140 LBS | RESPIRATION RATE: 18 BRPM | HEART RATE: 81 BPM | SYSTOLIC BLOOD PRESSURE: 140 MMHG | DIASTOLIC BLOOD PRESSURE: 82 MMHG

## 2021-05-12 DIAGNOSIS — G89.29 CHRONIC LOW BACK PAIN WITH SCIATICA, SCIATICA LATERALITY UNSPECIFIED, UNSPECIFIED BACK PAIN LATERALITY: ICD-10-CM

## 2021-05-12 DIAGNOSIS — M54.40 CHRONIC LOW BACK PAIN WITH SCIATICA, SCIATICA LATERALITY UNSPECIFIED, UNSPECIFIED BACK PAIN LATERALITY: ICD-10-CM

## 2021-05-12 DIAGNOSIS — F41.9 ANXIETY: ICD-10-CM

## 2021-05-12 DIAGNOSIS — Z12.83 SKIN CANCER SCREENING: ICD-10-CM

## 2021-05-12 DIAGNOSIS — M81.0 AGE-RELATED OSTEOPOROSIS WITHOUT CURRENT PATHOLOGICAL FRACTURE: ICD-10-CM

## 2021-05-12 PROCEDURE — 99214 OFFICE O/P EST MOD 30 MIN: CPT | Performed by: INTERNAL MEDICINE

## 2021-05-12 RX ORDER — ALPRAZOLAM 1 MG/1
1 TABLET ORAL
Qty: 30 TABLET | Refills: 1 | Status: SHIPPED | OUTPATIENT
Start: 2021-05-12 | End: 2021-08-10

## 2021-05-12 RX ORDER — IBANDRONATE SODIUM 150 MG/1
150 TABLET, FILM COATED ORAL
Qty: 100 TABLET | Refills: 3 | Status: SHIPPED | OUTPATIENT
Start: 2021-05-12 | End: 2022-05-18

## 2021-05-12 ASSESSMENT — PATIENT HEALTH QUESTIONNAIRE - PHQ9: CLINICAL INTERPRETATION OF PHQ2 SCORE: 0

## 2021-05-12 ASSESSMENT — FIBROSIS 4 INDEX: FIB4 SCORE: 0.72

## 2021-05-12 NOTE — PROGRESS NOTES
CC: Follow-up lab work, bone density    HPI:  Paulette presents with the following    1. Anxiety  Patient has a history of situational depression and anxiety after moving in with her father in 2013.  Due to family issues and increased stress, patient moved out of her home in 2019 and was admitted to [place] for 1 week.  Patient was discharged with Cymbalta and Valium.  Her situation has since resolved and weaned herself off of Cymbalta.  Patient uses Valium as needed for anxiety.     Update 5/12/2021: Patient is doing well off of her Cymbalta.  Patient is also weaning herself off of Valium as it causes grogginess the next day.  She takes Valium as needed and was last filled in February 2021.  She is requesting to change her medication to Xanax to use as needed.  She still suffers from social anxiety as well as depression.  She has her referral for behavioral health and will need to make an appointment.    2. Age-related osteoporosis without current pathological fracture  Bone density completed which showed a left femur T score of -2.8, left forearm T score -3.6.  Osteoporotic range.  FRAX score of 35.3%.  Patient not on any medications.  Vitamin D level is 38.    3. Chronic low back pain with sciatica, sciatica laterality unspecified, unspecified back pain laterality  Patient has a history of chronic back pain, status post lumbar surgery in 2013 in 2014.  Patient requesting a referral to physical therapy to help with balance and strength and improve lower back discomfort.  Patient is not followed by orthopedics or neurosurgeon.  Lumbar MRI completed in May 2020.  Uses gabapentin for pain.    Update 5/12/2021: Patient is doing well with physical therapy, completed 1 visit and has an appointment tomorrow.  Patient continues to take gabapentin which helps relieve pain.    4. Skin cancer screening  Patient has a history of excessive sun exposure, sunburning.  No history of skin cancer.  Requesting referral to  dermatology for skin check.    Labs: Lipid panel within good range.  TSH 1.7.  Hemoglobin A1c 5.6      Patient Active Problem List    Diagnosis Date Noted   • Hyperglycemia 04/08/2021   • Anxiety 04/08/2021   • Situational depression 04/08/2021   • Primary insomnia 04/08/2021   • Osteopenia of lumbar spine 04/08/2021   • Vitamin D deficiency 04/08/2021   • Coronary arteriosclerosis 08/11/2020   • Fam hx-ischem heart disease 06/20/2019   • Gastroesophageal reflux disease without esophagitis 01/28/2019   • Seasonal allergies 08/10/2018   • External hemorrhoids 04/05/2018   • Functional diarrhea 04/05/2018   • Mixed hyperlipidemia 05/26/2017   • Tobacco abuse 04/01/2016   • Age-related osteoporosis without current pathological fracture 02/18/2016   • Chronic lumbar pain 12/11/2015       Current Outpatient Medications   Medication Sig Dispense Refill   • ibandronate (BONIVA) 150 MG tablet Take 1 tablet by mouth every 30 days. 100 tablet 3   • esomeprazole (NEXIUM) 20 MG capsule esomeprazole magnesium 20 mg capsule,delayed release   Take 1 capsule every day by oral route.     • gabapentin (NEURONTIN) 800 MG tablet TAKE 1 TABLET BY MOUTH THREE TIMES A  Tab 1   • pravastatin (PRAVACHOL) 20 MG Tab TAKE 1 TABLET BY MOUTH EVERY  Tab 3   • albuterol (VENTOLIN HFA) 108 (90 Base) MCG/ACT Aero Soln inhalation aerosol Inhale 1 to 2 puffs every 4 hours as needed for shortness of breath 3 Each 1   • DULoxetine (CYMBALTA) 60 MG Cap DR Particles delayed-release capsule Take  by mouth every morning. Take 1 capsule by mouth every morning     • diazepam (VALIUM) 10 MG tablet Take 10 mg by mouth every 12 hours as needed for Anxiety.     • zolpidem (AMBIEN) 10 MG Tab TAKE 1 TABLET BY MOUTH EVERY DAY AT BEDTIME AS NEEDED FOR INSOMNIA FOR 30 DAYS *F51.01*  0   • therapeutic multivitamin-minerals (THERAGRAN-M) Tab Take 1 Tab by mouth every day.     • fluticasone (FLONASE) 50 MCG/ACT nasal spray Spray 2 Sprays in nose 2 times a  day.     • cetirizine (ZYRTEC) 10 MG Tab Take 10 mg by mouth every bedtime.     • Probiotic Product (PROBIOTIC DAILY PO) Take 1 Cap by mouth every bedtime.       No current facility-administered medications for this visit.         Allergies as of 05/12/2021 - Reviewed 05/12/2021   Allergen Reaction Noted   • Seasonal Runny Nose and Itching 01/13/2014   • Sulfa drugs Nausea 01/13/2014        Social History     Socioeconomic History   • Marital status:      Spouse name: Not on file   • Number of children: Not on file   • Years of education: Not on file   • Highest education level: Not on file   Occupational History   • Not on file   Tobacco Use   • Smoking status: Current Every Day Smoker     Packs/day: 1.00     Types: Cigarettes     Start date: 1/13/1979   • Smokeless tobacco: Never Used   Vaping Use   • Vaping Use: Some days   Substance and Sexual Activity   • Alcohol use: Not Currently     Alcohol/week: 0.0 oz     Comment: socially   • Drug use: Yes     Types: Marijuana, Inhaled     Comment: occ edible THC   • Sexual activity: Never     Comment: , ret    Other Topics Concern   • Not on file   Social History Narrative    ** Merged History Encounter **          Social Determinants of Health     Financial Resource Strain:    • Difficulty of Paying Living Expenses:    Food Insecurity:    • Worried About Running Out of Food in the Last Year:    • Ran Out of Food in the Last Year:    Transportation Needs:    • Lack of Transportation (Medical):    • Lack of Transportation (Non-Medical):    Physical Activity:    • Days of Exercise per Week:    • Minutes of Exercise per Session:    Stress:    • Feeling of Stress :    Social Connections:    • Frequency of Communication with Friends and Family:    • Frequency of Social Gatherings with Friends and Family:    • Attends Hoahaoism Services:    • Active Member of Clubs or Organizations:    • Attends Club or Organization Meetings:    • Marital  "Status:    Intimate Partner Violence:    • Fear of Current or Ex-Partner:    • Emotionally Abused:    • Physically Abused:    • Sexually Abused:        Family History   Problem Relation Age of Onset   • Cancer Mother 45        colon   • Heart Disease Mother         HEART ATTACK   • Heart Disease Half-brother         Heart Bypass @ 45/stent   • No Known Problems Half-brother    • No Known Problems Half-brother    • Diabetes Neg Hx    • Stroke Neg Hx        Past Surgical History:   Procedure Laterality Date   • LUMBAR FUSION POSTERIOR  2013    rods and screw placement   • ROTATOR CUFF REPAIR Right Mar 2009   • OTHER ORTHOPEDIC SURGERY  2009    Rotator cuff  surgery Right   • OTHER ORTHOPEDIC SURGERY      back surgery   • PRIMARY C SECTION      x2       ROS: Positive ROS per HPI   denies any Headache,Chest pain,  Shortness of breath,  Abdominal pain, Changes of bowel or bladder, Lower ext edema, Fevers, Nights sweats, Weight Changes, Focal weakness or numbness.  All other systems are negative.    /82   Pulse 81   Temp 36.1 °C (97 °F) (Temporal)   Resp 18   Ht 1.6 m (5' 2.99\")   Wt 63.5 kg (140 lb)   SpO2 96%   Breastfeeding No   BMI 24.81 kg/m²      Constitutional: Alert, no distress, well-groomed.  Skin: Warm, dry, good turgor, no rashes in visible areas.  Eye: Equal, round and reactive, conjunctiva clear, lids normal.  ENMT: Lips without lesions, good dentition, moist mucous membranes.  Neck: Trachea midline, no masses, no thyromegaly.  Respiratory: Unlabored respiratory effort, no cough.  Abdomen: Soft, no gross masses.  MSK: Normal gait, moves all extremities.  Neuro: Grossly non-focal. No cranial nerve deficit. Strength and sensation intact.   Psych: Alert and oriented x3, normal affect and mood.      Assessment and Plan.   61 y.o. female presenting with the following.     1. Anxiety  Stable off of Cymbalta  Patient to follow-up with behavioral health  Prescription provided for Xanax 1 mg tablets as " needed   report reviewed      2. Age-related osteoporosis without current pathological fracture  Patient to start Boniva 150 mg tablets monthly.  Continue weightbearing exercises.  Continue supplementation with calcium and vitamin D.  Monitor bone density    3. Chronic low back pain with sciatica, sciatica laterality unspecified, unspecified back pain laterality  Patient is stable, continue physical therapy  Continue gabapentin    4. Skin cancer screening    - REFERRAL TO DERMATOLOGY    My total time spent caring for the patient on the day of the encounter was 30 minutes.   This does not include time spent on separately billable procedures/tests.

## 2021-06-28 ENCOUNTER — OFFICE VISIT (OUTPATIENT)
Dept: BEHAVIORAL HEALTH | Facility: CLINIC | Age: 61
End: 2021-06-28
Payer: MEDICARE

## 2021-06-28 DIAGNOSIS — F41.9 ANXIETY AND DEPRESSION: ICD-10-CM

## 2021-06-28 DIAGNOSIS — F32.A ANXIETY AND DEPRESSION: ICD-10-CM

## 2021-06-28 PROCEDURE — 90791 PSYCH DIAGNOSTIC EVALUATION: CPT | Performed by: SOCIAL WORKER

## 2021-06-28 NOTE — PROGRESS NOTES
"[facility]   Initial Assessment    Name: Paulette Toussaint  MRN: [id number]  : 1960  Age: 61 y.o.  Date of assessment: 2021  PCP: Iraida Zavala M.D.  Persons in attendance: Patient  Total session time: 60   minutes      CHIEF COMPLAINT AND HISTORY OF PRESENTING PROBLEM:  (as stated by Patient):  Paulette Toussaint is a 61 y.o., White female referred for assessment by Iraida Zavala M.D..  Primary presenting issue includes Anxiety, denies feeling depressed. Prescribed Xanax 1 mg.  62 yo  female, lives alone with dog. Mother  at young age, pt never knew biol father and was raised by stepf-father who adopted her. He \"owned a [employer] on [address] and was connected in the meth community.\" Pt has stories about people she feels might be following her because she has information about them and this community. She contacted  in 2020 about cameras on property where she is living. She felt the property owners, also meth dealers, had cameras pointed at her residence and she was getting odd phone calls, so closed phone account and reopened to another phone. She has also disconnected [employer] because she was worried they may have gotten into this account.   Patient has a history of situational depression and anxiety after moving in with her step-father in .  Due to conflicts with stepf and brother causing increased stress, patient moved out of this home in  and was also admitted to [place] for 2 weeks. She was discharged with Cymbalta and Valium, but has weaned self off medication.  Patient currently uses Valium as needed for anxiety.    Pt reports being an , but took early jail due to back pain/surgeries.  Hx of \"dabbleing in meth and cocaine in past,\" use of mushrooms in 2020 when going off Cymbalta and Valium. Pt uses THC lozengers.   It is unclear if pt suffers with some delusional or paranoia, but will continue to be " assessed.    Pt denies SI or SIB.   BEHAVIORAL HEALTH TREATMENT HISTORY  Does patient/parent report a history of prior behavioral health treatment for patient? Yes:    Dates Level of Care Facilty/Provider Diagnosis/Problem Medications   2018  IP [facility]-2 weeks Anxiety, depression yes   2016  OP [facility] depression yes                                                               History of untreated behavioral health issues identified? No  Does patient/parent report change in appetite or weight loss/gain? No  Does patient/parent report physical pain? Yes              Indicate if pain is acute or chronic, and location: back chronic previous pain medication               Pain scale rating:           FAMILY/SOCIAL HISTORY  Current living situation/household members: self, dog  Does patient/parent report a family history of behavioral health issues, diagnoses, or treatment?   Family History   Problem Relation Age of Onset   • Cancer Mother 45        colon   • Heart Disease Mother         HEART ATTACK   • Heart Disease Half-brother         Heart Bypass @ 45/stent   • No Known Problems Half-brother    • No Known Problems Half-brother    • Diabetes Neg Hx    • Stroke Neg Hx           EMPLOYMENT/RESOURCES  Is the patient currently employed? No  Does the patient/parent report adequate financial resources? Yes       HISTORY:  Does patient report current or past enlistment? No               [If yes, complete below items]  Does patient report history of exposure to combat? No      SPIRITUAL/CULTURAL/IDENTITY:  What are the patient's/family's spiritual beliefs or practices? spiritual      ABUSE/NEGLECT/TRAUMA SCREENING  Does patient report feeling “unsafe” in his/her home, or afraid of anyone? No, neighbors have moved  Does patient report any history of physical, sexual, or emotional abuse? No  Is there evidence of neglect by self? Yes  Is there evidence of neglect by a caregiver? No                                                                                                           SAFETY ASSESSMENT - SELF  Does patient acknowledge current or past symptoms of dangerousness to self? 2018-SI  Recent change in frequency/specificity/intensity of suicidal thoughts or self-harm behavior? No  Current access to firearms, medications, or other identified means of suicide/self-harm? No  If yes, willing to restrict access to means of suicide/self-harm? NA      Current Suicide Risk: Low  Crisis Safety Plan completed and copy given to patient: No      SAFETY ASSESSMENT - OTHERS  Recent change in frequency/specificity/intensity of thoughts or threats to harm others? No  If Yes:  Current access to firearms/other identified means of harm?   If yes, willing to restrict access to weapons/means of harm?     Current Homicide Risk:  Not applicable  Crisis Safety Plan completed and copy given to patient? No  Based on information provided during the current assessment, is a mandated “duty to warn” being exercised? No      SUBSTANCE USE/ADDICTION HISTORY  Patient denies use of any substance/addictive behaviors No    If No:  Is there a family history of substance use/addiction? Yes  Does patient acknowledge or parent/significant other report use of/dependence on substances? Yes  Last time patient used alcohol: vague  Within the past week? No  Last time patient used marijuana: uses THC lozengers  Within the past month? Yes  Any other street drugs ever tried even once? Yes  Any use of prescription medications/pills without a prescription, or for reasons others than originally prescribed?  No  Any other addictive behavior reported (gambling, shopping, sex)? No     Drug History:  Amphetamine:      Cannibis: THC lozengers       Cocaine: in 1980's      Ecstasy: denies      Hallucinogen: mushrooms 2020      Inhalant: denies      Opiate: Benzo use in past per [facility] chart drug screen            MENTAL STATUS/OBSERVATIONS              Participation: Active verbal  participation, Attentive and Open to feedback  Grooming: Casual  Orientation:Fully Oriented and Evidence of delusions present (possible)  Behavior: Calm  Eye contact: Good          Mood:Anxious  Affect:Expansive  Thought process: Goal-directed  Thought content:  Paranoia (possibly)  Speech: Rate within normal limits and Volume within normal limits  Perception: Within normal limits  Memory: No gross evidence of memory deficits  Insight: Adequate  Judgment:  Limited  Other:               Family/couple interaction observations: NA      Patient's motivation/readiness for change: moderate    Topics addressed in psychotherapy include: history gathering, perceptions    Care plan completed: Yes  Does patient express agreement with the above plan? Yes     Diagnosis:  1. Anxiety  2. Depression (per chart history)    Referral appointment(s) scheduled? No       Annalee Peace, LCSW, MSW

## 2021-07-12 ENCOUNTER — OFFICE VISIT (OUTPATIENT)
Dept: BEHAVIORAL HEALTH | Facility: CLINIC | Age: 61
End: 2021-07-12
Payer: MEDICARE

## 2021-07-12 DIAGNOSIS — F32.A ANXIETY AND DEPRESSION: ICD-10-CM

## 2021-07-12 DIAGNOSIS — F41.9 ANXIETY AND DEPRESSION: ICD-10-CM

## 2021-07-12 PROCEDURE — 90837 PSYTX W PT 60 MINUTES: CPT | Performed by: SOCIAL WORKER

## 2021-07-12 NOTE — PROGRESS NOTES
[facility]   Therapy Progress Note        Name: Paulette Toussaint  MRN: [id number]  : 1960  Age: 61 y.o.  Date of assessment: 2021  PCP: Iraida Zavala M.D.  Persons in attendance: Patient  Total session time: 60 minutes      Topics addressed in psychotherapy include: Pt to indiv in office appt. Pt has poor hygiene, smells of cig smoke. Pt reports feeling more grounded, is working with reiki master, less perseverative on family issues. Some continues paranoia about fathers friends and meth users in town and concern they are tracking her. Assisted pt with problem solving current living arrangement/moving options and appropriate ways to make contact with father to get personal items from his home and to minimize conflict with others. Plan: See monthly.       Objective Observations:   Participation:Active verbal participation and Guarded   Grooming:Casual   Cognition:Alert   Eye Contact:Good   Mood:Euthymic   Affect:Congruent with content   Thought Process:Goal-directed   Speech:Rate within normal limits and Volume within normal limits    Current Risk:   Suicide: NA   Homicide: NA   Self-Harm: NA   Relapse: NA   Safety Plan Reviewed: NA    Care Plan Updated: No    Does patient express agreement with the above plan? Yes     Diagnosis:  No diagnosis found.    Therapeutic Intervention(s): Communication skills, Conflict clarification, Conflict resolution skills and Problem-solving    Treatment Goal(s)/Objective(s) addressed: short/long term goals     Progress toward Treatment Goals: Mild improvement    Referral appointment(s) scheduled? No       Annalee Peace, LCSW, MSW

## 2021-07-20 ENCOUNTER — OFFICE VISIT (OUTPATIENT)
Dept: DERMATOLOGY | Facility: IMAGING CENTER | Age: 61
End: 2021-07-20
Payer: MEDICARE

## 2021-07-20 VITALS — BODY MASS INDEX: 26.58 KG/M2 | WEIGHT: 150 LBS | TEMPERATURE: 98 F | HEIGHT: 63 IN

## 2021-07-20 DIAGNOSIS — Z12.83 SKIN CANCER SCREENING: ICD-10-CM

## 2021-07-20 DIAGNOSIS — D48.5 NEOPLASM OF UNCERTAIN BEHAVIOR OF SKIN: ICD-10-CM

## 2021-07-20 DIAGNOSIS — L82.1 SEBORRHEIC KERATOSES: ICD-10-CM

## 2021-07-20 DIAGNOSIS — D22.9 MULTIPLE NEVI: ICD-10-CM

## 2021-07-20 DIAGNOSIS — L81.4 LENTIGINES: ICD-10-CM

## 2021-07-20 PROCEDURE — 99213 OFFICE O/P EST LOW 20 MIN: CPT | Mod: 25 | Performed by: NURSE PRACTITIONER

## 2021-07-20 PROCEDURE — 11102 TANGNTL BX SKIN SINGLE LES: CPT | Performed by: NURSE PRACTITIONER

## 2021-07-20 ASSESSMENT — FIBROSIS 4 INDEX: FIB4 SCORE: 0.72

## 2021-07-20 NOTE — PROGRESS NOTES
"DERMATOLOGY NOTE  NEW VISIT  proceure room     Chief complaint: Establish Care (JOSE)     Patient here to establish care for full skin exam.  She states that her last exam was more than 5 years ago.  She has no skin concerns today aside from multiple skin tags.     HPI/location: bump on scalp  Time present: 5+ years  Painful lesion: No  Itching lesion: No  Enlarging lesion: No  Anything make it better or worse? No    History of skin cancer: No  History of precancers/actinic keratoses: Yes, Details: Skin tags  History of biopsies:No  History of blistering/severe sunburns:Yes, Details: multiple throughout life  Family history of skin cancer:No  Family history of atypical moles:No      Allergies   Allergen Reactions   • Seasonal Runny Nose and Itching   • Sulfa Drugs Nausea     Pt states \"I get very sick, I was over 20 years ago\".        MEDICATIONS:  Medications relevant to specialty reviewed.     REVIEW OF SYSTEMS:   Positive for skin (see HPI)  Negative for fevers and chills       EXAM:  Temp 36.7 °C (98 °F)   Ht 1.6 m (5' 3\")   Wt 68 kg (150 lb)   BMI 26.57 kg/m²   Constitutional: Well-developed, well-nourished, and in no distress.     A total body skin exam was performed excluding the genitals per patient preference and including the following areas: head (including face), neck, chest, abdomen, groin/buttocks, back, bilateral upper extremities, and bilateral lower extremities with the following pertinent findings listed below and/or in assessment/plan.     SK to scalp    Multiple light brown medium brown macules papules scattered over the trunk >> extremities. All with benign-appearing pigment network patterns on dermoscopy      Small cyst to back, right posterior shoulder    5 mm pinkish light brown papule on left upper back    Several light brown medium brown stuck-on waxy papules scattered on the trunk and extremities    -sun exposed skin of trunk and b/l upper and lower extremities with scattered clinically " benign light brown reticulated macules all of which were morphologically similar and none of which were suspicious for skin cancer today on exam          IMPRESSION / PLAN:    1. Neoplasm of uncertain behavior of skin  Procedure Note   Procedure: Biopsy by shave technique  Location: left back  Size: as noted in exam  Preoperative diagnosis: r/o atypia  Risks, benefits and alternatives of procedure discussed and written informed consent obtained for procedure and verbal consent for photos. Time out completed. Area of biopsy prepped with alcohol. Anesthesia with 1% lidocaine with epinephrine administered with 30 gauge needle. Shave biopsy of the site performed. Hemostasis achieved with pressure and aluminum chloride. Vaseline applied to wound with bandage. Patient tolerated procedure well and there were no complications. The specimen was sent to the pathology lab by the staff. Wound care was discussed.      2. Seborrheic keratoses  - Benign-appearing nature of lesions discussed. Advised to return to clinic for any new or concerning changes.      3. Multiple nevi  - Benign-appearing nature of lesions discussed. Advised to return to clinic for any new or concerning changes.      4. Lentigines  - Discussed benign nature of lesions, related to sun exposure  - Discussed sun protection, hand out provided      5. Skin cancer screening  Skin cancer education  - discussed importance of sun protective clothing, eyewear  - discussed importance of daily use of broad spectrum sunscreen with SPF 30 or greater, as well as need for reapplication ~every 2 hours when exposed to UVR  - discussed importance of regular self-exams, ideally once per month, every 12 months exams in clinic  - ABCDE's of melanoma discussed  - patient to bring any new or concerning lesions to my attention  - Patient educational handout provided and reviewed with patient         Please note that this dictation was created using voice recognition software. I have  made every reasonable attempt to correct obvious errors, but I expect that there are errors of grammar and possibly content that I did not discover before finalizing the note.      Return to clinic in: Return in about 1 year (around 7/20/2022) for joshua. and as needed for any new or changing skin lesions.

## 2021-07-26 ENCOUNTER — OFFICE VISIT (OUTPATIENT)
Dept: BEHAVIORAL HEALTH | Facility: CLINIC | Age: 61
End: 2021-07-26
Payer: MEDICARE

## 2021-07-26 DIAGNOSIS — F41.9 ANXIETY AND DEPRESSION: ICD-10-CM

## 2021-07-26 DIAGNOSIS — F32.A ANXIETY AND DEPRESSION: ICD-10-CM

## 2021-07-26 PROCEDURE — 90837 PSYTX W PT 60 MINUTES: CPT | Performed by: SOCIAL WORKER
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
## 2021-07-27 ENCOUNTER — TELEPHONE (OUTPATIENT)
Dept: DERMATOLOGY | Facility: IMAGING CENTER | Age: 61
End: 2021-07-27

## 2021-07-27 NOTE — TELEPHONE ENCOUNTER
Phone Number Called: [phone] (home)   Call outcome: Spoke to patient regarding message below.    Message:Biopsy results done on 07/20/21 by Lt Lucy back - Results benign, no further treatment needed.     D-Path Pathology Report was scanned in Media

## 2021-08-02 ENCOUNTER — PATIENT MESSAGE (OUTPATIENT)
Dept: HEALTH INFORMATION MANAGEMENT | Facility: OTHER | Age: 61
End: 2021-08-02

## 2021-08-09 ENCOUNTER — OFFICE VISIT (OUTPATIENT)
Dept: BEHAVIORAL HEALTH | Facility: CLINIC | Age: 61
End: 2021-08-09
Payer: MEDICARE

## 2021-08-09 DIAGNOSIS — F41.9 ANXIETY AND DEPRESSION: ICD-10-CM

## 2021-08-09 DIAGNOSIS — F32.A ANXIETY AND DEPRESSION: ICD-10-CM

## 2021-08-09 PROCEDURE — 90837 PSYTX W PT 60 MINUTES: CPT | Performed by: SOCIAL WORKER

## 2021-08-09 NOTE — PROGRESS NOTES
[facility]   Therapy Progress Note        Name: Paulette Toussaint  MRN: [id number]  : 1960  Age: 61 y.o.  Date of assessment: 2021  PCP: Iraida Zavala M.D.  Persons in attendance: Patient  Total session time: 60 minutes      Topics addressed in psychotherapy include: Pt to indiv in office appt. Pt appears less anxious and paranoid today. Reunited with son which helps lessens feelings of lonliness. Pt starting to look for housing options. Assisted pt with identification/understanding trust issues, family secrets.  Pt is conncting to peers a volunteer site and feeling sense of community, seeing PT, Reiki, looking into new car. Plan: See biweekly.      Objective Observations:   Participation:Active verbal participation   Grooming:Casual   Cognition:Fully Oriented, no fixed delusions noted   Eye Contact:Good   Mood:Euthymic   Affect:Flexible   Thought Process:Goal-directed   Speech:Rate within normal limits and Volume within normal limits    Current Risk:   Suicide: low   Homicide: NA   Self-Harm: NA   Relapse: NA   Safety Plan Reviewed: NA    Care Plan Updated: No    Does patient express agreement with the above plan? Yes     Diagnosis:  1. Anxiety    Therapeutic Intervention(s): Cognitive modification and Conflict clarification    Treatment Goal(s)/Objective(s) addressed: self care/actualization     Progress toward Treatment Goals: Mild improvement    Referral appointment(s) scheduled? No       Annalee Peace, LCSW, MSW

## 2021-08-28 ENCOUNTER — HOSPITAL ENCOUNTER (OUTPATIENT)
Dept: RADIOLOGY | Facility: MEDICAL CENTER | Age: 61
End: 2021-08-28
Attending: PHYSICIAN ASSISTANT
Payer: MEDICARE

## 2021-08-28 DIAGNOSIS — M54.12 CERVICAL RADICULOPATHY: ICD-10-CM

## 2021-08-28 DIAGNOSIS — M54.16 LUMBAR RADICULOPATHY: ICD-10-CM

## 2021-08-28 PROCEDURE — 72148 MRI LUMBAR SPINE W/O DYE: CPT | Mod: MH

## 2021-08-28 PROCEDURE — 72141 MRI NECK SPINE W/O DYE: CPT | Mod: MH

## 2021-08-28 PROCEDURE — 72146 MRI CHEST SPINE W/O DYE: CPT | Mod: MH

## 2021-09-01 ENCOUNTER — TELEPHONE (OUTPATIENT)
Dept: HEALTH INFORMATION MANAGEMENT | Facility: OTHER | Age: 61
End: 2021-09-01

## 2021-09-08 ENCOUNTER — OFFICE VISIT (OUTPATIENT)
Dept: BEHAVIORAL HEALTH | Facility: CLINIC | Age: 61
End: 2021-09-08
Payer: MEDICARE

## 2021-09-08 DIAGNOSIS — F41.9 ANXIETY AND DEPRESSION: ICD-10-CM

## 2021-09-08 DIAGNOSIS — F32.A ANXIETY AND DEPRESSION: ICD-10-CM

## 2021-09-08 PROCEDURE — 90837 PSYTX W PT 60 MINUTES: CPT | Performed by: SOCIAL WORKER

## 2021-09-08 NOTE — PROGRESS NOTES
[facility]   Therapy Progress Note        Name: Paulette Toussaint  MRN: [id number]  : 1960  Age: 61 y.o.  Date of assessment: 2021  PCP: Iraida Zavala M.D.  Persons in attendance: Patient  Total session time: 60 minutes      Topics addressed in psychotherapy include: Pt to inidv in office appt. Pt mood is calm, no evidence of paranoid thinking, perseveration on issues from past sessions. Pt is addressing medical and dental issues, looking for car and housing but no immediate issues to resolve. Continues to volunteer, and will take class at [facility]. Validated for focusing on self/care to improve mood regulation. Hygiene improved. Plan: See bi weekly.     Objective Observations:   Participation:Active verbal participation and Engaged   Grooming:Casual   Cognition:Fully Oriented   Eye Contact:Good   Mood:Euthymic   Affect:Flexible and Congruent with content   Thought Process:Logical   Speech:Rate within normal limits and Volume within normal limits    Current Risk:   Suicide: NA   Homicide: NA   Self-Harm: NA   Relapse: NA   Safety Plan Reviewed: NA    Care Plan Updated: No    Does patient express agreement with the above plan? Yes     Diagnosis:  1. Anxiety and depression    Therapeutic Intervention(s): Cognitive modification, Conflict clarification, Interpersonal effectiveness skills and Problem-solving    Treatment Goal(s)/Objective(s) addressed: self care, mood, anxiety     Progress toward Treatment Goals: Mild improvement    Referral appointment(s) scheduled? No       Annalee Peace, LCSW, MSW

## 2021-09-21 ENCOUNTER — OFFICE VISIT (OUTPATIENT)
Dept: BEHAVIORAL HEALTH | Facility: CLINIC | Age: 61
End: 2021-09-21
Payer: MEDICARE

## 2021-09-21 DIAGNOSIS — F41.9 ANXIETY AND DEPRESSION: ICD-10-CM

## 2021-09-21 DIAGNOSIS — F32.A ANXIETY AND DEPRESSION: ICD-10-CM

## 2021-09-21 PROCEDURE — 90837 PSYTX W PT 60 MINUTES: CPT | Performed by: SOCIAL WORKER

## 2021-09-21 NOTE — PROGRESS NOTES
[facility]   Therapy Progress Note        Name: Paulette Toussaint  MRN: [id number]  : 1960  Age: 61 y.o.  Date of assessment: 2021  PCP: Iraida Zavala M.D.  Persons in attendance: Patient  Total session time: 60 minutes      Topics addressed in psychotherapy include: Pt to in office indiv appt. Pt is calm and collected today, has notes written of  prioritized actions she needs to take for housing, vehicle repair, medical, legal. Problem solved where to begin/why. Pt is very diligent and solution focused.   Processed concerns/worry she has for son being depressed after breakup and ways to support/achnowledge him/needs. Plan: See bi weekly. To support decision making.     Objective Observations:   Participation:Active verbal participation, Engaged and Open to feedback   Grooming:Casual   Cognition:Fully Oriented   Eye Contact:Good   Mood:Euthymic   Affect:Congruent with content   Thought Process:Logical   Speech:Rate within normal limits and Soft    Current Risk:   Suicide: low   Homicide: NA   Self-Harm: NA   Relapse: NA   Safety Plan Reviewed: NA    Care Plan Updated: No    Does patient express agreement with the above plan? Yes     Diagnosis:  1. Anxiety and depression    Therapeutic Intervention(s): Cognitive modification, Goal-setting, Interpersonal effectiveness skills and Problem-solving    Treatment Goal(s)/Objective(s) addressed: self care, problem solving     Progress toward Treatment Goals: Moderate improvement    Referral appointment(s) scheduled? No       Annalee Peace, LCSW, MSW

## 2021-09-27 PROBLEM — I77.9 DISORDER OF ARTERIES AND ARTERIOLES (HCC): Status: ACTIVE | Noted: 2021-09-27

## 2021-10-04 ENCOUNTER — HOSPITAL ENCOUNTER (OUTPATIENT)
Dept: RADIOLOGY | Facility: MEDICAL CENTER | Age: 61
End: 2021-10-04
Attending: INTERNAL MEDICINE
Payer: MEDICARE

## 2021-10-04 DIAGNOSIS — Z12.31 VISIT FOR SCREENING MAMMOGRAM: ICD-10-CM

## 2021-10-04 PROCEDURE — 77063 BREAST TOMOSYNTHESIS BI: CPT

## 2021-10-05 ENCOUNTER — OFFICE VISIT (OUTPATIENT)
Dept: BEHAVIORAL HEALTH | Facility: CLINIC | Age: 61
End: 2021-10-05
Payer: MEDICARE

## 2021-10-05 DIAGNOSIS — F39 MOOD DISORDER (HCC): ICD-10-CM

## 2021-10-05 PROCEDURE — 90837 PSYTX W PT 60 MINUTES: CPT | Performed by: SOCIAL WORKER

## 2021-10-05 NOTE — PROGRESS NOTES
[facility]   Therapy Progress Note        Name: Paulette Toussaint  MRN: [id number]  : 1960  Age: 61 y.o.  Date of assessment: 10/5/2021  PCP: Iraida Zavala M.D.  Persons in attendance: Patient  Total session time: 60 minutes      Topics addressed in psychotherapy include: Pt to in office indiv. Pt active in looking for apt to rent, collecting SSDI and pension; will look into collecting 2 incomes and limitations. Provided phone to contact several area community resources. Clinical focus on some fixed paranoia  about thinking apt is bugged, or she is being watched; several times during appt reviewed ideas/fears associated about whether this was indeed happening. Pt is redirectable with thinking patterns. Plan: See bi weekly.     Objective Observations:   Participation:Active verbal participation, Attentive and Open to feedback   Grooming:Casual   Cognition:Fully Oriented   Eye Contact:Good   Mood:Euthymic   Affect:Flexible   Thought Process:Goal-directed   Speech:Rate within normal limits and Volume within normal limits    Current Risk:   Suicide: NA   Homicide: NA   Self-Harm: NA   Relapse: NA   Safety Plan Reviewed: NA    Care Plan Updated: No    Does patient express agreement with the above plan? Yes     Diagnosis:  No diagnosis found.    Therapeutic Intervention(s): Goal-setting and Interpersonal effectiveness skills    Treatment Goal(s)/Objective(s) addressed: mood, self care     Progress toward Treatment Goals: Moderate improvement    Referral appointment(s) scheduled? No       Annalee Peace, LCSW, MSW

## 2021-10-07 ENCOUNTER — TELEPHONE (OUTPATIENT)
Dept: MEDICAL GROUP | Facility: PHYSICIAN GROUP | Age: 61
End: 2021-10-07

## 2021-10-07 DIAGNOSIS — R92.8 ABNORMAL MAMMOGRAM: ICD-10-CM

## 2021-10-08 NOTE — TELEPHONE ENCOUNTER
Recent screening mammogram showed abnormality on the right breast.  Please inform patient that I ordered a diagnostic mammogram and breast ultrasound.  Thanks!

## 2021-10-13 ENCOUNTER — TELEPHONE (OUTPATIENT)
Dept: MEDICAL GROUP | Facility: PHYSICIAN GROUP | Age: 61
End: 2021-10-13

## 2021-10-13 NOTE — TELEPHONE ENCOUNTER
ESTABLISHED PATIENT PRE-VISIT PLANNING     Patient was NOT contacted to complete PVP.     Note: Patient will not be contacted if there is no indication to call.     1.  Reviewed notes from the last few office visits within the medical group: Yes    2.  If any orders were placed at last visit or intended to be done for this visit (i.e. 6 mos follow-up), do we have Results/Consult Notes?         •  Labs - Labs were not ordered at last office visit.  Note: If patient appointment is for lab review and patient did not complete labs, check with provider if OK to reschedule patient until labs completed.       •  Imaging - Imaging was not ordered at last office visit.       •  Referrals - Referral ordered, patient was seen and consult notes are in chart. Care Teams updated  YES.    3. Is this appointment scheduled as a Hospital Follow-Up? No    4.  Immunizations were updated in Epic using Reconcile Outside Information activity? Yes    5.  Patient is due for the following Health Maintenance Topics:   Health Maintenance Due   Topic Date Due   • COVID-19 Vaccine (1) Never done   • IMM DTaP/Tdap/Td Vaccine (1 - Tdap) Never done   • IMM ZOSTER VACCINES (1 of 2) Never done   • IMM INFLUENZA (1) 09/01/2021       6.  AHA (Pulse[employer]) form printed for Provider? No, already completed

## 2021-10-14 ENCOUNTER — OFFICE VISIT (OUTPATIENT)
Dept: MEDICAL GROUP | Facility: PHYSICIAN GROUP | Age: 61
End: 2021-10-14
Payer: MEDICARE

## 2021-10-14 VITALS
TEMPERATURE: 97.9 F | DIASTOLIC BLOOD PRESSURE: 70 MMHG | RESPIRATION RATE: 16 BRPM | HEIGHT: 63 IN | SYSTOLIC BLOOD PRESSURE: 140 MMHG | WEIGHT: 135 LBS | OXYGEN SATURATION: 93 % | HEART RATE: 100 BPM | BODY MASS INDEX: 23.92 KG/M2

## 2021-10-14 DIAGNOSIS — M85.88 OSTEOPENIA OF LUMBAR SPINE: ICD-10-CM

## 2021-10-14 DIAGNOSIS — Z12.2 SCREENING FOR LUNG CANCER: ICD-10-CM

## 2021-10-14 DIAGNOSIS — Z72.0 TOBACCO ABUSE: Chronic | ICD-10-CM

## 2021-10-14 DIAGNOSIS — Z00.00 ENCOUNTER FOR ANNUAL WELLNESS VISIT (AWV) IN MEDICARE PATIENT: ICD-10-CM

## 2021-10-14 DIAGNOSIS — E78.2 MIXED HYPERLIPIDEMIA: ICD-10-CM

## 2021-10-14 DIAGNOSIS — F41.9 ANXIETY: ICD-10-CM

## 2021-10-14 DIAGNOSIS — R92.8 ABNORMAL MAMMOGRAM: ICD-10-CM

## 2021-10-14 DIAGNOSIS — R06.02 SHORTNESS OF BREATH: ICD-10-CM

## 2021-10-14 PROCEDURE — G0439 PPPS, SUBSEQ VISIT: HCPCS | Performed by: INTERNAL MEDICINE

## 2021-10-14 RX ORDER — ALBUTEROL SULFATE 90 UG/1
AEROSOL, METERED RESPIRATORY (INHALATION)
Qty: 3 EACH | Refills: 1 | Status: SHIPPED | OUTPATIENT
Start: 2021-10-14 | End: 2021-12-16

## 2021-10-14 RX ORDER — ALPRAZOLAM 1 MG/1
1 TABLET ORAL NIGHTLY PRN
Qty: 30 TABLET | Refills: 0 | Status: SHIPPED | OUTPATIENT
Start: 2021-10-14 | End: 2022-01-18

## 2021-10-14 ASSESSMENT — ACTIVITIES OF DAILY LIVING (ADL): BATHING_REQUIRES_ASSISTANCE: 0

## 2021-10-14 ASSESSMENT — FIBROSIS 4 INDEX: FIB4 SCORE: 0.72

## 2021-10-14 ASSESSMENT — ENCOUNTER SYMPTOMS: GENERAL WELL-BEING: GOOD

## 2021-10-14 ASSESSMENT — PATIENT HEALTH QUESTIONNAIRE - PHQ9: CLINICAL INTERPRETATION OF PHQ2 SCORE: 0

## 2021-10-14 NOTE — PROGRESS NOTES
Chief Complaint   Patient presents with   • Annual Wellness Visit   • Medication Refill     Albuterol & Xanax         HPI:  Paulette is a 61 y.o. here for Medicare Annual Wellness Visit    1. Encounter for annual wellness visit (AWV) in Medicare patient  Annual wellness visit    2. Anxiety  Patient stable and requesting a refill on alprazolam 1 mg tablet daily that she uses as needed.  Last filled July 15, 2021.  Patient is also asking for an PARISH for her daughter.  She will be moving into a new house within the next 2 months.    3. Mixed hyperlipidemia  Patient currently taking pravastatin 20 mg tablets daily.  Patient has drastically changed her diet since her last lipid panel.  Lipid panel from April 2021, total cholesterol 161, triglyceride 102, HDL 59, LDL 81.  Patient would like to try discontinuing lovastatin and rely on diet and lifestyle changes.    4. Osteopenia of lumbar spine  Bone density completed April 2021.  Patient started taking Boniva 150 mg tablets monthly.  Tolerating medication well.  Vitamin D level 38.  Taking calcium and vitamin D supplementation.    5. Tobacco abuse  Patient is a current smoker, smoking history greater than 30 years, 1 pack/day.    6. Abnormal mammogram  Abnormality noted on recent mammogram in the right u acute now pper outer quadrant of her breast.  No breast lumps noted.  Patient has an appointment for diagnostic mammogram and ultrasound.    7. Shortness of breath  Requesting refill on albuterol.      Patient Active Problem List    Diagnosis Date Noted   • Abnormal mammogram 10/14/2021   • BMI 23.0-23.9, adult 09/27/2021   • Disorder of arteries and arterioles (HCC) 09/27/2021   • Hyperglycemia 04/08/2021   • Anxiety 04/08/2021   • Situational depression 04/08/2021   • Primary insomnia 04/08/2021   • Osteopenia of lumbar spine 04/08/2021   • Vitamin D deficiency 04/08/2021   • Coronary arteriosclerosis 08/11/2020   • Fam hx-ischem heart disease 06/20/2019   • Seasonal  allergies 08/10/2018   • External hemorrhoids 04/05/2018   • Functional diarrhea 04/05/2018   • Mixed hyperlipidemia 05/26/2017   • Tobacco abuse 04/01/2016   • Mood disorder (HCC) 12/11/2015   • Chronic lumbar pain 12/11/2015       Current Outpatient Medications   Medication Sig Dispense Refill   • ALPRAZolam (XANAX) 1 MG Tab Take 1 Tablet by mouth at bedtime as needed for Sleep for up to 30 days. 30 Tablet 0   • albuterol (VENTOLIN HFA) 108 (90 Base) MCG/ACT Aero Soln inhalation aerosol Inhale 1 to 2 puffs every 4 hours as needed for shortness of breath 3 Each 1   • meloxicam (MOBIC) 7.5 MG Tab Take 7.5 mg by mouth 2 times a day.     • gabapentin (NEURONTIN) 800 MG tablet TAKE 1 TABLET BY MOUTH THREE TIMES A  tablet 1   • ibandronate (BONIVA) 150 MG tablet Take 1 tablet by mouth every 30 days. 100 tablet 3   • pravastatin (PRAVACHOL) 20 MG Tab TAKE 1 TABLET BY MOUTH EVERY  Tab 3   • diazepam (VALIUM) 10 MG tablet Take 10 mg by mouth every 12 hours as needed for Anxiety.     • therapeutic multivitamin-minerals (THERAGRAN-M) Tab Take 1 Tab by mouth every day.     • fluticasone (FLONASE) 50 MCG/ACT nasal spray Spray 2 Sprays in nose 2 times a day.     • cetirizine (ZYRTEC) 10 MG Tab Take 10 mg by mouth every bedtime.     • Probiotic Product (PROBIOTIC DAILY PO) Take 1 Cap by mouth every bedtime.       No current facility-administered medications for this visit.        Patient is taking medications as noted in medication list.  Current supplements as per medication list.     Allergies: Seasonal and Sulfa drugs    Current social contact/activities:      Is patient current with immunizations? Yes.    She  reports that she has been smoking cigarettes. She started smoking about 42 years ago. She has been smoking about 1.00 pack per day. She has never used smokeless tobacco. She reports previous alcohol use. She reports current drug use. Drugs: Marijuana and Inhaled.  Ready to quit: Not Answered  Counseling  given: Not Answered        DPA/Advanced directive: Patient has Advanced Directive, but it is not on file. Instructed to bring in a copy to scan into their chart.    ROS:    Gait: Uses no assistive device   Ostomy: No   Other tubes: No   Amputations: No   Chronic oxygen use No   Last eye exam  1 year ago   Wears hearing aids: No   : Denies any urinary leakage during the last 6 months      Screening:        Depression Screening    Little interest or pleasure in doing things?  0 - not at all  Feeling down, depressed, or hopeless? 0 - not at all  Patient Health Questionnaire Score: 0    If depressive symptoms identified deferred to follow up visit unless specifically addressed in assessment and plan.    Interpretation of PHQ-9 Total Score   Score Severity   1-4 No Depression   5-9 Mild Depression   10-14 Moderate Depression   15-19 Moderately Severe Depression   20-27 Severe Depression    Screening for Cognitive Impairment    Three Minute Recall (captain, garden, picture)  3/3    Miles clock face with all 12 numbers and set the hands to show 5 past 8.  Yes    If cognitive concerns identified, deferred for follow up unless specifically addressed in assessment and plan.    Fall Risk Assessment    Has the patient had two or more falls in the last year or any fall with injury in the last year?  No  If fall risk identified, deferred for follow up unless specifically addressed in assessment and plan.    Safety Assessment    Throw rugs on floor.  No  Handrails on all stairs.  No  Good lighting in all hallways.  Yes  Difficulty hearing.  No  Patient counseled about all safety risks that were identified.    Functional Assessment ADLs    Are there any barriers preventing you from cooking for yourself or meeting nutritional needs?  No.    Are there any barriers preventing you from driving safely or obtaining transportation?  No.    Are there any barriers preventing you from using a telephone or calling for help?  No.    Are there  any barriers preventing you from shopping?  No.    Are there any barriers preventing you from taking care of your own finances?  No.    Are there any barriers preventing you from managing your medications?  No.    Are there any barriers preventing you from showering, bathing or dressing yourself?  No.    Are you currently engaging in any exercise or physical activity?  No.     What is your perception of your health?  Good.    Health Maintenance Summary                IMM DTaP/Tdap/Td Vaccine Overdue 3/10/1980     IMM INFLUENZA Overdue 9/1/2021      Done 1/14/2014 Imm Admin: INFLUENZA TIV (IM)     Patient has more history with this topic...    IMM ZOSTER VACCINES Postponed 3/14/2022 Originally 3/10/2011. System: vaccine not available, other system reasons    PAP SMEAR Next Due 2/12/2022      Done 2/12/2019 due next year , low risk can do it every 3 years     Patient has more history with this topic...    COLORECTAL CANCER SCREENING Next Due 7/26/2022     MAMMOGRAM Next Due 10/4/2022      Done 10/4/2021 MA-SCREENING MAMMO BILAT W/TOMOSYNTHESIS W/CAD     Patient has more history with this topic...    Annual Wellness Visit Next Due 10/15/2022      Done 10/14/2021 SUBSEQUENT ANNUAL WELLNESS VISIT-INCLUDES PPPS ()     Patient has more history with this topic...    IMM PNEUMOCOCCAL VACCINE: 0-64 Years Next Due 3/10/2026      Done 1/13/2020 Imm Admin: Pneumococcal polysaccharide vaccine (PPSV-23)          Patient Care Team:  Iraida Zavala M.D. as PCP - General (Internal Medicine)  Cedric Nuñez M.D. (Psychiatry & Neurology Psychiatry)  THOMAS GrandeNBRADLEY (Inactive) as [facility]   Tony Sawyer M.D. as Consulting Physician (Interventional Cardiology)  [facility] (Inactive) as Consulting Physician  JOANIE Leon as Attending Team Physician (Family Medicine)    Social History     Tobacco Use   • Smoking status: Current Every Day Smoker     Packs/day: 1.00  "    Types: Cigarettes     Start date: 1/13/1979   • Smokeless tobacco: Never Used   Vaping Use   • Vaping Use: Some days   Substance Use Topics   • Alcohol use: Not Currently     Alcohol/week: 0.0 oz     Comment: socially   • Drug use: Yes     Types: Marijuana, Inhaled     Comment: occ edible THC     Family History   Problem Relation Age of Onset   • Cancer Mother 45        colon   • Heart Disease Mother         HEART ATTACK   • Heart Disease Half-brother         Heart Bypass @ 45/stent   • No Known Problems Half-brother    • No Known Problems Half-brother    • Diabetes Neg Hx    • Stroke Neg Hx      She  has a past medical history of Abnormal mammogram (10/14/2021), Allergic reaction caused by a drug (12/2020), Anxiety, Chronic back pain, Depression (2013), Fall, History of spinal fusion, Hyperglycemia (4/8/2021), Lumbar burst fracture (HCC) (2013), Osteopenia of lumbar spine (4/8/2021), Other specified anxiety disorders (4/8/2021), Primary insomnia (4/8/2021), Situational depression (4/8/2021), and Vitamin D deficiency (4/8/2021).   Past Surgical History:   Procedure Laterality Date   • LUMBAR FUSION POSTERIOR  2013    rods and screw placement   • ROTATOR CUFF REPAIR Right Mar 2009   • OTHER ORTHOPEDIC SURGERY  2009    Rotator cuff  surgery Right   • OTHER ORTHOPEDIC SURGERY      back surgery   • PRIMARY C SECTION      x2           Exam:     /70 (BP Location: Right arm, Patient Position: Sitting, BP Cuff Size: Adult)   Pulse 100   Temp 36.6 °C (97.9 °F) (Temporal)   Resp 16   Ht 1.6 m (5' 3\")   Wt 61.2 kg (135 lb)   SpO2 93%  Body mass index is 23.91 kg/m².    Hearing good.    Dentition good  Alert, oriented in no acute distress.  Eye contact is good, speech goal directed, affect calm    Constitutional: Alert, no distress.  Skin: Warm, dry, good turgor, no rashes in visible areas.  Eye: Equal, round and reactive, conjunctiva clear, lids normal.  ENMT: Lips without lesions, good dentition, oropharynx " clear.  Neck: Trachea midline, no masses, no thyromegaly. No cervical or supraclavicular lymphadenopathy  Respiratory: Unlabored respiratory effort, lungs clear to auscultation, no wheezes, no ronchi.  Cardiovascular: Normal S1, S2, no murmur, no edema.  Abdomen: Soft, non-tender, no masses, no hepatosplenomegaly.  Psych: Alert and oriented x3, normal affect and mood.  Breast: Breast symmetrical, no axillary lymphadenopathy, no nipple abnormality/inversion, no breast lumps noted.      Assessment and Plan. The following treatment and monitoring plan is recommended:    1. Encounter for annual wellness visit (AWV) in Medicare patient  Subsequent Annual Wellness Visit - Includes PPPS ()   2. Anxiety  ALPRAZolam (XANAX) 1 MG Tab    Subsequent Annual Wellness Visit - Includes PPPS ()   3. Mixed hyperlipidemia  Subsequent Annual Wellness Visit - Includes PPPS ()    Lipid Profile   4. Osteopenia of lumbar spine  Subsequent Annual Wellness Visit - Includes PPPS ()   5. Tobacco abuse  Subsequent Annual Wellness Visit - Includes PPPS ()   6. Abnormal mammogram  Subsequent Annual Wellness Visit - Includes PPPS ()   7. Shortness of breath  Subsequent Annual Wellness Visit - Includes PPPS ()     1. Encounter for annual wellness visit (AWV) in Medicare patient    - Subsequent Annual Wellness Visit - Includes PPPS ()    2. Anxiety    - ALPRAZolam (XANAX) 1 MG Tab; Take 1 Tablet by mouth at bedtime as needed for Sleep for up to 30 days.  Dispense: 30 Tablet; Refill: 0  - Subsequent Annual Wellness Visit - Includes PPPS ()    3. Mixed hyperlipidemia  Patient will discontinue pravastatin and continue with  Diet lifestyle modifications to control her cholesterol.  Recheck lipid panel in 3 months.    - Subsequent Annual Wellness Visit - Includes PPPS ()  - Lipid Profile; Future    4. Osteopenia of lumbar spine  Stable.  Continue Boniva  Continue calcium and vitamin D  supplementation.  Weightbearing exercise.  - [facility]    5. Tobacco abuse  Chest x-ray completed 2018.  CT scan for  lung screening not covered under insurance.    - Subsequent Annual Wellness Visit - Includes PPPS ()    6. Abnormal mammogram  Patient to make appointment for diagnostic mammogram and ultrasound    - Subsequent Annual Wellness Visit - Includes PPPS ()    7. Shortness of breath  Refill albuterol  - Subsequent Annual Wellness [facility] - [facility]        Services suggested: No services needed at this time  Health Care Screening recommendations as per orders if indicated.  Referrals offered: PT/OT/Nutrition counseling/Behavioral Health/Smoking cessation as per orders if indicated.    Discussion today about general wellness and lifestyle habits:    · Prevent falls and reduce trip hazards; Cautioned about securing or removing rugs.  · Have a working fire alarm and carbon monoxide detector;   · Engage in regular physical activity and social activities       Follow-up: No follow-ups on file.

## 2021-10-14 NOTE — LETTER
October 14, 2021      To whom it may concern,        Paulette Toussaint is my patient, and has been under my care since 04/08/2021.  In order to help alleviate difficulties, enhance her ability to live independently, and to fully use and enjoy the dwelling unit you own and/or administer, I support Paulette Toussaint's decision to obtain emotional support animals. An emotional support animal will help to mitigate her everyday challenges, improve her independence, and her quality of life. If there are any questions or concerns, please contact my office.                        Iraida Zavala M.D.

## 2021-10-18 NOTE — TELEPHONE ENCOUNTER
Member in to schedule mammogram, scheduled. Also discussed any pending labs, and orders. Per member she also needs a ct lung preventative scan, will submit order to PCP and check WB, member also would like to know if the letter she requested for dog  has been done from PCP. Informed member I will follow up on the letter and also the order for the scan with PCP. Member would like to also go over gym benefit explained the benefit and also gave my contact information. No further questions.

## 2021-10-19 ENCOUNTER — TELEPHONE (OUTPATIENT)
Dept: MEDICAL GROUP | Facility: PHYSICIAN GROUP | Age: 61
End: 2021-10-19

## 2021-10-19 ENCOUNTER — TELEPHONE (OUTPATIENT)
Dept: HEALTH INFORMATION MANAGEMENT | Facility: OTHER | Age: 61
End: 2021-10-19

## 2021-10-19 NOTE — TELEPHONE ENCOUNTER
Member in to schedule mammogram, scheduled. Also discussed any pending labs, and orders. Per member she also needs a ct lung preventative scan let member I will look into this, as member is not on WB and escalated this to supervisor and Natali, will call patient later for this.Member also would like to know if the letter she requested for dog  has been done from PCP. Informed member I will follow up on the letter and also the order for the scan with PCP. Member would like to also go over gym benefit explained the benefit and also gave my contact information. No further questions.

## 2021-10-20 NOTE — TELEPHONE ENCOUNTER
Patient's letter has been signed, scanned, and sent through mail. Patient has been informed that letter has been completed

## 2021-10-20 NOTE — TELEPHONE ENCOUNTER
----- Message from Tri Sam, Med Ass't sent at 10/19/2021  1:51 PM PDT -----  Regarding: Letter  Kristen Lorenzo,    I wasn't sure if you are Dr. Sally BARKLEY, so please let me know if I need to send this message to someone else..  This patient came in yesterday and said she had talked to Dr. Zavala about a letter for a therapy dog. Patient had an appointment with Dr. Zavala on 10/14/21, but I did not see any mention of this, and she is saying she did discuss with Dr. Zavala and would like to know if she was able to write the letter for her, would you be able to assist?    Thank you  -Son

## 2021-10-26 ENCOUNTER — OFFICE VISIT (OUTPATIENT)
Dept: BEHAVIORAL HEALTH | Facility: CLINIC | Age: 61
End: 2021-10-26
Payer: MEDICARE

## 2021-10-26 DIAGNOSIS — F39 MOOD DISORDER (HCC): ICD-10-CM

## 2021-10-26 PROCEDURE — 90837 PSYTX W PT 60 MINUTES: CPT | Performed by: SOCIAL WORKER

## 2021-10-26 NOTE — PROGRESS NOTES
[facility]   Therapy Progress Note        Name: Paulette Toussaint  MRN: [id number]  : 1960  Age: 61 y.o.  Date of assessment: 10/26/2021  PCP: Iraida Zavala M.D.  Persons in attendance: Patient  Total session time: 60 minutes      Topics addressed in psychotherapy include: Pt to in office indiv appt. Pt asked for assistance with verbalizing wants/needs with relationship with son. She will spend time this week with him and unsure of boundaries around questions/concerns. Processed upcoming holiday plans so she is not alone/lonely. Pt continues to work towards move, updating vehicle. She will participate in [employer] festivities and is volunteering with [employer]. Plan: See bi weekly.     Objective Observations:   Participation:Active verbal participation   Grooming:Casual   Cognition:Fully Oriented   Eye Contact:Good   Mood:Euthymic and Anxious   Affect:Congruent with content   Thought Process:Logical   Speech:Rate within normal limits and Volume within normal limits    Current Risk:   Suicide: low   Homicide: NA   Self-Harm: low   Relapse: NA   Safety Plan Reviewed: no    Care Plan Updated: No    Does patient express agreement with the above plan? Yes     Diagnosis:  1. Mood Disorder    Therapeutic Intervention(s): Communication skills, Conflict clarification and Supportive psychotherapy    Treatment Goal(s)/Objective(s) addressed: mood, communication     Progress toward Treatment Goals: Moderate improvement    Referral appointment(s) scheduled? No       Annalee Peace, LCSW, MSW

## 2021-10-27 ENCOUNTER — HOSPITAL ENCOUNTER (OUTPATIENT)
Dept: RADIOLOGY | Facility: MEDICAL CENTER | Age: 61
End: 2021-10-27
Attending: INTERNAL MEDICINE
Payer: MEDICARE

## 2021-10-27 DIAGNOSIS — R92.8 ABNORMAL MAMMOGRAM: ICD-10-CM

## 2021-10-27 PROCEDURE — 76642 ULTRASOUND BREAST LIMITED: CPT | Mod: RT

## 2021-10-27 PROCEDURE — G0279 TOMOSYNTHESIS, MAMMO: HCPCS | Mod: RT

## 2021-11-18 ENCOUNTER — OFFICE VISIT (OUTPATIENT)
Dept: BEHAVIORAL HEALTH | Facility: CLINIC | Age: 61
End: 2021-11-18
Payer: MEDICARE

## 2021-11-18 DIAGNOSIS — F39 MOOD DISORDER (HCC): ICD-10-CM

## 2021-11-18 PROCEDURE — 90837 PSYTX W PT 60 MINUTES: CPT | Performed by: SOCIAL WORKER

## 2021-11-18 NOTE — PROGRESS NOTES
[facility]   Therapy Progress Note        Name: Paulette Toussaint  MRN: [id number]  : 1960  Age: 61 y.o.  Date of assessment: 2021  PCP: Iraida Zavala M.D.  Persons in attendance: Patient  Total session time: 60 minutes      Topics addressed in psychotherapy include: Pt to in office indiv appt. Mood depressed. Pt has keys to new house, some struggle with feeling motivated. Pt learned family will be in town for holidays. Processed feelings around seeing/or not seeing them with history of loneliness, loss, abandonment, betrayal. Pt is working through thoughts, feelings in order to decide if reaching out is in best interest. Provided feedback and validation. Plan: See biweekly.     Objective Observations:   Participation:Active verbal participation, Engaged and Open to feedback   Grooming:Casual   Cognition:Fully Oriented   Eye Contact:Good   Mood:Depressed   Affect:Flexible   Thought Process:Goal-directed   Speech:Rate within normal limits and Volume within normal limits    Current Risk:   Suicide: low   Homicide: NA   Self-Harm: NA   Relapse: NA   Safety Plan Reviewed: NA    Care Plan Updated: Yes    Does patient express agreement with the above plan? Yes     Diagnosis:  1. Mood Disorder    Therapeutic Intervention(s): Cognitive modification, Conflict clarification and Supportive psychotherapy    Treatment Goal(s)/Objective(s) addressed: grief, loss     Progress toward Treatment Goals: Mild improvement    Referral appointment(s) scheduled? No       Annalee Peace, LCSW, MSW

## 2021-12-15 NOTE — TELEPHONE ENCOUNTER
Received request via: Patient    Was the patient seen in the last year in this department? Yes    Does the patient have an active prescription (recently filled or refills available) for medication(s) requested? No   Patients specialist is not responding to her pharmacies refill requests. Patient is asking f you could refill it. Please Advise

## 2021-12-21 RX ORDER — MELOXICAM 7.5 MG/1
7.5 TABLET ORAL 2 TIMES DAILY
Qty: 200 TABLET | Refills: 0 | Status: SHIPPED | OUTPATIENT
Start: 2021-12-21 | End: 2022-03-14

## 2022-01-10 ENCOUNTER — APPOINTMENT (OUTPATIENT)
Dept: BEHAVIORAL HEALTH | Facility: CLINIC | Age: 62
End: 2022-01-10
Payer: MEDICARE

## 2022-01-17 ENCOUNTER — TELEPHONE (OUTPATIENT)
Dept: MEDICAL GROUP | Facility: PHYSICIAN GROUP | Age: 62
End: 2022-01-17

## 2022-01-17 NOTE — TELEPHONE ENCOUNTER
ESTABLISHED PATIENT PRE-VISIT PLANNING     Patient was NOT contacted to complete PVP.     Note: Patient will not be contacted if there is no indication to call.     1.  Reviewed notes from the last few office visits within the medical group: Yes    2.  If any orders were placed at last visit or intended to be done for this visit (i.e. 6 mos follow-up), do we have Results/Consult Notes?         •  Labs - Labs ordered, NOT completed. Patient advised to complete prior to next appointment.  Note: If patient appointment is for lab review and patient did not complete labs, check with provider if OK to reschedule patient until labs completed.       •  Imaging - Imaging was not ordered at last office visit.       •  Referrals - No referrals were ordered at last office visit.    3. Is this appointment scheduled as a Hospital Follow-Up? No    4.  Immunizations were updated in Epic using Reconcile Outside Information activity? Yes    5.  Patient is due for the following Health Maintenance Topics:   Health Maintenance Due   Topic Date Due   • PAP SMEAR  02/12/2022       6.  [employer]HA (P[employer]) form printed for Provider? Email sent to [facility] requesting form

## 2022-01-20 PROBLEM — F43.21 SITUATIONAL DEPRESSION: Status: RESOLVED | Noted: 2021-04-08 | Resolved: 2022-01-20

## 2022-01-21 ENCOUNTER — HOSPITAL ENCOUNTER (OUTPATIENT)
Dept: LAB | Facility: MEDICAL CENTER | Age: 62
End: 2022-01-21
Attending: INTERNAL MEDICINE
Payer: MEDICARE

## 2022-01-21 DIAGNOSIS — E78.2 MIXED HYPERLIPIDEMIA: ICD-10-CM

## 2022-01-21 LAB
CHOLEST SERPL-MCNC: 216 MG/DL (ref 100–199)
FASTING STATUS PATIENT QL REPORTED: NORMAL
HDLC SERPL-MCNC: 60 MG/DL
LDLC SERPL CALC-MCNC: 134 MG/DL
TRIGL SERPL-MCNC: 110 MG/DL (ref 0–149)

## 2022-01-21 PROCEDURE — 80061 LIPID PANEL: CPT

## 2022-01-21 PROCEDURE — 36415 COLL VENOUS BLD VENIPUNCTURE: CPT

## 2022-01-25 ENCOUNTER — OFFICE VISIT (OUTPATIENT)
Dept: MEDICAL GROUP | Facility: PHYSICIAN GROUP | Age: 62
End: 2022-01-25
Payer: MEDICARE

## 2022-01-25 VITALS
HEIGHT: 64 IN | HEART RATE: 70 BPM | BODY MASS INDEX: 23.99 KG/M2 | OXYGEN SATURATION: 96 % | TEMPERATURE: 98.4 F | RESPIRATION RATE: 15 BRPM | WEIGHT: 140.5 LBS | DIASTOLIC BLOOD PRESSURE: 70 MMHG | SYSTOLIC BLOOD PRESSURE: 130 MMHG

## 2022-01-25 DIAGNOSIS — I77.9 DISORDER OF ARTERIES AND ARTERIOLES (HCC): ICD-10-CM

## 2022-01-25 DIAGNOSIS — M62.830 BACK MUSCLE SPASM: ICD-10-CM

## 2022-01-25 DIAGNOSIS — Z91.89 OTHER SPECIFIED PERSONAL RISK FACTORS, NOT ELSEWHERE CLASSIFIED: ICD-10-CM

## 2022-01-25 DIAGNOSIS — E78.2 MIXED HYPERLIPIDEMIA: ICD-10-CM

## 2022-01-25 DIAGNOSIS — F39 MOOD DISORDER (HCC): ICD-10-CM

## 2022-01-25 DIAGNOSIS — Z12.4 SCREENING FOR CERVICAL CANCER: ICD-10-CM

## 2022-01-25 PROCEDURE — 99214 OFFICE O/P EST MOD 30 MIN: CPT | Performed by: INTERNAL MEDICINE

## 2022-01-25 RX ORDER — TIZANIDINE 4 MG/1
4 TABLET ORAL EVERY 6 HOURS PRN
Qty: 30 TABLET | Refills: 2 | Status: SHIPPED | OUTPATIENT
Start: 2022-01-25 | End: 2022-11-16

## 2022-01-25 ASSESSMENT — PATIENT HEALTH QUESTIONNAIRE - PHQ9: CLINICAL INTERPRETATION OF PHQ2 SCORE: 0

## 2022-01-25 ASSESSMENT — FIBROSIS 4 INDEX: FIB4 SCORE: 0.72

## 2022-01-25 NOTE — PROGRESS NOTES
Annual Health Assessment Questions:    1.  Are you currently engaging in any exercise or physical activity? Yes    2.  How would you describe your mood or emotional well-being today? good    3.  Have you had any falls in the last year? No    4.  Have you noticed any problems with your balance or had difficulty walking? Yes    5.  In the last six months have you experienced any leakage of urine? No    6. DPA/Advanced Directive: Patient has Living Will and Durable Power of , but it is not on file. Instructed to bring in a copy to scan into their chart.

## 2022-01-25 NOTE — PROGRESS NOTES
CC: Follow-up 100, dyslipidemia.    HPI:  Paulette presents with the following    1. Mixed hyperlipidemia  Patient discontinued pravastatin 20 mg tablets in October.  Patient is relying on controlling her cholesterol with diet and lifestyle modifications.  Increase her fruits and vegetables and decrease her saturated fats.  Try to eat 3 meals a.  Total cholesterol 216, triglyceride 110, HDL 60, .  Positive family history for a half brother with early CAD.  Patient is a current smoker.    2. Mood disorder (HCC)  Chronic.  Stable.  Uses Xanax 1 mg tablets only as needed.  Patient has been following up with behavioral.  And doing quite well.    3. Back muscle spasm  Requesting physician for muscle relaxant to help with pain in her upper back.  History of spinal fusion in 2013.    4. 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  Stable.  Noted abnormal Quanta flow arterial study in September 2021 on annual exam.  Patient is a smoker.  Stopped pravastatin.  Denies claudication.    5. Screening for cervical cancer  Requesting referral to gynecology for Pap smear.        Patient Active Problem List    Diagnosis Date Noted   • Back muscle spasm 01/25/2022   • Abnormal mammogram 10/14/2021   • BMI 23.0-23.9, adult 09/27/2021   • Disorder of arteries and arterioles (HCC) 09/27/2021   • Hyperglycemia 04/08/2021   • Anxiety 04/08/2021   • Primary insomnia 04/08/2021   • Osteopenia of lumbar spine 04/08/2021   • Vitamin D deficiency 04/08/2021   • Coronary arteriosclerosis 08/11/2020   • Fam hx-ischem heart disease 06/20/2019   • Seasonal allergies 08/10/2018   • External hemorrhoids 04/05/2018   • Functional diarrhea 04/05/2018   • Mixed hyperlipidemia 05/26/2017   • Tobacco abuse 04/01/2016   • Mood disorder (HCC) 12/11/2015   • Chronic lumbar pain 12/11/2015       Current Outpatient Medications   Medication Sig Dispense Refill   • tizanidine (ZANAFLEX) 4 MG Tab Take 1 Tablet by mouth every 6 hours as  needed. 30 Tablet 2   • ALPRAZolam (XANAX) 1 MG Tab Take 1 Tablet by mouth at bedtime as needed for Sleep for up to 60 days. 30 Tablet 1   • meloxicam (MOBIC) 7.5 MG Tab Take 1 Tablet by mouth 2 times a day. 200 Tablet 0   • albuterol 108 (90 Base) MCG/ACT Aero Soln inhalation aerosol INHALE 1 TO 2 PUFFS EVERY 4 HOURS AS NEEDED FOR SHORTNESS OF BREATH 20.1 Each 1   • gabapentin (NEURONTIN) 800 MG tablet TAKE 1 TABLET BY MOUTH THREE TIMES A  tablet 1   • ibandronate (BONIVA) 150 MG tablet Take 1 tablet by mouth every 30 days. 100 tablet 3   • therapeutic multivitamin-minerals (THERAGRAN-M) Tab Take 1 Tab by mouth every day.     • fluticasone (FLONASE) 50 MCG/ACT nasal spray Spray 2 Sprays in nose 2 times a day.     • cetirizine (ZYRTEC) 10 MG Tab Take 10 mg by mouth every bedtime.     • Probiotic Product (PROBIOTIC DAILY PO) Take 1 Cap by mouth every bedtime.     • pravastatin (PRAVACHOL) 20 MG Tab TAKE 1 TABLET BY MOUTH EVERY DAY (Patient not taking: Reported on 1/25/2022) 100 Tablet 3   • diazepam (VALIUM) 10 MG tablet Take 10 mg by mouth every 12 hours as needed for Anxiety. (Patient not taking: Reported on 1/25/2022)       No current facility-administered medications for this visit.         Allergies as of 01/25/2022 - Reviewed 01/25/2022   Allergen Reaction Noted   • Seasonal Runny Nose and Itching 01/13/2014   • Sulfa drugs Nausea 01/13/2014        Social History     Socioeconomic History   • Marital status:      Spouse name: Not on file   • Number of children: Not on file   • Years of education: Not on file   • Highest education level: Not on file   Occupational History   • Not on file   Tobacco Use   • Smoking status: Current Every Day Smoker     Packs/day: 1.00     Types: Cigarettes     Start date: 1/13/1979   • Smokeless tobacco: Never Used   Vaping Use   • Vaping Use: Some days   Substance and Sexual Activity   • Alcohol use: Not Currently     Alcohol/week: 0.0 oz     Comment: socially   •  Drug use: Yes     Types: Marijuana, Inhaled     Comment: occ edible THC   • Sexual activity: Never     Comment: , ret    Other Topics Concern   • Not on file   Social History Narrative    ** Merged History Encounter **          Social Determinants of Health     Financial Resource Strain:    • Difficulty of Paying Living Expenses: Not on file   Food Insecurity:    • Worried About Running Out of Food in the Last Year: Not on file   • Ran Out of Food in the Last Year: Not on file   Transportation Needs:    • Lack of Transportation (Medical): Not on file   • Lack of Transportation (Non-Medical): Not on file   Physical Activity:    • Days of Exercise per Week: Not on file   • Minutes of Exercise per Session: Not on file   Stress:    • Feeling of Stress : Not on file   Social Connections:    • Frequency of Communication with Friends and Family: Not on file   • Frequency of Social Gatherings with Friends and Family: Not on file   • Attends [employer] Services: Not on file   • Active Member of Clubs or Organizations: Not on file   • Attends Club or Organization Meetings: Not on file   • Marital Status: Not on file   Intimate Partner Violence:    • Fear of Current or Ex-Partner: Not on file   • Emotionally Abused: Not on file   • Physically Abused: Not on file   • Sexually Abused: Not on file   Housing Stability:    • Unable to Pay for Housing in the Last Year: Not on file   • Number of Places Lived in the Last Year: Not on file   • Unstable Housing in the Last Year: Not on file       Family History   Problem Relation Age of Onset   • Cancer Mother 45        colon   • Heart Disease Mother         HEART ATTACK   • Heart Disease Half-brother         Heart Bypass @ 45/stent   • No Known Problems Half-brother    • No Known Problems Half-brother    • Diabetes Neg Hx    • Stroke Neg Hx        Past Surgical History:   Procedure Laterality Date   • FUSION, SPINE, LUMBAR, PLIF  2013    rods and screw placement  "  • ROTATOR CUFF REPAIR Right Mar 2009   • OTHER ORTHOPEDIC SURGERY  2009    Rotator cuff  surgery Right   • OTHER ORTHOPEDIC SURGERY      back surgery   • PRIMARY C SECTION      x2       ROS: Positive ROS per HPI.  Denies any Headache,Chest pain,  Shortness of breath,  Abdominal pain, Changes of bowel or bladder, Lower ext edema, Fevers, Nights sweats, Weight Changes, Focal weakness or numbness.  All other systems are negative.    /70 (BP Location: Left arm, Patient Position: Sitting, BP Cuff Size: Adult)   Pulse 70   Temp 36.9 °C (98.4 °F) (Temporal)   Resp 15   Ht 1.626 m (5' 4\")   Wt 63.7 kg (140 lb 8 oz)   SpO2 96%   BMI 24.12 kg/m²      Constitutional: Alert, no distress, well-groomed.  Skin: Warm, dry, good turgor, no rashes in visible areas.  Eye: Equal, round and reactive, conjunctiva clear, lids normal.  ENMT: Lips without lesions, good dentition, moist mucous membranes.  Neck: Trachea midline, no masses, no thyromegaly.  Respiratory: Unlabored respiratory effort, no cough.  Abdomen: Soft, no gross masses.  MSK: Normal gait, moves all extremities.  Neuro: Grossly non-focal. No cranial nerve deficit. Strength and sensation intact.   Psych: Alert and oriented x3, normal affect and mood.      Assessment and Plan.   61 y.o. female presenting with the following.     1. Mixed hyperlipidemia  Off statins at this time.  Continue diet and lifestyle modifications, fish oil.  Increase fiber intake.  Check CCS    2. Mood disorder (HCC)  Chronic.  Stable.  Continue with behavioral therapy.  Continue to monitor    3. Back muscle spasm  Prescription for tizanidine.  Heat therapy.  Stretch.  Figure 8 back strap recommended.    4. 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  Stable.  Continue to monitor.  Control cholesterol.  Recommend discontinuing tobacco use.  Consider vascular studies.    5. Screening for cervical cancer    - Referral to Gynecology    6. Other specified personal risk factors, not " elsewhere classified    - CT-HEART W/O CONT EVAL CALCIUM; Future    My total time spent caring for the patient on the day of the encounter was 30 minutes.   This does not include time spent on separately billable procedures/tests.

## 2022-02-01 ENCOUNTER — OFFICE VISIT (OUTPATIENT)
Dept: BEHAVIORAL HEALTH | Facility: CLINIC | Age: 62
End: 2022-02-01
Payer: MEDICARE

## 2022-02-01 DIAGNOSIS — F41.9 ANXIETY AND DEPRESSION: ICD-10-CM

## 2022-02-01 DIAGNOSIS — F32.A ANXIETY AND DEPRESSION: ICD-10-CM

## 2022-02-01 PROCEDURE — 90837 PSYTX W PT 60 MINUTES: CPT | Performed by: SOCIAL WORKER

## 2022-02-01 NOTE — PROGRESS NOTES
[facility]   Therapy Progress Note        Name: Paulette Concepcion  MRN: [id number]  : 1960  Age: 61 y.o.  Date of assessment: 2022  PCP: Iraida Zavala M.D.  Persons in attendance: Patient  Total session time: 55 minutes      Topics addressed in psychotherapy include: Pt to in office indiv appt. Pt feeling more relaxed after moving into new apt. Pt somewhat focus on people who used pass key to get into old apt and feels they may have been tracking her but was able to move through speculative thoughts with use of motivational interviewing. Pt feeling safe at new residence. Processed hurt around no contact from family, son/father over holiday. 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 Assisted pt with ideas around finding evidence of lost brother/sibling in [place]. Plan: See bi weekly.          Objective Observations:   Participation:Active verbal participation, Engaged and Open to feedback   Grooming:Casual   Cognition:Fully Oriented   Eye Contact:Good   Mood:Depressed   Affect:Congruent with content   Thought Process:Goal-directed   Speech:Rate within normal limits and Volume within normal limits    Current Risk:   Suicide: NA   Homicide: NA   Self-Harm: NA   Relapse: NA   Safety Plan Reviewed: no    Care Plan Updated: Yes    Does patient express agreement with the above plan? No     Diagnosis:  1. Mood Disorder    Therapeutic Intervention(s): Goal-setting, Interpersonal effectiveness skills, Maladaptive behavior addressed and Supportive psychotherapy    Treatment Goal(s)/Objective(s) addressed: grief/loss     Progress toward Treatment Goals: Moderate improvement    Referral appointment(s) scheduled? No       Annalee Peace, LCSW, MSW

## 2022-02-08 RX ORDER — GABAPENTIN 800 MG/1
800 TABLET ORAL 3 TIMES DAILY
Qty: 300 TABLET | Refills: 3 | Status: SHIPPED | OUTPATIENT
Start: 2022-02-08 | End: 2023-03-08

## 2022-02-25 ENCOUNTER — APPOINTMENT (OUTPATIENT)
Dept: BEHAVIORAL HEALTH | Facility: CLINIC | Age: 62
End: 2022-02-25
Payer: MEDICARE

## 2022-02-28 ENCOUNTER — OFFICE VISIT (OUTPATIENT)
Dept: BEHAVIORAL HEALTH | Facility: CLINIC | Age: 62
End: 2022-02-28
Payer: MEDICARE

## 2022-02-28 DIAGNOSIS — F39 MOOD DISORDER (HCC): ICD-10-CM

## 2022-02-28 PROCEDURE — 90837 PSYTX W PT 60 MINUTES: CPT | Performed by: SOCIAL WORKER

## 2022-02-28 NOTE — PROGRESS NOTES
[facility]   Therapy Progress Note        Name: Paulette Concepcoin  MRN: [id number]  : 1960  Age: 61 y.o.  Date of assessment: 2022  PCP: Iraida Zavala M.D.  Persons in attendance: Patient  Total session time: 55 minutes      Topics addressed in psychotherapy include: Pt to in office indiv appt. Pt with some focus on paranoid thoughts from old apt bleeding into devices in new apt. Processed fear based irrational/rational thinking with use of thought stopping. Addressed boundaries with inviting homeless person to house to shower and take to do laundry.  Pt is agreeable to concerns. Plan: See monthly.     Objective Observations:   Participation:Active verbal participation, Engaged and Open to feedback   Grooming:Casual   Cognition:Fully Oriented   Eye Contact:Good   Mood:Euthymic   Affect:Flexible and Congruent with content   Thought Process:Logical   Speech:Rate within normal limits and Volume within normal limits    Current Risk:   Suicide: low   Homicide: NA   Self-Harm: NA   Relapse: low   Safety Plan Reviewed: NA    Care Plan Updated: No    Does patient express agreement with the above plan? Yes     Diagnosis:  No diagnosis found.    Therapeutic Intervention(s): Cognitive modification and Limit-setting    Treatment Goal(s)/Objective(s) addressed: thi     Progress toward Treatment Goals: Moderate improvement    Referral appointment(s) scheduled? No       Annalee Peace, LCSW, MSW

## 2022-03-14 ENCOUNTER — OFFICE VISIT (OUTPATIENT)
Dept: BEHAVIORAL HEALTH | Facility: CLINIC | Age: 62
End: 2022-03-14
Payer: MEDICARE

## 2022-03-14 DIAGNOSIS — F39 MOOD DISORDER (HCC): ICD-10-CM

## 2022-03-14 PROCEDURE — 90837 PSYTX W PT 60 MINUTES: CPT | Performed by: SOCIAL WORKER

## 2022-04-04 ENCOUNTER — OFFICE VISIT (OUTPATIENT)
Dept: BEHAVIORAL HEALTH | Facility: CLINIC | Age: 62
End: 2022-04-04
Payer: MEDICARE

## 2022-04-04 DIAGNOSIS — F32.A ANXIETY AND DEPRESSION: ICD-10-CM

## 2022-04-04 DIAGNOSIS — F41.9 ANXIETY AND DEPRESSION: ICD-10-CM

## 2022-04-04 DIAGNOSIS — F39 MOOD DISORDER (HCC): ICD-10-CM

## 2022-04-04 PROCEDURE — 90837 PSYTX W PT 60 MINUTES: CPT | Performed by: SOCIAL WORKER

## 2022-04-04 NOTE — PROGRESS NOTES
[facility]   Therapy Progress Note        Name: Paulette Concepcion  MRN: [id number]  : 1960  Age: 62 y.o.  Date of assessment: 2022  PCP: Iraida Zavala M.D.  Persons in attendance: Patient  Total session time: 55 minutes      Topics addressed in psychotherapy include: Pt to indiv in office appt. Pt with persistent persecutory delusions, that does not impair overall functioning. She does endorse anxiety/depression from previous interactions with father/friends and feels people may be watching her and/or getting into accounts and getting access to a variety of electronics (which she has shut down with in past). Pt is amenable to seeing psychiatry for consult to assist with lowering anxiety level.   She is active in community, volunteers and is making connections however family relations with everyone but 1 son is severed. Continued validate concerns and provide support.Plan: refer to psychiatry, see monthly.     Objective Observations:   Participation:Active verbal participation, Engaged and Open to feedback   Grooming:Neat   Cognition:Alert and Evidence of delusions present   Eye Contact:Good   Mood:Depressed   Affect:Sad   Thought Process:Goal-directed   Speech:Rate within normal limits and Volume within normal limits    Current Risk:   Suicide: low   Homicide: NA   Self-Harm: NA   Relapse: NA   Safety Plan Reviewed: no    Care Plan Updated: No    Does patient express agreement with the above plan? Yes     Diagnosis:  1. Mood Disorder  2.     Therapeutic Intervention(s): Distress tolerance skills, Interpersonal effectiveness skills and Supportive psychotherapy    Treatment Goal(s)/Objective(s) addressed: anxiety/depression/mood     Progress toward Treatment Goals: Mild improvement    Referral appointment(s) scheduled? Yes , psychiatry      Annalee Peace, LCSW, MSW

## 2022-04-22 ENCOUNTER — OFFICE VISIT (OUTPATIENT)
Dept: BEHAVIORAL HEALTH | Facility: CLINIC | Age: 62
End: 2022-04-22
Payer: MEDICARE

## 2022-04-22 DIAGNOSIS — F41.1 GENERALIZED ANXIETY DISORDER: ICD-10-CM

## 2022-04-22 DIAGNOSIS — F98.8 ADD (ATTENTION DEFICIT DISORDER) WITHOUT HYPERACTIVITY: ICD-10-CM

## 2022-04-22 DIAGNOSIS — F33.1 MODERATE EPISODE OF RECURRENT MAJOR DEPRESSIVE DISORDER (HCC): ICD-10-CM

## 2022-04-22 PROCEDURE — 99204 OFFICE O/P NEW MOD 45 MIN: CPT | Performed by: PSYCHIATRY & NEUROLOGY

## 2022-04-22 RX ORDER — ALPRAZOLAM 1 MG/1
1 TABLET ORAL DAILY
Qty: 30 TABLET | Refills: 0 | Status: SHIPPED | OUTPATIENT
Start: 2022-04-22 | End: 2022-05-22

## 2022-04-22 RX ORDER — BUPROPION HYDROCHLORIDE 100 MG/1
100 TABLET, EXTENDED RELEASE ORAL DAILY
Qty: 30 TABLET | Refills: 0 | Status: SHIPPED | OUTPATIENT
Start: 2022-04-22 | End: 2022-05-10 | Stop reason: SDUPTHER

## 2022-04-22 NOTE — PROGRESS NOTES
[facility]   Therapy Progress Note      This provider informed the patient their medical records are totally confidential except for the use by other providers involved in their care, or if the patient signs a release, or to report instances of child or elder abuse, or if it is determined they are an immediate risk to harm themselves or others.    Name: Paulette Concepcion  MRN: [id number]  : 1960  Age: 62 y.o.  Date of assessment: 2022  PCP: Iraida Zavala M.D.      Present Illness:   Chart reviewed prior to seeing her in my office.  She is 62,  20 years, and has 2 sons 27 and 29.  She was a  for 30 years but was not able to continue because of spinal pain and a fusion.  She is referred for evaluation of anxiety and depression but has wondered if she possibly has ADHD because of her difficulty focusing.  She has not been on any antidepressant since the 2020.  She is sometimes restless, easily distracted, and has trouble completing projects.  She has some difficulty focusing on movies and books.  Her distractibility is a major concern.  Past Psych History:   See above.  She last saw our staff therapist in 2021.  No psychiatric hospitalization.    Substance Abuse History:  No alcohol or substance abuse problems.    Family History:   Her parents were .  Her mother  at age 49.  She never met her biological father.  She has 3  1/2 brothers 1 of whom has ADHD.    Medical History:  See above.  Chronic lumbar pain.  Treated by her PCP with gabapentin 800 mg 3 times daily.    Psych Medications:  She was on a low-dose of Prozac for several years.  He was previously on Wellbutrin for depression but cannot recall if it helped her to focus.    Allergies:   Cymbalta, sulfa    Mental Status:   She is alert, oriented, and cooperative.  Relatedness is good.  Grooming is good.  Speech is normal rate.  Anxious.  Memory is fairly good.  Insight and  judgment are fairly good.  No indication of psychotic thinking.    Current Risk:       Suicidal: Not suicidal       Homicidal: Not homicidal       Self-Harm: No plan to harm self       Relapse (Low/Moderate/High): Moderate       Crisis Safety Plan Reviewed: Discussed with patient    Diagnosis:  ADD  Major depressive disorder, recurrent  Generalized anxiety disorder    Treatment Plan:  The current treatment plan consists of a follow-up visit in 2 weeks and then monthly psychiatric and psychotherapy sessions designed to evaluate her anxiety, depression, and ADD.    Duration will be for a minimum of 12 months and will be reviewed at each visit.    Remission of depression and control of anxiety with improvement in ADD symptoms in order to prevent relapse due to the chronic nature of her behavioral health problems and mental illness.  She will use Xanax 1 mg daily as needed.  Start Wellbutrin  mg a.m.  Possible side effects discussed.    Daniel Krause M.D.     This note was created using voice recognition software (Dragon). The accuracy of the dictation is limited by the abilities of the software. I have reviewed the note prior to signing, however some errors in grammar and context are still possible. If you have any questions related to this note please do not hesitate to contact our office.

## 2022-04-26 ENCOUNTER — APPOINTMENT (OUTPATIENT)
Dept: BEHAVIORAL HEALTH | Facility: CLINIC | Age: 62
End: 2022-04-26
Payer: MEDICARE

## 2022-05-09 ENCOUNTER — OFFICE VISIT (OUTPATIENT)
Dept: BEHAVIORAL HEALTH | Facility: CLINIC | Age: 62
End: 2022-05-09
Payer: MEDICARE

## 2022-05-09 DIAGNOSIS — F41.1 GENERALIZED ANXIETY DISORDER: ICD-10-CM

## 2022-05-09 PROCEDURE — 90837 PSYTX W PT 60 MINUTES: CPT | Performed by: SOCIAL WORKER

## 2022-05-09 NOTE — PROGRESS NOTES
"[facility]   Therapy Progress Note        Name: Paulette Concepcion  MRN: [id number]  : 1960  Age: 62 y.o.  Date of assessment: 2022  PCP: Iraida Zavala M.D.  Persons in attendance: Patient  Total session time: 55 minutes      Topics addressed in psychotherapy include: Pt to indiv in office appt. Pt reports seeing psy and beginning Wellbutrin which \"activates her like a stimulant,\" some need to take Xanax to calm self in evening. Reports making friends in new neighborhood; focus on homeless activity in apt setting, validated by landlord and neighbors. Spending time with new friend; clinical focus on communicating with sons when they reach out and feleing happy both children made contact on mothers day. Plan: See monthly.     Objective Observations:   Participation:Active verbal participation and Engaged   Grooming:Casual   Cognition:Fully Oriented   Eye Contact:Good   Mood:Euthymic   Affect:Flexible   Thought Process:Goal-directed   Speech:Rate within normal limits and Volume within normal limits    Current Risk:   Suicide: low   Homicide: NA   Self-Harm: NA   Relapse: NA   Safety Plan Reviewed: no    Care Plan Updated: No    Does patient express agreement with the above plan? No     Diagnosis:  1. SAUL  2. Mod MDD    Therapeutic Intervention(s): Cognitive modification and Communication skills    Treatment Goal(s)/Objective(s) addressed: anxiety     Progress toward Treatment Goals: Moderate improvement    Referral appointment(s) scheduled? No       Annalee Peace, LCSW, MSW    "

## 2022-05-10 ENCOUNTER — OFFICE VISIT (OUTPATIENT)
Dept: BEHAVIORAL HEALTH | Facility: CLINIC | Age: 62
End: 2022-05-10
Payer: MEDICARE

## 2022-05-10 DIAGNOSIS — F33.1 MODERATE EPISODE OF RECURRENT MAJOR DEPRESSIVE DISORDER (HCC): ICD-10-CM

## 2022-05-10 DIAGNOSIS — F98.8 ADD (ATTENTION DEFICIT DISORDER) WITHOUT HYPERACTIVITY: ICD-10-CM

## 2022-05-10 DIAGNOSIS — F41.1 GENERALIZED ANXIETY DISORDER: ICD-10-CM

## 2022-05-10 PROCEDURE — 99214 OFFICE O/P EST MOD 30 MIN: CPT | Performed by: PSYCHIATRY & NEUROLOGY

## 2022-05-10 RX ORDER — BUPROPION HYDROCHLORIDE 100 MG/1
100 TABLET, EXTENDED RELEASE ORAL 2 TIMES DAILY
Qty: 60 TABLET | Refills: 0 | Status: SHIPPED | OUTPATIENT
Start: 2022-05-10 | End: 2022-05-17

## 2022-05-10 NOTE — PROGRESS NOTES
[facility]   Follow Up Assessment     This provider informed the patient their medical records are totally confidential except for the use by other providers involved in their care, or if the patient signs a release, or to report instances of child or elder abuse, or if it is determined they are an immediate risk to harm themselves or others.    Name: Paulette Concepcion  MRN: [id number]  : 1960  Age: 62 y.o.  Date of assessment: 5/10/2022  PCP: Iraida Zavala M.D.      Subjective:  Chart reviewed prior to seeing her in my office.  She indicates that Wellbutrin  mg a.m. has helped her to focus a little better but could be making her anxious?  We discussed treatment options of either increasing Wellbutrin  mg to twice daily or switching to Strattera.  She prefers to go with the Wellbutrin twice daily approach.  We also talked about her identity theft problem in the 2020.  She thinks she was followed by someone she works with.  She filed a police report with the [place] [employer] but had not heard anything back from them.  Her friends could not leave her voicemails because her inbox was full.-tampered?  She does have security on her bank accounts.  Someone contacted her pharmacist.  She moved to a house in 2021 and has not had any problems since then.  She recalls that her father dealt with low life criminals.  She is estranged from her father and brother.    Objective: She is alert, oriented, and cooperative.  Relatedness is good.  Grooming is good.  Speech is normal rate.  Anxious.  Memory is fairly good.  Insight and judgment are fairly good.    Current Risk:       Suicidal: Not suicidal       Homicidal: Not homicidal       Self-Harm: No plan to harm self       Relapse: (Low/Moderate/High): Moderate       Crisis Safety Plan Reviewed: Discussed with patient    Diagnosis:   ADD  Major depressive disorder, recurrent  SAUL    Treatment Plan:  The current  treatment plan consists of monthly psychiatric and psychotherapy sessions designed to evaluate her depression, anxiety, and ADD.    Duration will be for a minimum of 12 months and will be reviewed at each visit.    Goals: Improvement in ADD symptoms with remission of depression and control of anxiety in order to prevent relapse due to the chronic nature of her behavioral health problems and mental illness.  Increase Wellbutrin SR to 100 mg twice daily.        Daniel Krause M.D.      This note was created using voice recognition software (Dragon). The accuracy of the dictation is limited by the abilities of the software. I have reviewed the note prior to signing, however some errors in grammar and context are still possible. If you have any questions related to this note please do not hesitate to contact our office.

## 2022-05-17 RX ORDER — BUPROPION HYDROCHLORIDE 100 MG/1
TABLET, EXTENDED RELEASE ORAL
Qty: 30 TABLET | Refills: 0 | Status: SHIPPED | OUTPATIENT
Start: 2022-05-17 | End: 2022-05-31

## 2022-05-23 ENCOUNTER — OFFICE VISIT (OUTPATIENT)
Dept: URGENT CARE | Facility: CLINIC | Age: 62
End: 2022-05-23
Payer: MEDICARE

## 2022-05-23 VITALS
DIASTOLIC BLOOD PRESSURE: 98 MMHG | HEART RATE: 82 BPM | RESPIRATION RATE: 14 BRPM | SYSTOLIC BLOOD PRESSURE: 140 MMHG | HEIGHT: 64 IN | TEMPERATURE: 98.3 F | BODY MASS INDEX: 24.24 KG/M2 | OXYGEN SATURATION: 95 % | WEIGHT: 142 LBS

## 2022-05-23 DIAGNOSIS — L03.317 CELLULITIS OF BUTTOCK: ICD-10-CM

## 2022-05-23 DIAGNOSIS — R03.0 ELEVATED BLOOD PRESSURE READING: ICD-10-CM

## 2022-05-23 PROCEDURE — 99213 OFFICE O/P EST LOW 20 MIN: CPT | Performed by: FAMILY MEDICINE

## 2022-05-23 RX ORDER — FLUCONAZOLE 150 MG/1
150 TABLET ORAL DAILY
Qty: 1 TABLET | Refills: 0 | Status: SHIPPED | OUTPATIENT
Start: 2022-05-23 | End: 2022-05-24

## 2022-05-23 RX ORDER — DOXYCYCLINE HYCLATE 100 MG
100 TABLET ORAL 2 TIMES DAILY
Qty: 14 TABLET | Refills: 0 | Status: SHIPPED | OUTPATIENT
Start: 2022-05-23 | End: 2022-05-30

## 2022-05-23 ASSESSMENT — FIBROSIS 4 INDEX: FIB4 SCORE: 0.73

## 2022-05-23 NOTE — PROGRESS NOTES
"Subjective:      Chief Complaint   Patient presents with   • Nodule                     nodule  This is a new problem. The current episode started in the past 5 days. The problem is unchanged.  The rash is characterized by redness, swelling and pain. Pt was exposed to nothing. Pertinent negatives include no cough, fatigue, fever, shortness of breath or sore throat. Past treatments include nothing.     Past Medical History:   Diagnosis Date   • Back muscle spasm 1/25/2022   • Abnormal mammogram 10/14/2021   • Hyperglycemia 4/8/2021   • Other specified anxiety disorders 4/8/2021   • Situational depression 4/8/2021   • Primary insomnia 4/8/2021   • Osteopenia of lumbar spine 4/8/2021   • Vitamin D deficiency 4/8/2021   • Allergic reaction caused by a drug 12/2020    Cymbalta   • Lumbar burst fracture (HCC) 2013    L1 burst, tx'd with rods/screws [facility]   • Depression 2013   • Anxiety    • Chronic back pain    • Fall    • History of spinal fusion     T10-L3         Social History     Tobacco Use   • Smoking status: Current Every Day Smoker     Packs/day: 1.00     Types: Cigarettes     Start date: 1/13/1979   • Smokeless tobacco: Never Used   Vaping Use   • Vaping Use: Some days   Substance Use Topics   • Alcohol use: Not Currently     Alcohol/week: 0.0 oz     Comment: socially   • Drug use: Yes     Types: Marijuana, Inhaled     Comment: occ edible THC             Review of Systems   Constitutional: Negative for fever and fatigue.   HENT: Negative for sore throat.    Respiratory: Negative for cough and shortness of breath.    Skin: Positive for nodule.   All other systems reviewed and are negative.         Objective:   BP (!) 140/98   Pulse 82   Temp 36.8 °C (98.3 °F)   Resp 14   Ht 1.626 m (5' 4\")   Wt 64.4 kg (142 lb)   SpO2 95%       Physical Exam   Constitutional: pt is oriented to person, place, and time. Pt appears well-developed and well-nourished. No distress.   HENT:   Head: Normocephalic and " atraumatic.   Mouth/Throat: Oropharynx is clear and moist and mucous membranes are normal. No uvula swelling. No oropharyngeal exudate, posterior oropharyngeal edema or posterior oropharyngeal erythema.   Eyes: Conjunctivae are normal.   Cardiovascular: Normal rate, regular rhythm and normal heart sounds.    Pulmonary/Chest: Effort normal and breath sounds normal. No respiratory distress. She has no wheezes.   Neurological: pt is alert and oriented to person, place, and time. No cranial nerve deficit.   Skin: 2cm erythematous,  nodule, left buttock.   No fluctuance or induration.   No d/c.   Area is slightly TTP.  Pt is not diaphoretic.    Psychiatric: pt's behavior is normal.   Nursing note and vitals reviewed.              Assessment/Plan:          1. Cellulitis of buttock   sulfa allergic    - doxycycline (VIBRAMYCIN) 100 MG Tab; Take 1 Tablet by mouth 2 times a day for 7 days.  Dispense: 14 Tablet; Refill: 0  - fluconazole (DIFLUCAN) 150 MG tablet; Take 1 Tablet by mouth every day for 1 dose.  Dispense: 1 Tablet; Refill: 0    2. Elevated blood pressure reading  Pt does not have hx HTN  Likely situational  Advised f/u PCP         After informed consent was obtained -Risks, benefits, and alternatives discussed,   including infection, bleeding, nerve damage, and poor cosmetic outcome    Area was prepped and draped in usual sterile fashion.  After performing local field block with 1% lidocaine, with epinephrine, abscess was incised and drained and packed with iodoform gauze.  Blunt dissection was used to break up loculations.  Substantial amount of cheesy, purulent material was expressed, and cultures were taken and sent for gram staining.    Wound was cleaned and dressed and wound care instructions given.       7 day course bactrim started empirically.       - ANAEROBIC/AEROBIC/GRAM STAIN

## 2022-05-31 ENCOUNTER — OFFICE VISIT (OUTPATIENT)
Dept: BEHAVIORAL HEALTH | Facility: CLINIC | Age: 62
End: 2022-05-31
Payer: MEDICARE

## 2022-05-31 DIAGNOSIS — F41.1 GENERALIZED ANXIETY DISORDER: ICD-10-CM

## 2022-05-31 DIAGNOSIS — F98.8 ADD (ATTENTION DEFICIT DISORDER) WITHOUT HYPERACTIVITY: ICD-10-CM

## 2022-05-31 DIAGNOSIS — F33.1 MODERATE EPISODE OF RECURRENT MAJOR DEPRESSIVE DISORDER (HCC): ICD-10-CM

## 2022-05-31 PROCEDURE — 99214 OFFICE O/P EST MOD 30 MIN: CPT | Performed by: PSYCHIATRY & NEUROLOGY

## 2022-05-31 PROCEDURE — 90837 PSYTX W PT 60 MINUTES: CPT | Performed by: SOCIAL WORKER

## 2022-05-31 RX ORDER — ATOMOXETINE 25 MG/1
25 CAPSULE ORAL DAILY
Qty: 30 CAPSULE | Refills: 0 | Status: SHIPPED | OUTPATIENT
Start: 2022-05-31 | End: 2022-06-20

## 2022-05-31 NOTE — PROGRESS NOTES
[facility]   Therapy Progress Note        Name: Paulette Concepcion  MRN: [id number]  : 1960  Age: 62 y.o.  Date of assessment: 2022  PCP: Iraida Zavala M.D.  Persons in attendance: Patient  Total session time: 55 minutes      Topics addressed in psychotherapy include: Pt to indiv in office appt. Pt feels like Wellbutrin is instigating depression; with feeling lack of motivation to accomplish tasks. Processed losses in family and financial struggles. Pt encouraged to continue routine/schedule she is developing and adhering to. Validated landlord listening to concerns and boundaries with fa/son. . Plan: See biweekly.     Objective Observations:   Participation:Active verbal participation, Engaged and Open to feedback   Grooming:Casual   Cognition:Fully Oriented   Eye Contact:Good   Mood:Depressed   Affect:Congruent with content   Thought Process:Goal-directed   Speech:Rate within normal limits and Volume within normal limits    Current Risk:   Suicide: low   Homicide: NA   Self-Harm: NA   Relapse: NA   Safety Plan Reviewed: no    Care Plan Updated: No    Does patient express agreement with the above plan? Yes     Diagnosis:  1. SAUL  2. Mood Disorder    Therapeutic Intervention(s): Communication skills, Interpersonal effectiveness skills and Positive behavior reinforced    Treatment Goal(s)/Objective(s) addressed: grief     Progress toward Treatment Goals: Mild improvement    Referral appointment(s) scheduled? No       Annalee Peace, LCSW, MSW

## 2022-05-31 NOTE — PROGRESS NOTES
[facility]   Follow Up Assessment     This provider informed the patient their medical records are totally confidential except for the use by other providers involved in their care, or if the patient signs a release, or to report instances of child or elder abuse, or if it is determined they are an immediate risk to harm themselves or others.    Name: Paulette Concepcion  MRN: [id number]  : 1960  Age: 62 y.o.  Date of assessment: 2022  PCP: Iraida Zavala M.D.      Subjective: Chart reviewed prior to seeing her in my office.  Wellbutrin  mg twice daily is not helping her depression and it is not helping her ADD symptoms.  She prefers to try something other than Wellbutrin.  She is starting to feel safer.  We discussed a trial on Strattera.    Objective:  She is alert, oriented, and cooperative.  Relatedness is good.  Grooming is good.  Speech is normal rate.  Anxious.  Memory is fairly good.  Insight and judgment are fairly good.  No indication of psychotic thinking.    Current Risk:       Suicidal: Not suicide       Homicidal: Not homicidal       Self-Harm: No plan to harm self       Relapse: (Low/Moderate/High): Moderate       Crisis Safety Plan Reviewed: Discussed with the patient    Diagnosis:   ADD  Major depressive disorder, recurrent  Generalized anxiety disorder      Treatment Plan:  The current treatment plan consists of a follow-up visit in 3 weeks and then monthly psychiatric sessions designed to evaluate her depression anxiety and ADD.    Duration will be for a minimum of 12 months and will be reviewed at each visit.    Goals: Remission of depression and control of anxiety with improvement in ADD symptoms in order to prevent relapse due to the chronic nature of her behavioral health problems and mental illness.  Decrease Wellbutrin SR to 100 mg daily x1 week and discontinue.  Start Strattera 25 mg daily x1 week and then increase to a total of 50 mg daily.  Possible  side effects including headaches and nausea discussed.          Daniel Krause M.D.      This note was created using voice recognition software (Dragon). The accuracy of the dictation is limited by the abilities of the software. I have reviewed the note prior to signing, however some errors in grammar and context are still possible. If you have any questions related to this note please do not hesitate to contact our office.

## 2022-06-01 ENCOUNTER — HOSPITAL ENCOUNTER (OUTPATIENT)
Dept: RADIOLOGY | Facility: MEDICAL CENTER | Age: 62
End: 2022-06-01
Attending: INTERNAL MEDICINE
Payer: MEDICARE

## 2022-06-01 DIAGNOSIS — R92.8 ABNORMAL MAMMOGRAM: ICD-10-CM

## 2022-06-01 PROCEDURE — G0279 TOMOSYNTHESIS, MAMMO: HCPCS

## 2022-06-20 ENCOUNTER — OFFICE VISIT (OUTPATIENT)
Dept: BEHAVIORAL HEALTH | Facility: CLINIC | Age: 62
End: 2022-06-20
Payer: MEDICARE

## 2022-06-20 DIAGNOSIS — F41.1 GENERALIZED ANXIETY DISORDER: ICD-10-CM

## 2022-06-20 DIAGNOSIS — F98.8 ADD (ATTENTION DEFICIT DISORDER) WITHOUT HYPERACTIVITY: ICD-10-CM

## 2022-06-20 DIAGNOSIS — F33.1 MODERATE EPISODE OF RECURRENT MAJOR DEPRESSIVE DISORDER (HCC): ICD-10-CM

## 2022-06-20 PROCEDURE — 99214 OFFICE O/P EST MOD 30 MIN: CPT | Performed by: PSYCHIATRY & NEUROLOGY

## 2022-06-20 PROCEDURE — 90837 PSYTX W PT 60 MINUTES: CPT | Performed by: SOCIAL WORKER

## 2022-06-20 RX ORDER — ATOMOXETINE 40 MG/1
40 CAPSULE ORAL 2 TIMES DAILY
Qty: 60 CAPSULE | Refills: 0 | Status: SHIPPED | OUTPATIENT
Start: 2022-06-20 | End: 2022-07-13

## 2022-06-20 NOTE — PROGRESS NOTES
[facility]   Therapy Progress Note        Name: Paulette Concepcion  MRN: [id number]  : 1960  Age: 62 y.o.  Date of assessment: 2022  PCP: Iraida Zavala M.D.  Persons in attendance: Patient  Total session time: 55 minutes      Topics addressed in psychotherapy include: Pt to indiv in office appt. Pt continues to take Strattera 3 wks, reports lack of motivation and feeling she might need more anti depressant to address this. Processed ex husb/fa relationship and positives about relationships with sons, cautious about asking detail questions, but instead enjoys their company and strengths of independence, success, health, activities.  Pt is getting out with friend to events and volunteering, feeling lack of leona. Some concern controls in TV after reassuring tv is programs ed this way. Plan: see monthly.     Objective Observations:   Participation:Active verbal participation and Open to feedback   Grooming:Casual   Cognition:Fully Oriented   Eye Contact:Good   Mood:Depressed   Affect:Flat, some aspects of   Thought Process:Logical   Speech:Rate within normal limits and Volume within normal limits    Current Risk:   Suicide: low   Homicide: NA   Self-Harm: NA   Relapse: NA   Safety Plan Reviewed: no    Care Plan Updated: No    Does patient express agreement with the above plan? Yes     Diagnosis:  1. Mod recurrent MDD  2. ADD    Therapeutic Intervention(s): Communication skills    Treatment Goal(s)/Objective(s) addressed: grief/loss     Progress toward Treatment Goals: Moderate improvement    Referral appointment(s) scheduled? No       Annalee Peace, LCSW, MSW

## 2022-06-20 NOTE — PROGRESS NOTES
[facility]   Follow Up Assessment     This provider informed the patient their medical records are totally confidential except for the use by other providers involved in their care, or if the patient signs a release, or to report instances of child or elder abuse, or if it is determined they are an immediate risk to harm themselves or others.    Name: Paulette Concepcion  MRN: [id number]  : 1960  Age: 62 y.o.  Date of assessment: 2022  PCP: Iraida Zavala M.D.      Subjective:  Chart reviewed prior to seeing her in my office.  Increasing Strattera to 50 mg a.m. has not significantly improved her ADD symptoms.  We discussed Strattera dose options.  She is benefiting from her sessions with Annalee EDWARDS.  We talked about treatment options for depression.  She previously tried Cymbalta and Prozac but cannot recall the names of any of the other antidepressants she tried.    Objective:  She is alert, oriented, and cooperative.  Relatedness is good.  Grooming is good.  Speech is normal rate.  Sad and anxious.  Memory is fairly good.  Insight and judgment are fairly good.    Current Risk:       Suicidal: Not suicidal       Homicidal: Not homicidal       Self-Harm: No plan to harm self       Relapse: (Low/Moderate/High): Moderate       Crisis Safety Plan Reviewed: Discussed with patient    Diagnosis:   ADD  Major depressive disorder, recurrent  Generalized anxiety disorder    Treatment Plan:  The current treatment plan consists of a follow-up visit in 3 weeks and then monthly psychiatric sessions designed to evaluate her ADD, depression, and anxiety.    Duration will be for a minimum of 12 months and will be reviewed at each visit.    Goals: Improvement in ADD symptoms with remission of depression and control of anxiety in order to prevent relapse due to the chronic nature of her behavioral health problems and mental illness.  Strattera will be increased to a total of 80 mg/day.  Consider a trial  on Celexa when the Strattera dose is determined.    Daniel Krause M.D.      This note was created using voice recognition software (Dragon). The accuracy of the dictation is limited by the abilities of the software. I have reviewed the note prior to signing, however some errors in grammar and context are still possible. If you have any questions related to this note please do not hesitate to contact our office.

## 2022-07-13 ENCOUNTER — OFFICE VISIT (OUTPATIENT)
Dept: BEHAVIORAL HEALTH | Facility: CLINIC | Age: 62
End: 2022-07-13
Payer: MEDICARE

## 2022-07-13 DIAGNOSIS — F98.8 ADD (ATTENTION DEFICIT DISORDER) WITHOUT HYPERACTIVITY: ICD-10-CM

## 2022-07-13 DIAGNOSIS — F33.1 MODERATE EPISODE OF RECURRENT MAJOR DEPRESSIVE DISORDER (HCC): ICD-10-CM

## 2022-07-13 DIAGNOSIS — F41.1 GENERALIZED ANXIETY DISORDER: ICD-10-CM

## 2022-07-13 PROCEDURE — 99214 OFFICE O/P EST MOD 30 MIN: CPT | Performed by: PSYCHIATRY & NEUROLOGY

## 2022-07-13 RX ORDER — ALPRAZOLAM 1 MG/1
1 TABLET ORAL NIGHTLY
Qty: 90 TABLET | Refills: 0 | Status: SHIPPED | OUTPATIENT
Start: 2022-07-13 | End: 2022-12-27

## 2022-07-13 RX ORDER — ATOMOXETINE 100 MG/1
100 CAPSULE ORAL DAILY
Qty: 90 CAPSULE | Refills: 0 | Status: SHIPPED | OUTPATIENT
Start: 2022-07-13 | End: 2022-08-24

## 2022-07-13 RX ORDER — CITALOPRAM 20 MG/1
20 TABLET ORAL DAILY
Qty: 90 TABLET | Refills: 0 | Status: SHIPPED | OUTPATIENT
Start: 2022-07-13 | End: 2022-09-26 | Stop reason: SDUPTHER

## 2022-07-13 NOTE — PROGRESS NOTES
[facility]   Follow Up Assessment     This provider informed the patient their medical records are totally confidential except for the use by other providers involved in their care, or if the patient signs a release, or to report instances of child or elder abuse, or if it is determined they are an immediate risk to harm themselves or others.    Name: Paulette Concepcion  MRN: [id number]  : 1960  Age: 62 y.o.  Date of assessment: 2022  PCP: Iraida Zavala M.D.      Subjective:  Reviewed prior to seeing her in my office.  Strattera 80 mg a.m. is helping her ADD symptoms but she would like to increase to the full dose of 100 mg a.m.  She is still feeling anxious and said and is agreeable to starting an antidepressant with anxiolytic properties.  She is using Xanax sparingly.    Objective:  She is alert, oriented, and cooperative.  Relatedness is good.  Grooming is good.  Speech is normal rate.  Sad and anxious.  Memory is good.  Insight and judgment are good.  No indication of psychotic thinking.    Current Risk:       Suicidal: Not suicidal       Homicidal: Not homicidal       Self-Harm: No plan to harm self       Relapse: (Low/Moderate/High): Moderate       Crisis Safety Plan Reviewed: Discussed with patient    Diagnosis:   ADD  Major depressive disorder, recurrent  Generalized anxiety disorder    Treatment Plan:  Current treatment plan consists of a follow-up visit in 6 weeks and then quarterly psychiatric sessions designed to evaluate her ADD, depression, and anxiety.    Duration will be for a minimum of 12 months and will be reviewed at each visit.    Goals: Improvement in ADD symptoms with remission of depression and control of anxiety in order to prevent relapse due to the chronic nature of her behavioral health problems and mental illness.  Increase Strattera to 100 mg a.m.  Continue Xanax 1.0 mg daily as directed.  Start Celexa 20 mg at bedtime-initially 1/2 tablet x 1 week.   Possible side effects including drowsiness discussed.    Daniel Krause M.D.      This note was created using voice recognition software (Dragon). The accuracy of the dictation is limited by the abilities of the software. I have reviewed the note prior to signing, however some errors in grammar and context are still possible. If you have any questions related to this note please do not hesitate to contact our office.

## 2022-07-20 ENCOUNTER — OFFICE VISIT (OUTPATIENT)
Dept: DERMATOLOGY | Facility: IMAGING CENTER | Age: 62
End: 2022-07-20
Payer: MEDICARE

## 2022-07-20 DIAGNOSIS — D23.5 DILATED PORE OF WINER OF BACK: ICD-10-CM

## 2022-07-20 DIAGNOSIS — L02.92 FURUNCLE: ICD-10-CM

## 2022-07-20 DIAGNOSIS — L81.4 LENTIGINES: ICD-10-CM

## 2022-07-20 DIAGNOSIS — L21.9 SEBORRHEA: ICD-10-CM

## 2022-07-20 DIAGNOSIS — L82.1 SK (SEBORRHEIC KERATOSIS): ICD-10-CM

## 2022-07-20 DIAGNOSIS — D18.01 CHERRY ANGIOMA: ICD-10-CM

## 2022-07-20 DIAGNOSIS — D22.9 MULTIPLE NEVI: ICD-10-CM

## 2022-07-20 DIAGNOSIS — Z12.83 SKIN CANCER SCREENING: ICD-10-CM

## 2022-07-20 PROCEDURE — 99213 OFFICE O/P EST LOW 20 MIN: CPT | Performed by: NURSE PRACTITIONER

## 2022-07-20 RX ORDER — CLINDAMYCIN PHOSPHATE 11.9 MG/ML
SOLUTION TOPICAL
Refills: 0 | Status: CANCELLED | OUTPATIENT
Start: 2022-07-20

## 2022-07-20 NOTE — PROGRESS NOTES
"DERMATOLOGY NOTE  FOLLOW UP VISIT  proceure room     Chief complaint: Follow-Up (Annual joshua )    HPI/location: lt buttock  Poss cyst tx before w/ antibiotics   Time present:   Painful lesion: No  Itching lesion: No  Enlarging lesion: No  Anything make it better or worse?    And 2 skin tags bothersome    History of skin cancer: No  History of precancers/actinic keratoses: Yes, Details: Skin tags  History of biopsies:Yes, Details: benign  History of blistering/severe sunburns:Yes, Details: multiple throughout life  Family history of skin cancer:No  Family history of atypical moles:No    Allergies   Allergen Reactions   • Seasonal Runny Nose and Itching   • Sulfa Drugs Nausea     Pt states \"I get very sick, I was over 20 years ago\".     MEDICATIONS:  Medications relevant to specialty reviewed.     REVIEW OF SYSTEMS:   Positive for skin (see HPI)  Negative for fevers and chills    EXAM:  There were no vitals taken for this visit.  Constitutional: Well-developed, well-nourished, and in no distress.     A total body skin exam was performed excluding the genitals per patient preference and including the following areas: head (including face), neck, chest, abdomen, groin/buttocks, back, bilateral upper extremities, and bilateral lower extremities with the following pertinent findings listed below and/or in assessment/plan.     -one small localized patch of seborrhea on scalp  -sun exposed skin of trunk and b/l upper and lower extremities with scattered clinically benign light brown reticulated macules all of which were morphologically similar and none of which were suspicious for skin cancer today on exam  -Several scattered 1-3mm bright red macules and thin papules on the trunk and extremities  -Multiple tan medium brown skin-colored macules papules scattered over the trunk >> extremities. All with benign-appearing pigment network patterns on dermoscopy  -Several tan stuck-on waxy papules scattered on the trunk and " extremities  -Small epidermal cyst to back, mid upper back   -Numerous dilated pore of Francine on back  -Lt lateral buttock furuncle      IMPRESSION / PLAN:    1. Seborrhea  Discussed course/nature of disease  Declines tx at this time, not bothering her    2. Cherry angioma  - Benign-appearing nature of lesions discussed during exam.   - Advised to continue to monitor for any return to clinic for new or concerning changes.      3. Dilated pore of Francine of back  - Benign-appearing nature of lesions discussed during exam.   - Advised to continue to monitor for any return to clinic for new or concerning changes.      4. SK (seborrheic keratosis)  - Benign-appearing nature of lesions discussed during exam.   - Courtesy LN2 to 2 SKs low abdomen for cosmesis  - Advised to continue to monitor for any return to clinic for new or concerning changes.      5. Multiple nevi  - Benign-appearing nature of lesions discussed during exam.   - Advised to continue to monitor for any return to clinic for new or concerning changes.  - ABCDE's of melanoma discussed      6. Lentigines  - Benign-appearing nature of lesions discussed during exam.   - Advised to continue to monitor for any return to clinic for new or concerning changes.      7. Furuncle  Discussed course/nature of disease  Advised supportive measures including moist warm compresses  Pt reports this is resolving, advised to follow up for recurrence/worsening      8. Skin cancer screening  Skin cancer education  - discussed importance of sun protective clothing, eyewear in addition to the use of broad spectrum sunscreen with SPF 30 or greater, as well as need for reapplication ~every 2 hours when exposed to UVR  - discussed importance following up for any new or changing lesions as noted in handout given, but every 12 months exams in clinic in the setting of dermatologic history  - ABCDE's of melanoma discussed/handout given          Please note that this dictation was created  using voice recognition software. I have made every reasonable attempt to correct obvious errors, but I expect that there are errors of grammar and possibly content that I did not discover before finalizing the note.      Return to clinic in: Return in about 1 year (around 7/20/2023) for JOSE. and as needed for any new or changing skin lesions.

## 2022-07-28 ENCOUNTER — OFFICE VISIT (OUTPATIENT)
Dept: BEHAVIORAL HEALTH | Facility: CLINIC | Age: 62
End: 2022-07-28
Payer: MEDICARE

## 2022-07-28 DIAGNOSIS — F41.1 GENERALIZED ANXIETY DISORDER: ICD-10-CM

## 2022-07-28 PROCEDURE — 90837 PSYTX W PT 60 MINUTES: CPT | Performed by: SOCIAL WORKER

## 2022-07-28 NOTE — PROGRESS NOTES
[facility]   Therapy Progress Note        Name: Paulette Concepcion  MRN: [id number]  : 1960  Age: 62 y.o.  Date of assessment: 2022  PCP: Iraida Zavala M.D.  Persons in attendance: Patient  Total session time: 56 minutes      Topics addressed in psychotherapy include: Pt to indiv in office appt. Some increased anxiety, more regular use of Xanax. Pt feeling generalize anxiety about news, world problems, violence, decision to stay home from Pride event. She does not feel it has increased in response to change in medication/dosage. Positives in life with son relocating back to [place] in Nov, contact with local son, feeling connected with 2 friends and volunteering. Walked through steps/ideas to reach out to father. Plan: see monthly.     Objective Observations:   Participation:Active verbal participation, Engaged and Open to feedback   Grooming: casual   Cognition:Fully Oriented   Eye Contact:Good   Mood:Anxious   Affect:Flexible   Thought Process:Goal-directed   Speech:Rate within normal limits and Volume within normal limits    Current Risk:   Suicide: NA   Homicide: NA   Self-Harm: NA   Relapse: NA   Safety Plan Reviewed: no    Care Plan Updated: No    Does patient express agreement with the above plan? Yes     Diagnosis:  1. SAUL  2. Recurrent MDD    Therapeutic Intervention(s): Interpersonal effectiveness skills, Parenting/familial roles addressed and Socialization    Treatment Goal(s)/Objective(s) addressed: anxiety     Progress toward Treatment Goals: Mild improvement    Referral appointment(s) scheduled? No       Annalee Peace, LCSW, MSW

## 2022-08-18 ENCOUNTER — OFFICE VISIT (OUTPATIENT)
Dept: BEHAVIORAL HEALTH | Facility: CLINIC | Age: 62
End: 2022-08-18
Payer: MEDICARE

## 2022-08-18 DIAGNOSIS — F41.1 GENERALIZED ANXIETY DISORDER: ICD-10-CM

## 2022-08-18 PROCEDURE — 90837 PSYTX W PT 60 MINUTES: CPT | Performed by: SOCIAL WORKER

## 2022-08-18 NOTE — PROGRESS NOTES
[facility]   Therapy Progress Note        Name: Paulette Concepcion  MRN: [id number]  : 1960  Age: 62 y.o.  Date of assessment: 2022  PCP: Iraida Zavala M.D.  Persons in attendance: Patient  Total session time: 56 minutes      Topics addressed in psychotherapy include: Pt to indiv in office appt Pt experiencing higher levels of anxiety/triggers based on dealing with past issues with electronics, people being in pt home, going through old docs. Processed trauma response and recognition. Pt will talk with psy about anxiety as it relates to current med and if it is working to level with goal to reduce use of Xanax. She will contack medicare and [employer] for financial planning Plan: see biweekly.      Objective Observations:   Participation:Active verbal participation   Grooming:Casual   Cognition:Fully Oriented   Eye Contact:Good   Mood:Anxious   Affect:Sad and Anxious   Thought Process:Goal-directed   Speech:Rate within normal limits and Volume within normal limits    Current Risk:   Suicide: low   Homicide: NA   Self-Harm: NA   Relapse: NA   Safety Plan Reviewed: NA    Care Plan Updated: No    Does patient express agreement with the above plan? Yes     Diagnosis:  SAUL    Therapeutic Intervention(s): Cognitive modification, Conflict clarification, and Psychoeducation RE: truama    Treatment Goal(s)/Objective(s) addressed: anxiety    Progress toward Treatment Goals: Mild improvement    Referral appointment(s) scheduled? No       Annalee Peace, LCSW, MSW

## 2022-08-23 ENCOUNTER — OFFICE VISIT (OUTPATIENT)
Dept: DERMATOLOGY | Facility: IMAGING CENTER | Age: 62
End: 2022-08-23
Payer: MEDICARE

## 2022-08-23 DIAGNOSIS — L02.92 FURUNCLE: ICD-10-CM

## 2022-08-23 DIAGNOSIS — D23.9 DILATED PORE OF WINER: ICD-10-CM

## 2022-08-23 PROCEDURE — 99213 OFFICE O/P EST LOW 20 MIN: CPT | Performed by: NURSE PRACTITIONER

## 2022-08-23 NOTE — PROGRESS NOTES
"DERMATOLOGY NOTE  FOLLOW UP VISIT  proceure room     Chief complaint: Follow-Up  Flare up on cyst on back and left buttock . States has been draining x 2 days     Initial Hx   HPI/location: lt buttock  Poss cyst tx before w/ antibiotics   Time present:   Painful lesion: No  Itching lesion: No  Enlarging lesion: No  Anything make it better or worse?    And 2 skin tags bothersome    History of skin cancer: No  History of precancers/actinic keratoses: Yes, Details: Skin tags  History of biopsies:Yes, Details: benign  History of blistering/severe sunburns:Yes, Details: multiple throughout life  Family history of skin cancer:No  Family history of atypical moles:No    Allergies   Allergen Reactions    Seasonal Runny Nose and Itching    Sulfa Drugs Nausea     Pt states \"I get very sick, I was over 20 years ago\".     MEDICATIONS:  Medications relevant to specialty reviewed.     REVIEW OF SYSTEMS:   Positive for skin (see HPI)  Negative for fevers and chills    EXAM:  There were no vitals taken for this visit.  Constitutional: Well-developed, well-nourished, and in no distress.     A focused skin exam was performed including the affected areas of the neck/upper back and L posterior hip. Notable findings on exam today listed below and/or in assessment/plan.     Furuncle to L posterior hip, TTP and draining serosanguinous and purulent drainage  Inflamed dilated pore of Francine to posterior neck/upper back, with notable surrounding induration from pt expressing content from lesion      IMPRESSION / PLAN:      1. Dilated pore of Francine  Additional thick whitish content expressed from lesion  Advised to return when induration resolved, can do punch removal at that time      2. Furuncle  Additional content expressed from lesion  Advised to apply warm moist compresses to affected area up to 3 times per day, allowing lesion to drain and heal  Return 3 weeks for further eval, may consider ILK or excision              Please note that " this dictation was created using voice recognition software. I have made every reasonable attempt to correct obvious errors, but I expect that there are errors of grammar and possibly content that I did not discover before finalizing the note.      Return to clinic in: Return for follow up when lesions heal--1-3 weeks. and as needed for any new or changing skin lesions.

## 2022-08-24 ENCOUNTER — OFFICE VISIT (OUTPATIENT)
Dept: BEHAVIORAL HEALTH | Facility: CLINIC | Age: 62
End: 2022-08-24
Payer: MEDICARE

## 2022-08-24 DIAGNOSIS — F98.8 ADD (ATTENTION DEFICIT DISORDER) WITHOUT HYPERACTIVITY: ICD-10-CM

## 2022-08-24 DIAGNOSIS — F41.1 GENERALIZED ANXIETY DISORDER: ICD-10-CM

## 2022-08-24 DIAGNOSIS — F33.1 MODERATE EPISODE OF RECURRENT MAJOR DEPRESSIVE DISORDER (HCC): ICD-10-CM

## 2022-08-24 PROCEDURE — 99214 OFFICE O/P EST MOD 30 MIN: CPT | Performed by: PSYCHIATRY & NEUROLOGY

## 2022-08-24 RX ORDER — DEXTROAMPHETAMINE SACCHARATE, AMPHETAMINE ASPARTATE, DEXTROAMPHETAMINE SULFATE AND AMPHETAMINE SULFATE 1.25; 1.25; 1.25; 1.25 MG/1; MG/1; MG/1; MG/1
5 TABLET ORAL 2 TIMES DAILY
Qty: 60 TABLET | Refills: 0 | Status: SHIPPED | OUTPATIENT
Start: 2022-08-24 | End: 2022-09-23

## 2022-08-24 NOTE — PROGRESS NOTES
[facility]   Follow Up Assessment     This provider informed the patient their medical records are totally confidential except for the use by other providers involved in their care, or if the patient signs a release, or to report instances of child or elder abuse, or if it is determined they are an immediate risk to harm themselves or others.    Name: Paulette Concepcion  MRN: [id number]  : 1960  Age: 62 y.o.  Date of assessment: 2022  PCP: Iraida Zavala M.D.      Subjective:  Chart reviewed prior to seeing her in my office.  Increasing Strattera to 100 mg a.m. has not helped her ADD symptoms.  He is not sure if Celexa 20 mg at bedtime is helping her depression or anxiety. We discussed medication options.    Objective:  She is alert, oriented, and cooperative.  Relatedness is good.  Grooming is good.  Speech is normal rate.  Anxious.  Memory is good.  Insight and judgment are good.  No indication of psychotic thinking.    Current Risk:       Suicidal: Not suicidal       Homicidal: Not homicidal       Self-Harm: No plan to harm self       Relapse: (Low/Moderate/High): Moderate       Crisis Safety Plan Reviewed: Discussed with patient    Diagnosis:   ADD  Major depressive disorder, recurrent  Generalized anxiety disorder    Treatment Plan:  The current treatment plan consists of a follow-up visit in 6 weeks and then quarterly psychiatric sessions designed to evaluate her ADD, depression, and anxiety.    Duration will be for a minimum of 12 months and will be reviewed at each visit.    Goals: Improvement in ADD symptoms with remission of depression and control of anxiety in order to prevent relapse due to the chronic nature of her behavioral health problems and mental illness.  Continue Celexa 20 mg at bedtime for now.  Discontinue Strattera and start Adderall 5 mg twice daily.  Possible side effects of Adderall discussed.    Daniel Krause M.D.      This note was created using  voice recognition software (Dragon). The accuracy of the dictation is limited by the abilities of the software. I have reviewed the note prior to signing, however some errors in grammar and context are still possible. If you have any questions related to this note please do not hesitate to contact our office.

## 2022-09-08 ENCOUNTER — OFFICE VISIT (OUTPATIENT)
Dept: BEHAVIORAL HEALTH | Facility: CLINIC | Age: 62
End: 2022-09-08
Payer: MEDICARE

## 2022-09-08 DIAGNOSIS — F41.1 GENERALIZED ANXIETY DISORDER: ICD-10-CM

## 2022-09-08 DIAGNOSIS — F39 MOOD DISORDER (HCC): ICD-10-CM

## 2022-09-08 PROCEDURE — 90837 PSYTX W PT 60 MINUTES: CPT | Performed by: SOCIAL WORKER

## 2022-09-08 RX ORDER — ALPRAZOLAM 1 MG/1
1 TABLET ORAL NIGHTLY
Qty: 90 TABLET | Refills: 0 | OUTPATIENT
Start: 2022-09-08 | End: 2022-12-07

## 2022-09-08 NOTE — PROGRESS NOTES
[facility]   Therapy Progress Note        Name: Paulette Concepcion  MRN: [id number]  : 1960  Age: 62 y.o.  Date of assessment: 2022  PCP: Iraida Zavala M.D.  Persons in attendance: Patient  Total session time: 55 minutes      Topics addressed in psychotherapy include: Pt to indv in office appt. Mood stable, some lack of initiation however some improvement. Increase in antidepressive, prescribed Adderall, just beginning to take. Focused on election, some conspiracy theory, not reaching out to past family she feels has different and possible extreme viewpoints.Has had interactions with kids, friends, events in Sept on calend.     Objective Observations:   Participation:Active verbal participation, Attentive, and Engaged   Grooming:Casual   Cognition:Fully Oriented   Eye Contact:Good   Mood:Euthymic   Affect:Congruent with content   Thought Process:Perseveration on election   Speech:Rate within normal limits and Volume within normal limits    Current Risk:   Suicide: low   Homicide: NA   Self-Harm: NA   Relapse: low   Safety Plan Reviewed: no    Care Plan Updated: No    Does patient express agreement with the above plan? Yes     Diagnosis:  SAUL  Mood disorder history    Therapeutic Intervention(s): Cognitive modification, Interpersonal effectiveness skills, and Positive behavior reinforced    Treatment Goal(s)/Objective(s) addressed: mood     Progress toward Treatment Goals: No change    Referral appointment(s) scheduled? No       Annalee Peace, LCSW, MSW

## 2022-09-21 ENCOUNTER — DOCUMENTATION (OUTPATIENT)
Dept: HEALTH INFORMATION MANAGEMENT | Facility: OTHER | Age: 62
End: 2022-09-21
Payer: MEDICARE

## 2022-09-26 ENCOUNTER — OFFICE VISIT (OUTPATIENT)
Dept: BEHAVIORAL HEALTH | Facility: CLINIC | Age: 62
End: 2022-09-26
Payer: MEDICARE

## 2022-09-26 DIAGNOSIS — F39 MOOD DISORDER (HCC): ICD-10-CM

## 2022-09-26 DIAGNOSIS — F41.1 GENERALIZED ANXIETY DISORDER: ICD-10-CM

## 2022-09-26 PROCEDURE — 90837 PSYTX W PT 60 MINUTES: CPT | Performed by: SOCIAL WORKER

## 2022-09-26 NOTE — PROGRESS NOTES
[facility]   Therapy Progress Note        Name: Paulette Concepcion  MRN: [id number]  : 1960  Age: 62 y.o.  Date of assessment: 2022  PCP: Iraida Zavala M.D.  Persons in attendance: Patient  Total session time: 56   minutes      Topics addressed in psychotherapy include: Pt to indiv in office appt. Pt reports increase in antidepressent has been helpful. Conflict with son over politics and gender, pt unsure how to handle but did good job deflecting situation as it occurred. Steered clear of events over weekned due to concern about bikers/hx with fa. and is hyperfocused on election, news. Will meet with friend for dinner, watch the Voice for leisure and distraction from world issues. Plan: see biweekly.     Objective Observations:   Participation:Active verbal participation, Engaged, and Open to feedback   Grooming:Casual   Cognition:Fully Oriented   Eye Contact:Good   Mood:Anxious   Affect:Congruent with content   Thought Process:Goal-directed   Speech:Rate within normal limits and Volume within normal limits    Current Risk:   Suicide: low   Homicide: NA   Self-Harm: low   Relapse: low   Safety Plan Reviewed: no    Care Plan Updated: No    Does patient express agreement with the above plan? Yes     Diagnosis:  SAUL    Therapeutic Intervention(s): Communication skills, Distress tolerance skills, and Interpersonal effectiveness skills    Treatment Goal(s)/Objective(s) addressed: mood stabilization     Progress toward Treatment Goals: Mild improvement    Referral appointment(s) scheduled? No       Annalee Peace, LCSW, MSW

## 2022-09-26 NOTE — TELEPHONE ENCOUNTER
Patient is asking for a refill, patient has an appointment 10/5/22. Patient states she has been taking two tablets a day on the citalopram (CELEXA) if you could please update the directions.         Received request via: Patient    Was the patient seen in the last year in this department? Yes    Does the patient have an active prescription (recently filled or refills available) for medication(s) requested? No

## 2022-09-27 RX ORDER — CITALOPRAM 20 MG/1
20 TABLET ORAL 2 TIMES DAILY
Qty: 60 TABLET | Refills: 0 | Status: SHIPPED | OUTPATIENT
Start: 2022-09-27 | End: 2022-10-05

## 2022-09-27 RX ORDER — CITALOPRAM 20 MG/1
20 TABLET ORAL DAILY
Qty: 90 TABLET | Refills: 0 | Status: SHIPPED | OUTPATIENT
Start: 2022-09-27 | End: 2022-09-27 | Stop reason: SDUPTHER

## 2022-09-27 RX ORDER — ATOMOXETINE 100 MG/1
100 CAPSULE ORAL DAILY
Qty: 90 CAPSULE | Refills: 0 | Status: SHIPPED | OUTPATIENT
Start: 2022-09-27 | End: 2022-10-05

## 2022-10-05 ENCOUNTER — OFFICE VISIT (OUTPATIENT)
Dept: BEHAVIORAL HEALTH | Facility: CLINIC | Age: 62
End: 2022-10-05
Payer: MEDICARE

## 2022-10-05 DIAGNOSIS — F33.1 MODERATE EPISODE OF RECURRENT MAJOR DEPRESSIVE DISORDER (HCC): ICD-10-CM

## 2022-10-05 DIAGNOSIS — F41.1 GENERALIZED ANXIETY DISORDER: ICD-10-CM

## 2022-10-05 DIAGNOSIS — F98.8 ADD (ATTENTION DEFICIT DISORDER) WITHOUT HYPERACTIVITY: ICD-10-CM

## 2022-10-05 PROBLEM — G62.9 NEUROPATHY: Status: ACTIVE | Noted: 2022-10-05

## 2022-10-05 PROBLEM — I70.0 ATHEROSCLEROSIS OF AORTA (HCC): Status: ACTIVE | Noted: 2022-10-05

## 2022-10-05 PROBLEM — M81.0 OSTEOPOROSIS WITHOUT CURRENT PATHOLOGICAL FRACTURE: Status: ACTIVE | Noted: 2021-04-08

## 2022-10-05 PROBLEM — F13.20 SEDATIVE HYPNOTIC OR ANXIOLYTIC DEPENDENCE (HCC): Status: ACTIVE | Noted: 2022-10-05

## 2022-10-05 PROBLEM — I77.9 DISORDER OF ARTERIES AND ARTERIOLES (HCC): Status: RESOLVED | Noted: 2021-09-27 | Resolved: 2022-10-05

## 2022-10-05 PROBLEM — R03.0 ELEVATED BP WITHOUT DIAGNOSIS OF HYPERTENSION: Status: ACTIVE | Noted: 2022-10-05

## 2022-10-05 PROCEDURE — 99214 OFFICE O/P EST MOD 30 MIN: CPT | Performed by: PSYCHIATRY & NEUROLOGY

## 2022-10-05 RX ORDER — CITALOPRAM 40 MG/1
40 TABLET ORAL NIGHTLY
Qty: 90 TABLET | Refills: 0 | Status: SHIPPED | OUTPATIENT
Start: 2022-10-05 | End: 2022-12-07 | Stop reason: SDUPTHER

## 2022-10-05 NOTE — PROGRESS NOTES
[facility]   Follow Up Assessment     This provider informed the patient their medical records are totally confidential except for the use by other providers involved in their care, or if the patient signs a release, or to report instances of child or elder abuse, or if it is determined they are an immediate risk to harm themselves or others.    Name: Paulette Concepcion  MRN: [id number]  : 1960  Age: 62 y.o.  Date of assessment: 10/5/2022  PCP: Iraida Zavala M.D.      Subjective:  Chart reviewed prior to seeing her in my office.  Adderall 5 mg twice daily is helping her ADD symptoms but she would like to increase the dose.  She would like to have a 90-day supply of Celexa 40 mg at bedtime.    Objective:  She is alert, oriented, and cooperative.  Relatedness is good.  Grooming is good.  Speech is normal rate.  Less anxious.  Memory is good.  Insight and judgment are good.  No indication of psychotic thinking.    Current Risk:       Suicidal: Not suicidal       Homicidal: Not homicidal       Self-Harm: No plan to harm self       Relapse: (Low/Moderate/High): Moderate       Crisis Safety Plan Reviewed: Discussed with patient    Diagnosis:   ADD  Major depressive disorder, recurrent  Generalized anxiety disorder    Treatment Plan:  The current treatment plan consists of a follow-up visit in 3 weeks and then monthly psychiatric sessions designed to evaluate her ADD, depression, and anxiety.    Duration will be for a minimum of 12 months and will be reviewed at each visit.    Goals: Remission of depression and control of anxiety with improvement in ADHD symptoms in order to prevent relapse due to the chronic nature of her behavioral health problems and mental illness.  Change Adderall to 10 mg 3 times daily.  Continue Celexa 40 mg at bedtime.    Daniel Krause M.D.      This note was created using voice recognition software (Dragon). The accuracy of the dictation is limited by the  abilities of the software. I have reviewed the note prior to signing, however some errors in grammar and context are still possible. If you have any questions related to this note please do not hesitate to contact our office.

## 2022-10-12 DIAGNOSIS — F98.8 ADD (ATTENTION DEFICIT DISORDER) WITHOUT HYPERACTIVITY: Primary | ICD-10-CM

## 2022-10-12 RX ORDER — DEXTROAMPHETAMINE SACCHARATE, AMPHETAMINE ASPARTATE, DEXTROAMPHETAMINE SULFATE AND AMPHETAMINE SULFATE 2.5; 2.5; 2.5; 2.5 MG/1; MG/1; MG/1; MG/1
10 TABLET ORAL 3 TIMES DAILY
Qty: 30 TABLET | Refills: 0 | Status: SHIPPED | OUTPATIENT
Start: 2022-10-12 | End: 2022-10-26

## 2022-10-17 ENCOUNTER — APPOINTMENT (OUTPATIENT)
Dept: BEHAVIORAL HEALTH | Facility: CLINIC | Age: 62
End: 2022-10-17
Payer: MEDICARE

## 2022-10-26 ENCOUNTER — OFFICE VISIT (OUTPATIENT)
Dept: BEHAVIORAL HEALTH | Facility: CLINIC | Age: 62
End: 2022-10-26
Payer: MEDICARE

## 2022-10-26 DIAGNOSIS — F41.1 GENERALIZED ANXIETY DISORDER: ICD-10-CM

## 2022-10-26 DIAGNOSIS — F33.1 MODERATE EPISODE OF RECURRENT MAJOR DEPRESSIVE DISORDER (HCC): ICD-10-CM

## 2022-10-26 DIAGNOSIS — F98.8 ADD (ATTENTION DEFICIT DISORDER) WITHOUT HYPERACTIVITY: ICD-10-CM

## 2022-10-26 PROCEDURE — 99214 OFFICE O/P EST MOD 30 MIN: CPT | Performed by: PSYCHIATRY & NEUROLOGY

## 2022-10-26 RX ORDER — DEXTROAMPHETAMINE SACCHARATE, AMPHETAMINE ASPARTATE, DEXTROAMPHETAMINE SULFATE, AND AMPHETAMINE SULFATE 3.75; 3.75; 3.75; 3.75 MG/1; MG/1; MG/1; MG/1
15 TABLET ORAL 3 TIMES DAILY
Qty: 90 TABLET | Refills: 0 | Status: SHIPPED | OUTPATIENT
Start: 2022-10-26 | End: 2022-10-31 | Stop reason: SDUPTHER

## 2022-10-26 NOTE — PROGRESS NOTES
[facility]   Follow Up Assessment     This provider informed the patient their medical records are totally confidential except for the use by other providers involved in their care, or if the patient signs a release, or to report instances of child or elder abuse, or if it is determined they are an immediate risk to harm themselves or others.    Name: Paulette Concepcion  MRN: [id number]  : 1960  Age: 62 y.o.  Date of assessment: 10/26/2022  PCP: Iraida Zavala M.D.      Subjective:  Chart reviewed prior to seeing her in my office.  Adderall 10 mg 3 times daily is definitely helping her ADD symptoms-focusing better, but she would like to increase the dose.  We talked about the current shortage of Adderall 10 mg and 20 mg doses in local pharmacies.  She is agreeable to increasing Adderall to 15 mg 3 times daily.    Objective:  She is alert, oriented, and cooperative.  Relatedness is good.  Grooming is good.  Speech is normal rate.  Less anxious.  Memory is good.  Insight and judgment are good.  No indication of psychotic thinking.    Current Risk:       Suicidal: Not suicidal       Homicidal: Not homicidal       Self-Harm: No plan to harm self       Relapse: (Low/Moderate/High): Moderate       Crisis Safety Plan Reviewed: Discussed with patient    Diagnosis:   ADD  Major depressive disorder, recurrent  Generalized anxiety disorder    Treatment Plan:  The current treatment plan consists of a follow-up visit in 2 months and then quarterly psychiatric sessions designed to evaluate her ADD, depression, and anxiety.    Duration will be for a minimum of 12 months and will be reviewed at each visit.    Goals: Improvement in ADD symptoms, remission of depression, and control of anxiety in order to prevent relapse due to the chronic nature of her behavioral health problems and mental illness.  Increase Adderall to 15 mg 3 times daily.  Consider decreasing Celexa to 20 mg at bedtime?        Daniel  TYRA Krause M.D.      This note was created using voice recognition software (Dragon). The accuracy of the dictation is limited by the abilities of the software. I have reviewed the note prior to signing, however some errors in grammar and context are still possible. If you have any questions related to this note please do not hesitate to contact our office.

## 2022-10-31 DIAGNOSIS — F98.8 ADD (ATTENTION DEFICIT DISORDER) WITHOUT HYPERACTIVITY: ICD-10-CM

## 2022-10-31 RX ORDER — DEXTROAMPHETAMINE SACCHARATE, AMPHETAMINE ASPARTATE, DEXTROAMPHETAMINE SULFATE, AND AMPHETAMINE SULFATE 3.75; 3.75; 3.75; 3.75 MG/1; MG/1; MG/1; MG/1
15 TABLET ORAL 2 TIMES DAILY
Qty: 60 TABLET | Refills: 0 | Status: SHIPPED | OUTPATIENT
Start: 2022-10-31 | End: 2022-11-30

## 2022-10-31 NOTE — TELEPHONE ENCOUNTER
Patient states insurance only covers x2 a day. Patient is asking if you can re-sent the Adderall with the directions of x2 a day.        Received request via: Patient    Was the patient seen in the last year in this department? Yes    Does the patient have an active prescription (recently filled or refills available) for medication(s) requested? No

## 2022-11-11 ENCOUNTER — PATIENT MESSAGE (OUTPATIENT)
Dept: HEALTH INFORMATION MANAGEMENT | Facility: OTHER | Age: 62
End: 2022-11-11

## 2022-11-16 ENCOUNTER — OFFICE VISIT (OUTPATIENT)
Dept: BEHAVIORAL HEALTH | Facility: CLINIC | Age: 62
End: 2022-11-16
Payer: MEDICARE

## 2022-11-16 DIAGNOSIS — F41.1 GENERALIZED ANXIETY DISORDER: ICD-10-CM

## 2022-11-16 PROCEDURE — 90837 PSYTX W PT 60 MINUTES: CPT | Performed by: SOCIAL WORKER

## 2022-11-16 NOTE — PROGRESS NOTES
[facility]   Therapy Progress Note        Name: Paulette Concepcion  MRN: [id number]  : 1960  Age: 62 y.o.  Date of assessment: 2022  PCP: Iraida Zavala M.D.  Persons in attendance: patient  Total session time: 56 minutes      Topics addressed in psychotherapy include: Pt to indiv in office appt. Pt reports Adderal assisted in focus, mood. Pt is not perseverated on family, political issues, more calm. Explored relationships with bro, children, contact.. Mentioned issue with tire being flat and whether soeone took air out but moved on with conversation. Engaged in community, volunteering and building friendships. Reaching out to others for advise. Provide support psychotherapy with life changes. Plan: see monthly.     Objective Observations:   Participation:Active verbal participation and Engaged   Grooming:Casual   Cognition:Fully Oriented   Eye Contact:Good   Mood:Euthymic   Affect:Congruent with content   Thought Process:Logical   Speech:Rate within normal limits and Soft    Current Risk:   Suicide: low   Homicide: NA   Self-Harm: na   Relapse: low   Safety Plan Reviewed: no    Care Plan Updated: No    Does patient express agreement with the above plan? Yes     Diagnosis:  SAUL    Therapeutic Intervention(s): Goal-setting, Interpersonal effectiveness skills, and Stressors assessed    Treatment Goal(s)/Objective(s) addressed: anxiety     Progress toward Treatment Goals: Moderate improvement    Referral appointment(s) scheduled? No       Annalee Peace, LCSW, MSW

## 2022-11-22 ENCOUNTER — HOSPITAL ENCOUNTER (OUTPATIENT)
Dept: RADIOLOGY | Facility: MEDICAL CENTER | Age: 62
End: 2022-11-22
Attending: INTERNAL MEDICINE
Payer: MEDICARE

## 2022-11-22 DIAGNOSIS — Z12.31 VISIT FOR SCREENING MAMMOGRAM: ICD-10-CM

## 2022-11-22 PROCEDURE — 77063 BREAST TOMOSYNTHESIS BI: CPT

## 2022-12-07 ENCOUNTER — OFFICE VISIT (OUTPATIENT)
Dept: BEHAVIORAL HEALTH | Facility: CLINIC | Age: 62
End: 2022-12-07
Payer: MEDICARE

## 2022-12-07 DIAGNOSIS — F41.1 GENERALIZED ANXIETY DISORDER: ICD-10-CM

## 2022-12-07 DIAGNOSIS — F98.8 ADD (ATTENTION DEFICIT DISORDER) WITHOUT HYPERACTIVITY: ICD-10-CM

## 2022-12-07 DIAGNOSIS — F33.1 MODERATE EPISODE OF RECURRENT MAJOR DEPRESSIVE DISORDER (HCC): ICD-10-CM

## 2022-12-07 PROCEDURE — 99214 OFFICE O/P EST MOD 30 MIN: CPT | Performed by: PSYCHIATRY & NEUROLOGY

## 2022-12-07 RX ORDER — CITALOPRAM 40 MG/1
40 TABLET ORAL NIGHTLY
Qty: 90 TABLET | Refills: 0 | Status: SHIPPED | OUTPATIENT
Start: 2022-12-07 | End: 2023-03-07

## 2022-12-07 RX ORDER — LISDEXAMFETAMINE DIMESYLATE 20 MG/1
20 CAPSULE ORAL DAILY
Qty: 30 CAPSULE | Refills: 0 | Status: SHIPPED | OUTPATIENT
Start: 2022-12-07 | End: 2022-12-16

## 2022-12-07 NOTE — PROGRESS NOTES
[facility]   Follow Up Assessment     This provider informed the patient their medical records are totally confidential except for the use by other providers involved in their care, or if the patient signs a release, or to report instances of child or elder abuse, or if it is determined they are an immediate risk to harm themselves or others.    Name: Paulette Concepcion  MRN: [id number]  : 1960  Age: 62 y.o.  Date of assessment: 2022  PCP: Iraida Zavala M.D.      Subjective:  Chart reviewed prior to seeing her in my office.  She was seen most recently on .  We talked about the availability of Adderall problem.  She is agreeable to a trial on Vyvanse.  She prefers to stay at Celexa 40 mg hs for now.    Objective:  She is alert, oriented, and cooperative.  Relatedness is good.  Grooming is good.  Anxious.  Memory is good.  Insight and judgment are good.  No indication of psychotic thinking.    Current Risk:       Suicidal: Not suicidal       Homicidal: Not homicidal       Self-Harm: No plan to harm self       Relapse: (Low/Moderate/High): Moderate       Crisis Safety Plan Reviewed: Discussed with patient    Diagnosis:   ADD  Major depressive disorder, recurrent  Generalized anxiety disorder    Treatment Plan:  The current treatment plan consists of quarterly psychiatric sessions designed to evaluate her ADD, depression, and anxiety.    Duration will be for a minimum of 12 months and will be reviewed at each visit.    Goals: Improvement in ADD symptoms with remission of depression and control of anxiety in order to prevent relapse due to the chronic nature of her behavioral health problems and mental illness.  Continue Celexa 40 mg at bedtime.  Discontinue Adderall and start Vyvanse 20 mg a.m.    Daniel Krause M.D.      This note was created using voice recognition software (Dragon). The accuracy of the dictation is limited by the abilities of the software. I have  reviewed the note prior to signing, however some errors in grammar and context are still possible. If you have any questions related to this note please do not hesitate to contact our office.

## 2022-12-12 DIAGNOSIS — F98.8 ADD (ATTENTION DEFICIT DISORDER) WITHOUT HYPERACTIVITY: ICD-10-CM

## 2022-12-12 RX ORDER — DEXTROAMPHETAMINE SACCHARATE, AMPHETAMINE ASPARTATE, DEXTROAMPHETAMINE SULFATE, AND AMPHETAMINE SULFATE 3.75; 3.75; 3.75; 3.75 MG/1; MG/1; MG/1; MG/1
15 TABLET ORAL 2 TIMES DAILY
Qty: 60 TABLET | Refills: 0 | OUTPATIENT
Start: 2022-12-12 | End: 2023-01-11

## 2022-12-12 NOTE — TELEPHONE ENCOUNTER
Pt Received request via: Patient    Was the patient seen in the last year in this department? Yes    Does the patient have an active prescription (recently filled or refills available) for medication(s) requested? No    Does the patient have [facility] Plus and need 100 day supply (blood pressure, diabetes and cholesterol meds only)? Medication is not for cholesterol, blood pressure or diabetes

## 2022-12-16 DIAGNOSIS — F98.8 ADD (ATTENTION DEFICIT DISORDER) WITHOUT HYPERACTIVITY: Primary | ICD-10-CM

## 2022-12-16 RX ORDER — DEXTROAMPHETAMINE SACCHARATE, AMPHETAMINE ASPARTATE, DEXTROAMPHETAMINE SULFATE AND AMPHETAMINE SULFATE 5; 5; 5; 5 MG/1; MG/1; MG/1; MG/1
20 TABLET ORAL 2 TIMES DAILY
Qty: 60 TABLET | Refills: 0 | Status: SHIPPED | OUTPATIENT
Start: 2022-12-16 | End: 2023-02-14 | Stop reason: SDUPTHER

## 2022-12-23 RX ORDER — CITALOPRAM 20 MG/1
TABLET ORAL
Qty: 90 TABLET | Refills: 0 | Status: SHIPPED | OUTPATIENT
Start: 2022-12-23 | End: 2023-03-09

## 2022-12-26 DIAGNOSIS — F41.1 GENERALIZED ANXIETY DISORDER: ICD-10-CM

## 2022-12-27 RX ORDER — ALPRAZOLAM 1 MG/1
TABLET ORAL
Qty: 30 TABLET | Refills: 0 | Status: SHIPPED | OUTPATIENT
Start: 2022-12-27 | End: 2023-03-03

## 2023-01-19 ENCOUNTER — OFFICE VISIT (OUTPATIENT)
Dept: BEHAVIORAL HEALTH | Facility: CLINIC | Age: 63
End: 2023-01-19
Payer: MEDICARE

## 2023-01-19 DIAGNOSIS — F39 MOOD DISORDER (HCC): ICD-10-CM

## 2023-01-19 PROCEDURE — 90832 PSYTX W PT 30 MINUTES: CPT | Performed by: SOCIAL WORKER

## 2023-01-19 NOTE — PROGRESS NOTES
[facility]   Therapy Progress Note        Name: Paulette Concepcion  MRN: [id number]  : 1960  Age: 62 y.o.  Date of assessment: 2023  PCP: Iraida Zavala M.D.  Persons in attendance: Patient  Total session time: 30 minutes      Topics addressed in psychotherapy include: Pt to indiv in office. Mood relaxed, some low level depression; feels meds are working well, however still reports feeling blah some days; feels weather damon not impact mood. Lost heat x2 this winter, has gained weight due to carb increase, some intermittent contact with sons, brother who visited over holiday, good neighbor support. No interest in traveling to see friend in [place].     Objective Observations:   Participation:Active verbal participation and Engaged   Grooming:Casual   Cognition:Fully Oriented   Eye Contact:Good   Mood:Depressed   Affect:Flat   Thought Process:Goal-directed   Speech:Rate within normal limits and Volume within normal limits    Current Risk:   Suicide: NA   Homicide: NA   Self-Harm: NA   Relapse: NAn   Safety Plan Reviewed: NA    Care Plan Updated: No    Does patient express agreement with the above plan? Yes     Diagnosis:  Mood disorder    Therapeutic Intervention(s): Interpersonal effectiveness skills    Treatment Goal(s)/Objective(s) addressed: mood     Progress toward Treatment Goals: Moderate improvement    Referral appointment(s) scheduled? No       Annalee Peace, LCSW, MSW

## 2023-02-14 DIAGNOSIS — F98.8 ADD (ATTENTION DEFICIT DISORDER) WITHOUT HYPERACTIVITY: ICD-10-CM

## 2023-02-14 RX ORDER — DEXTROAMPHETAMINE SACCHARATE, AMPHETAMINE ASPARTATE, DEXTROAMPHETAMINE SULFATE AND AMPHETAMINE SULFATE 5; 5; 5; 5 MG/1; MG/1; MG/1; MG/1
20 TABLET ORAL 2 TIMES DAILY
Qty: 60 TABLET | Refills: 0 | Status: SHIPPED | OUTPATIENT
Start: 2023-02-14 | End: 2023-02-17 | Stop reason: SDUPTHER

## 2023-02-14 NOTE — TELEPHONE ENCOUNTER
Received request via: Patient    Was the patient seen in the last year in this department? Yes    Does the patient have an active prescription (recently filled or refills available) for medication(s) requested? No    Does the patient have [facility] Plus and need 100 day supply (blood pressure, diabetes and cholesterol meds only)? Medication is not for cholesterol, blood pressure or diabetes

## 2023-02-17 DIAGNOSIS — F98.8 ADD (ATTENTION DEFICIT DISORDER) WITHOUT HYPERACTIVITY: ICD-10-CM

## 2023-02-17 RX ORDER — DEXTROAMPHETAMINE SACCHARATE, AMPHETAMINE ASPARTATE, DEXTROAMPHETAMINE SULFATE AND AMPHETAMINE SULFATE 5; 5; 5; 5 MG/1; MG/1; MG/1; MG/1
20 TABLET ORAL 2 TIMES DAILY
Qty: 60 TABLET | Refills: 0 | Status: SHIPPED | OUTPATIENT
Start: 2023-02-17 | End: 2023-03-09

## 2023-02-17 NOTE — TELEPHONE ENCOUNTER
Please re-send prescription to the [facility] pharmacy.       Received request via: Patient    Was the patient seen in the last year in this department? Yes    Does the patient have an active prescription (recently filled or refills available) for medication(s) requested? No    Does the patient have [facility] Plus and need 100 day supply (blood pressure, diabetes and cholesterol meds only)? Medication is not for cholesterol, blood pressure or diabetes

## 2023-02-23 ENCOUNTER — APPOINTMENT (OUTPATIENT)
Dept: MEDICAL GROUP | Facility: PHYSICIAN GROUP | Age: 63
End: 2023-02-23
Payer: MEDICARE

## 2023-02-24 ENCOUNTER — APPOINTMENT (OUTPATIENT)
Dept: MEDICAL GROUP | Facility: PHYSICIAN GROUP | Age: 63
End: 2023-02-24
Payer: MEDICARE

## 2023-02-24 ENCOUNTER — OFFICE VISIT (OUTPATIENT)
Dept: MEDICAL GROUP | Facility: MEDICAL CENTER | Age: 63
End: 2023-02-24
Payer: MEDICARE

## 2023-02-24 VITALS
DIASTOLIC BLOOD PRESSURE: 74 MMHG | HEIGHT: 62 IN | TEMPERATURE: 97.5 F | HEART RATE: 90 BPM | BODY MASS INDEX: 28.05 KG/M2 | SYSTOLIC BLOOD PRESSURE: 152 MMHG | OXYGEN SATURATION: 96 % | WEIGHT: 152.4 LBS

## 2023-02-24 DIAGNOSIS — Q67.0 ASYMMETRY OF FACE: ICD-10-CM

## 2023-02-24 DIAGNOSIS — K13.0 TRAUMATIC LIP PAIN: ICD-10-CM

## 2023-02-24 DIAGNOSIS — R03.0 ELEVATED BP WITHOUT DIAGNOSIS OF HYPERTENSION: ICD-10-CM

## 2023-02-24 PROCEDURE — 99214 OFFICE O/P EST MOD 30 MIN: CPT | Performed by: STUDENT IN AN ORGANIZED HEALTH CARE EDUCATION/TRAINING PROGRAM

## 2023-02-24 ASSESSMENT — ENCOUNTER SYMPTOMS
VOMITING: 0
PALPITATIONS: 0
FEVER: 0
SHORTNESS OF BREATH: 0
SPEECH CHANGE: 0
WEIGHT LOSS: 0
FOCAL WEAKNESS: 0
SENSORY CHANGE: 1
CHILLS: 0
NAUSEA: 0
WHEEZING: 0

## 2023-02-24 ASSESSMENT — PATIENT HEALTH QUESTIONNAIRE - PHQ9
1. LITTLE INTEREST OR PLEASURE IN DOING THINGS: NOT AT ALL
2. FEELING DOWN, DEPRESSED, IRRITABLE, OR HOPELESS: NOT AT ALL
7. TROUBLE CONCENTRATING ON THINGS, SUCH AS READING THE NEWSPAPER OR WATCHING TELEVISION: NOT AT ALL
SUM OF ALL RESPONSES TO PHQ QUESTIONS 1-9: 0
9. THOUGHTS THAT YOU WOULD BE BETTER OFF DEAD, OR OF HURTING YOURSELF: NOT AT ALL
5. POOR APPETITE OR OVEREATING: NOT AT ALL
4. FEELING TIRED OR HAVING LITTLE ENERGY: NOT AT ALL
8. MOVING OR SPEAKING SO SLOWLY THAT OTHER PEOPLE COULD HAVE NOTICED. OR THE OPPOSITE, BEING SO FIGETY OR RESTLESS THAT YOU HAVE BEEN MOVING AROUND A LOT MORE THAN USUAL: NOT AT ALL
3. TROUBLE FALLING OR STAYING ASLEEP OR SLEEPING TOO MUCH: NOT AT ALL
6. FEELING BAD ABOUT YOURSELF - OR THAT YOU ARE A FAILURE OR HAVE LET YOURSELF OR YOUR FAMILY DOWN: NOT AL ALL
SUM OF ALL RESPONSES TO PHQ9 QUESTIONS 1 AND 2: 0

## 2023-02-24 ASSESSMENT — FIBROSIS 4 INDEX: FIB4 SCORE: 0.73

## 2023-02-24 NOTE — PROGRESS NOTES
"Subjective:     CC: face symptoms     HPI:   Paulette presents today with    Problem   Traumatic Lip Pain    - Patient reports for past few week she has been biting her lips in the lower left and she is concern it may be due to her neck.   - she reports she feels like there is less control of her lower left lip  - No recent change in medication  - no other weakness       Asymmetry of Face   Elevated Bp Without Diagnosis of Hypertension    Elevated blood pressure in setting of recent adderal prescription.          Health Maintenance: follow pcp    ROS:  Review of Systems   Constitutional:  Negative for chills, fever and weight loss.   Respiratory:  Negative for shortness of breath and wheezing.    Cardiovascular:  Negative for chest pain and palpitations.   Gastrointestinal:  Negative for nausea and vomiting.   Genitourinary:  Negative for frequency and urgency.   Neurological:  Positive for sensory change. Negative for speech change and focal weakness.     Objective:     Exam:  BP (!) 152/74 (BP Location: Left arm, Patient Position: Sitting, BP Cuff Size: Small adult)   Pulse 90   Temp 36.4 °C (97.5 °F) (Temporal)   Ht 1.575 m (5' 2\")   Wt 69.1 kg (152 lb 6.4 oz)   SpO2 96%   BMI 27.87 kg/m²  Body mass index is 27.87 kg/m².    Physical Exam  Cardiovascular:      Rate and Rhythm: Normal rate and regular rhythm.   Pulmonary:      Effort: Pulmonary effort is normal.      Breath sounds: Normal breath sounds.   Neurological:      Mental Status: She is alert and oriented to person, place, and time.      Cranial Nerves: Facial asymmetry (smile symmetrical, rest left lower lip resting upwards) present. No cranial nerve deficit or dysarthria.      Motor: No weakness or tremor.      Coordination: Coordination normal. Finger-Nose-Finger Test and Heel to Shin Test normal.      Gait: Gait normal.           Labs:     Assessment & Plan:     62 y.o. female with the following -     1. Traumatic lip pain  3. Asymmetry of " face  Acute x2 days  Patient reports the last 2 days she has noticed increased muscle tone on her left lower lip.  She has been experiencing more traumatic bite wounds on her left lip.  On physical examination and her left lip is elevated upwards and towards her teeth at rest.  There is also appears to be decreased sensation around the left lower lip.  Neurological exam normal speech is fluent and smile is symmetric during active movement.  Etiology unclear, I do not believe this is a acute stroke.  Differential may include MS, ALS. Less likely tardive dyskinesia as patient is not on antipsychotic.  Not other muscle rigidity on exam.   Plan  --Patient to follow-up at urgent care or emergency room if she develops sudden worsening of her symptoms or facial droop  - MR-BRAIN-W/O; Future    2. Elevated BP without diagnosis of hypertension  Chronic, elevated  History of elevated blood pressure however today her blood pressure was measured at 152/74 which is higher than her normal elevated blood pressure of note she was recently started on Adderall by psychiatry  Repeat blood pressure was 142/74  Patient does not have chest pain or significant shortness of breath  Plan  Recommend patient obtain blood pressure cuff and to follow with primary within 2 to 3 weeks for evaluation of blood pressure and possibly starting blood pressure medication  Discussed with patient that there is increased risk of stroke with use of stimulant and uncontrolled blood pressure  Patient will followed up with me if her blood pressure remains on the elevated and she is unable to follow-up with her primary  - MR-BRAIN-W/O; Future        HCC Gap Form    Last edited 02/24/23 16:05 PST by Biju Henley M.D.           Return if symptoms worsen or fail to improve.    Please note that this dictation was created using voice recognition software. I have made every reasonable attempt to correct obvious errors, but I expect that there are errors of  grammar and possibly content that I did not discover before finalizing the note.

## 2023-03-02 DIAGNOSIS — F41.1 GENERALIZED ANXIETY DISORDER: ICD-10-CM

## 2023-03-03 RX ORDER — ALPRAZOLAM 1 MG/1
TABLET ORAL
Qty: 30 TABLET | Refills: 0 | Status: SHIPPED | OUTPATIENT
Start: 2023-03-03 | End: 2023-03-09 | Stop reason: SDUPTHER

## 2023-03-03 NOTE — TELEPHONE ENCOUNTER
Received request via: Pharmacy    Was the patient seen in the last year in this department? Yes    Does the patient have an active prescription (recently filled or refills available) for medication(s) requested? No    Does the patient have long term Plus and need 100 day supply (blood pressure, diabetes and cholesterol meds only)? Medication is not for cholesterol, blood pressure or diabetes and Patient does not have SCP

## 2023-03-09 ENCOUNTER — OFFICE VISIT (OUTPATIENT)
Dept: BEHAVIORAL HEALTH | Facility: CLINIC | Age: 63
End: 2023-03-09
Payer: MEDICARE

## 2023-03-09 DIAGNOSIS — F41.1 GENERALIZED ANXIETY DISORDER: ICD-10-CM

## 2023-03-09 DIAGNOSIS — F98.8 ADD (ATTENTION DEFICIT DISORDER) WITHOUT HYPERACTIVITY: ICD-10-CM

## 2023-03-09 PROCEDURE — 99214 OFFICE O/P EST MOD 30 MIN: CPT | Performed by: PSYCHIATRY & NEUROLOGY

## 2023-03-09 RX ORDER — CITALOPRAM 40 MG/1
40 TABLET ORAL DAILY
Qty: 90 TABLET | Refills: 0 | Status: SHIPPED | OUTPATIENT
Start: 2023-03-09 | End: 2023-06-05

## 2023-03-09 RX ORDER — DEXTROAMPHETAMINE SACCHARATE, AMPHETAMINE ASPARTATE, DEXTROAMPHETAMINE SULFATE AND AMPHETAMINE SULFATE 5; 5; 5; 5 MG/1; MG/1; MG/1; MG/1
20 TABLET ORAL 2 TIMES DAILY
Qty: 180 EACH | Refills: 0 | Status: SHIPPED | OUTPATIENT
Start: 2023-03-09 | End: 2023-03-23 | Stop reason: SDUPTHER

## 2023-03-09 RX ORDER — ALPRAZOLAM 1 MG/1
1 TABLET ORAL EVERY EVENING
Qty: 90 TABLET | Refills: 0 | Status: SHIPPED | OUTPATIENT
Start: 2023-03-09 | End: 2023-06-07

## 2023-03-09 NOTE — PROGRESS NOTES
[facility]   Follow Up Assessment     This provider informed the patient their medical records are totally confidential except for the use by other providers involved in their care, or if the patient signs a release, or to report instances of child or elder abuse, or if it is determined they are an immediate risk to harm themselves or others.    Name: Paulette Concepcion  MRN: [id number]  : 1960  Age: 62 y.o.  Date of assessment: 3/9/2023  PCP: Iraida Zavala M.D.      Subjective:  Chart reviewed prior to seeing her in my office.  She was last seen on  to seventh.  She is not able to afford Vyvanse and would like to resume Adderall 20 mg 3 times daily from her [facility] pharmacy.  Celexa 40 mg at bedtime is working well for her depression.    Objective:  She is alert, oriented, and cooperative.  Relatedness is good.  Grooming is good.  Speech is normal rate.  Anxious.  Memory is good.  Insight and judgment are good.  No indication of psychotic thinking.    Current Risk:       Suicidal: Not suicidal       Homicidal: Not homicidal       Self-Harm: No plan to harm self       Relapse: (Low/Moderate/High): Moderate       Crisis Safety Plan Reviewed: Discussed with patient    Diagnosis:   ADD  Major depressive disorder, recurrent  Generalized anxiety disorder    Treatment Plan:  The current treatment plan consists of quarterly psychiatric sessions designed to evaluate her ADD, depression, and anxiety.    Duration will be for a minimum of 12 months and will be reviewed at each visit.    Goals: Remission of depression and control of anxiety with improvement in ADD symptoms in order to prevent relapse due to the chronic nature of her behavioral health problems and mental illness.  Restart Adderall 20 mg 3 times daily    Daniel Krause M.D.      This note was created using voice recognition software (Dragon). The accuracy of the dictation is limited by the abilities of the software. I  have reviewed the note prior to signing, however some errors in grammar and context are still possible. If you have any questions related to this note please do not hesitate to contact our office.

## 2023-03-10 ENCOUNTER — HOSPITAL ENCOUNTER (OUTPATIENT)
Dept: RADIOLOGY | Facility: MEDICAL CENTER | Age: 63
End: 2023-03-10
Attending: STUDENT IN AN ORGANIZED HEALTH CARE EDUCATION/TRAINING PROGRAM
Payer: MEDICARE

## 2023-03-10 DIAGNOSIS — R03.0 ELEVATED BP WITHOUT DIAGNOSIS OF HYPERTENSION: ICD-10-CM

## 2023-03-10 DIAGNOSIS — Q67.0 ASYMMETRY OF FACE: ICD-10-CM

## 2023-03-10 DIAGNOSIS — K13.0 TRAUMATIC LIP PAIN: ICD-10-CM

## 2023-03-10 PROCEDURE — 70551 MRI BRAIN STEM W/O DYE: CPT

## 2023-03-23 ENCOUNTER — TELEPHONE (OUTPATIENT)
Dept: HEALTH INFORMATION MANAGEMENT | Facility: OTHER | Age: 63
End: 2023-03-23

## 2023-03-23 ENCOUNTER — APPOINTMENT (OUTPATIENT)
Dept: MEDICAL GROUP | Facility: PHYSICIAN GROUP | Age: 63
End: 2023-03-23
Payer: MEDICARE

## 2023-03-23 DIAGNOSIS — F98.8 ADD (ATTENTION DEFICIT DISORDER) WITHOUT HYPERACTIVITY: ICD-10-CM

## 2023-03-23 RX ORDER — ALBUTEROL SULFATE 90 UG/1
AEROSOL, METERED RESPIRATORY (INHALATION)
Qty: 18 EACH | Refills: 3 | Status: CANCELLED | OUTPATIENT
Start: 2023-03-23

## 2023-03-23 RX ORDER — DEXTROAMPHETAMINE SACCHARATE, AMPHETAMINE ASPARTATE, DEXTROAMPHETAMINE SULFATE AND AMPHETAMINE SULFATE 5; 5; 5; 5 MG/1; MG/1; MG/1; MG/1
20 TABLET ORAL 2 TIMES DAILY
Qty: 180 EACH | Refills: 0 | Status: SHIPPED | OUTPATIENT
Start: 2023-03-23 | End: 2023-06-08

## 2023-03-23 NOTE — TELEPHONE ENCOUNTER
Received request via: Patient    Was the patient seen in the last year in this department? No    Does the patient have an active prescription (recently filled or refills available) for medication(s) requested? No    Does the patient have [facility] and need 100 day supply (blood pressure, diabetes and cholesterol meds only)? {YES/NO/[id number]}

## 2023-03-23 NOTE — TELEPHONE ENCOUNTER
Received request via: Pharmacy    Was the patient seen in the last year in this department? Yes    Does the patient have an active prescription (recently filled or refills available) for medication(s) requested? No    [facility] and need 100 day supply (blood pressure, diabetes and cholesterol meds only)? Medication is not for cholesterol, blood pressure or diabetes

## 2023-03-30 ENCOUNTER — APPOINTMENT (OUTPATIENT)
Dept: MEDICAL GROUP | Facility: IMAGING CENTER | Age: 63
End: 2023-03-30
Payer: MEDICARE

## 2023-04-04 ENCOUNTER — OFFICE VISIT (OUTPATIENT)
Dept: MEDICAL GROUP | Facility: PHYSICIAN GROUP | Age: 63
End: 2023-04-04
Payer: MEDICARE

## 2023-04-04 VITALS
DIASTOLIC BLOOD PRESSURE: 80 MMHG | RESPIRATION RATE: 16 BRPM | HEART RATE: 83 BPM | WEIGHT: 152.3 LBS | SYSTOLIC BLOOD PRESSURE: 140 MMHG | HEIGHT: 63 IN | TEMPERATURE: 98 F | BODY MASS INDEX: 26.98 KG/M2 | OXYGEN SATURATION: 98 %

## 2023-04-04 DIAGNOSIS — F39 MOOD DISORDER (HCC): ICD-10-CM

## 2023-04-04 DIAGNOSIS — E55.9 VITAMIN D DEFICIENCY: ICD-10-CM

## 2023-04-04 DIAGNOSIS — E78.5 DYSLIPIDEMIA: ICD-10-CM

## 2023-04-04 DIAGNOSIS — Q67.0 FACIAL ASYMMETRY: ICD-10-CM

## 2023-04-04 DIAGNOSIS — R63.5 WEIGHT GAIN: ICD-10-CM

## 2023-04-04 DIAGNOSIS — R73.9 HYPERGLYCEMIA: ICD-10-CM

## 2023-04-04 DIAGNOSIS — R03.0 ELEVATED BLOOD PRESSURE READING: ICD-10-CM

## 2023-04-04 DIAGNOSIS — J30.2 SEASONAL ALLERGIES: ICD-10-CM

## 2023-04-04 DIAGNOSIS — I70.0 ATHEROSCLEROSIS OF AORTA (HCC): ICD-10-CM

## 2023-04-04 DIAGNOSIS — F98.8 ADD (ATTENTION DEFICIT DISORDER) WITHOUT HYPERACTIVITY: ICD-10-CM

## 2023-04-04 PROCEDURE — 99214 OFFICE O/P EST MOD 30 MIN: CPT | Performed by: INTERNAL MEDICINE

## 2023-04-04 RX ORDER — ALBUTEROL SULFATE 90 UG/1
1 AEROSOL, METERED RESPIRATORY (INHALATION) EVERY 4 HOURS PRN
Qty: 20.1 EACH | Refills: 1 | Status: SHIPPED | OUTPATIENT
Start: 2023-04-04 | End: 2023-10-10

## 2023-04-04 ASSESSMENT — FIBROSIS 4 INDEX: FIB4 SCORE: 0.74

## 2023-04-05 NOTE — PROGRESS NOTES
CC: Facial asymmetry, imaging, lab work    HPI:  Paulette presents with the following    1. ADD (attention deficit disorder) without hyperactivity  Patient is closely followed by psychology/psychiatry.  Patient was started on Adderall 20 mg tablets to help with focus, attention and completing tasks.    2. Mood disorder (HCC)  Chronic.  Stable.  Treated with Celexa 40 mg daily.  Follows up with psychiatry every 3 months.    3. Atherosclerosis of aorta (HCC)  Chronic.  Stable.  Continue with statin.    4. Elevated blood pressure reading  It was noted in the urgent care in February the patient had elevated blood pressure readings.  No diagnosis of hypertension.  Patient started taking Adderall about 7 to 8 months ago.  Does not check her blood pressure at home.    5. Hyperglycemia  History of hyperglycemia.  No diagnosis of diabetes.    6. Facial asymmetry  Patient presented to the clinic on February 24 with 2-day history of increased muscle tone on her left lower lip.  Associated symptom was decreased sensation around that left lower lip.  Neurologic exam for speech was fluent and smile was symmetric during active movement.  No other focal neurologic symptoms.  Symptoms have been slowly improving over time.  Patient completed brain MRI which showed no acute abnormalities.        Patient Active Problem List    Diagnosis Date Noted    Dyslipidemia 04/04/2023    Traumatic lip pain 02/24/2023    Facial asymmetry 02/24/2023    Neuropathy 10/05/2022    Sedative hypnotic or anxiolytic dependence (HCC) 10/05/2022    Atherosclerosis of aorta ([facility]) 10/05/2022    Elevated blood pressure reading 10/05/2022    Moderate episode of recurrent major depressive disorder (HCC) 04/22/2022    ADD (attention deficit disorder) without hyperactivity 04/22/2022    Generalized anxiety disorder 04/22/2022    Back muscle spasm 01/25/2022    Abnormal mammogram 10/14/2021    BMI 25.0-25.9,adult 09/27/2021    Hyperglycemia 04/08/2021     Anxiety 04/08/2021    Primary insomnia 04/08/2021    Osteoporosis without current pathological fracture 04/08/2021    Vitamin D deficiency 04/08/2021    Coronary arteriosclerosis 08/11/2020    Fam hx-ischem heart disease 06/20/2019    Seasonal allergies 08/10/2018    External hemorrhoids 04/05/2018    Functional diarrhea 04/05/2018    Mixed hyperlipidemia 05/26/2017    Tobacco dependence 04/01/2016    Mood disorder (HCC) 12/11/2015    Chronic lumbar pain 12/11/2015       Current Outpatient Medications   Medication Sig Dispense Refill    albuterol 108 (90 Base) MCG/ACT Aero Soln inhalation aerosol Inhale 1 Puff every four hours as needed for Shortness of Breath. 20.1 Each 1    amphetamine-dextroamphetamine (ADDERALL) 20 MG Tab Take 1 Tablet by mouth 2 times a day for 90 days. 180 Each 0    tizanidine (ZANAFLEX) 4 MG Tab TAKE 1 TABLET BY MOUTH EVERY 6 HOURS AS NEEDED 100 Tablet 1    ALPRAZolam (XANAX) 1 MG Tab Take 1 Tablet by mouth every evening for 90 days. 90 Tablet 0    citalopram (CELEXA) 40 MG Tab Take 1 Tablet by mouth every day for 90 days. 90 Tablet 0    gabapentin (NEURONTIN) 800 MG tablet TAKE 1 TABLET BY MOUTH THREE TIMES A  Tablet 3    meloxicam (MOBIC) 7.5 MG Tab TAKE 1 TABLET BY MOUTH TWICE A  Tablet 1    ibandronate (BONIVA) 150 MG tablet TAKE 1 TABLET BY MOUTH EVERY 30 DAYS. 3 Tablet 3    therapeutic multivitamin-minerals (THERAGRAN-M) Tab Take 1 Tab by mouth every day.      fluticasone (FLONASE) 50 MCG/ACT nasal spray Spray 2 Sprays in nose 2 times a day.      cetirizine (ZYRTEC) 10 MG Tab Take 10 mg by mouth every bedtime.      Probiotic Product (PROBIOTIC DAILY PO) Take 1 Cap by mouth every bedtime.       No current facility-administered medications for this visit.         Allergies as of 04/04/2023 - Reviewed 04/04/2023   Allergen Reaction Noted    Seasonal Runny Nose and Itching 01/13/2014    Sulfa drugs Nausea 01/13/2014        Social History     Socioeconomic History    Marital  status: Single     Spouse name: Not on file    Number of children: Not on file    Years of education: Not on file    Highest education level: Not on file   Occupational History    Not on file   Tobacco Use    Smoking status: Every Day     Packs/day: 1.00     Types: Cigarettes     Start date: 1979    Smokeless tobacco: Never   Vaping Use    Vaping Use: Some days   Substance and Sexual Activity    Alcohol use: Yes     Comment: socially    Drug use: Yes     Types: Marijuana, Inhaled     Comment: occ edible THC    Sexual activity: Never     Comment: , ret    Other Topics Concern    Not on file   Social History Narrative    ** Merged History Encounter **          Social Determinants of Health     Financial Resource Strain: Not on file   Food Insecurity: Not on file   Transportation Needs: Not on file   Physical Activity: Not on file   Stress: Not on file   Social Connections: Not on file   Intimate Partner Violence: Not on file   Housing Stability: Not on file       Family History   Problem Relation Age of Onset    Cancer Mother 45        colon    Heart Disease Mother         HEART ATTACK    Heart Disease Half-brother         Heart Bypass @ 45/stent    No Known Problems Half-brother     No Known Problems Half-brother     Diabetes Neg Hx     Stroke Neg Hx        Past Surgical History:   Procedure Laterality Date    FUSION, SPINE, LUMBAR, PLIF  2013    rods and screw placement    ROTATOR CUFF REPAIR Right Mar 2009    OTHER ORTHOPEDIC SURGERY  2009    Rotator cuff  surgery Right    OTHER ORTHOPEDIC SURGERY      back surgery    PRIMARY C SECTION      x2       ROS: Positive ROS per HPI.  Denies any Headache,Chest pain,  Shortness of breath,  Abdominal pain, Changes of bowel or bladder, Lower ext edema, Fevers, Nights sweats, Weight Changes, Focal weakness or numbness.  All other systems are negative.    BP (!) 140/80 (BP Location: Left arm, Patient Position: Sitting, BP Cuff Size: Adult long)   Pulse  "83   Temp 36.7 °C (98 °F)   Resp 16   Ht 1.6 m (5' 3\")   Wt 69.1 kg (152 lb 4.8 oz)   SpO2 98%   BMI 26.98 kg/m²      Constitutional: Alert, no distress, well-groomed.  Skin: Warm, dry, good turgor, no rashes in visible areas.  Eye: Equal, round and reactive, conjunctiva clear, lids normal.  ENMT: Lips without lesions, good dentition, moist mucous membranes.  Neck: Trachea midline, no masses, no thyromegaly.  Respiratory: Unlabored respiratory effort, no cough.  Abdomen: Soft, no gross masses.  MSK: Normal gait, moves all extremities.  Neuro:  No cranial nerve deficit.  No dysarthria.  Strength and sensation intact.   Psych: Alert and oriented x3, normal affect and mood.    Assessment and Plan.   63 y.o. female presenting with the following.     1. ADD (attention deficit disorder) without hyperactivity  Stable.  Continue Adderall as prescribed by psychiatry.    2. Mood disorder (HCC)  Chronic.  Stable.  Continue Celexa 40 mg daily.    3. Atherosclerosis of aorta (HCC)  Chronic.  Stable.  Treat with statin.  Continue to monitor.    4. Elevated blood pressure reading  Labs ordered.  Patient will keep a blood pressure log and follow-up in the clinic.    5. Hyperglycemia    - HEMOGLOBIN A1C; Future    6. Facial asymmetry  Possible palsy.  Unknown etiology.  Resolving.  MRI of brain negative.  If no resolution, consider referral to neurology.    - Sed Rate; Future  - CRP QUANTITIVE (NON-CARDIAC); Future    7. Dyslipidemia    - CBC WITH DIFFERENTIAL; Future  - Comp Metabolic Panel; Future  - Lipid Profile; Future    8. Seasonal allergies  Stable.  Refill albuterol as requested.    9. Vitamin D deficiency    - VITAMIN D,25 HYDROXY (DEFICIENCY); Future    10. Weight gain  Patient gained 10 pounds in the last 2 to 3 months.  - TSH WITH REFLEX TO FT4; Future    My total time spent caring for the patient on the day of the encounter was 34 minutes.   This does not include time spent on separately billable " procedures/tests.

## 2023-04-19 ENCOUNTER — PATIENT OUTREACH (OUTPATIENT)
Dept: MEDICAL GROUP | Facility: PHYSICIAN GROUP | Age: 63
End: 2023-04-19
Payer: MEDICARE

## 2023-04-19 RX ORDER — LISINOPRIL 5 MG/1
5 TABLET ORAL DAILY
Qty: 100 TABLET | Refills: 1 | Status: SHIPPED | OUTPATIENT
Start: 2023-04-19 | End: 2023-11-02

## 2023-04-19 NOTE — PROGRESS NOTES
Patient came by PCP office today with her new blood pressure monitor concerned about recent reading. Pt reports she has been getting elevated readings. Average for last 9 readings on pt monitor is 154/95. Pt had reading in office with her monitor of 167/92, HR 74. Nurse rechecked a reading with clinic blood pressure monitor. Reading was 170/98. Pt reports she has also had a headache today. States headache may be from no caffeine today and also change in weather. Pt also reports she is supposed to go to dentist in next 30 minutes. Nurse notified PCP of elevated readings with headache. PCP recommends pt continue to monitor and if readings remain elevated with headache pt to go to ER or urgent care for evaluation. PCP also recommended pt reschedule dental appt. Pt notified of these recommendations. PCP reports she may start pt on a blood pressure medication after reviewing chart. Pt has follow-up with PCP on 5/3/23. Pt will keep log of readings and bring to appt. Pt to notify PCP if readings remain elevated. Pt has no other questions or concerns at this time.

## 2023-04-19 NOTE — TELEPHONE ENCOUNTER
Pt notified of new order for blood pressure medication and PCP recommendations. Pt verbalized understanding.

## 2023-05-12 ENCOUNTER — TELEPHONE (OUTPATIENT)
Dept: HEALTH INFORMATION MANAGEMENT | Facility: OTHER | Age: 63
End: 2023-05-12
Payer: MEDICARE

## 2023-06-05 RX ORDER — CITALOPRAM 40 MG/1
TABLET ORAL
Qty: 90 TABLET | Refills: 0 | Status: SHIPPED | OUTPATIENT
Start: 2023-06-05 | End: 2023-07-10

## 2023-06-05 NOTE — TELEPHONE ENCOUNTER
Received request via: Pharmacy    Was the patient seen in the last year in this department? Yes    Does the patient have an active prescription (recently filled or refills available) for medication(s) requested? No    Does the patient have [facility] and need 100 day supply (blood pressure, diabetes and cholesterol meds only)? Medication is not for cholesterol, blood pressure or diabetes

## 2023-06-08 ENCOUNTER — OFFICE VISIT (OUTPATIENT)
Dept: BEHAVIORAL HEALTH | Facility: CLINIC | Age: 63
End: 2023-06-08
Payer: MEDICARE

## 2023-06-08 DIAGNOSIS — F41.1 GENERALIZED ANXIETY DISORDER: ICD-10-CM

## 2023-06-08 DIAGNOSIS — F98.8 ADD (ATTENTION DEFICIT DISORDER) WITHOUT HYPERACTIVITY: ICD-10-CM

## 2023-06-08 DIAGNOSIS — F33.1 MAJOR DEPRESSIVE DISORDER, RECURRENT EPISODE, MODERATE (HCC): ICD-10-CM

## 2023-06-08 PROCEDURE — 99214 OFFICE O/P EST MOD 30 MIN: CPT | Performed by: PSYCHIATRY & NEUROLOGY

## 2023-06-08 RX ORDER — METHYLPHENIDATE HYDROCHLORIDE 5 MG/1
5 TABLET ORAL 2 TIMES DAILY
Qty: 60 TABLET | Refills: 0 | Status: SHIPPED | OUTPATIENT
Start: 2023-06-08 | End: 2023-07-08

## 2023-06-08 RX ORDER — ALPRAZOLAM 1 MG/1
1 TABLET ORAL 2 TIMES DAILY
Qty: 60 TABLET | Refills: 0 | Status: SHIPPED | OUTPATIENT
Start: 2023-06-08 | End: 2023-07-08

## 2023-06-08 NOTE — PROGRESS NOTES
[facility]   Follow Up Assessment     This provider informed the patient their medical records are totally confidential except for the use by other providers involved in their care, or if the patient signs a release, or to report instances of child or elder abuse, or if it is determined they are an immediate risk to harm themselves or others.    Name: Paulette Concepcion  MRN: [id number]  : 1960  Age: 63 y.o.  Date of assessment: 2023  PCP: Iraida Zavala M.D.      Subjective:  Chart reviewed prior to seeing her in my office.  She was last seen on .  She is being treated for hypertension which could be aggravated by Adderall.  We talked about the option of trying Ritalin.  She is able to monitor her blood pressure.  He does have ongoing problems with back pain and might do better on Cymbalta than Celexa but defer on changing antidepressants until her ADD is improved.  She does not need Xanax for anxiety.    Objective:  She is alert, oriented, and cooperative.  Relatedness is good.  Grooming is good.  Speech is normal rate.  Anxious.  Memory is good.  Insight and judgment are good.  No indication of psychotic thinking.    Current Risk:       Suicidal: Not suicidal       Homicidal: Not homicidal       Self-Harm:       Relapse: (Low/Moderate/High): Moderate       Crisis Safety Plan Reviewed: Discussed with patient    Diagnosis:   ADD  Major depressive disorder, recurrent  Generalized anxiety disorder    Treatment Plan:  Current treatment plan consists of monthly psychiatric sessions designed to evaluate her ADD, depression, and anxiety.    Duration will be for a minimum of 12 months and will be reviewed at each visit.  Goals: Improvement in ADD symptoms with remission of depression and control of anxiety in order to prevent relapse due to the chronic nature of her behavioral health problems and mental illness.  Discontinue Adderall and start Ritalin 5 mg twice daily.  Continue  Xanax 1 mg twice daily.  Continue Celexa 40 mg at bedtime for now.  Consider a trial on Cymbalta.      Daniel Krause M.D.      This note was created using voice recognition software (Dragon). The accuracy of the dictation is limited by the abilities of the software. I have reviewed the note prior to signing, however some errors in grammar and context are still possible. If you have any questions related to this note please do not hesitate to contact our office.

## 2023-07-10 ENCOUNTER — OFFICE VISIT (OUTPATIENT)
Dept: BEHAVIORAL HEALTH | Facility: CLINIC | Age: 63
End: 2023-07-10
Payer: MEDICARE

## 2023-07-10 DIAGNOSIS — F41.1 GENERALIZED ANXIETY DISORDER: ICD-10-CM

## 2023-07-10 DIAGNOSIS — F33.1 MAJOR DEPRESSIVE DISORDER, RECURRENT EPISODE, MODERATE (HCC): ICD-10-CM

## 2023-07-10 DIAGNOSIS — F98.8 ADD (ATTENTION DEFICIT DISORDER) WITHOUT HYPERACTIVITY: ICD-10-CM

## 2023-07-10 PROCEDURE — 99214 OFFICE O/P EST MOD 30 MIN: CPT | Performed by: PSYCHIATRY & NEUROLOGY

## 2023-07-10 RX ORDER — DULOXETIN HYDROCHLORIDE 30 MG/1
30 CAPSULE, DELAYED RELEASE ORAL DAILY
Qty: 30 CAPSULE | Refills: 0 | Status: SHIPPED | OUTPATIENT
Start: 2023-07-10 | End: 2023-08-11

## 2023-07-10 RX ORDER — DEXTROAMPHETAMINE SACCHARATE, AMPHETAMINE ASPARTATE, DEXTROAMPHETAMINE SULFATE AND AMPHETAMINE SULFATE 5; 5; 5; 5 MG/1; MG/1; MG/1; MG/1
20 TABLET ORAL 2 TIMES DAILY
Qty: 60 EACH | Refills: 0 | Status: SHIPPED | OUTPATIENT
Start: 2023-07-10 | End: 2023-08-09

## 2023-07-10 RX ORDER — ALPRAZOLAM 1 MG/1
1 TABLET ORAL 2 TIMES DAILY
Qty: 60 TABLET | Refills: 0 | Status: SHIPPED | OUTPATIENT
Start: 2023-07-10 | End: 2023-07-13 | Stop reason: SDUPTHER

## 2023-07-10 NOTE — PROGRESS NOTES
[facility]   Follow Up Assessment     This provider informed the patient their medical records are totally confidential except for the use by other providers involved in their care, or if the patient signs a release, or to report instances of child or elder abuse, or if it is determined they are an immediate risk to harm themselves or others.    Name: Paulette Concepcion  MRN: [id number]  : 1960  Age: 63 y.o.  Date of assessment: 7/10/2023  PCP: Iraida Zavala M.D.      Subjective:  Chart reviewed prior to seeing her in my office.  She would like to resume Adderall 20 mg twice daily because Ritalin has not been helpful.  She also would like to cross taper from Celexa to Cymbalta for depression.  She wants the Cymbalta and Xanax prescriptions to go to [facility] and wants the Adderall sent to [facility]    Objective:  She is alert, oriented, and cooperative.  Relatedness is good.  Grooming is good.  Speech is normal rate.  Anxious.  Memory is good.  Insight and judgment are good.  No indication of psychotic thinking.    Current Risk:       Suicidal: Not suicidal       Homicidal: Not homicidal       Self-Harm: No plan to harm self       Relapse: (Low/Moderate/High): Moderate       Crisis Safety Plan Reviewed: Discussed with patient    Diagnosis:   ADD  Major depressive disorder, recurrent  Generalized anxiety disorder    Treatment Plan:  The current treatment plan consists of monthly psychiatric sessions designed to evaluate her ADD, depression, and anxiety.  Decrease Celexa to 20 mg at bedtime x1 week and discontinue.  In 1 week start Cymbalta 30 mg a.m.  Discontinue Ritalin and restart Adderall 20 mg twice daily.  She will monitor her blood pressures.  Continue Xanax 1 mg twice daily.    Daniel Krause M.D.      This note was created using voice recognition software (Dragon). The accuracy of the dictation is limited by the abilities of the software. I have reviewed the note prior to signing,  however some errors in grammar and context are still possible. If you have any questions related to this note please do not hesitate to contact our office.

## 2023-07-13 DIAGNOSIS — F41.1 GENERALIZED ANXIETY DISORDER: ICD-10-CM

## 2023-07-13 NOTE — TELEPHONE ENCOUNTER
Please re-send to pharmacy.       Received request via: Pharmacy    Was the patient seen in the last year in this department? Yes    Does the patient have an active prescription (recently filled or refills available) for medication(s) requested? No    Does the patient have [facility] and need 100 day supply (blood pressure, diabetes and cholesterol meds only)? Medication is not for cholesterol, blood pressure or diabetes

## 2023-07-14 RX ORDER — ALPRAZOLAM 1 MG/1
1 TABLET ORAL 2 TIMES DAILY
Qty: 60 TABLET | Refills: 0 | Status: SHIPPED | OUTPATIENT
Start: 2023-07-14 | End: 2023-08-13

## 2023-08-11 ENCOUNTER — TELEPHONE (OUTPATIENT)
Dept: MEDICAL GROUP | Facility: PHYSICIAN GROUP | Age: 63
End: 2023-08-11
Payer: MEDICARE

## 2023-08-11 RX ORDER — DULOXETIN HYDROCHLORIDE 30 MG/1
30 CAPSULE, DELAYED RELEASE ORAL
Qty: 30 CAPSULE | Refills: 0 | Status: SHIPPED | OUTPATIENT
Start: 2023-08-11 | End: 2023-08-17 | Stop reason: SDUPTHER

## 2023-08-11 NOTE — TELEPHONE ENCOUNTER
Received a call from [facility] regarding mutual patient. They state several faxes have been sent to Dr. Zavala requesting clearance. Please inform provider that the paperwork has been scanned to the chart and a copy also left on Dr. Zavala's desk. Thank you.

## 2023-08-17 ENCOUNTER — OFFICE VISIT (OUTPATIENT)
Dept: BEHAVIORAL HEALTH | Facility: CLINIC | Age: 63
End: 2023-08-17
Payer: MEDICARE

## 2023-08-17 DIAGNOSIS — F33.1 MAJOR DEPRESSIVE DISORDER, RECURRENT EPISODE, MODERATE (HCC): ICD-10-CM

## 2023-08-17 DIAGNOSIS — F41.1 GENERALIZED ANXIETY DISORDER: ICD-10-CM

## 2023-08-17 DIAGNOSIS — F98.8 ADD (ATTENTION DEFICIT DISORDER) WITHOUT HYPERACTIVITY: ICD-10-CM

## 2023-08-17 PROCEDURE — 99214 OFFICE O/P EST MOD 30 MIN: CPT | Performed by: PSYCHIATRY & NEUROLOGY

## 2023-08-17 RX ORDER — DEXTROAMPHETAMINE SACCHARATE, AMPHETAMINE ASPARTATE, DEXTROAMPHETAMINE SULFATE AND AMPHETAMINE SULFATE 5; 5; 5; 5 MG/1; MG/1; MG/1; MG/1
20 TABLET ORAL 2 TIMES DAILY
Qty: 60 TABLET | Refills: 0 | Status: SHIPPED | OUTPATIENT
Start: 2023-08-17 | End: 2023-09-26 | Stop reason: SDUPTHER

## 2023-08-17 RX ORDER — DULOXETIN HYDROCHLORIDE 30 MG/1
30 CAPSULE, DELAYED RELEASE ORAL 2 TIMES DAILY
Qty: 60 CAPSULE | Refills: 0 | Status: SHIPPED | OUTPATIENT
Start: 2023-08-17 | End: 2023-09-08

## 2023-08-17 RX ORDER — ALPRAZOLAM 1 MG/1
1 TABLET ORAL 2 TIMES DAILY
Qty: 60 TABLET | Refills: 0 | Status: SHIPPED | OUTPATIENT
Start: 2023-08-17 | End: 2023-09-26 | Stop reason: SDUPTHER

## 2023-08-17 NOTE — PROGRESS NOTES
[facility]   Follow Up Assessment     This provider informed the patient their medical records are totally confidential except for the use by other providers involved in their care, or if the patient signs a release, or to report instances of child or elder abuse, or if it is determined they are an immediate risk to harm themselves or others.    Name: Paulette Concepcion  MRN: [id number]  : 1960  Age: 63 y.o.  Date of assessment: 2023  PCP: Iraida Zavala M.D.      Subjective:  Chart reviewed prior to seeing her in my office.  She was last seen on July 10.  We reviewed her current medications, purposes, doses, etc.  She would like to change Cymbalta to 30 mg twice daily.  She will start seeing her new therapist in 2 weeks.    Objective:  He is alert, oriented, and cooperative.  Relatedness is good.  Grooming is good.  Speech is normal rate.  Less anxious.  Memory is good.  Insight and judgment are fairly good.  No indication of psychotic thinking.    Current Risk:       Suicidal: Not suicidal       Homicidal: Not homicidal       Self-Harm: No plan to harm self       Relapse: (Low/Moderate/High): Moderate       Crisis Safety Plan Reviewed: Discussed with patient    Diagnosis:   ADD  Major depressive disorder, recurrent  Generalized anxiety disorder    Treatment Plan:  The current treatment plan consists of quarterly psychiatric sessions designed to evaluate her ADD, depression, and anxiety.    Duration will be for a minimum of 12 months and will be reviewed at each visit.    Goals: Improvement in ADD symptoms with remission of depression and control of anxiety in order to prevent relapse due to the chronic nature of her behavioral health problems and mental illness.  Continue Adderall 20 mg twice daily.  Continue Xanax 1 mg twice daily.  Increase Cymbalta to 30 mg twice daily.    Daniel Krause M.D.      This note was created using voice recognition software (Dragon). The accuracy  of the dictation is limited by the abilities of the software. I have reviewed the note prior to signing, however some errors in grammar and context are still possible. If you have any questions related to this note please do not hesitate to contact our office.

## 2023-08-30 ENCOUNTER — OFFICE VISIT (OUTPATIENT)
Dept: BEHAVIORAL HEALTH | Facility: CLINIC | Age: 63
End: 2023-08-30
Payer: MEDICARE

## 2023-08-30 DIAGNOSIS — F33.1 MAJOR DEPRESSIVE DISORDER, RECURRENT EPISODE, MODERATE (HCC): ICD-10-CM

## 2023-08-30 PROCEDURE — 90837 PSYTX W PT 60 MINUTES: CPT | Performed by: SOCIAL WORKER
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
## 2023-09-08 RX ORDER — DULOXETIN HYDROCHLORIDE 30 MG/1
30 CAPSULE, DELAYED RELEASE ORAL
Qty: 30 CAPSULE | Refills: 0 | Status: SHIPPED | OUTPATIENT
Start: 2023-09-08 | End: 2023-09-27

## 2023-09-11 ENCOUNTER — OFFICE VISIT (OUTPATIENT)
Dept: BEHAVIORAL HEALTH | Facility: CLINIC | Age: 63
End: 2023-09-11
Payer: MEDICARE

## 2023-09-11 DIAGNOSIS — F33.1 MAJOR DEPRESSIVE DISORDER, RECURRENT EPISODE, MODERATE (HCC): ICD-10-CM

## 2023-09-11 DIAGNOSIS — F41.1 GENERALIZED ANXIETY DISORDER: ICD-10-CM

## 2023-09-11 PROCEDURE — 90837 PSYTX W PT 60 MINUTES: CPT | Performed by: SOCIAL WORKER
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
## 2023-09-26 DIAGNOSIS — F41.1 GENERALIZED ANXIETY DISORDER: ICD-10-CM

## 2023-09-26 DIAGNOSIS — F98.8 ADD (ATTENTION DEFICIT DISORDER) WITHOUT HYPERACTIVITY: ICD-10-CM

## 2023-09-26 RX ORDER — DEXTROAMPHETAMINE SACCHARATE, AMPHETAMINE ASPARTATE, DEXTROAMPHETAMINE SULFATE AND AMPHETAMINE SULFATE 5; 5; 5; 5 MG/1; MG/1; MG/1; MG/1
20 TABLET ORAL 2 TIMES DAILY
Qty: 60 TABLET | Refills: 0 | Status: SHIPPED | OUTPATIENT
Start: 2023-09-26 | End: 2023-10-30 | Stop reason: SDUPTHER

## 2023-09-26 RX ORDER — ALPRAZOLAM 1 MG/1
1 TABLET ORAL 2 TIMES DAILY
Qty: 60 TABLET | Refills: 0 | Status: SHIPPED | OUTPATIENT
Start: 2023-09-26 | End: 2023-10-26

## 2023-09-26 NOTE — TELEPHONE ENCOUNTER
Received request via: Pharmacy    Was the patient seen in the last year in this department? Yes    Does the patient have an active prescription (recently filled or refills available) for medication(s) requested? No    Does the patient have [facility] and need 100 day supply (blood pressure, diabetes and cholesterol meds only)? Medication is not for cholesterol, blood pressure or diabetes and Patient does not have [facility]

## 2023-09-27 RX ORDER — DULOXETIN HYDROCHLORIDE 60 MG/1
60 CAPSULE, DELAYED RELEASE ORAL DAILY
Qty: 30 CAPSULE | Refills: 0 | Status: SHIPPED | OUTPATIENT
Start: 2023-09-27 | End: 2023-10-19

## 2023-10-02 ENCOUNTER — OFFICE VISIT (OUTPATIENT)
Dept: BEHAVIORAL HEALTH | Facility: CLINIC | Age: 63
End: 2023-10-02
Payer: MEDICARE

## 2023-10-02 DIAGNOSIS — F98.8 ADD (ATTENTION DEFICIT DISORDER) WITHOUT HYPERACTIVITY: ICD-10-CM

## 2023-10-02 DIAGNOSIS — F33.1 MAJOR DEPRESSIVE DISORDER, RECURRENT EPISODE, MODERATE (HCC): ICD-10-CM

## 2023-10-02 PROCEDURE — 90837 PSYTX W PT 60 MINUTES: CPT | Performed by: SOCIAL WORKER

## 2023-10-02 NOTE — PROGRESS NOTES
"[facility]   Therapy Progress Note        Name: Paulette Concepcion  MRN: [id number]  : 1960  Age: 63 y.o.  Date of assessment: 10/2/2023  PCP: Iraida Zavala M.D.  Persons in attendance: Patient  Total session time: 58 minutes      Topics addressed in psychotherapy include: \" Things have been busy with 'adulting things' My car is acting up and I know I probably need to upgrade but I am not sure. I have not heard from my youngest.I sent him a cute email about missing your mom when she is gone, nothing. I will send him a birthday wish.  I did my volunteering at the [facility] for the show [employer] and it was amazing. I am concerned about talking to the landlord about some things that need to be fixed. And just general things I need to do. I have made lists but I do not know where to start.\"    Discussion about prioritizing and only giving herself 2-3 tasks not the entire list. Right now car is her priority, so write down what you need in car- top 3 things, then test drive a couple of models that you are interested in. Then check into [employer]. If you show up with your list they are less likely to try to push- but be firm on the things you want and want your budget is for car.  Making too ;long of list becomes overwhelming. When looking at the task of going through old medical files. Bring it out while yo u are watching a show that is good background and if older than 7 years probably can be safe shredding unless you choose to keep such as a surgery.    Objective Observations:   Participation:Active verbal participation and Open to feedback   Grooming:Neat   Cognition:Alert   Eye Contact:Good   Mood:Depressed   Affect:Congruent with content   Thought Process:Logical and Goal-directed   Speech:Rate within normal limits    Current Risk:   Suicide: not indicated   Homicide: not indicated   Self-Harm: not indicated   Relapse: not indicated   Safety Plan Reviewed: not applicable    Care " Plan Updated: Yes    Does patient express agreement with the above plan? Yes     Diagnosis:  1. Major depressive disorder, recurrent episode, moderate (HCC)    2. ADD (attention deficit disorder) without hyperactivity        Referral appointment(s) scheduled? Yes       Tari Vinson L.C.S.W.

## 2023-10-16 ENCOUNTER — OFFICE VISIT (OUTPATIENT)
Dept: BEHAVIORAL HEALTH | Facility: CLINIC | Age: 63
End: 2023-10-16
Payer: MEDICARE

## 2023-10-16 DIAGNOSIS — F33.1 MAJOR DEPRESSIVE DISORDER, RECURRENT EPISODE, MODERATE (HCC): ICD-10-CM

## 2023-10-16 DIAGNOSIS — F98.8 ADD (ATTENTION DEFICIT DISORDER) WITHOUT HYPERACTIVITY: ICD-10-CM

## 2023-10-16 DIAGNOSIS — F41.1 GENERALIZED ANXIETY DISORDER: ICD-10-CM

## 2023-10-16 PROCEDURE — 90834 PSYTX W PT 45 MINUTES: CPT | Performed by: SOCIAL WORKER

## 2023-10-16 NOTE — PROGRESS NOTES
"[facility]   Therapy Progress Note        Name: Paulette Concepcion  MRN: [id number]  : 1960  Age: 63 y.o.  Date of assessment: 10/16/2023  PCP: Iraida Zavala M.D.  Persons in attendance: Patient  Total session time: 45 minutes      Topics addressed in psychotherapy include: \" I am so overwhelmed and I think it is the betrayal  from my mother not telling me about my real father and then the estrangement with my family ( but not really my family ) and my youngest son.  I am so wound up I do not know what to do first and I just need help. I am so concerned about my phone if it has been hacked and I cannot access my bank, my car needs repairs but it is a 2006 and I really probably need a new one, and all the work that needs to be done at my apartment.\"    Discussion about senior center to access services that are targeted to seniors. 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  Discussion about if her basic needs are not being met it will not be very helpful to try to address her emotional concerns. Carlton's Hierarchy was discussed to illustrate this point. Encouraged again to contact [employer] or Fausto Colón to see about her phone as this is attached to her fiances . Write a letter to her landlord with all the concern that need to be addressed.    Session ended as she had come in 20 late so a full session could not be given.  Objective Observations:   Participation:Active verbal participation and Open to feedback   Grooming:Neat   Cognition:Alert   Eye Contact:Good   Mood:Depressed   Affect:Congruent with content   Thought Process:Goal-directed   Speech:Rate within normal limits and Volume within normal limits    Current Risk:   Suicide: not indicated   Homicide: not indicated   Self-Harm: not indicated   Relapse: not indicated   Safety Plan Reviewed: not applicable    Care Plan Updated: Yes    Does patient express " agreement with the above plan? Yes     Diagnosis:  1. Major depressive disorder, recurrent episode, moderate (HCC)    2. Generalized anxiety disorder    3. ADD (attention deficit disorder) without hyperactivity        Referral appointment(s) scheduled? Yes       Tari Vinson L.C.S.W.

## 2023-10-19 RX ORDER — DULOXETIN HYDROCHLORIDE 60 MG/1
60 CAPSULE, DELAYED RELEASE ORAL
Qty: 30 CAPSULE | Refills: 0 | Status: SHIPPED | OUTPATIENT
Start: 2023-10-19 | End: 2023-11-16

## 2023-10-30 DIAGNOSIS — F98.8 ADD (ATTENTION DEFICIT DISORDER) WITHOUT HYPERACTIVITY: ICD-10-CM

## 2023-10-31 RX ORDER — DEXTROAMPHETAMINE SACCHARATE, AMPHETAMINE ASPARTATE, DEXTROAMPHETAMINE SULFATE AND AMPHETAMINE SULFATE 5; 5; 5; 5 MG/1; MG/1; MG/1; MG/1
20 TABLET ORAL 2 TIMES DAILY
Qty: 60 TABLET | Refills: 0 | Status: ON HOLD | OUTPATIENT
Start: 2023-10-31 | End: 2023-12-01

## 2023-11-08 ENCOUNTER — OFFICE VISIT (OUTPATIENT)
Dept: BEHAVIORAL HEALTH | Facility: CLINIC | Age: 63
End: 2023-11-08
Payer: MEDICARE

## 2023-11-08 DIAGNOSIS — F98.8 ADD (ATTENTION DEFICIT DISORDER) WITHOUT HYPERACTIVITY: ICD-10-CM

## 2023-11-08 DIAGNOSIS — F39 MOOD DISORDER (HCC): ICD-10-CM

## 2023-11-08 DIAGNOSIS — F33.1 MAJOR DEPRESSIVE DISORDER, RECURRENT EPISODE, MODERATE (HCC): ICD-10-CM

## 2023-11-08 DIAGNOSIS — F41.1 GENERALIZED ANXIETY DISORDER: ICD-10-CM

## 2023-11-08 PROCEDURE — 90834 PSYTX W PT 45 MINUTES: CPT | Performed by: SOCIAL WORKER

## 2023-11-08 NOTE — PROGRESS NOTES
[facility]   Therapy Progress Note        Name: Paulette Concepcion  MRN: [id number]  : 1960  Age: 63 y.o.  Date of assessment: 2023  PCP: Iraida Zavala M.D.  Persons in attendance: Patient  Total session time: 51 minutes      Topics addressed in psychotherapy include: patient arrived 8 minutes late. She was very upset. Asking me to take notes for her so she could remember all the things she needed to do. She kept her eyes closed throughout the session. She went over the entire story she gave me at intake about her fears that she has hacked and her information is out there.   She did go to [employer]. She has turned off all of her electronics and had her laptop scrubbed. She denies having any issues with her credit but then will state later that there are things out there.  Explained again that I have provided all the resources I know to check with concerning her fears and again discussed a company like [employer] whose mission is to do deep dives into your information and then monitor it going forward. But [employer] have told her they believe her electronics are safe. She has a water break but did not want to call the water department. 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 Telling her landlord could save the landlord more costly repairs.    She will be transferring to another therapist as this writer is leaving. Will close out her case plan.    Objective Observations:   Participation:Verbally monopolizing   Grooming:Neat   Cognition: rambling and often stopping to calm herself.   Eye Contact: closed eyes entire session   Mood:Anxious   Affect:Anxious   Thought Process:Perseveration   Speech:Volume within normal limits    Current Risk:   Suicide: not indicated   Homicide: not indicated   Self-Harm: not indicated   Relapse:  not indicated   Safety Plan Reviewed: not applicable    Care Plan Updated: Yes    Does patient express agreement with the above plan? Yes     Diagnosis:  No diagnosis found.    Referral appointment(s) scheduled?  Will transfer        Tari Vinson L.C.S.W.

## 2023-11-10 ENCOUNTER — HOSPITAL ENCOUNTER (OUTPATIENT)
Dept: LAB | Facility: MEDICAL CENTER | Age: 63
End: 2023-11-10
Attending: INTERNAL MEDICINE
Payer: MEDICARE

## 2023-11-10 DIAGNOSIS — E55.9 VITAMIN D DEFICIENCY: ICD-10-CM

## 2023-11-10 DIAGNOSIS — Q67.0 FACIAL ASYMMETRY: ICD-10-CM

## 2023-11-10 DIAGNOSIS — R63.5 WEIGHT GAIN: ICD-10-CM

## 2023-11-10 DIAGNOSIS — R73.9 HYPERGLYCEMIA: ICD-10-CM

## 2023-11-10 DIAGNOSIS — E78.5 DYSLIPIDEMIA: ICD-10-CM

## 2023-11-10 LAB
25(OH)D3 SERPL-MCNC: 21 NG/ML (ref 30–100)
ALBUMIN SERPL BCP-MCNC: 5 G/DL (ref 3.2–4.9)
ALBUMIN/GLOB SERPL: 1.9 G/DL
ALP SERPL-CCNC: 91 U/L (ref 30–99)
ALT SERPL-CCNC: 23 U/L (ref 2–50)
ANION GAP SERPL CALC-SCNC: 12 MMOL/L (ref 7–16)
AST SERPL-CCNC: 32 U/L (ref 12–45)
BASOPHILS # BLD AUTO: 0.1 % (ref 0–1.8)
BASOPHILS # BLD: 0.01 K/UL (ref 0–0.12)
BILIRUB SERPL-MCNC: 0.4 MG/DL (ref 0.1–1.5)
BUN SERPL-MCNC: 4 MG/DL (ref 8–22)
CALCIUM ALBUM COR SERPL-MCNC: 8.7 MG/DL (ref 8.5–10.5)
CALCIUM SERPL-MCNC: 9.5 MG/DL (ref 8.5–10.5)
CHLORIDE SERPL-SCNC: 84 MMOL/L (ref 96–112)
CHOLEST SERPL-MCNC: 164 MG/DL (ref 100–199)
CO2 SERPL-SCNC: 24 MMOL/L (ref 20–33)
CREAT SERPL-MCNC: 0.35 MG/DL (ref 0.5–1.4)
CRP SERPL HS-MCNC: <0.3 MG/DL (ref 0–0.75)
EOSINOPHIL # BLD AUTO: 0.02 K/UL (ref 0–0.51)
EOSINOPHIL NFR BLD: 0.2 % (ref 0–6.9)
ERYTHROCYTE [DISTWIDTH] IN BLOOD BY AUTOMATED COUNT: 41.1 FL (ref 35.9–50)
ERYTHROCYTE [SEDIMENTATION RATE] IN BLOOD BY WESTERGREN METHOD: 4 MM/HOUR (ref 0–25)
EST. AVERAGE GLUCOSE BLD GHB EST-MCNC: 120 MG/DL
FASTING STATUS PATIENT QL REPORTED: NORMAL
GFR SERPLBLD CREATININE-BSD FMLA CKD-EPI: 115 ML/MIN/1.73 M 2
GLOBULIN SER CALC-MCNC: 2.6 G/DL (ref 1.9–3.5)
GLUCOSE SERPL-MCNC: 107 MG/DL (ref 65–99)
HBA1C MFR BLD: 5.8 % (ref 4–5.6)
HCT VFR BLD AUTO: 40.6 % (ref 37–47)
HDLC SERPL-MCNC: 58 MG/DL
HGB BLD-MCNC: 14.2 G/DL (ref 12–16)
IMM GRANULOCYTES # BLD AUTO: 0.05 K/UL (ref 0–0.11)
IMM GRANULOCYTES NFR BLD AUTO: 0.4 % (ref 0–0.9)
LDLC SERPL CALC-MCNC: 88 MG/DL
LYMPHOCYTES # BLD AUTO: 1.91 K/UL (ref 1–4.8)
LYMPHOCYTES NFR BLD: 17 % (ref 22–41)
MCH RBC QN AUTO: 32.3 PG (ref 27–33)
MCHC RBC AUTO-ENTMCNC: 35 G/DL (ref 32.2–35.5)
MCV RBC AUTO: 92.5 FL (ref 81.4–97.8)
MONOCYTES # BLD AUTO: 0.6 K/UL (ref 0–0.85)
MONOCYTES NFR BLD AUTO: 5.4 % (ref 0–13.4)
NEUTROPHILS # BLD AUTO: 8.62 K/UL (ref 1.82–7.42)
NEUTROPHILS NFR BLD: 76.9 % (ref 44–72)
NRBC # BLD AUTO: 0 K/UL
NRBC BLD-RTO: 0 /100 WBC (ref 0–0.2)
PLATELET # BLD AUTO: 430 K/UL (ref 164–446)
PMV BLD AUTO: 9.1 FL (ref 9–12.9)
POTASSIUM SERPL-SCNC: 4.4 MMOL/L (ref 3.6–5.5)
PROT SERPL-MCNC: 7.6 G/DL (ref 6–8.2)
RBC # BLD AUTO: 4.39 M/UL (ref 4.2–5.4)
SODIUM SERPL-SCNC: 120 MMOL/L (ref 135–145)
TRIGL SERPL-MCNC: 89 MG/DL (ref 0–149)
TSH SERPL DL<=0.005 MIU/L-ACNC: 0.74 UIU/ML (ref 0.38–5.33)
WBC # BLD AUTO: 11.2 K/UL (ref 4.8–10.8)

## 2023-11-10 PROCEDURE — 36415 COLL VENOUS BLD VENIPUNCTURE: CPT

## 2023-11-10 PROCEDURE — 85025 COMPLETE CBC W/AUTO DIFF WBC: CPT

## 2023-11-10 PROCEDURE — 83036 HEMOGLOBIN GLYCOSYLATED A1C: CPT

## 2023-11-10 PROCEDURE — 80061 LIPID PANEL: CPT

## 2023-11-10 PROCEDURE — 84443 ASSAY THYROID STIM HORMONE: CPT

## 2023-11-10 PROCEDURE — 85652 RBC SED RATE AUTOMATED: CPT

## 2023-11-10 PROCEDURE — 82306 VITAMIN D 25 HYDROXY: CPT

## 2023-11-10 PROCEDURE — 86140 C-REACTIVE PROTEIN: CPT

## 2023-11-10 PROCEDURE — 80053 COMPREHEN METABOLIC PANEL: CPT

## 2023-11-11 ENCOUNTER — TELEPHONE (OUTPATIENT)
Dept: MEDICAL GROUP | Facility: PHYSICIAN GROUP | Age: 63
End: 2023-11-11
Payer: MEDICARE

## 2023-11-11 DIAGNOSIS — E87.1 HYPONATREMIA: ICD-10-CM

## 2023-11-11 NOTE — TELEPHONE ENCOUNTER
I called patient to let her know about her bloodwork, she did not  so I left a voice message asking her to check mychart message

## 2023-11-14 ENCOUNTER — TELEPHONE (OUTPATIENT)
Dept: MEDICAL GROUP | Facility: PHYSICIAN GROUP | Age: 63
End: 2023-11-14
Payer: MEDICARE

## 2023-11-14 NOTE — TELEPHONE ENCOUNTER
TREVOR was able to get in contact with the patient but she refused to go to the emergency department. Left another voicemail for patient informing her sodium level is critical and she needs to go to the ED and call us.

## 2023-11-14 NOTE — TELEPHONE ENCOUNTER
Multiple attempts to get in contact with patient but it keeps going to voicemail. Called emergency contacts and all go straight to voicemail. Called [employer] non emergency and they are going out to do a welfare check. Gave direct line for call back regarding patient.

## 2023-11-16 RX ORDER — DULOXETIN HYDROCHLORIDE 60 MG/1
60 CAPSULE, DELAYED RELEASE ORAL
Qty: 90 CAPSULE | Refills: 1 | Status: ON HOLD | OUTPATIENT
Start: 2023-11-16 | End: 2023-12-01

## 2023-11-22 ENCOUNTER — APPOINTMENT (OUTPATIENT)
Dept: RADIOLOGY | Facility: MEDICAL CENTER | Age: 63
DRG: 640 | End: 2023-11-22
Attending: EMERGENCY MEDICINE
Payer: MEDICARE

## 2023-11-22 ENCOUNTER — HOSPITAL ENCOUNTER (INPATIENT)
Facility: MEDICAL CENTER | Age: 63
LOS: 10 days | DRG: 640 | End: 2023-12-02
Attending: EMERGENCY MEDICINE | Admitting: STUDENT IN AN ORGANIZED HEALTH CARE EDUCATION/TRAINING PROGRAM
Payer: MEDICARE

## 2023-11-22 ENCOUNTER — APPOINTMENT (OUTPATIENT)
Dept: RADIOLOGY | Facility: MEDICAL CENTER | Age: 63
DRG: 640 | End: 2023-11-22
Attending: STUDENT IN AN ORGANIZED HEALTH CARE EDUCATION/TRAINING PROGRAM
Payer: MEDICARE

## 2023-11-22 DIAGNOSIS — E87.1 HYPONATREMIA: ICD-10-CM

## 2023-11-22 DIAGNOSIS — Z79.899 ON DEEP VEIN THROMBOSIS (DVT) PROPHYLAXIS: ICD-10-CM

## 2023-11-22 DIAGNOSIS — M54.40 CHRONIC LOW BACK PAIN WITH SCIATICA, SCIATICA LATERALITY UNSPECIFIED, UNSPECIFIED BACK PAIN LATERALITY: ICD-10-CM

## 2023-11-22 DIAGNOSIS — E87.1 ACUTE HYPONATREMIA: ICD-10-CM

## 2023-11-22 DIAGNOSIS — J39.2 THROAT DRY: ICD-10-CM

## 2023-11-22 DIAGNOSIS — S22.41XD CLOSED FRACTURE OF MULTIPLE RIBS OF RIGHT SIDE WITH ROUTINE HEALING: ICD-10-CM

## 2023-11-22 DIAGNOSIS — G89.29 CHRONIC LOW BACK PAIN WITH SCIATICA, SCIATICA LATERALITY UNSPECIFIED, UNSPECIFIED BACK PAIN LATERALITY: ICD-10-CM

## 2023-11-22 DIAGNOSIS — Z91.89 AT RISK FOR CONSTIPATION: ICD-10-CM

## 2023-11-22 DIAGNOSIS — R41.0 DELIRIUM: ICD-10-CM

## 2023-11-22 DIAGNOSIS — R11.0 NAUSEA: ICD-10-CM

## 2023-11-22 DIAGNOSIS — I10 PRIMARY HYPERTENSION: ICD-10-CM

## 2023-11-22 DIAGNOSIS — F22 DELUSIONAL DISORDER (HCC): ICD-10-CM

## 2023-11-22 DIAGNOSIS — E55.9 VITAMIN D DEFICIENCY: ICD-10-CM

## 2023-11-22 DIAGNOSIS — E78.5 DYSLIPIDEMIA: ICD-10-CM

## 2023-11-22 DIAGNOSIS — R05.9 COUGH, UNSPECIFIED TYPE: ICD-10-CM

## 2023-11-22 LAB
ALBUMIN SERPL BCP-MCNC: 4.5 G/DL (ref 3.2–4.9)
ALBUMIN/GLOB SERPL: 1.9 G/DL
ALP SERPL-CCNC: 98 U/L (ref 30–99)
ALT SERPL-CCNC: 25 U/L (ref 2–50)
AMPHET UR QL SCN: NEGATIVE
ANION GAP SERPL CALC-SCNC: 12 MMOL/L (ref 7–16)
APPEARANCE UR: CLEAR
AST SERPL-CCNC: 29 U/L (ref 12–45)
BARBITURATES UR QL SCN: NEGATIVE
BASOPHILS # BLD AUTO: 0.2 % (ref 0–1.8)
BASOPHILS # BLD: 0.03 K/UL (ref 0–0.12)
BENZODIAZ UR QL SCN: POSITIVE
BILIRUB SERPL-MCNC: 0.6 MG/DL (ref 0.1–1.5)
BILIRUB UR QL STRIP.AUTO: NEGATIVE
BUN SERPL-MCNC: 4 MG/DL (ref 8–22)
BZE UR QL SCN: NEGATIVE
CALCIUM ALBUM COR SERPL-MCNC: 8.6 MG/DL (ref 8.5–10.5)
CALCIUM SERPL-MCNC: 9 MG/DL (ref 8.5–10.5)
CANNABINOIDS UR QL SCN: POSITIVE
CHLORIDE SERPL-SCNC: 81 MMOL/L (ref 96–112)
CK SERPL-CCNC: 254 U/L (ref 0–154)
CO2 SERPL-SCNC: 24 MMOL/L (ref 20–33)
COLOR UR: YELLOW
CREAT SERPL-MCNC: 0.37 MG/DL (ref 0.5–1.4)
EKG IMPRESSION: NORMAL
EOSINOPHIL # BLD AUTO: 0.07 K/UL (ref 0–0.51)
EOSINOPHIL NFR BLD: 0.6 % (ref 0–6.9)
ERYTHROCYTE [DISTWIDTH] IN BLOOD BY AUTOMATED COUNT: 38.7 FL (ref 35.9–50)
ETHANOL BLD-MCNC: <10.1 MG/DL
FENTANYL UR QL: NEGATIVE
GFR SERPLBLD CREATININE-BSD FMLA CKD-EPI: 113 ML/MIN/1.73 M 2
GLOBULIN SER CALC-MCNC: 2.4 G/DL (ref 1.9–3.5)
GLUCOSE SERPL-MCNC: 108 MG/DL (ref 65–99)
GLUCOSE UR STRIP.AUTO-MCNC: NEGATIVE MG/DL
HCT VFR BLD AUTO: 38.8 % (ref 37–47)
HGB BLD-MCNC: 14.2 G/DL (ref 12–16)
IMM GRANULOCYTES # BLD AUTO: 0.04 K/UL (ref 0–0.11)
IMM GRANULOCYTES NFR BLD AUTO: 0.3 % (ref 0–0.9)
KETONES UR STRIP.AUTO-MCNC: NEGATIVE MG/DL
LEUKOCYTE ESTERASE UR QL STRIP.AUTO: NEGATIVE
LYMPHOCYTES # BLD AUTO: 2.33 K/UL (ref 1–4.8)
LYMPHOCYTES NFR BLD: 19.2 % (ref 22–41)
MCH RBC QN AUTO: 32.9 PG (ref 27–33)
MCHC RBC AUTO-ENTMCNC: 36.6 G/DL (ref 32.2–35.5)
MCV RBC AUTO: 89.8 FL (ref 81.4–97.8)
METHADONE UR QL SCN: NEGATIVE
MICRO URNS: NORMAL
MONOCYTES # BLD AUTO: 0.74 K/UL (ref 0–0.85)
MONOCYTES NFR BLD AUTO: 6.1 % (ref 0–13.4)
NEUTROPHILS # BLD AUTO: 8.95 K/UL (ref 1.82–7.42)
NEUTROPHILS NFR BLD: 73.6 % (ref 44–72)
NITRITE UR QL STRIP.AUTO: NEGATIVE
NRBC # BLD AUTO: 0 K/UL
NRBC BLD-RTO: 0 /100 WBC (ref 0–0.2)
OPIATES UR QL SCN: NEGATIVE
OSMOLALITY SERPL: 240 MOSM/KG H2O (ref 278–298)
OXYCODONE UR QL SCN: NEGATIVE
PCP UR QL SCN: NEGATIVE
PH UR STRIP.AUTO: 7 [PH] (ref 5–8)
PLATELET # BLD AUTO: 358 K/UL (ref 164–446)
PMV BLD AUTO: 8.2 FL (ref 9–12.9)
POTASSIUM SERPL-SCNC: 3.7 MMOL/L (ref 3.6–5.5)
PROPOXYPH UR QL SCN: NEGATIVE
PROT SERPL-MCNC: 6.9 G/DL (ref 6–8.2)
PROT UR QL STRIP: NEGATIVE MG/DL
RBC # BLD AUTO: 4.32 M/UL (ref 4.2–5.4)
RBC UR QL AUTO: NEGATIVE
SODIUM SERPL-SCNC: 117 MMOL/L (ref 135–145)
SP GR UR STRIP.AUTO: 1
UROBILINOGEN UR STRIP.AUTO-MCNC: 0.2 MG/DL
WBC # BLD AUTO: 12.2 K/UL (ref 4.8–10.8)

## 2023-11-22 PROCEDURE — 82570 ASSAY OF URINE CREATININE: CPT

## 2023-11-22 PROCEDURE — 83930 ASSAY OF BLOOD OSMOLALITY: CPT

## 2023-11-22 PROCEDURE — 93005 ELECTROCARDIOGRAM TRACING: CPT | Performed by: EMERGENCY MEDICINE

## 2023-11-22 PROCEDURE — 99223 1ST HOSP IP/OBS HIGH 75: CPT | Mod: AI,25 | Performed by: STUDENT IN AN ORGANIZED HEALTH CARE EDUCATION/TRAINING PROGRAM

## 2023-11-22 PROCEDURE — 99497 ADVNCD CARE PLAN 30 MIN: CPT | Mod: 25 | Performed by: STUDENT IN AN ORGANIZED HEALTH CARE EDUCATION/TRAINING PROGRAM

## 2023-11-22 PROCEDURE — 83935 ASSAY OF URINE OSMOLALITY: CPT

## 2023-11-22 PROCEDURE — 99406 BEHAV CHNG SMOKING 3-10 MIN: CPT | Performed by: STUDENT IN AN ORGANIZED HEALTH CARE EDUCATION/TRAINING PROGRAM

## 2023-11-22 PROCEDURE — 99285 EMERGENCY DEPT VISIT HI MDM: CPT

## 2023-11-22 PROCEDURE — 80307 DRUG TEST PRSMV CHEM ANLYZR: CPT

## 2023-11-22 PROCEDURE — 82550 ASSAY OF CK (CPK): CPT

## 2023-11-22 PROCEDURE — 770000 HCHG ROOM/CARE - INTERMEDIATE ICU *

## 2023-11-22 PROCEDURE — 82077 ASSAY SPEC XCP UR&BREATH IA: CPT

## 2023-11-22 PROCEDURE — 81003 URINALYSIS AUTO W/O SCOPE: CPT

## 2023-11-22 PROCEDURE — 84443 ASSAY THYROID STIM HORMONE: CPT

## 2023-11-22 PROCEDURE — 84300 ASSAY OF URINE SODIUM: CPT

## 2023-11-22 PROCEDURE — 85025 COMPLETE CBC W/AUTO DIFF WBC: CPT

## 2023-11-22 PROCEDURE — 80053 COMPREHEN METABOLIC PANEL: CPT

## 2023-11-22 PROCEDURE — 71045 X-RAY EXAM CHEST 1 VIEW: CPT

## 2023-11-22 PROCEDURE — 82533 TOTAL CORTISOL: CPT

## 2023-11-22 PROCEDURE — 36415 COLL VENOUS BLD VENIPUNCTURE: CPT

## 2023-11-22 RX ORDER — BISACODYL 10 MG
10 SUPPOSITORY, RECTAL RECTAL
Status: DISCONTINUED | OUTPATIENT
Start: 2023-11-22 | End: 2023-12-02 | Stop reason: HOSPADM

## 2023-11-22 RX ORDER — CETIRIZINE HYDROCHLORIDE 10 MG/1
10 TABLET ORAL NIGHTLY PRN
Status: DISCONTINUED | OUTPATIENT
Start: 2023-11-23 | End: 2023-12-02 | Stop reason: HOSPADM

## 2023-11-22 RX ORDER — POLYETHYLENE GLYCOL 3350 17 G/17G
1 POWDER, FOR SOLUTION ORAL
Status: DISCONTINUED | OUTPATIENT
Start: 2023-11-22 | End: 2023-12-02 | Stop reason: HOSPADM

## 2023-11-22 RX ORDER — LIDOCAINE 4 G/G
1 PATCH TOPICAL EVERY 24 HOURS
Status: DISCONTINUED | OUTPATIENT
Start: 2023-11-23 | End: 2023-12-02 | Stop reason: HOSPADM

## 2023-11-22 RX ORDER — SODIUM CHLORIDE, SODIUM LACTATE, POTASSIUM CHLORIDE, AND CALCIUM CHLORIDE .6; .31; .03; .02 G/100ML; G/100ML; G/100ML; G/100ML
500 INJECTION, SOLUTION INTRAVENOUS
Status: DISCONTINUED | OUTPATIENT
Start: 2023-11-22 | End: 2023-12-02 | Stop reason: HOSPADM

## 2023-11-22 RX ORDER — HYDRALAZINE HYDROCHLORIDE 20 MG/ML
20 INJECTION INTRAMUSCULAR; INTRAVENOUS EVERY 4 HOURS PRN
Status: DISCONTINUED | OUTPATIENT
Start: 2023-11-22 | End: 2023-12-02 | Stop reason: HOSPADM

## 2023-11-22 RX ORDER — PROCHLORPERAZINE EDISYLATE 5 MG/ML
5-10 INJECTION INTRAMUSCULAR; INTRAVENOUS EVERY 4 HOURS PRN
Status: DISCONTINUED | OUTPATIENT
Start: 2023-11-22 | End: 2023-12-02 | Stop reason: HOSPADM

## 2023-11-22 RX ORDER — ALBUTEROL SULFATE 90 UG/1
1 AEROSOL, METERED RESPIRATORY (INHALATION) EVERY 4 HOURS PRN
Status: DISCONTINUED | OUTPATIENT
Start: 2023-11-22 | End: 2023-12-02 | Stop reason: HOSPADM

## 2023-11-22 RX ORDER — PROMETHAZINE HYDROCHLORIDE 25 MG/1
12.5-25 SUPPOSITORY RECTAL EVERY 4 HOURS PRN
Status: DISCONTINUED | OUTPATIENT
Start: 2023-11-22 | End: 2023-12-02 | Stop reason: HOSPADM

## 2023-11-22 RX ORDER — ONDANSETRON 4 MG/1
4 TABLET, ORALLY DISINTEGRATING ORAL EVERY 4 HOURS PRN
Status: DISCONTINUED | OUTPATIENT
Start: 2023-11-22 | End: 2023-12-02 | Stop reason: HOSPADM

## 2023-11-22 RX ORDER — ACETAMINOPHEN 325 MG/1
650 TABLET ORAL EVERY 6 HOURS
Status: DISPENSED | OUTPATIENT
Start: 2023-11-23 | End: 2023-11-27

## 2023-11-22 RX ORDER — HYDROMORPHONE HYDROCHLORIDE 1 MG/ML
0.25 INJECTION, SOLUTION INTRAMUSCULAR; INTRAVENOUS; SUBCUTANEOUS
Status: DISCONTINUED | OUTPATIENT
Start: 2023-11-22 | End: 2023-11-26

## 2023-11-22 RX ORDER — PROMETHAZINE HYDROCHLORIDE 25 MG/1
12.5-25 TABLET ORAL EVERY 4 HOURS PRN
Status: DISCONTINUED | OUTPATIENT
Start: 2023-11-22 | End: 2023-12-02 | Stop reason: HOSPADM

## 2023-11-22 RX ORDER — LORAZEPAM 2 MG/ML
2 INJECTION INTRAMUSCULAR EVERY 4 HOURS PRN
Status: DISCONTINUED | OUTPATIENT
Start: 2023-11-22 | End: 2023-12-02 | Stop reason: HOSPADM

## 2023-11-22 RX ORDER — LABETALOL HYDROCHLORIDE 5 MG/ML
10 INJECTION, SOLUTION INTRAVENOUS EVERY 4 HOURS PRN
Status: DISCONTINUED | OUTPATIENT
Start: 2023-11-22 | End: 2023-11-23

## 2023-11-22 RX ORDER — SODIUM CHLORIDE 9 MG/ML
INJECTION, SOLUTION INTRAVENOUS CONTINUOUS
Status: DISCONTINUED | OUTPATIENT
Start: 2023-11-22 | End: 2023-11-23

## 2023-11-22 RX ORDER — M-VIT,TX,IRON,MINS/CALC/FOLIC 27MG-0.4MG
1 TABLET ORAL DAILY
Status: DISCONTINUED | OUTPATIENT
Start: 2023-11-23 | End: 2023-12-02 | Stop reason: HOSPADM

## 2023-11-22 RX ORDER — GABAPENTIN 400 MG/1
800 CAPSULE ORAL 3 TIMES DAILY
Status: DISCONTINUED | OUTPATIENT
Start: 2023-11-23 | End: 2023-11-26

## 2023-11-22 RX ORDER — OXYCODONE HYDROCHLORIDE 5 MG/1
2.5 TABLET ORAL
Status: DISCONTINUED | OUTPATIENT
Start: 2023-11-22 | End: 2023-11-26

## 2023-11-22 RX ORDER — ACETAMINOPHEN 325 MG/1
650 TABLET ORAL EVERY 6 HOURS PRN
Status: DISCONTINUED | OUTPATIENT
Start: 2023-11-28 | End: 2023-11-29

## 2023-11-22 RX ORDER — LISINOPRIL 10 MG/1
5 TABLET ORAL DAILY
Status: DISCONTINUED | OUTPATIENT
Start: 2023-11-23 | End: 2023-11-23

## 2023-11-22 RX ORDER — ENOXAPARIN SODIUM 100 MG/ML
40 INJECTION SUBCUTANEOUS DAILY
Status: DISCONTINUED | OUTPATIENT
Start: 2023-11-23 | End: 2023-11-24

## 2023-11-22 RX ORDER — ONDANSETRON 2 MG/ML
4 INJECTION INTRAMUSCULAR; INTRAVENOUS EVERY 4 HOURS PRN
Status: DISCONTINUED | OUTPATIENT
Start: 2023-11-22 | End: 2023-12-02 | Stop reason: HOSPADM

## 2023-11-22 RX ORDER — TIZANIDINE 4 MG/1
4 TABLET ORAL EVERY 6 HOURS PRN
Status: DISCONTINUED | OUTPATIENT
Start: 2023-11-22 | End: 2023-12-02 | Stop reason: HOSPADM

## 2023-11-22 RX ORDER — NICOTINE 21 MG/24HR
21 PATCH, TRANSDERMAL 24 HOURS TRANSDERMAL
Status: DISCONTINUED | OUTPATIENT
Start: 2023-11-23 | End: 2023-12-02 | Stop reason: HOSPADM

## 2023-11-22 RX ORDER — FLUTICASONE PROPIONATE 50 MCG
2 SPRAY, SUSPENSION (ML) NASAL 2 TIMES DAILY
Status: DISCONTINUED | OUTPATIENT
Start: 2023-11-22 | End: 2023-11-23

## 2023-11-22 RX ORDER — ACETAMINOPHEN 325 MG/1
650 TABLET ORAL EVERY 6 HOURS PRN
Status: DISCONTINUED | OUTPATIENT
Start: 2023-11-22 | End: 2023-11-22

## 2023-11-22 RX ORDER — OXYCODONE HYDROCHLORIDE 5 MG/1
5 TABLET ORAL
Status: DISCONTINUED | OUTPATIENT
Start: 2023-11-22 | End: 2023-11-26

## 2023-11-22 RX ORDER — AMOXICILLIN 250 MG
2 CAPSULE ORAL 2 TIMES DAILY
Status: DISCONTINUED | OUTPATIENT
Start: 2023-11-23 | End: 2023-12-02 | Stop reason: HOSPADM

## 2023-11-22 ASSESSMENT — FIBROSIS 4 INDEX: FIB4 SCORE: 1

## 2023-11-23 PROBLEM — S22.41XD CLOSED FRACTURE OF MULTIPLE RIBS OF RIGHT SIDE WITH ROUTINE HEALING: Status: ACTIVE | Noted: 2023-11-23

## 2023-11-23 PROBLEM — I10 HYPERTENSION: Status: ACTIVE | Noted: 2023-11-23

## 2023-11-23 PROBLEM — Z71.89 ACP (ADVANCE CARE PLANNING): Status: ACTIVE | Noted: 2023-11-23

## 2023-11-23 LAB
ALBUMIN SERPL BCP-MCNC: 3.9 G/DL (ref 3.2–4.9)
ALBUMIN/GLOB SERPL: 1.7 G/DL
ALP SERPL-CCNC: 86 U/L (ref 30–99)
ALT SERPL-CCNC: 21 U/L (ref 2–50)
ANION GAP SERPL CALC-SCNC: 11 MMOL/L (ref 7–16)
AST SERPL-CCNC: 21 U/L (ref 12–45)
BASOPHILS # BLD AUTO: 0.1 % (ref 0–1.8)
BASOPHILS # BLD: 0.01 K/UL (ref 0–0.12)
BILIRUB SERPL-MCNC: 0.6 MG/DL (ref 0.1–1.5)
BUN SERPL-MCNC: 3 MG/DL (ref 8–22)
CALCIUM ALBUM COR SERPL-MCNC: 8.9 MG/DL (ref 8.5–10.5)
CALCIUM SERPL-MCNC: 8.8 MG/DL (ref 8.5–10.5)
CHLORIDE SERPL-SCNC: 89 MMOL/L (ref 96–112)
CO2 SERPL-SCNC: 23 MMOL/L (ref 20–33)
CORTIS SERPL-MCNC: 18.5 UG/DL (ref 0–23)
CREAT SERPL-MCNC: 0.23 MG/DL (ref 0.5–1.4)
CREAT UR-MCNC: 5.92 MG/DL
EOSINOPHIL # BLD AUTO: 0.07 K/UL (ref 0–0.51)
EOSINOPHIL NFR BLD: 0.8 % (ref 0–6.9)
ERYTHROCYTE [DISTWIDTH] IN BLOOD BY AUTOMATED COUNT: 38.8 FL (ref 35.9–50)
GFR SERPLBLD CREATININE-BSD FMLA CKD-EPI: 127 ML/MIN/1.73 M 2
GLOBULIN SER CALC-MCNC: 2.3 G/DL (ref 1.9–3.5)
GLUCOSE SERPL-MCNC: 106 MG/DL (ref 65–99)
HCT VFR BLD AUTO: 36.4 % (ref 37–47)
HGB BLD-MCNC: 13.6 G/DL (ref 12–16)
IMM GRANULOCYTES # BLD AUTO: 0.03 K/UL (ref 0–0.11)
IMM GRANULOCYTES NFR BLD AUTO: 0.3 % (ref 0–0.9)
LYMPHOCYTES # BLD AUTO: 1.9 K/UL (ref 1–4.8)
LYMPHOCYTES NFR BLD: 21.9 % (ref 22–41)
MAGNESIUM SERPL-MCNC: 2.1 MG/DL (ref 1.5–2.5)
MCH RBC QN AUTO: 33.3 PG (ref 27–33)
MCHC RBC AUTO-ENTMCNC: 37.4 G/DL (ref 32.2–35.5)
MCV RBC AUTO: 89.2 FL (ref 81.4–97.8)
MONOCYTES # BLD AUTO: 0.65 K/UL (ref 0–0.85)
MONOCYTES NFR BLD AUTO: 7.5 % (ref 0–13.4)
NEUTROPHILS # BLD AUTO: 6.02 K/UL (ref 1.82–7.42)
NEUTROPHILS NFR BLD: 69.4 % (ref 44–72)
NRBC # BLD AUTO: 0 K/UL
NRBC BLD-RTO: 0 /100 WBC (ref 0–0.2)
OSMOLALITY UR: <50 MOSM/KG H2O (ref 300–900)
PHOSPHATE SERPL-MCNC: 3.8 MG/DL (ref 2.5–4.5)
PLATELET # BLD AUTO: 326 K/UL (ref 164–446)
PMV BLD AUTO: 8.6 FL (ref 9–12.9)
POTASSIUM SERPL-SCNC: 3.6 MMOL/L (ref 3.6–5.5)
PROT SERPL-MCNC: 6.2 G/DL (ref 6–8.2)
RBC # BLD AUTO: 4.08 M/UL (ref 4.2–5.4)
SODIUM SERPL-SCNC: 123 MMOL/L (ref 135–145)
SODIUM SERPL-SCNC: 126 MMOL/L (ref 135–145)
SODIUM SERPL-SCNC: 127 MMOL/L (ref 135–145)
SODIUM UR-SCNC: <20 MMOL/L
TSH SERPL DL<=0.005 MIU/L-ACNC: 1.75 UIU/ML (ref 0.38–5.33)
WBC # BLD AUTO: 8.7 K/UL (ref 4.8–10.8)

## 2023-11-23 PROCEDURE — 99233 SBSQ HOSP IP/OBS HIGH 50: CPT | Performed by: HOSPITALIST

## 2023-11-23 PROCEDURE — 770000 HCHG ROOM/CARE - INTERMEDIATE ICU *

## 2023-11-23 PROCEDURE — 83735 ASSAY OF MAGNESIUM: CPT

## 2023-11-23 PROCEDURE — 84295 ASSAY OF SERUM SODIUM: CPT

## 2023-11-23 PROCEDURE — A9270 NON-COVERED ITEM OR SERVICE: HCPCS | Performed by: HOSPITALIST

## 2023-11-23 PROCEDURE — 70450 CT HEAD/BRAIN W/O DYE: CPT

## 2023-11-23 PROCEDURE — 700105 HCHG RX REV CODE 258: Performed by: STUDENT IN AN ORGANIZED HEALTH CARE EDUCATION/TRAINING PROGRAM

## 2023-11-23 PROCEDURE — A9270 NON-COVERED ITEM OR SERVICE: HCPCS | Performed by: STUDENT IN AN ORGANIZED HEALTH CARE EDUCATION/TRAINING PROGRAM

## 2023-11-23 PROCEDURE — 700101 HCHG RX REV CODE 250: Performed by: STUDENT IN AN ORGANIZED HEALTH CARE EDUCATION/TRAINING PROGRAM

## 2023-11-23 PROCEDURE — 84100 ASSAY OF PHOSPHORUS: CPT

## 2023-11-23 PROCEDURE — 51798 US URINE CAPACITY MEASURE: CPT

## 2023-11-23 PROCEDURE — 80053 COMPREHEN METABOLIC PANEL: CPT

## 2023-11-23 PROCEDURE — 97530 THERAPEUTIC ACTIVITIES: CPT

## 2023-11-23 PROCEDURE — 700102 HCHG RX REV CODE 250 W/ 637 OVERRIDE(OP): Performed by: HOSPITALIST

## 2023-11-23 PROCEDURE — 700111 HCHG RX REV CODE 636 W/ 250 OVERRIDE (IP): Mod: JZ | Performed by: STUDENT IN AN ORGANIZED HEALTH CARE EDUCATION/TRAINING PROGRAM

## 2023-11-23 PROCEDURE — 700102 HCHG RX REV CODE 250 W/ 637 OVERRIDE(OP): Performed by: STUDENT IN AN ORGANIZED HEALTH CARE EDUCATION/TRAINING PROGRAM

## 2023-11-23 PROCEDURE — 85025 COMPLETE CBC W/AUTO DIFF WBC: CPT

## 2023-11-23 PROCEDURE — 97162 PT EVAL MOD COMPLEX 30 MIN: CPT

## 2023-11-23 RX ORDER — LABETALOL HYDROCHLORIDE 5 MG/ML
10 INJECTION, SOLUTION INTRAVENOUS EVERY 4 HOURS PRN
Status: DISCONTINUED | OUTPATIENT
Start: 2023-11-23 | End: 2023-12-02 | Stop reason: HOSPADM

## 2023-11-23 RX ORDER — LISINOPRIL 10 MG/1
10 TABLET ORAL ONCE
Status: COMPLETED | OUTPATIENT
Start: 2023-11-23 | End: 2023-11-23

## 2023-11-23 RX ORDER — GUAIFENESIN 400 MG/1
400 TABLET ORAL EVERY 4 HOURS PRN
Status: ON HOLD | COMMUNITY
End: 2023-12-01

## 2023-11-23 RX ORDER — ACETAMINOPHEN 500 MG
500-1000 TABLET ORAL EVERY 6 HOURS PRN
COMMUNITY

## 2023-11-23 RX ORDER — POTASSIUM CHLORIDE 20 MEQ/1
40 TABLET, EXTENDED RELEASE ORAL ONCE
Status: COMPLETED | OUTPATIENT
Start: 2023-11-23 | End: 2023-11-23

## 2023-11-23 RX ORDER — DULOXETIN HYDROCHLORIDE 60 MG/1
60 CAPSULE, DELAYED RELEASE ORAL DAILY
Status: DISCONTINUED | OUTPATIENT
Start: 2023-11-23 | End: 2023-11-26

## 2023-11-23 RX ORDER — LISINOPRIL 5 MG/1
5 TABLET ORAL ONCE
Status: DISCONTINUED | OUTPATIENT
Start: 2023-11-23 | End: 2023-11-23

## 2023-11-23 RX ORDER — ALPRAZOLAM 1 MG/1
.5-1 TABLET ORAL
COMMUNITY
End: 2023-12-28 | Stop reason: SDUPTHER

## 2023-11-23 RX ORDER — PSEUDOEPHEDRINE HCL 30 MG
60 TABLET ORAL EVERY 4 HOURS PRN
COMMUNITY

## 2023-11-23 RX ORDER — ALPRAZOLAM 0.5 MG/1
.5-1 TABLET ORAL
Status: DISCONTINUED | OUTPATIENT
Start: 2023-11-23 | End: 2023-11-28

## 2023-11-23 RX ORDER — LISINOPRIL 5 MG/1
15 TABLET ORAL DAILY
Status: ON HOLD | COMMUNITY
End: 2023-12-01

## 2023-11-23 RX ORDER — DEXTROAMPHETAMINE SACCHARATE, AMPHETAMINE ASPARTATE, DEXTROAMPHETAMINE SULFATE AND AMPHETAMINE SULFATE 2.5; 2.5; 2.5; 2.5 MG/1; MG/1; MG/1; MG/1
20 TABLET ORAL 2 TIMES DAILY
Status: DISCONTINUED | OUTPATIENT
Start: 2023-11-23 | End: 2023-11-24

## 2023-11-23 RX ORDER — QUETIAPINE FUMARATE 25 MG/1
25 TABLET, FILM COATED ORAL 2 TIMES DAILY
Status: DISCONTINUED | OUTPATIENT
Start: 2023-11-23 | End: 2023-11-24

## 2023-11-23 RX ORDER — LISINOPRIL 10 MG/1
10 TABLET ORAL DAILY
Status: DISCONTINUED | OUTPATIENT
Start: 2023-11-24 | End: 2023-11-23

## 2023-11-23 RX ORDER — TURMERIC 400 MG
400 CAPSULE ORAL DAILY
COMMUNITY

## 2023-11-23 RX ORDER — CHLORAL HYDRATE 500 MG
1000 CAPSULE ORAL DAILY
COMMUNITY

## 2023-11-23 RX ADMIN — NICOTINE TRANSDERMAL SYSTEM 21 MG: 21 PATCH, EXTENDED RELEASE TRANSDERMAL at 05:25

## 2023-11-23 RX ADMIN — SODIUM CHLORIDE: 9 INJECTION, SOLUTION INTRAVENOUS at 00:31

## 2023-11-23 RX ADMIN — OXYCODONE 5 MG: 5 TABLET ORAL at 05:25

## 2023-11-23 RX ADMIN — ALPRAZOLAM 1 MG: 0.5 TABLET ORAL at 08:39

## 2023-11-23 RX ADMIN — LIDOCAINE 1 PATCH: 4 PATCH TOPICAL at 00:37

## 2023-11-23 RX ADMIN — OXYCODONE 5 MG: 5 TABLET ORAL at 13:23

## 2023-11-23 RX ADMIN — ACETAMINOPHEN 650 MG: 325 TABLET, FILM COATED ORAL at 05:24

## 2023-11-23 RX ADMIN — LISINOPRIL 10 MG: 10 TABLET ORAL at 11:10

## 2023-11-23 RX ADMIN — ACETAMINOPHEN 650 MG: 325 TABLET, FILM COATED ORAL at 17:29

## 2023-11-23 RX ADMIN — GABAPENTIN 800 MG: 400 CAPSULE ORAL at 11:09

## 2023-11-23 RX ADMIN — OXYCODONE 5 MG: 5 TABLET ORAL at 08:36

## 2023-11-23 RX ADMIN — ACETAMINOPHEN 650 MG: 325 TABLET, FILM COATED ORAL at 11:08

## 2023-11-23 RX ADMIN — OXYCODONE 5 MG: 5 TABLET ORAL at 01:00

## 2023-11-23 RX ADMIN — LISINOPRIL 5 MG: 10 TABLET ORAL at 05:24

## 2023-11-23 RX ADMIN — ENOXAPARIN SODIUM 40 MG: 100 INJECTION SUBCUTANEOUS at 17:32

## 2023-11-23 RX ADMIN — NICOTINE POLACRILEX 2 MG: 2 GUM, CHEWING BUCCAL at 03:41

## 2023-11-23 RX ADMIN — ACETAMINOPHEN 650 MG: 325 TABLET, FILM COATED ORAL at 00:36

## 2023-11-23 RX ADMIN — Medication 1 TABLET: at 05:24

## 2023-11-23 RX ADMIN — SODIUM CHLORIDE: 9 INJECTION, SOLUTION INTRAVENOUS at 01:45

## 2023-11-23 RX ADMIN — TIZANIDINE 4 MG: 4 TABLET ORAL at 13:23

## 2023-11-23 RX ADMIN — DOCUSATE SODIUM 50 MG AND SENNOSIDES 8.6 MG 2 TABLET: 8.6; 5 TABLET, FILM COATED ORAL at 17:28

## 2023-11-23 RX ADMIN — QUETIAPINE FUMARATE 25 MG: 25 TABLET ORAL at 13:53

## 2023-11-23 RX ADMIN — DULOXETINE HYDROCHLORIDE 60 MG: 60 CAPSULE, DELAYED RELEASE ORAL at 11:09

## 2023-11-23 RX ADMIN — POTASSIUM CHLORIDE 40 MEQ: 1500 TABLET, EXTENDED RELEASE ORAL at 11:09

## 2023-11-23 RX ADMIN — GABAPENTIN 800 MG: 400 CAPSULE ORAL at 17:28

## 2023-11-23 RX ADMIN — GABAPENTIN 800 MG: 400 CAPSULE ORAL at 05:24

## 2023-11-23 ASSESSMENT — ENCOUNTER SYMPTOMS
HEARTBURN: 0
SHORTNESS OF BREATH: 0
COUGH: 0
BRUISES/BLEEDS EASILY: 0
HEADACHES: 0
WEAKNESS: 1
BLURRED VISION: 0
DOUBLE VISION: 0
DIZZINESS: 0
HALLUCINATIONS: 0
VOMITING: 0
INSOMNIA: 1
MYALGIAS: 0
DEPRESSION: 1
FEVER: 0
CHILLS: 0
ABDOMINAL PAIN: 0
NERVOUS/ANXIOUS: 1
PALPITATIONS: 0
NAUSEA: 0

## 2023-11-23 ASSESSMENT — COGNITIVE AND FUNCTIONAL STATUS - GENERAL
WALKING IN HOSPITAL ROOM: A LOT
SUGGESTED CMS G CODE MODIFIER MOBILITY: CL
TOILETING: A LOT
WALKING IN HOSPITAL ROOM: A LITTLE
CLIMB 3 TO 5 STEPS WITH RAILING: A LITTLE
HELP NEEDED FOR BATHING: A LOT
PERSONAL GROOMING: A LITTLE
DRESSING REGULAR UPPER BODY CLOTHING: A LOT
MOVING FROM LYING ON BACK TO SITTING ON SIDE OF FLAT BED: A LOT
MOVING FROM LYING ON BACK TO SITTING ON SIDE OF FLAT BED: A LOT
EATING MEALS: A LITTLE
MOVING TO AND FROM BED TO CHAIR: A LOT
STANDING UP FROM CHAIR USING ARMS: A LOT
CLIMB 3 TO 5 STEPS WITH RAILING: TOTAL
STANDING UP FROM CHAIR USING ARMS: A LITTLE
SUGGESTED CMS G CODE MODIFIER MOBILITY: CK
TURNING FROM BACK TO SIDE WHILE IN FLAT BAD: A LOT
TURNING FROM BACK TO SIDE WHILE IN FLAT BAD: A LOT
DAILY ACTIVITIY SCORE: 14
SUGGESTED CMS G CODE MODIFIER DAILY ACTIVITY: CK
MOVING TO AND FROM BED TO CHAIR: A LOT
MOBILITY SCORE: 15
MOBILITY SCORE: 11
DRESSING REGULAR LOWER BODY CLOTHING: A LOT

## 2023-11-23 ASSESSMENT — LIFESTYLE VARIABLES
EVER HAD A DRINK FIRST THING IN THE MORNING TO STEADY YOUR NERVES TO GET RID OF A HANGOVER: NO
AVERAGE NUMBER OF DAYS PER WEEK YOU HAVE A DRINK CONTAINING ALCOHOL: 0
HAVE YOU EVER FELT YOU SHOULD CUT DOWN ON YOUR DRINKING: NO
EVER FELT BAD OR GUILTY ABOUT YOUR DRINKING: NO
ALCOHOL_USE: NO
TOTAL SCORE: 0
DOES PATIENT WANT TO STOP DRINKING: NO
SUBSTANCE_ABUSE: 0
HAVE PEOPLE ANNOYED YOU BY CRITICIZING YOUR DRINKING: NO
CONSUMPTION TOTAL: NEGATIVE
HOW MANY TIMES IN THE PAST YEAR HAVE YOU HAD 5 OR MORE DRINKS IN A DAY: 0
TOTAL SCORE: 0
ON A TYPICAL DAY WHEN YOU DRINK ALCOHOL HOW MANY DRINKS DO YOU HAVE: 0
TOTAL SCORE: 0

## 2023-11-23 ASSESSMENT — GAIT ASSESSMENTS
ASSISTIVE DEVICE: NONE;FRONT WHEEL WALKER
GAIT LEVEL OF ASSIST: CONTACT GUARD ASSIST
DEVIATION: DECREASED BASE OF SUPPORT;BRADYKINETIC;OTHER (COMMENT)
DISTANCE (FEET): 50

## 2023-11-23 ASSESSMENT — PAIN DESCRIPTION - PAIN TYPE
TYPE: ACUTE PAIN
TYPE: ACUTE PAIN;CHRONIC PAIN
TYPE: ACUTE PAIN

## 2023-11-23 ASSESSMENT — FIBROSIS 4 INDEX: FIB4 SCORE: 1

## 2023-11-23 NOTE — ED NOTES
Assist RN: Patient noted to have an abnormal rhythm on her tele monitoring. ERP updated EKG ordered. Trauma RN and RRT staff at bedside to assist primary RN.

## 2023-11-23 NOTE — CONSULTS
RCC    68-year-old woman with a history major depression and generalized anxiety disorder who recently followed up on 11/8 and was found to be hyponatremic with a sodium of 120 but no follow-up for that abnormal lab as of yet.  Patient today found by EMS after friend had contacted for a well check since they had not seen her for a few days.  Patient a bit altered and there was concern of failure to thrive.  She was found at the power off in her home and her dog had passed away.  Right in the ER and is a bit slow and altered but CT head negative.  Sodium level 117 and she appears euvolemic.  Vital signs are normal.  ER physician requesting IMCU admission, this is deemed clinically appropriate.  Case reviewed with nursing staff in the ER.    This patient will be admitted to the IMCU for hyponatremia and remains under the care of Hospitalist team (who all questions and concerns should be relayed to).  While a critical care consultation has not been requested, we are immediately available if the patient requires critical care in the future.

## 2023-11-23 NOTE — ASSESSMENT & PLAN NOTE
Chronic Psychiatric illness, psych consulted, appreciate recommendations  Pt started on Risperdol 1 mg BID  Continue duloxetine and gabapentin, if persistent hyponatremia may need to stop   Decrease duloxetine dosing and gabapentin

## 2023-11-23 NOTE — ED TRIAGE NOTES
Chief Complaint   Patient presents with    Psych Eval     Pt BIBA for failure to thrive. Per EMS point found in home with dead dog, paranoid, not properly eating or drinking. GCS 15. Denies SI/HI.         BP (!) 142/84   Pulse 72   Temp 36.9 °C (98.5 °F) (Temporal)   Resp 18   SpO2 98%

## 2023-11-23 NOTE — PROGRESS NOTES
4 Eyes Skin Assessment Completed by JONA Michelle and JONA Menendez    Skin assessment is primarily focused on high risk bony prominences. Pay special attention to skin beneath and around medical devices, high risk bony prominences, skin to skin areas and areas where the patient lacks sensation to feel pain and areas where the patient previously had breakdown.     HIGH RISK PRESSURE POINTS -  Sacrum/Coccyx WDL  Right ischial tuberosity (Sit Bones) Bruising  Left Ischial Tuberosity (Sit Bones) WDL  Right Heel Red and Blanching  Left Heel Red and Blanching    HEAD & NECK DEVICES:  Occipital Region WDL  Right Ear WDL  Left Ear WDL  Chin WDL  Neck WDL  Sternum WDL  NA, Intact with no device in use    RESPIRATORY DEVICES:  Lips WDL  Tongue WDL  Right Cheek WDL  Left Cheek WDL  Bridge of Nose WDL  NA, Pt on room air    FEEDING DEVICES:  Nasal Septum WDL  NA    TORSO DEVICES:  Scapula WDL  Spine Scar  Back Scar  Abdomen WDL  Right Flank WDL  Left Flank WDL  Tele Box    LINES, BP MONITORING DEVICES IN USE:  Right Elbow, Forearm, Wrist, Hand Red and Scab  Left Elbow, Forearm, Wrist, Hand Red and Scab  Peripheral IV, BP Cuff, and Pulse Ox    ORTHOPEDIC DEVICES:  Right Hip Bruising  Left Hip WDL  Right Thigh Bruising  Left Thigh WDL  Right Leg WDL  Left Leg Edema  Right Achilles WDL  Left Achilles WDL  Right Foot Red and Blanching  Left Foot Red and Blanching  NA    MISCELLANEOUS:  Axilla WDL  Groin WDL  Perineum WDL  Buttocks/Zabrina-rectal WDL  Purewick and SCDs    PROTOCOL INTERVENTIONS:   ICU Low Airloss Bed Already in place  Condom Cath/Purewick Already in place    WOUND PHOTOS:   N/A no wounds identified    WOUND CONSULT:   N/A, no advanced wound care needs identified

## 2023-11-23 NOTE — ED NOTES
Assist RN: Patient ambulated to the bathroom independently with slow, steady gait and accompanied by this RN for fall precautions. Patient assisted back to bed and placed on VS Monitors. Patient resting comfortably, resp even and unlabored, NAD and all fall precautions in place per protocol.   JOHN [place]: Joan Jackson  [phone]    HPI:  Ms. Onur Cobb is a 73 yo F with h/o hypothyroid, h/o shoulder surgery on 7/2011, question h/o MV prolapse. 2014 stress test and an echo was normal.  Holter in 2012 that noted benign PVCs. 2/2020 stress echo was normal.    Since her last visit, she continues to do well. She denies any exertional chest pain. Her breathing has been stable. She does continue to have occasional palpitations, but she does think this is less than last year. She has not needed to be on a beta blocker or medical therapy for this. She did go to the emergency room back in June of this year for chest discomfort and workup there was unrevealing. This morning, she did have some chest discomfort as well that was substernal, nonexertional and she does think she did some lifting yesterday. She is compensated on exam with clear lungs and no lower extremity edema. Fam Hx. Father 47 yo. Brother carotid dx age 72. Soc Hx. Works . No tobacco  Assessment and Plan:   1. Palpitations. Holter in the past noted benign PVCs and her palpitations are less than before. We did talk in the past about low dose beta blocker if needed, but not indicated at this time and will have her follow back in one year. 2. Chest discomfort. Noncardiac. If in the future her symptoms become more exertional, can always consider a stress test and we discussed this. She is active and not having symptoms when she exerts herself. 3. Family history of early coronary artery disease. We did talk about coronary calcium score in the past, but she is holistic and not interested in taking medications. I do not think there would be a benefit to obtaining a coronary calcium score because if it is elevated, would recommend a statin. Otherwise she is addressing her risk factors and exercising regularly.           She  has a past medical history of Arthritis, Cervical ca ([address] Hyperlipidemia, Hypothyroid, Leg swelling, Ocular migraine, Psychiatric disorder, Skipped beats, and Snoring. She has no past medical history of Diabetes ([address] or Essential hypertension. All other systems negative except as above. PE  Vitals:    10/18/22 1344   BP: 120/60   Pulse: 87   Resp: 16   SpO2: 95%   Weight: 164 lb (74.4 kg)   Height: 5' 8\" (1.727 m)    Body mass index is 24.94 kg/m².    General appearance - alert, well appearing, and in no distress  Mental status - affect appropriate to mood  Neck - supple, no JVD  Chest - clear to auscultation, no wheezes, rales or rhonchi  Heart - normal rate, regular rhythm, normal S1, S2, no murmurs, rubs, clicks or gallops  Abdomen - soft, nontender, nondistended  Extremities - peripheral pulses normal, no pedal edema    Recent Labs:  Lab Results   Component Value Date/Time    Cholesterol, total 167 07/06/2009 03:35 AM    HDL Cholesterol 55 07/06/2009 03:35 AM    LDL, calculated 100.8 (H) 07/06/2009 03:35 AM    Triglyceride 56 07/06/2009 03:35 AM    CHOL/HDL Ratio 3.0 07/06/2009 03:35 AM     Lab Results   Component Value Date/Time    Creatinine 0.67 06/14/2022 12:20 PM     Lab Results   Component Value Date/Time    BUN 17 06/14/2022 12:20 PM     Lab Results   Component Value Date/Time    Potassium 3.9 06/14/2022 12:20 PM     No results found for: HBA1C, [id number]  Lab Results   Component Value Date/Time    HGB 13.1 06/14/2022 12:20 PM     Lab Results   Component Value Date/Time    PLATELET 173 52/29/1587 12:20 PM       Reviewed:  Past Medical History:   Diagnosis Date    Arthritis     Cervical ca ([address]     Hyperlipidemia     Hypothyroid     Leg swelling     Ocular migraine     Psychiatric disorder     depression    Skipped beats     Snoring      Social History     Tobacco Use   Smoking Status Never   Smokeless Tobacco Never     Social History     Substance and Sexual Activity   Alcohol Use Not Currently    Comment: once a month glass of wine     Allergies   Allergen Reactions Bactrim [Sulfamethoprim Ds] Unknown (comments)    Codeine Nausea Only    Morphine Itching    Phenergan Fortis Other (comments)     Parkinson like symptoms    Sulfa (Sulfonamide Antibiotics) Unknown (comments)    Ultram [Tramadol] Nausea Only       Current Outpatient Medications   Medication Sig    multivitamin (ONE A DAY) tablet Take 1 Tablet by mouth daily. lidocaine (Lidoderm) 5 % Apply patch to the affected area for 12 hours a day and remove for 12 hours a day. (Patient not taking: Reported on 10/18/2022)    methylPREDNISolone (Medrol, Braulio,) 4 mg tablet Follow instructions on packaging (Patient not taking: Reported on 10/18/2022)     No current facility-administered medications for this visit.        Kirill Eckert MD  [facility] heart and Vascular [place]  [address] [facility] [address]  [address]

## 2023-11-23 NOTE — ED NOTES
Bedside report to Britany TENORIO. Pt moved from [address] to [address]. Pt placed on cardiac monitor upon arrival to Blue [address].

## 2023-11-23 NOTE — ASSESSMENT & PLAN NOTE
"BP elevated, added amlodipine, continue Lisinopril\"    Vitals:    12/01/23 1500   BP: 129/73   Pulse: 83   Resp: 17   Temp:    SpO2: 99%     Stable.  "

## 2023-11-23 NOTE — CARE PLAN
The patient is Watcher - Medium risk of patient condition declining or worsening    Shift Goals  Clinical Goals: Serial labs d/t hyponatremia, MIVF, pain management  Patient Goals: sleep, pain management  Family Goals: ALBA    Progress made toward(s) clinical / shift goals:     Problem: Pain - Standard  Goal: Alleviation of pain or a reduction in pain to the patient’s comfort goal  Outcome: Not Met     Problem: Psychosocial  Goal: Patient's ability to identify and develop effective coping behaviors will improve  Outcome: Not Met  Goal: Patient's ability to identify and utilize available support systems will improve  Outcome: Not Met     Problem: Fluid Volume  Goal: Fluid volume balance will be maintained  Outcome: Not Met     Problem: Respiratory  Goal: Patient will achieve/maintain optimum respiratory ventilation and gas exchange  Outcome: Not Met     Problem: Physical Regulation  Goal: Diagnostic test results will improve  Outcome: Not Met     Problem: Knowledge Deficit - Standard  Goal: Patient and family/care givers will demonstrate understanding of plan of care, disease process/condition, diagnostic tests and medications  Outcome: Not Met     Problem: Hemodynamics  Goal: Patient's hemodynamics, fluid balance and neurologic status will be stable or improve  Outcome: Progressing     Problem: Urinary - Renal Perfusion  Goal: Ability to achieve and maintain adequate renal perfusion and functioning will improve  Outcome: Progressing     Problem: Provide Safe Environment  Goal: Suicide environmental safety, protocols, policies, and practices will be implemented  Outcome: Met     Problem: Skin Integrity  Goal: Skin integrity is maintained or improved  Outcome: Met     Problem: Fall Risk  Goal: Patient will remain free from falls  Outcome: Met       Patient is not progressing towards the following goals:      Problem: Pain - Standard  Goal: Alleviation of pain or a reduction in pain to the patient’s comfort  goal  Outcome: Not Met     Problem: Psychosocial  Goal: Patient's ability to identify and develop effective coping behaviors will improve  Outcome: Not Met  Goal: Patient's ability to identify and utilize available support systems will improve  Outcome: Not Met     Problem: Fluid Volume  Goal: Fluid volume balance will be maintained  Outcome: Not Met     Problem: Respiratory  Goal: Patient will achieve/maintain optimum respiratory ventilation and gas exchange  Outcome: Not Met     Problem: Physical Regulation  Goal: Diagnostic test results will improve  Outcome: Not Met     Problem: Knowledge Deficit - Standard  Goal: Patient and family/care givers will demonstrate understanding of plan of care, disease process/condition, diagnostic tests and medications  Outcome: Not Met

## 2023-11-23 NOTE — H&P
Hospital Medicine History & Physical Note    Date of Service  2023    Primary Care Physician  Iraida Zavala M.D.    Consultants  ICU (Dr. Hassan) - to IMCU    Code Status  Full Code    Chief Complaint  Chief Complaint   Patient presents with    Psych Eval     Pt BIBA for failure to thrive. Per EMS point found in home with dead dog, paranoid, not properly eating or drinking. GCS 15. Denies SI/HI.        History of Presenting Illness  Paulette Concepcion is a 63 y.o. female with history of hypertension, anxiety depression, neuropathy, asthma, tobacco abuse who presented 2023 with evaluation for failure to thrive. EMS was contacted by patient's friend for wellness check, found patient to be in a paranoid state and brought patient to ER for evaluation.  Patient stated her dog was mauled by another dog approximately a week ago and unfortunately .  She stated she has been depressed with passing of her dog.  She admits to not eating drinking well, depressed moods.  Denies SI/HI.  She was found to have concerning sodium level 117 in ER.  ICU was consulted, recommend IMCU admission.  I added CT head which subsequently did not show any acute findings.  Admitted to medicine service for further evaluation and treatment.    I discussed the plan of care with patient, bedside RN, pharmacy, and critical care.    Review of Systems  Review of Systems   Constitutional:  Positive for malaise/fatigue. Negative for chills and fever.   HENT:  Negative for hearing loss and tinnitus.    Eyes:  Negative for blurred vision and double vision.   Respiratory:  Negative for cough and shortness of breath.    Cardiovascular:  Negative for chest pain and palpitations.   Gastrointestinal:  Negative for abdominal pain, heartburn, nausea and vomiting.   Genitourinary:  Negative for dysuria and hematuria.   Musculoskeletal:  Negative for joint pain and myalgias.   Skin:  Negative for itching and rash.   Neurological:   "Positive for weakness. Negative for dizziness and headaches.   Endo/Heme/Allergies:  Negative for environmental allergies. Does not bruise/bleed easily.   Psychiatric/Behavioral:  Positive for depression. Negative for hallucinations, substance abuse and suicidal ideas. The patient is nervous/anxious and has insomnia.    All other systems reviewed and are negative.      Past Medical History   has a past medical history of Abnormal mammogram (10/14/2021), Allergic reaction caused by a drug (12/2020), Anxiety, Back muscle spasm (1/25/2022), Chronic back pain, Depression (2013), Dyslipidemia (4/4/2023), Fall, History of spinal fusion, Hyperglycemia (4/8/2021), Lumbar burst fracture (HCC) (2013), Osteopenia of lumbar spine (4/8/2021), Other specified anxiety disorders (4/8/2021), Primary insomnia (4/8/2021), Situational depression (4/8/2021), and Vitamin D deficiency (4/8/2021).    Surgical History   has a past surgical history that includes other orthopedic surgery (2009); other orthopedic surgery; fusion, spine, lumbar, plif (2013); primary c section; and rotator cuff repair (Right, Mar 2009).     Family History  family history includes Cancer (age of onset: 45) in her mother; Heart Disease in her half-brother and mother; No Known Problems in her half-brother and half-brother.   Family history reviewed with patient. There is no family history that is pertinent to the chief complaint.     Social History   reports that she has quit smoking. Her smoking use included cigarettes. She started smoking about 44 years ago. She has a 44.9 pack-year smoking history. She has never used smokeless tobacco. She reports that she does not currently use alcohol. She reports that she does not currently use drugs after having used the following drugs: Marijuana and Inhaled.    Allergies  Allergies   Allergen Reactions    Sulfa Drugs Nausea     Pt states \"I get very sick, I was over 20 years ago\".    Seasonal Runny Nose and Itching       "       Medications  Prior to Admission Medications   Prescriptions Last Dose Informant Patient Reported? Taking?   DULoxetine (CYMBALTA) 60 MG Cap DR Particles delayed-release capsule   No No   Sig: TAKE 1 CAPSULE BY MOUTH EVERY DAY   Probiotic Product (PROBIOTIC DAILY PO)  Patient Yes No   Sig: Take 1 Cap by mouth every bedtime.   albuterol 108 (90 Base) MCG/ACT Aero Soln inhalation aerosol   No No   Sig: INHALE 1 PUFF EVERY FOUR HOURS AS NEEDED FOR SHORTNESS OF BREATH.   amphetamine-dextroamphetamine (ADDERALL, 20MG,) 20 MG Tab   No No   Sig: Take 1 Tablet by mouth 2 times a day for 30 days.   cetirizine (ZYRTEC) 10 MG Tab  Patient Yes No   Sig: Take 10 mg by mouth every bedtime.   fluticasone (FLONASE) 50 MCG/ACT nasal spray  Patient Yes No   Sig: Spray 2 Sprays in nose 2 times a day.   gabapentin (NEURONTIN) 800 MG tablet   No No   Sig: TAKE 1 TABLET BY MOUTH THREE TIMES A DAY   ibandronate (BONIVA) 150 MG tablet   No No   Sig: TAKE 1 TABLET BY MOUTH EVERY 30 DAYS.   lisinopril (PRINIVIL) 5 MG Tab   No No   Sig: TAKE 1 TABLET BY MOUTH EVERY DAY   meloxicam (MOBIC) 7.5 MG Tab   No No   Sig: TAKE 1 TABLET BY MOUTH TWICE A DAY   therapeutic multivitamin-minerals (THERAGRAN-M) Tab  Patient Yes No   Sig: Take 1 Tab by mouth every day.   tizanidine (ZANAFLEX) 4 MG Tab   No No   Sig: TAKE 1 TABLET BY MOUTH EVERY 6 HOURS AS NEEDED      Facility-Administered Medications: None       Physical Exam  Temp:  [36.2 °C (97.1 °F)-36.9 °C (98.5 °F)] 36.2 °C (97.1 °F)  Pulse:  [60-82] 78  Resp:  [12-43] 26  BP: (135-190)/(65-93) 171/82  SpO2:  [86 %-98 %] 96 %  Blood Pressure: 135/86   Temperature: 36.9 °C (98.5 °F)   Pulse: 68   Respiration: 18   Pulse Oximetry: 94 %       Physical Exam  Vitals and nursing note reviewed.   Constitutional:       General: She is not in acute distress.  HENT:      Head: Normocephalic and atraumatic.      Nose: Nose normal.      Mouth/Throat:      Mouth: Mucous membranes are dry.      Pharynx:  "Oropharynx is clear.   Eyes:      General: No scleral icterus.     Extraocular Movements: Extraocular movements intact.   Cardiovascular:      Rate and Rhythm: Normal rate and regular rhythm.      Pulses: Normal pulses.      Heart sounds:      No friction rub.   Pulmonary:      Effort: No respiratory distress.      Breath sounds: No wheezing or rales.   Chest:      Chest wall: No tenderness.   Abdominal:      General: There is no distension.      Tenderness: There is no abdominal tenderness. There is no guarding or rebound.   Musculoskeletal:         General: Normal range of motion.      Cervical back: Neck supple. No tenderness.      Right lower leg: No edema.      Left lower leg: No edema.   Skin:     General: Skin is dry.      Coloration: Skin is pale.   Neurological:      General: No focal deficit present.      Mental Status: She is alert and oriented to person, place, and time.      Comments: Follows command appropriately  Pleasant   Psychiatric:      Comments: Appears anxious         Laboratory:  Recent Labs     11/22/23 2108 11/23/23  0340   WBC 12.2* 8.7   RBC 4.32 4.08*   HEMOGLOBIN 14.2 13.6   HEMATOCRIT 38.8 36.4*   MCV 89.8 89.2   MCH 32.9 33.3*   MCHC 36.6* 37.4*   RDW 38.7 38.8   PLATELETCT 358 326   MPV 8.2* 8.6*     Recent Labs     11/22/23 2108 11/23/23  0340   SODIUM 117* 123*   POTASSIUM 3.7 3.6   CHLORIDE 81* 89*   CO2 24 23   GLUCOSE 108* 106*   BUN 4* 3*   CREATININE 0.37* 0.23*   CALCIUM 9.0 8.8     Recent Labs     11/22/23 2108 11/23/23  0340   ALTSGPT 25 21   ASTSGOT 29 21   ALKPHOSPHAT 98 86   TBILIRUBIN 0.6 0.6   GLUCOSE 108* 106*         No results for input(s): \"NTPROBNP\" in the last 72 hours.      No results for input(s): \"TROPONINT\" in the last 72 hours.    Imaging:  CT-HEAD W/O   Final Result      1.  No acute intracranial abnormality.   2.  Redemonstration of a small amount of fat along the left aspect of the jacinta of uncertain clinical significance.   3.  Mild right maxillary " sinus disease.         DX-CHEST-PORTABLE (1 VIEW)   Final Result      1.  No acute cardiac or pulmonary abnormalities are identified.   2.  Mildly displaced, acute right lateral third and fourth rib fractures.      IR-MIDLINE CATHETER INSERTION WO GUIDANCE > AGE 3    (Results Pending)       X-Ray:  I have personally reviewed the images and compared with prior images.  EKG:  I have personally reviewed the images and compared with prior images.    Assessment/Plan:  Justification for Admission Status  I anticipate this patient will require at least 2 midnights hospitalization, therefore appropriate for inpatient status.        * Acute hyponatremia- (present on admission)  Assessment & Plan  Admission Na 117 @21:08 on 11/22  Target slow correction of 6-8meq / 24hour to avoid CPM  Extensive workup ordered    Check sodium every 4hours  Midline ordered  Neurocheck every 4hours  Seizure precautions - As needed IV Ativan    Initially placed on NS@50cc/hr -- discontinued due to fast correction  Recheck again in 4hrs    Admitted to IMCU for close hemodynamic monitoring    Hypertension  Assessment & Plan  Lisinopril    Neuropathy- (present on admission)  Assessment & Plan  Gabapentin    Mood disorder (HCC)- (present on admission)  Assessment & Plan  As needed Xanax  Hold SSRI for now given acute hyponatremia    ACP (advance care planning)  Assessment & Plan  Goal of care discussed with patient in ER.  She is agreeable for noninvasive, as well as invasive/heroic life-sustaining measures-including CPR/defibrillation/intubation and mechanical ventilation if needed.  Diagnosis, prognosis, questions and concerns addressed.  Full CODE STATUS confirmed.  ACP: 16 minutes    Tobacco dependence- (present on admission)  Assessment & Plan  Admit to smoking >10 cigarettes daily  Tobacco cessation counseling provided: 5 minutes  Offered nicotine patch, nicotine gum, Chantix as alternative.  Provided patient with standard tobacco cessation  information per protocol        VTE prophylaxis: enoxaparin ppx

## 2023-11-23 NOTE — ASSESSMENT & PLAN NOTE
"Admit to smoking >10 cigarettes daily  Tobacco cessation recommended\"    I spent 4 minutes, discussing tobacco dependence and cardiac as well as pulmonary risk. Smoking cessation instructions. Code 47775      "

## 2023-11-23 NOTE — ASSESSMENT & PLAN NOTE
Continue home Gabapentin, decreased dose to 400 mg TID 11/26 due to lethargy and possible contributing factor to low Na

## 2023-11-23 NOTE — PROGRESS NOTES
Pt has been in sinus arhythmia this shift with frequent PAC's.  NS running as per orders at this time.  She remains A&O x4 but slow to answer questions and goes off on tangents when asked simple and complex questions.  Concerns noted for DV/SA and would not answer remaining ADMIN questions at this time.  CN updated and made aware with order placed to SW for further assistance.  Consult is already placed for PSYCH to come by and assess Pt today.    SBP: 135-190mmHg  HR: 65-82 BPM    Pt SBP did not sustain at the high of 190mmHg so no PRN medications were given this shift at this time.

## 2023-11-23 NOTE — ED NOTES
Bedside report received from JONA Wilburn, assumed care of patient.  POC discussed with patient. Call light within reach, all needs addressed at this time. Dr. Carlos at bedside. Pt provided with ice chips and water per Dr. Carlos ok.       Fall risk interventions in place: Move the patient closer to the nurse's station, Patient's personal possessions are with in their safe reach, Place fall risk sign on patient's door, Keep floor surfaces clean and dry, and Accompanied to restroom (all applicable per [place] Fall risk assessment)   Continuous monitoring: Cardiac Leads, Pulse Ox, or Blood Pressure  IVF/IV medications: Not Applicable   Oxygen: Room Air  Bedside sitter: Not Applicable   Isolation: Not Applicable

## 2023-11-23 NOTE — ASSESSMENT & PLAN NOTE
"Admission Na 117 @21:08 did improve initially but now trending back down to 119 on 11/26  discussed with nephrology Dr. Victoria today, likely due to low solute diet, continue fluid restriction, high protein diet and salt tabs started.   BID Na checks adequate per nephro   Repeat osmolarity studies suggesting mixed picture   Monitor labs, avoid over correction\"    Ease on fluid restriction, do free water restrictin and OK to salt food. Since we are easing on it, try to avoid hypotonic fluids if possible. Mentation and behavior improving. On psychotropics. Last QTc normal range 11/22. Repeat EKG at some point. Repeat CXR, ordered labs, COVID/flu/RSV and procalcitonin for cough and congestion. Procalc negative and COVID/flu/RSV negative. No indication for Abx. Na now 132. Ordered incentive spirometry for rib fx. Educated patient on hyponatremia and fluid balance.   SNF planned tomorrow.  "

## 2023-11-23 NOTE — THERAPY
Physical Therapy   Initial Evaluation     Patient Name: Paulette Concepcion  Age:  63 y.o., Sex:  female  Medical Record #: [id number]  Today's Date: 11/23/2023     Precautions  Precautions: (P) Fall Risk;Swallow Precautions;Other (See Comments)  Comments: (P) Seizure precautions; Suicide risk    Assessment  Patient is 63 y.o. female who presented 11/22/2023 with evaluation for failure to thrive.   Found to have acute hyponatremia, Mildly displaced acute right lateral third and fourth rib fractures.   PMH: hypertension, anxiety, depression, neuropathy, asthma, tobacco abuse, back muscle spasm, chronic back pain, DLP, mood disorder     Patient seen for PT evaluation and treatment. Patient in bed, agreeable for the session. Patient able to demonstrate functional mobility tasks as detailed below. Patient currently appears to be weaker than her baseline, will continue to benefit from PT services and recommend post acute placement at this time.     Plan    Physical Therapy Initial Treatment Plan   Treatment Plan : (P) Bed Mobility, Gait Training, Neuro Re-Education / Balance, Therapeutic Activities, Therapeutic Exercise  Treatment Frequency: (P) 4 Times per Week  Duration: (P) Until Therapy Goals Met    DC Equipment Recommendations: (P) None  Discharge Recommendations: (P) Recommend post-acute placement for additional physical therapy services prior to discharge home     Objective     11/23/23 1101   Charge Group   PT Evaluation PT Evaluation Mod   Total Time Spent   PT Total Time Yes   PT Evaluation Time Spent (Mins) 18   PT Therapeutic Activities Time Spent (Mins) 20   PT Total Time Spent (Calculated) 38   Initial Contact Note    Initial Contact Note Order Received and Verified, Physical Therapy Evaluation in Progress with Full Report to Follow.   Precautions   Precautions Fall Risk;Swallow Precautions;Other (See Comments)   Comments Seizure precautions; Suicide risk   Vitals   Pulse 83   Patient BP Position  Sitting  (Edge of bed)   Blood Pressure (!) 176/85   Pulse Oximetry 96 %   O2 (LPM) 2   O2 Delivery Device Nasal Cannula   Vitals Comments Post activity: 150/108, 82, 96   Pain   Pain Scales 0 to 10 Scale    Intervention Medication (see MAR);Repositioned;Rest   Pain 0 - 10 Group   Location Rib Cage;Neck   Therapist Pain Assessment Prior to Activity;During Activity;Post Activity   Prior Living Situation   Prior Services Home-Independent   Housing / Facility 2 Story House   Steps Into Home 1   Steps In Home   (Flight of steps with rails to basement-has tenants down there, does not have to go down there)   Rail Both Rail (Steps in Home)   Equipment Owned Front-Wheel Walker;Single Point Cane   Lives with - Patient's Self Care Capacity Alone and Unable to Care For Self   Comments Patient mentioned that since the passing of her dog, she has not been able to take care of herself   Prior Level of Functional Mobility   Bed Mobility Independent   Transfer Status Independent   Ambulation Independent   Ambulation Distance Community   Assistive Devices Used None   Stairs Independent   Comments Patient mentioned that lately she has been home bound. Otherwise was ambulatory in the community.   History of Falls   History of Falls Yes   Date of Last Fall   (H/O 1 fall recently)   Cognition    Level of Consciousness Alert   Passive ROM Lower Body   Passive ROM Lower Body WDL   Active ROM Lower Body    Active ROM Lower Body  WDL   Strength Lower Body   Lower Body Strength  X   Gross Strength Generalized Weakness, Equal Bilaterally   Comments Grossly BLE 3/5 to 3+/5   Vision   Vision Comments Glasses at all times   Other Treatments   Other Treatments Provided Patient educated about daily mobility/ambulation with nursing, OOB to chair for meals, use of FWW for increased balance & safety at this time; Patient was assisted to the bathroom for oral care & washing her face, per patient's request.   Balance Assessment   Sitting Balance  (Static) Fair +   Sitting Balance (Dynamic) Fair   Standing Balance (Static) Fair   Standing Balance (Dynamic) Fair -   Weight Shift Sitting Good   Weight Shift Standing Fair   Comments Seated EOB; Standing with & without FWW   Bed Mobility    Supine to Sit Standby Assist   Scooting Standby Assist   Rolling Standby Assist   Comments HOB raised   Gait Analysis   Gait Level Of Assist Contact Guard Assist   Assistive Device None;Front Wheel Walker   Distance (Feet) 50   Deviation Decreased Base Of Support;Bradykinetic;Other (Comment)  (Asymmetrical & reduced step & stride length)   Comments Patient ambulated in the room and into the bathroom; cues for directions, appropriate use of AD   Functional Mobility   Sit to Stand Contact Guard Assist   Bed, Chair, Wheelchair Transfer Contact Guard Assist   Transfer Method Stand Step   Mobility Bed-chair, W FWW, cues for hand placement, LE placement, sequencing   How much difficulty does the patient currently have...   Turning over in bed (including adjusting bedclothes, sheets and blankets)? 2   Sitting down on and standing up from a chair with arms (e.g., wheelchair, bedside commode, etc.) 2   Moving from lying on back to sitting on the side of the bed? 2   How much help from another person does the patient currently need...   Moving to and from a bed to a chair (including a wheelchair)? 3   Need to walk in a hospital room? 3   Climbing 3-5 steps with a railing? 3   6 clicks Mobility Score 15   Activity Tolerance   Sitting in Chair Post session   Patient / Family Goals    Patient / Family Goal #1 To get better   Short Term Goals    Short Term Goal # 1 Patient will perform supine-sit with HOB flat with supervision in 6 visits   Short Term Goal # 2 Patient will perform sit-stand and chair transfers with LRAD with supervision in 6 visits   Short Term Goal # 3 Patient will ambulate > 100 feet with LRAD with supervision in 6 visits   Education Group   Education Provided Role of  Physical Therapist   Role of Physical Therapist Patient Response Patient;Acceptance;Explanation;Verbal Demonstration   Physical Therapy Initial Treatment Plan    Treatment Plan  Bed Mobility;Gait Training;Neuro Re-Education / Balance;Therapeutic Activities;Therapeutic Exercise   Treatment Frequency 4 Times per Week   Duration Until Therapy Goals Met   Problem List    Problems Pain;Impaired Bed Mobility;Impaired Transfers;Impaired Ambulation;Functional Strength Deficit;Impaired Balance;Decreased Activity Tolerance   Anticipated Discharge Equipment and Recommendations   DC Equipment Recommendations None   Discharge Recommendations Recommend post-acute placement for additional physical therapy services prior to discharge home   Interdisciplinary Plan of Care Collaboration   IDT Collaboration with  Nursing   Patient Position at End of Therapy Seated;Chair Alarm On;Call Light within Reach;Tray Table within Reach   Session Information   Date / Session Number  11/23-1(1/4, 11/29)

## 2023-11-23 NOTE — PROGRESS NOTES
[facility] Medicine Daily Progress Note    Date of Service  2023    Chief Complaint  Paulette Concepcion is a 63 y.o. female admitted 2023 with confusion, failure to thrive, psychosis, hyponatremia    Hospital Course    Paulette Concepcion is a 63 y.o. female with history of hypertension, anxiety depression, neuropathy, asthma, tobacco abuse who presented 2023 with evaluation for failure to thrive. EMS was contacted by patient's friend for wellness check, found patient to be in a paranoid state and brought patient to ER for evaluation.  Patient stated her dog was mauled by another dog approximately a week ago and unfortunately .  She stated she has been depressed with passing of her dog.  She admits to not eating drinking well, depressed moods.  Denies SI/HI.  She was found to have concerning sodium level 117 in ER  Patient apparently is chronically psychiatrically ill, followed by an outpatient psychiatrist  The patient gives a somewhat convoluted story of her current living situation, she has had some difficulty with construction, on and off power outage, appears to have failed to care for himself and in the current environment  The patient exhibits some paranoid and psychotic features    Interval Problem Update  Patient seen and examined today.  Data, Medication data reviewed.  Case discussed with nursing as available.  Plan of Care reviewed with patient and notified of changes.   the patient currently afebrile, heart rate in the 60s and 80s, she is saturating in the high 90s mid 90s on 2 L nasal cannula oxygen, blood pressure in the 150s to 120s over 80s, the patient gives a somewhat convoluted story of her recent events, she is confirming that her dog was attacked and , he was brought into her home  The patient admits that she has been confused and was unable to care for herself in a organized fashion  The patient complains of chest pain from rib fractures, she had  falls  The patient is chronically followed by a outpatient psychiatrist, she has an appointment apparently also with a new person at that facility.  Sodium is slowly correcting,  CT head negative, chest x-ray with right-sided rib fractures  I have discussed this patient's plan of care and discharge plan at IDT rounds today with Case Management, Nursing, Nursing leadership, and other members of the IDT team.    Consultants/Specialty  critical care and psychiatry    Code Status  Full Code    Disposition  The patient is not medically cleared for discharge to home or a post-acute facility.  Anticipate discharge to: home with organized home healthcare and close outpatient follow-up    I have placed the appropriate orders for post-discharge needs.    Review of Systems  Review of Systems   Constitutional:  Positive for malaise/fatigue. Negative for chills and fever.   HENT: Negative.     Eyes: Negative.    Respiratory: Negative.  Negative for cough.    Cardiovascular:  Positive for chest pain. Negative for palpitations.   Gastrointestinal: Negative.  Negative for heartburn, nausea and vomiting.   Genitourinary: Negative.  Negative for dysuria and frequency.   Musculoskeletal:  Positive for falls and myalgias. Negative for back pain and neck pain.   Skin: Negative.  Negative for itching and rash.   Neurological:  Positive for weakness. Negative for dizziness, focal weakness and headaches.   Endo/Heme/Allergies: Negative.  Negative for polydipsia. Does not bruise/bleed easily.   Psychiatric/Behavioral:  Positive for depression. The patient is nervous/anxious.         Physical Exam  Temp:  [36.2 °C (97.1 °F)-36.9 °C (98.5 °F)] 36.2 °C (97.1 °F)  Pulse:  [60-82] 69  Resp:  [12-43] 20  BP: (135-190)/() 158/102  SpO2:  [86 %-98 %] 95 %    Physical Exam  Vitals and nursing note reviewed.   Constitutional:       General: She is not in acute distress.     Appearance: She is well-developed. She is ill-appearing. She is not  diaphoretic.   HENT:      Head: Normocephalic and atraumatic.      Nose: Nose normal.   Eyes:      Conjunctiva/sclera: Conjunctivae normal.      Pupils: Pupils are equal, round, and reactive to light.   Neck:      Thyroid: No thyromegaly.      Vascular: No JVD.   Cardiovascular:      Rate and Rhythm: Normal rate and regular rhythm.      Heart sounds: Normal heart sounds.      No friction rub. No gallop.   Pulmonary:      Effort: Pulmonary effort is normal.      Breath sounds: Rhonchi present. No wheezing or rales.      Comments: Chest wall tenderness  Abdominal:      General: Bowel sounds are normal. There is no distension.      Palpations: Abdomen is soft. There is no mass.      Tenderness: There is no abdominal tenderness. There is no guarding or rebound.   Musculoskeletal:         General: No tenderness. Normal range of motion.      Cervical back: Normal range of motion and neck supple.   Lymphadenopathy:      Cervical: No cervical adenopathy.   Skin:     General: Skin is warm and dry.   Neurological:      Mental Status: She is alert and oriented to person, place, and time.      Cranial Nerves: No cranial nerve deficit.   Psychiatric:         Mood and Affect: Mood is anxious. Affect is labile and inappropriate.         Speech: Speech is tangential.         Behavior: Behavior normal.         Thought Content: Thought content is paranoid.         Cognition and Memory: Cognition is impaired.         Judgment: Judgment is inappropriate.         Fluids    Intake/Output Summary (Last 24 hours) at 11/23/2023 0855  Last data filed at 11/23/2023 0548  Gross per 24 hour   Intake 1014.5 ml   Output 1525 ml   Net -510.5 ml       Laboratory  Recent Labs     11/22/23 2108 11/23/23  0340   WBC 12.2* 8.7   RBC 4.32 4.08*   HEMOGLOBIN 14.2 13.6   HEMATOCRIT 38.8 36.4*   MCV 89.8 89.2   MCH 32.9 33.3*   MCHC 36.6* 37.4*   RDW 38.7 38.8   PLATELETCT 358 326   MPV 8.2* 8.6*     Recent Labs     11/22/23 2108 11/23/23  0340   SODIUM  117* 123*   POTASSIUM 3.7 3.6   CHLORIDE 81* 89*   CO2 24 23   GLUCOSE 108* 106*   BUN 4* 3*   CREATININE 0.37* 0.23*   CALCIUM 9.0 8.8                   Imaging  CT-HEAD W/O   Final Result      1.  No acute intracranial abnormality.   2.  Redemonstration of a small amount of fat along the left aspect of the jacinta of uncertain clinical significance.   3.  Mild right maxillary sinus disease.         DX-CHEST-PORTABLE (1 VIEW)   Final Result      1.  No acute cardiac or pulmonary abnormalities are identified.   2.  Mildly displaced, acute right lateral third and fourth rib fractures.      IR-MIDLINE CATHETER INSERTION WO GUIDANCE > AGE 3    (Results Pending)        Assessment/Plan  * Acute hyponatremia- (present on admission)  Assessment & Plan  Admission Na 117 @21:08 on 11/22  Target slow correction of 6-8meq / 24hour to avoid CPM  Check sodium frequently  Neurocheck every 4hours  Seizure precautions - As needed IV Ativan  Initially placed on NS@50cc/hr -- discontinued due to fast correction  IMCU for close hemodynamic monitoring    Closed fracture of multiple ribs of right side with routine healing  Assessment & Plan  Conservative treatment, pain control, incentive spirometry    ACP (advance care planning)  Assessment & Plan  Goal of care discussed with patient in ER.  She is agreeable for noninvasive, as well as invasive/heroic life-sustaining measures-including CPR/defibrillation/intubation and mechanical ventilation if needed.  Diagnosis, prognosis, questions and concerns addressed.  Full CODE STATUS confirmed.  ACP: 16 minutes    Hypertension  Assessment & Plan  Lisinopril    Neuropathy- (present on admission)  Assessment & Plan  Gabapentin    Mood disorder (HCC)- (present on admission)  Assessment & Plan  Psychiatry consulted, appreciate recommendations    Tobacco dependence- (present on admission)  Assessment & Plan  Admit to smoking >10 cigarettes daily  Tobacco cessation counseling provided  Offered nicotine  patch, nicotine gum, Chantix as alternative.    Provided patient with standard tobacco cessation information per protocol    Plan  Gentle pain control of rib fractures  Resume the patient's outpatient psychiatric medication regimen  Psychiatry consult appreciated, will add Seroquel as suggested  Blood pressure control  Slow sodium correction  PT OT discharge disposition evaluation, the patient might need some additional assistance, appears to be failing at home by herself  See orders  Patient is has a high medical complexity, complex decision making and is at high risk for complication, morbidity, and mortality.  My total time spent caring for the patient on the day of the encounter was 58 minutes.   This does not include time spent on separately billable procedures/tests.    VTE prophylaxis:    enoxaparin ppx      I have performed a physical exam and reviewed and updated ROS and Plan today (11/23/2023). In review of yesterday's note (11/22/2023), there are no changes except as documented above.        Please note that this dictation was created using voice recognition software. I have made every reasonable attempt to correct obvious errors, but I expect that there are errors of grammar and possibly context that I did not discover before finalizing the note.

## 2023-11-23 NOTE — ED PROVIDER NOTES
ED Provider Note    CHIEF COMPLAINT  Chief Complaint   Patient presents with    Psych Eval     Pt BIBA for failure to thrive. Per EMS point found in home with dead dog, paranoid, not properly eating or drinking. GCS 15. Denies SI/HI.        EXTERNAL RECORDS REVIEWED  Outpatient Notes patient has a history of major depressive disorder as well as generalized anxiety disorder.  She had follow-up with outpatient on the eighth of this month after which she had lab drawn and was found to be hyponatremic.  No further labs were drawn after that.    HPI/ROS  LIMITATION TO HISTORY   Select: Altered mental status / Confusion  OUTSIDE HISTORIAN(S):  EMS patient friend called because they had not heard from her in a few days EMS found the patient at home with a dead dog for the last 3 days at least all the electricity was also off in the home.    Paulette Concepcion is a 63 y.o. female who presents to the emergency department for concern for failure to thrive.  Patient is a little slow tells me that she fell maybe has a little right radial rib tenderness but cannot tell me how she fell.  Knows it is 2023 but it took her a while to get to the ER.  Began complaining of the rib tenderness she has no complaints at this time.  He did not know that her dog was dead in her home and we talked about denies SI or HI    PAST MEDICAL HISTORY   has a past medical history of Abnormal mammogram (10/14/2021), Allergic reaction caused by a drug (12/2020), Anxiety, Back muscle spasm (1/25/2022), Chronic back pain, Depression (2013), Dyslipidemia (4/4/2023), Fall, History of spinal fusion, Hyperglycemia (4/8/2021), Lumbar burst fracture (HCC) (2013), Osteopenia of lumbar spine (4/8/2021), Other specified anxiety disorders (4/8/2021), Primary insomnia (4/8/2021), Situational depression (4/8/2021), and Vitamin D deficiency (4/8/2021).    SURGICAL HISTORY   has a past surgical history that includes other orthopedic surgery (2009); other  "orthopedic surgery; fusion, spine, lumbar, plif (2013); primary c section; and rotator cuff repair (Right, Mar 2009).    FAMILY HISTORY  Family History   Problem Relation Age of Onset    Cancer Mother 45        colon    Heart Disease Mother         HEART ATTACK    Heart Disease Half-brother         Heart Bypass @ 45/stent    No Known Problems Half-brother     No Known Problems Half-brother     Diabetes Neg Hx     Stroke Neg Hx        SOCIAL HISTORY  Social History     Tobacco Use    Smoking status: Former     Current packs/day: 1.00     Average packs/day: 1 pack/day for 44.9 years (44.9 ttl pk-yrs)     Types: Cigarettes     Start date: 1979    Smokeless tobacco: Never   Vaping Use    Vaping Use: Some days   Substance and Sexual Activity    Alcohol use: Not Currently     Comment: socially    Drug use: Not Currently     Types: Marijuana, Inhaled     Comment: occ edible THC    Sexual activity: Never     Comment: , ret        CURRENT MEDICATIONS  Home Medications    **Home medications have not yet been reviewed for this encounter**         ALLERGIES  Allergies   Allergen Reactions    Seasonal Runny Nose and Itching    Sulfa Drugs Nausea     Pt states \"I get very sick, I was over 20 years ago\".       PHYSICAL EXAM  VITAL SIGNS: BP (!) 142/84   Pulse 72   Temp 36.9 °C (98.5 °F) (Temporal)   Resp 18   SpO2 98%    Pulse OX: Pulse Oxygen level is within normal limits  Constitutional: Alert in no apparent distress.  HENT: Normocephalic, Atraumatic, MMM  Eyes: PERound. Conjunctiva normal, non-icteric.   Heart: Regular rate and rhythm, intact distal pulses   Lungs: Symmetrical movement, no resp distress to auscultation bilaterally right lateral rib tenderness no crepitus  Abdomen: Non-tender, non-distended, normal bowel sounds  EXT/Back no signs of trauma  Skin: Warm, Dry, No erythema, No rash.   Neurologic: Alert and oriented but slow to respond mildly confused, Grossly non-focal steady gait " cranial nerves II through XII intact sensation intact lower extremities.       DIAGNOSTIC STUDIES / PROCEDURES  EKG  I have independently interpreted this EKG  Results for orders placed or performed during the hospital encounter of 23   EKG   Result Value Ref Range    Report       [facility] Emergency Dept.    Test Date:  2023  Pt Name:    EDUARDO GUNDERSON      Department: ER  MRN:        [id number]                      Room:       [facility]  Gender:     Female                       Technician: [id number]  :        1960                   Requested By:MARC MESSINA  Order #:    [id number]                    Reading MD:    Measurements  Intervals                                Axis  Rate:       71                           P:          77  SD:         137                          QRS:        83  QRSD:       94                           T:          61  QT:         427  QTc:        465    Interpretive Statements  Sinus rhythm  Atrial premature complexes  Probable left atrial enlargement  Borderline right axis deviation  Compared to ECG 2020 14:20:02  Atrial premature complex(es) now present  Sinus tachycardia no longer present  ST (T wave) deviation no longer present           LABS  Labs Reviewed   CBC WITH DIFFERENTIAL - Abnormal; Notable for the following components:       Result Value    WBC 12.2 (*)     MCHC 36.6 (*)     MPV 8.2 (*)     Neutrophils-Polys 73.60 (*)     Lymphocytes 19.20 (*)     Neutrophils (Absolute) 8.95 (*)     All other components within normal limits   COMP METABOLIC PANEL - Abnormal; Notable for the following components:    Sodium 117 (*)     Chloride 81 (*)     Glucose 108 (*)     Bun 4 (*)     Creatinine 0.37 (*)     All other components within normal limits   OSMOLALITY SERUM - Abnormal; Notable for the following components:    Osmolality Serum 240 (*)     All other components within normal limits   URINE DRUG SCREEN - Abnormal; Notable for the  following components:    Benzodiazepines Positive (*)     Cannabinoid Metab Positive (*)     All other components within normal limits    Narrative:     Indication for culture:->Patient WITHOUT an indwelling Gallagher  catheter in place with new onset of Dysuria, Frequency,  Urgency, and/or Suprapubic pain  Release to patient->Immediate   URINALYSIS,CULTURE IF INDICATED    Narrative:     Indication for culture:->Patient WITHOUT an indwelling Gallagher  catheter in place with new onset of Dysuria, Frequency,  Urgency, and/or Suprapubic pain  Release to patient->Immediate   DIAGNOSTIC ALCOHOL   ESTIMATED GFR         RADIOLOGY  I have independently interpreted the diagnostic imaging associated with this visit and am waiting the final reading from the radiologist.   My preliminary interpretation is as follows:     Chest x-ray with obviously a right-sided rib fracture without evidence of pneumothorax    Radiologist interpretation:     DX-CHEST-PORTABLE (1 VIEW)   Final Result      1.  No acute cardiac or pulmonary abnormalities are identified.   2.  Mildly displaced, acute right lateral third and fourth rib fractures.              COURSE & MEDICAL DECISION MAKING    ED Observation Status? Yes; I am placing the patient in to an observation status due to a diagnostic uncertainty as well as therapeutic intensity. Patient placed in observation status at 8:29 PM, 11/22/2023.     Observation plan is as follows: Laboratory analysis including sodium levels urine drug screen alcohol chest x-ray    Upon Reevaluation, the patient's condition has: not improved; and will be escalated to hospitalization.    Patient discharged from ED Observation status at 9:58 PM  (Time) 11/22/23  (Date).     INITIAL ASSESSMENT, COURSE AND PLAN  Care Narrative:     Patient 63-year-old female presents emergency department for evaluation for altered mental status and a well check.  She currently is just a little slow to respond a little bizarre statements and  thoughts at times.  She was hyponatremic a few weeks ago we will assess is whether that that is worsened or improved.  She is nonfocal and her neurologic examination her NIH is 0.  She does not appear dehydrated nor fluid overloaded.  Abdomen soft and nontender she does have limited right rib tenderness to evaluate for underlying occult fractures.    DISPOSITION AND DISCUSSIONS  9:58 PM  Spoke w lab and patient is in fact hyponatremic.    This appears to be at least a week or 2 in nature based on her last sodium but it is progressively worsening leading to altered mental status.  The plan is for hospitalization in setting of hyponatremia I believe the patient is a good candidate for IMCU.  Unfortunately she does have mild rib fractures on the right there is no evidence of pneumo or hemothorax.    10:38 PM  Spoke w Dr Hassan on-call for the ICU and he agrees with IMCU admission and awaiting hospitalist discussion    Spoke with the medicine service and they have accepted the patient to the IMCU.    I have discussed management of the patient with the following physicians and NATALIA's:  [place] hospitalist    Discussion of management with other Q or appropriate source(s): None       Decision tools and prescription drugs considered including, but not limited to: NIH Stroke Scale 0 .      FINAL DIAGNOSIS  1. Hyponatremia    2. Delirium           Electronically signed by: Cassi Hester M.D., 11/22/2023 8:29 PM
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
## 2023-11-23 NOTE — ED NOTES
Pt assisted to bedside commode to provide urine sample. Urine specimen obtained and sent to lab. Pt to CT in wheelchair.

## 2023-11-24 ENCOUNTER — APPOINTMENT (OUTPATIENT)
Dept: RADIOLOGY | Facility: MEDICAL CENTER | Age: 63
DRG: 640 | End: 2023-11-24
Attending: STUDENT IN AN ORGANIZED HEALTH CARE EDUCATION/TRAINING PROGRAM
Payer: MEDICARE

## 2023-11-24 PROBLEM — F22 DELUSIONAL DISORDER (HCC): Status: ACTIVE | Noted: 2023-11-24

## 2023-11-24 LAB
ALBUMIN SERPL BCP-MCNC: 3.8 G/DL (ref 3.2–4.9)
ALBUMIN/GLOB SERPL: 1.7 G/DL
ALP SERPL-CCNC: 85 U/L (ref 30–99)
ALT SERPL-CCNC: 19 U/L (ref 2–50)
ANION GAP SERPL CALC-SCNC: 11 MMOL/L (ref 7–16)
AST SERPL-CCNC: 18 U/L (ref 12–45)
BASOPHILS # BLD AUTO: 0.1 % (ref 0–1.8)
BASOPHILS # BLD: 0.01 K/UL (ref 0–0.12)
BILIRUB SERPL-MCNC: 0.3 MG/DL (ref 0.1–1.5)
BUN SERPL-MCNC: 5 MG/DL (ref 8–22)
CALCIUM ALBUM COR SERPL-MCNC: 8.8 MG/DL (ref 8.5–10.5)
CALCIUM SERPL-MCNC: 8.6 MG/DL (ref 8.5–10.5)
CHLORIDE SERPL-SCNC: 93 MMOL/L (ref 96–112)
CO2 SERPL-SCNC: 23 MMOL/L (ref 20–33)
CREAT SERPL-MCNC: 0.4 MG/DL (ref 0.5–1.4)
EOSINOPHIL # BLD AUTO: 0.02 K/UL (ref 0–0.51)
EOSINOPHIL NFR BLD: 0.2 % (ref 0–6.9)
ERYTHROCYTE [DISTWIDTH] IN BLOOD BY AUTOMATED COUNT: 41.7 FL (ref 35.9–50)
GFR SERPLBLD CREATININE-BSD FMLA CKD-EPI: 111 ML/MIN/1.73 M 2
GLOBULIN SER CALC-MCNC: 2.3 G/DL (ref 1.9–3.5)
GLUCOSE SERPL-MCNC: 126 MG/DL (ref 65–99)
HCT VFR BLD AUTO: 36.5 % (ref 37–47)
HGB BLD-MCNC: 12.9 G/DL (ref 12–16)
IMM GRANULOCYTES # BLD AUTO: 0.04 K/UL (ref 0–0.11)
IMM GRANULOCYTES NFR BLD AUTO: 0.3 % (ref 0–0.9)
LYMPHOCYTES # BLD AUTO: 1.49 K/UL (ref 1–4.8)
LYMPHOCYTES NFR BLD: 12.2 % (ref 22–41)
MAGNESIUM SERPL-MCNC: 1.9 MG/DL (ref 1.5–2.5)
MCH RBC QN AUTO: 32.5 PG (ref 27–33)
MCHC RBC AUTO-ENTMCNC: 35.3 G/DL (ref 32.2–35.5)
MCV RBC AUTO: 91.9 FL (ref 81.4–97.8)
MONOCYTES # BLD AUTO: 0.88 K/UL (ref 0–0.85)
MONOCYTES NFR BLD AUTO: 7.2 % (ref 0–13.4)
NEUTROPHILS # BLD AUTO: 9.8 K/UL (ref 1.82–7.42)
NEUTROPHILS NFR BLD: 80 % (ref 44–72)
NRBC # BLD AUTO: 0 K/UL
NRBC BLD-RTO: 0 /100 WBC (ref 0–0.2)
PHOSPHATE SERPL-MCNC: 3.8 MG/DL (ref 2.5–4.5)
PLATELET # BLD AUTO: 340 K/UL (ref 164–446)
PMV BLD AUTO: 8.7 FL (ref 9–12.9)
POTASSIUM SERPL-SCNC: 4.5 MMOL/L (ref 3.6–5.5)
PROT SERPL-MCNC: 6.1 G/DL (ref 6–8.2)
RBC # BLD AUTO: 3.97 M/UL (ref 4.2–5.4)
SODIUM SERPL-SCNC: 123 MMOL/L (ref 135–145)
SODIUM SERPL-SCNC: 127 MMOL/L (ref 135–145)
WBC # BLD AUTO: 12.2 K/UL (ref 4.8–10.8)

## 2023-11-24 PROCEDURE — 36415 COLL VENOUS BLD VENIPUNCTURE: CPT

## 2023-11-24 PROCEDURE — 700102 HCHG RX REV CODE 250 W/ 637 OVERRIDE(OP): Performed by: HOSPITALIST

## 2023-11-24 PROCEDURE — A9270 NON-COVERED ITEM OR SERVICE: HCPCS | Performed by: STUDENT IN AN ORGANIZED HEALTH CARE EDUCATION/TRAINING PROGRAM

## 2023-11-24 PROCEDURE — 80053 COMPREHEN METABOLIC PANEL: CPT

## 2023-11-24 PROCEDURE — A9270 NON-COVERED ITEM OR SERVICE: HCPCS | Performed by: HOSPITALIST

## 2023-11-24 PROCEDURE — 700111 HCHG RX REV CODE 636 W/ 250 OVERRIDE (IP): Performed by: HOSPITALIST

## 2023-11-24 PROCEDURE — 84295 ASSAY OF SERUM SODIUM: CPT

## 2023-11-24 PROCEDURE — 99233 SBSQ HOSP IP/OBS HIGH 50: CPT | Performed by: HOSPITALIST

## 2023-11-24 PROCEDURE — 700101 HCHG RX REV CODE 250: Performed by: STUDENT IN AN ORGANIZED HEALTH CARE EDUCATION/TRAINING PROGRAM

## 2023-11-24 PROCEDURE — 90832 PSYTX W PT 30 MINUTES: CPT | Performed by: SOCIAL WORKER

## 2023-11-24 PROCEDURE — 770020 HCHG ROOM/CARE - TELE (206)

## 2023-11-24 PROCEDURE — 83735 ASSAY OF MAGNESIUM: CPT

## 2023-11-24 PROCEDURE — 85025 COMPLETE CBC W/AUTO DIFF WBC: CPT

## 2023-11-24 PROCEDURE — 700102 HCHG RX REV CODE 250 W/ 637 OVERRIDE(OP): Performed by: STUDENT IN AN ORGANIZED HEALTH CARE EDUCATION/TRAINING PROGRAM

## 2023-11-24 PROCEDURE — 84100 ASSAY OF PHOSPHORUS: CPT

## 2023-11-24 RX ORDER — RISPERIDONE 1 MG/1
1 TABLET ORAL 2 TIMES DAILY
Status: DISCONTINUED | OUTPATIENT
Start: 2023-11-24 | End: 2023-12-02 | Stop reason: HOSPADM

## 2023-11-24 RX ORDER — VITAMIN B COMPLEX
1000 TABLET ORAL DAILY
Status: DISCONTINUED | OUTPATIENT
Start: 2023-11-24 | End: 2023-12-02 | Stop reason: HOSPADM

## 2023-11-24 RX ADMIN — ACETAMINOPHEN 650 MG: 325 TABLET, FILM COATED ORAL at 23:27

## 2023-11-24 RX ADMIN — LIDOCAINE 1 PATCH: 4 PATCH TOPICAL at 23:27

## 2023-11-24 RX ADMIN — HYDRALAZINE HYDROCHLORIDE 20 MG: 20 INJECTION, SOLUTION INTRAMUSCULAR; INTRAVENOUS at 04:09

## 2023-11-24 RX ADMIN — LIDOCAINE 1 PATCH: 4 PATCH TOPICAL at 02:29

## 2023-11-24 RX ADMIN — ACETAMINOPHEN 650 MG: 325 TABLET, FILM COATED ORAL at 02:29

## 2023-11-24 RX ADMIN — RISPERIDONE 1 MG: 1 TABLET ORAL at 14:42

## 2023-11-24 RX ADMIN — HYDRALAZINE HYDROCHLORIDE 20 MG: 20 INJECTION, SOLUTION INTRAMUSCULAR; INTRAVENOUS at 14:09

## 2023-11-24 RX ADMIN — GABAPENTIN 800 MG: 400 CAPSULE ORAL at 17:31

## 2023-11-24 ASSESSMENT — ENCOUNTER SYMPTOMS
MYALGIAS: 1
BACK PAIN: 0
EYES NEGATIVE: 1
GASTROINTESTINAL NEGATIVE: 1
DIZZINESS: 0
NAUSEA: 0
COUGH: 0
FALLS: 1
CHILLS: 0
POLYDIPSIA: 0
NERVOUS/ANXIOUS: 1
FOCAL WEAKNESS: 0
RESPIRATORY NEGATIVE: 1
DEPRESSION: 1
WEAKNESS: 1
NECK PAIN: 0
VOMITING: 0
FEVER: 0
PALPITATIONS: 0
HEARTBURN: 0
HEADACHES: 0
BRUISES/BLEEDS EASILY: 0

## 2023-11-24 ASSESSMENT — COGNITIVE AND FUNCTIONAL STATUS - GENERAL
SUGGESTED CMS G CODE MODIFIER MOBILITY: CJ
STANDING UP FROM CHAIR USING ARMS: A LITTLE
TOILETING: A LITTLE
DRESSING REGULAR UPPER BODY CLOTHING: A LITTLE
WALKING IN HOSPITAL ROOM: A LITTLE
DAILY ACTIVITIY SCORE: 20
MOBILITY SCORE: 21
CLIMB 3 TO 5 STEPS WITH RAILING: A LITTLE
HELP NEEDED FOR BATHING: A LITTLE
SUGGESTED CMS G CODE MODIFIER DAILY ACTIVITY: CJ
DRESSING REGULAR LOWER BODY CLOTHING: A LITTLE

## 2023-11-24 ASSESSMENT — PAIN DESCRIPTION - PAIN TYPE
TYPE: ACUTE PAIN

## 2023-11-24 ASSESSMENT — FIBROSIS 4 INDEX: FIB4 SCORE: 0.75

## 2023-11-24 NOTE — PROGRESS NOTES
Despite multiple attempts at education, patient continues to refuse medications and nursing care. MD [facility]

## 2023-11-24 NOTE — CARE PLAN
"The patient is Watcher - Medium risk of patient condition declining or worsening    Shift Goals  Clinical Goals: Control Na level  Patient Goals: Manage back pain  Family Goals: ALBA    Progress made toward(s) clinical / shift goals:                     Patient not seeming to be in touch with reality. Keeps referring to her electronics being routed wrong and people stealing her personal information. Refused all morning meds and stated she was \" not participating in any kind of medical studies\". Patient is oriented x4 when asked directly but quickly goes off on tangents about various topics. Being eval by psych.                  Problem: Psychosocial  Goal: Patient's ability to identify and develop effective coping behaviors will improve  Outcome: Not Progressing     Problem: Pain - Standard  Goal: Alleviation of pain or a reduction in pain to the patient’s comfort goal  Outcome: Not Progressing     Problem: Knowledge Deficit - Standard  Goal: Patient and family/care givers will demonstrate understanding of plan of care, disease process/condition, diagnostic tests and medications  Outcome: Not Progressing       Patient is not progressing towards the following goals:      Problem: Pain - Standard  Goal: Alleviation of pain or a reduction in pain to the patient’s comfort goal  Outcome: Not Progressing     Problem: Psychosocial  Goal: Patient's ability to identify and develop effective coping behaviors will improve  Outcome: Not Progressing  Goal: Patient's ability to identify and utilize available support systems will improve  Outcome: Not Progressing     Problem: Knowledge Deficit - Standard  Goal: Patient and family/care givers will demonstrate understanding of plan of care, disease process/condition, diagnostic tests and medications  Outcome: Not Progressing     "
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
## 2023-11-24 NOTE — ASSESSMENT & PLAN NOTE
Patient refusing to interact with medical staff, refusing medication administration yesterday, more interactive today however still with paranoid behavior   Psychiatry following  Continue Risperdal

## 2023-11-24 NOTE — CONSULTS
"  [facility] PSYCHIATRIC FOLLOW-UP:(established)  *Reason for admission:    Pt BIBA for failure to thrive. Per EMS point found in home with dead dog, paranoid, not properly eating or drinking. Pt and dog had been attacked by another dog.   *Legal Hold Status: not applicable                S:  disorganized thnking and no sequitur answers. The only almost coherent answer was in response to the year: \"2020,2023,2023\". Never looked up, kept shaking her head indicating no but not clear as to what.    Note: when first seen on the 23rd she was intelligible and able to answer questions coherently    Notes:   11/23/2023:  was a  flying into [place] on 911. AH, ideas of reference, command messages  from electronic. Non sequitur, depression, anxiety, PTSD, ADD (does not use it daily: only when needing energy and motivation), no denny by hx. Per chart hx: MDD, ADD, SAUL, primary insomnia, chr lumbar pain.    Hx of zyprexa 2.5 mg, wellbutrin 100 mg, elavil 10, proza, xananx, abilify, cymbalta, , rxulti, trintellix.    O: Medical ROS (as pertinent):                      *Psychiatric Examination:   Vitals:   Vitals:    11/24/23 0300 11/24/23 0400 11/24/23 0500 11/24/23 0600   BP:   (!) 149/67 (!) 149/69   Pulse: 82  (!) 101 (!) 101   Resp:       Temp:       TempSrc:       SpO2: 95%      Weight:  67.5 kg (148 lb 13 oz)     Height:  1.6 m (5' 2.99\")       General Appearance:  poor eye contact and not able to participate meaningfully  Abnormal Movements: none   Gait and Posture: not observed  Speech: soft  Thought Process: slow rate  Associations:   disorganized and non-sequitur  Abnormal or Psychotic Thoughts: delusions  Judgement and Insight: impaired   Orientation: unable to determine. Confused about the year  Recent and Remote Memory: unable to determine  Attention Span and Concentration: distracted  Language:fluent  Fund of Knowledge: not tested  Mood and Affect: anxious? Depressed? Not " identified,  SI/HI:  no reply        Current Medications:  Scheduled Medications   Medication Dose Frequency    amphetamine-dextroamphetamine  20 mg BID    DULoxetine  60 mg DAILY    lisinopril  15 mg DAILY    QUEtiapine  25 mg BID    nicotine  21 mg Daily-0600    senna-docusate  2 Tablet BID    enoxaparin (LOVENOX) injection  40 mg DAILY AT 1800    gabapentin  800 mg TID    therapeutic multivitamin-minerals  1 Tablet DAILY    lidocaine  1 Patch Q24HR    acetaminophen  650 mg Q6HRS      Latest Reference Range & Units 11/10/23 14:54 11/22/23 21:08 11/23/23 03:40 11/23/23 08:27 11/23/23 17:57 11/24/23 02:40 11/24/23 06:00   Sodium 135 - 145 mmol/L 120 (L) 117 (LL) 123 (L) 127 (L) 126 (L) 127 (L) 127 (L)   (LL): Data is critically low  (L): Data is abnormally low   Latest Reference Range & Units Most Recent   TSH 0.380 - 5.330 uIU/mL 1.750  11/22/23 21:08     EKG: qtc 465     *ASSESSMENT/RECOMENDATIONS:  1. Major depression with psychosis      R/O encephalopathy with psychosis: less likely  2   PTSD  3:  per first encounter: generalized anxiety disorder, ADHD as well.    2  THC use    Medical:   -vit D deficiency   -R sided rib fx  -HTN  -Neuropathy  -chr pain issues  -hyponatremia    Legal hold: not applicable: but if she tries to leave or becomes agitated or aggressive consider LH. When seen tomorrow, if she refuses meds, most likely I will initiate a LH for inability to care for self, and psychosis     Discussed/voalted: DIAMOND Em MD    Medication and Other Recommendations: final orders as per Tx Tm  Simplify regimen: stop adderall, stop seroquel. Start risperdol 1 mg bid  -vit D supplementation  It appears cymbalta was started before she came in. It might contribute to hyponatremia but this is not clear. She is improving.22    Will continue to follow with you.       Discharge recommendations: per tx tm: if she doesn't clear, tries to leave or refuses meds. A LH may be needed.    If released from [facility]:  Discharge Instructions:  -Reviewed safety plan: 911, ER, PCM, MHC, Suicide crisis line  -Please assist with outpatient Psychiatric/substance use follow up appointments at discharge once medically cleared.

## 2023-11-24 NOTE — DIETARY
"Nutrition services: Day 2 of admit.  Paulette Concepcion is a 63 y.o. female with admitting DX of Acute hyponatremia [E87.1].  Consult received for Failure to thrive.    RD attempted to visit pt but she was sleeping and unrousable to knocking and calling her name. Physical exam was inappropriate but pt looked adequately nourished.    Assessment:  Height: 160 cm (5' 2.99\")  Weight: 67.5 kg (148 lb 13 oz)  Body mass index is 26.37 kg/m²., BMI classification: overweight  Diet/Intake: Regular diet with 1200 mL restriction / Intake: one meal at 25-50%, no other meals recorded    Evaluation:   Current dx list includes: HTN, neuropathy, mood disorder  Chart hx shows pt was 69.1 kg (152 lb) in February and April this year. Weight from 11/23 via bed scale was 62.8 kg (138 lb). This would reflect a 14 lb (9%) weight loss in 7 months, which is not significant in malnutrition guidelines.  Labs: Na 127, Cl 93, glu 126, A1C (11/10) 5.8, BUN 5, creat 0.4  MAR: LR, anti-emetics PRN, bowel regimen PRN, Theragran-M, Kdur (completed 11/23)  Skin: no wounds or edema noted  GI: LBM pta   Nutrition focused physical exam: Unable to assess    Malnutrition Risk: no ASPEN criteria identified at this time.    Recommendations/Plan:   Encourage intake of meals.  Document intake of all meals as % taken in ADL's to provide interdisciplinary communication across all shifts.   Monitor weight.  Nutrition rep will continue to see patient for ongoing meal and snack preferences.       RD following.  "

## 2023-11-24 NOTE — DISCHARGE PLANNING
Case Management Discharge Planning    Admission Date: 11/22/2023  GMLOS: 2.6  ALOS: 2    6-Clicks ADL Score: 14  6-Clicks Mobility Score: 15    Anticipated Discharge Dispo: Discharge Disposition: D/T to SNF with Medicare cert in anticipation of skilled care (03)      Action(s) Taken: RNCM attempted to meet with patient but she would not respond and remained covered up. Patient was brought in by EMS after a friend had called over concern of not hearing from her. Patient did not have electricity and her dog was found dead in her home. RNCM completed on like APS intake form. Reference number [id number].    Escalations Completed: None    Medically Clear: No    Next Steps: RNCM will continue to follow up with patient to complete assessment.     Barriers to Discharge: Medical clearance and Pending Placement    Is the patient up for discharge tomorrow: No

## 2023-11-24 NOTE — PROGRESS NOTES
Patient has just been transferred from [facility] 603 to [id number] via wheelchair. Patient transferred with all belongings and continuous cardiac monitoring. IPASS report given to JONA Fuchs.

## 2023-11-24 NOTE — PROGRESS NOTES
[facility] Medicine Daily Progress Note    Date of Service  2023    Chief Complaint  Paulette Concepcion is a 63 y.o. female admitted 2023 with confusion, failure to thrive, psychosis, hyponatremia    Hospital Course    Paulette Concepcion is a 63 y.o. female with history of hypertension, anxiety depression, neuropathy, asthma, tobacco abuse who presented 2023 with evaluation for failure to thrive. EMS was contacted by patient's friend for wellness check, found patient to be in a paranoid state and brought patient to ER for evaluation.  Patient stated her dog was mauled by another dog approximately a week ago and unfortunately .  She stated she has been depressed with passing of her dog.  She admits to not eating drinking well, depressed moods.  Denies SI/HI.  She was found to have concerning sodium level 117 in ER  Patient apparently is chronically psychiatrically ill, followed by an outpatient psychiatrist  The patient gives a somewhat convoluted story of her current living situation, she has had some difficulty with construction, on and off power outage, appears to have failed to care for himself and in the current environment  The patient exhibits some paranoid and psychotic features    Interval Problem Update  Patient seen and examined today.  Data, Medication data reviewed.  Case discussed with nursing as available.  Plan of Care reviewed with patient and notified of changes.   the patient currently afebrile, heart rate in the 60s and 80s, she is saturating in the high 90s mid 90s on 2 L nasal cannula oxygen, blood pressure in the 150s to 120s over 80s, the patient gives a somewhat convoluted story of her recent events, she is confirming that her dog was attacked and , he was brought into her home  The patient admits that she has been confused and was unable to care for herself in a organized fashion  The patient complains of chest pain from rib fractures, she had  falls  The patient is chronically followed by a outpatient psychiatrist, she has an appointment apparently also with a new person at that facility.  Sodium is slowly correcting,  CT head negative, chest x-ray with right-sided rib fractures  11/24 the patient is refusing to interact with medical staff this morning, she has her blanket pulled over her head, she is unwilling to talk or be examined,  Apparently has refused to take a.m. medication as well, the patient is currently afebrile, heart rate in the low 100s, unlabored respiration, on low-flow nasal cannula oxygen, blood pressure fluctuating in the 130s to 150s over 70s,  Laboratory data today indicated white cell count of 12.2, a hemoglobin of 12.9 with a count 340,  Sodium is slowly improving, 127, potassium 4.5, BUN is 5, creatinine 0.4    I have discussed this patient's plan of care and discharge plan at IDT rounds today with Case Management, Nursing, Nursing leadership, and other members of the IDT team.    Consultants/Specialty  critical care and psychiatry    Code Status  Full Code    Disposition  The patient is not medically cleared for discharge to home or a post-acute facility.  Anticipate discharge to: a psychiatric hospital    I have placed the appropriate orders for post-discharge needs.    Review of Systems    Review of Systems   Constitutional:  Positive for malaise/fatigue. Negative for chills and fever.   HENT: Negative.     Eyes: Negative.    Respiratory: Negative.  Negative for cough.    Cardiovascular:  Negative for palpitations.   Gastrointestinal: Negative.  Negative for heartburn, nausea and vomiting.   Genitourinary: Negative.  Negative for dysuria and frequency.   Musculoskeletal:  Positive for falls. Negative for back pain and neck pain.   Skin: Negative.  Negative for itching and rash.   Neurological:  Positive for weakness. Negative for dizziness, focal weakness and headaches.   Endo/Heme/Allergies: Negative.  Negative for polydipsia.  Does not bruise/bleed easily.   Psychiatric/Behavioral:  Positive for depression. The patient is nervous/anxious.         Physical Exam  Temp:  [36.1 °C (97 °F)-36.2 °C (97.1 °F)] 36.1 °C (97 °F)  Pulse:  [] 101  Resp:  [16-23] 19  BP: (117-181)/() 149/69  SpO2:  [88 %-99 %] 95 %    Physical Exam  Vitals and nursing note reviewed.   Constitutional:       General: She is not in acute distress.     Appearance: She is well-developed. She is ill-appearing. She is not diaphoretic.   HENT:      Head: Normocephalic and atraumatic.      Nose: Nose normal.   Eyes:      Conjunctiva/sclera: Conjunctivae normal.      Pupils: Pupils are equal, round, and reactive to light.   Neck:      Thyroid: No thyromegaly.      Vascular: No JVD.   Cardiovascular:      Rate and Rhythm: Normal rate and regular rhythm.      Heart sounds: Normal heart sounds.      No friction rub. No gallop.   Pulmonary:      Effort: Pulmonary effort is normal.      Breath sounds: Rhonchi present. No wheezing or rales.      Comments: Chest wall tenderness  Abdominal:      General: Bowel sounds are normal. There is no distension.      Palpations: Abdomen is soft. There is no mass.      Tenderness: There is no abdominal tenderness. There is no guarding or rebound.   Musculoskeletal:         General: No tenderness. Normal range of motion.      Cervical back: Normal range of motion and neck supple.   Lymphadenopathy:      Cervical: No cervical adenopathy.   Skin:     General: Skin is warm and dry.   Neurological:      Mental Status: She is disoriented.      Cranial Nerves: No cranial nerve deficit.   Psychiatric:         Mood and Affect: Mood is anxious. Affect is labile and inappropriate.         Speech: Speech is tangential.         Thought Content: Thought content is paranoid.         Cognition and Memory: Cognition is impaired.         Judgment: Judgment is inappropriate.      Comments: Withdrawn, paranoid         Fluids    Intake/Output Summary  (Last 24 hours) at 11/24/2023 0922  Last data filed at 11/24/2023 0400  Gross per 24 hour   Intake 800 ml   Output 2300 ml   Net -1500 ml         Laboratory  Recent Labs     11/22/23 2108 11/23/23 0340 11/24/23  0240   WBC 12.2* 8.7 12.2*   RBC 4.32 4.08* 3.97*   HEMOGLOBIN 14.2 13.6 12.9   HEMATOCRIT 38.8 36.4* 36.5*   MCV 89.8 89.2 91.9   MCH 32.9 33.3* 32.5   MCHC 36.6* 37.4* 35.3   RDW 38.7 38.8 41.7   PLATELETCT 358 326 340   MPV 8.2* 8.6* 8.7*       Recent Labs     11/22/23 2108 11/23/23 0340 11/23/23  0827 11/23/23  1757 11/24/23  0240 11/24/23  0600   SODIUM 117* 123*   < > 126* 127* 127*   POTASSIUM 3.7 3.6  --   --  4.5  --    CHLORIDE 81* 89*  --   --  93*  --    CO2 24 23  --   --  23  --    GLUCOSE 108* 106*  --   --  126*  --    BUN 4* 3*  --   --  5*  --    CREATININE 0.37* 0.23*  --   --  0.40*  --    CALCIUM 9.0 8.8  --   --  8.6  --     < > = values in this interval not displayed.                     Imaging  CT-HEAD W/O   Final Result      1.  No acute intracranial abnormality.   2.  Redemonstration of a small amount of fat along the left aspect of the jacinta of uncertain clinical significance.   3.  Mild right maxillary sinus disease.         DX-CHEST-PORTABLE (1 VIEW)   Final Result      1.  No acute cardiac or pulmonary abnormalities are identified.   2.  Mildly displaced, acute right lateral third and fourth rib fractures.           Assessment/Plan  * Acute hyponatremia- (present on admission)  Assessment & Plan  Admission Na 117 @21:08 on 11/22  Slow correction, monitor    Delusional disorder (HCC)  Assessment & Plan  Patient refusing to interact with medical staff, refusing medication administration  Psychiatry following    Closed fracture of multiple ribs of right side with routine healing  Assessment & Plan  Conservative treatment, pain control, incentive spirometry    ACP (advance care planning)  Assessment & Plan  Currently unable to engage secondary to her parents delirium,  psychosis    Hypertension  Assessment & Plan  Lisinopril    Neuropathy- (present on admission)  Assessment & Plan  Gabapentin    Mood disorder (HCC)- (present on admission)  Assessment & Plan  Psychiatry consulted, appreciate recommendations    Tobacco dependence- (present on admission)  Assessment & Plan  Admit to smoking >10 cigarettes daily  Tobacco cessation recommended      Plan  Patient currently refusing to interact with medical staff, follow instructions, taking medication  Gentle pain control of rib fractures  Resume the patient's outpatient psychiatric medication regimen  Psychiatry consult appreciated, added Seroquel as suggested  Blood pressure control  Slow sodium correction  PT OT discharge disposition evaluation, the patient might need some additional assistance, appears to be failing at home by herself  EPS referral report  See orders  Patient is has a high medical complexity, complex decision making and is at high risk for complication, morbidity, and mortality.  My total time spent caring for the patient on the day of the encounter was 54 minutes.   This does not include time spent on separately billable procedures/tests.  Encourage the patient to comply with treatment regimen, engage with treatment while hospitalized  VTE prophylaxis:    Xarelto 10mg daily as prophylaxis      I have performed a physical exam and reviewed and updated ROS and Plan today (11/24/2023). In review of yesterday's note (11/23/2023), there are no changes except as documented above.        Please note that this dictation was created using voice recognition software. I have made every reasonable attempt to correct obvious errors, but I expect that there are errors of grammar and possibly context that I did not discover before finalizing the note.

## 2023-11-25 LAB
ANION GAP SERPL CALC-SCNC: 13 MMOL/L (ref 7–16)
ANION GAP SERPL CALC-SCNC: 14 MMOL/L (ref 7–16)
BUN SERPL-MCNC: 11 MG/DL (ref 8–22)
BUN SERPL-MCNC: 8 MG/DL (ref 8–22)
CALCIUM SERPL-MCNC: 9 MG/DL (ref 8.5–10.5)
CALCIUM SERPL-MCNC: 9 MG/DL (ref 8.5–10.5)
CHLORIDE SERPL-SCNC: 88 MMOL/L (ref 96–112)
CHLORIDE SERPL-SCNC: 90 MMOL/L (ref 96–112)
CO2 SERPL-SCNC: 21 MMOL/L (ref 20–33)
CO2 SERPL-SCNC: 21 MMOL/L (ref 20–33)
CREAT SERPL-MCNC: 0.35 MG/DL (ref 0.5–1.4)
CREAT SERPL-MCNC: 0.38 MG/DL (ref 0.5–1.4)
ERYTHROCYTE [DISTWIDTH] IN BLOOD BY AUTOMATED COUNT: 43.1 FL (ref 35.9–50)
GFR SERPLBLD CREATININE-BSD FMLA CKD-EPI: 113 ML/MIN/1.73 M 2
GFR SERPLBLD CREATININE-BSD FMLA CKD-EPI: 115 ML/MIN/1.73 M 2
GLUCOSE SERPL-MCNC: 122 MG/DL (ref 65–99)
GLUCOSE SERPL-MCNC: 146 MG/DL (ref 65–99)
HCT VFR BLD AUTO: 37.8 % (ref 37–47)
HGB BLD-MCNC: 13.4 G/DL (ref 12–16)
MAGNESIUM SERPL-MCNC: 2 MG/DL (ref 1.5–2.5)
MCH RBC QN AUTO: 33 PG (ref 27–33)
MCHC RBC AUTO-ENTMCNC: 35.4 G/DL (ref 32.2–35.5)
MCV RBC AUTO: 93.1 FL (ref 81.4–97.8)
PHOSPHATE SERPL-MCNC: 4.3 MG/DL (ref 2.5–4.5)
PLATELET # BLD AUTO: 330 K/UL (ref 164–446)
PMV BLD AUTO: 8.8 FL (ref 9–12.9)
POTASSIUM SERPL-SCNC: 4.5 MMOL/L (ref 3.6–5.5)
POTASSIUM SERPL-SCNC: 4.6 MMOL/L (ref 3.6–5.5)
RBC # BLD AUTO: 4.06 M/UL (ref 4.2–5.4)
SODIUM SERPL-SCNC: 122 MMOL/L (ref 135–145)
SODIUM SERPL-SCNC: 124 MMOL/L (ref 135–145)
SODIUM SERPL-SCNC: 125 MMOL/L (ref 135–145)
WBC # BLD AUTO: 15.6 K/UL (ref 4.8–10.8)

## 2023-11-25 PROCEDURE — 84100 ASSAY OF PHOSPHORUS: CPT

## 2023-11-25 PROCEDURE — A9270 NON-COVERED ITEM OR SERVICE: HCPCS | Performed by: STUDENT IN AN ORGANIZED HEALTH CARE EDUCATION/TRAINING PROGRAM

## 2023-11-25 PROCEDURE — 36415 COLL VENOUS BLD VENIPUNCTURE: CPT

## 2023-11-25 PROCEDURE — 99233 SBSQ HOSP IP/OBS HIGH 50: CPT | Performed by: STUDENT IN AN ORGANIZED HEALTH CARE EDUCATION/TRAINING PROGRAM

## 2023-11-25 PROCEDURE — 700105 HCHG RX REV CODE 258: Performed by: STUDENT IN AN ORGANIZED HEALTH CARE EDUCATION/TRAINING PROGRAM

## 2023-11-25 PROCEDURE — 770020 HCHG ROOM/CARE - TELE (206)

## 2023-11-25 PROCEDURE — 85027 COMPLETE CBC AUTOMATED: CPT

## 2023-11-25 PROCEDURE — 700101 HCHG RX REV CODE 250: Performed by: STUDENT IN AN ORGANIZED HEALTH CARE EDUCATION/TRAINING PROGRAM

## 2023-11-25 PROCEDURE — 80048 BASIC METABOLIC PNL TOTAL CA: CPT

## 2023-11-25 PROCEDURE — 700111 HCHG RX REV CODE 636 W/ 250 OVERRIDE (IP): Performed by: HOSPITALIST

## 2023-11-25 PROCEDURE — 700102 HCHG RX REV CODE 250 W/ 637 OVERRIDE(OP): Performed by: STUDENT IN AN ORGANIZED HEALTH CARE EDUCATION/TRAINING PROGRAM

## 2023-11-25 PROCEDURE — 700102 HCHG RX REV CODE 250 W/ 637 OVERRIDE(OP): Performed by: HOSPITALIST

## 2023-11-25 PROCEDURE — 83735 ASSAY OF MAGNESIUM: CPT

## 2023-11-25 PROCEDURE — A9270 NON-COVERED ITEM OR SERVICE: HCPCS | Performed by: HOSPITALIST

## 2023-11-25 PROCEDURE — 84295 ASSAY OF SERUM SODIUM: CPT

## 2023-11-25 RX ORDER — SODIUM CHLORIDE 9 MG/ML
500 INJECTION, SOLUTION INTRAVENOUS ONCE
Status: COMPLETED | OUTPATIENT
Start: 2023-11-25 | End: 2023-11-25

## 2023-11-25 RX ORDER — AMLODIPINE BESYLATE 5 MG/1
5 TABLET ORAL
Status: DISCONTINUED | OUTPATIENT
Start: 2023-11-25 | End: 2023-11-27

## 2023-11-25 RX ORDER — LISINOPRIL 20 MG/1
20 TABLET ORAL DAILY
Status: DISCONTINUED | OUTPATIENT
Start: 2023-11-26 | End: 2023-12-02 | Stop reason: HOSPADM

## 2023-11-25 RX ADMIN — LISINOPRIL 15 MG: 5 TABLET ORAL at 05:52

## 2023-11-25 RX ADMIN — LIDOCAINE 1 PATCH: 4 PATCH TOPICAL at 22:34

## 2023-11-25 RX ADMIN — ACETAMINOPHEN 650 MG: 325 TABLET, FILM COATED ORAL at 12:24

## 2023-11-25 RX ADMIN — Medication 1 TABLET: at 05:51

## 2023-11-25 RX ADMIN — GABAPENTIN 800 MG: 400 CAPSULE ORAL at 17:42

## 2023-11-25 RX ADMIN — DULOXETINE HYDROCHLORIDE 60 MG: 60 CAPSULE, DELAYED RELEASE ORAL at 05:52

## 2023-11-25 RX ADMIN — Medication 1000 UNITS: at 05:51

## 2023-11-25 RX ADMIN — OXYCODONE 2.5 MG: 5 TABLET ORAL at 05:52

## 2023-11-25 RX ADMIN — OXYCODONE 5 MG: 5 TABLET ORAL at 22:35

## 2023-11-25 RX ADMIN — RISPERIDONE 1 MG: 1 TABLET ORAL at 05:51

## 2023-11-25 RX ADMIN — ACETAMINOPHEN 650 MG: 325 TABLET, FILM COATED ORAL at 17:42

## 2023-11-25 RX ADMIN — GABAPENTIN 800 MG: 400 CAPSULE ORAL at 05:51

## 2023-11-25 RX ADMIN — SODIUM CHLORIDE 500 ML: 9 INJECTION, SOLUTION INTRAVENOUS at 12:29

## 2023-11-25 RX ADMIN — AMLODIPINE BESYLATE 5 MG: 5 TABLET ORAL at 16:42

## 2023-11-25 RX ADMIN — TIZANIDINE 4 MG: 4 TABLET ORAL at 15:36

## 2023-11-25 RX ADMIN — OXYCODONE 5 MG: 5 TABLET ORAL at 19:24

## 2023-11-25 RX ADMIN — OXYCODONE 5 MG: 5 TABLET ORAL at 12:24

## 2023-11-25 RX ADMIN — TIZANIDINE 4 MG: 4 TABLET ORAL at 08:52

## 2023-11-25 RX ADMIN — OXYCODONE 5 MG: 5 TABLET ORAL at 15:36

## 2023-11-25 RX ADMIN — HYDRALAZINE HYDROCHLORIDE 20 MG: 20 INJECTION, SOLUTION INTRAMUSCULAR; INTRAVENOUS at 21:14

## 2023-11-25 RX ADMIN — RISPERIDONE 1 MG: 1 TABLET ORAL at 17:42

## 2023-11-25 RX ADMIN — NICOTINE TRANSDERMAL SYSTEM 21 MG: 21 PATCH, EXTENDED RELEASE TRANSDERMAL at 05:53

## 2023-11-25 RX ADMIN — ACETAMINOPHEN 650 MG: 325 TABLET, FILM COATED ORAL at 05:51

## 2023-11-25 RX ADMIN — DOCUSATE SODIUM 50 MG AND SENNOSIDES 8.6 MG 2 TABLET: 8.6; 5 TABLET, FILM COATED ORAL at 17:42

## 2023-11-25 RX ADMIN — GABAPENTIN 800 MG: 400 CAPSULE ORAL at 12:24

## 2023-11-25 RX ADMIN — RIVAROXABAN 10 MG: 10 TABLET, FILM COATED ORAL at 17:42

## 2023-11-25 RX ADMIN — TIZANIDINE 4 MG: 4 TABLET ORAL at 22:46

## 2023-11-25 RX ADMIN — ACETAMINOPHEN 650 MG: 325 TABLET, FILM COATED ORAL at 22:35

## 2023-11-25 RX ADMIN — OXYCODONE 5 MG: 5 TABLET ORAL at 08:51

## 2023-11-25 ASSESSMENT — PATIENT HEALTH QUESTIONNAIRE - PHQ9
5. POOR APPETITE OR OVEREATING: SEVERAL DAYS
SUM OF ALL RESPONSES TO PHQ QUESTIONS 1-9: 7
9. THOUGHTS THAT YOU WOULD BE BETTER OFF DEAD, OR OF HURTING YOURSELF: SEVERAL DAYS
6. FEELING BAD ABOUT YOURSELF - OR THAT YOU ARE A FAILURE OR HAVE LET YOURSELF OR YOUR FAMILY DOWN: NOT AL ALL
8. MOVING OR SPEAKING SO SLOWLY THAT OTHER PEOPLE COULD HAVE NOTICED. OR THE OPPOSITE, BEING SO FIGETY OR RESTLESS THAT YOU HAVE BEEN MOVING AROUND A LOT MORE THAN USUAL: SEVERAL DAYS
4. FEELING TIRED OR HAVING LITTLE ENERGY: SEVERAL DAYS
7. TROUBLE CONCENTRATING ON THINGS, SUCH AS READING THE NEWSPAPER OR WATCHING TELEVISION: NOT AT ALL
2. FEELING DOWN, DEPRESSED, IRRITABLE, OR HOPELESS: SEVERAL DAYS
SUM OF ALL RESPONSES TO PHQ9 QUESTIONS 1 AND 2: 2
1. LITTLE INTEREST OR PLEASURE IN DOING THINGS: SEVERAL DAYS
3. TROUBLE FALLING OR STAYING ASLEEP OR SLEEPING TOO MUCH: SEVERAL DAYS

## 2023-11-25 ASSESSMENT — ENCOUNTER SYMPTOMS
COUGH: 1
ABDOMINAL PAIN: 0
NAUSEA: 0
SHORTNESS OF BREATH: 1
VOMITING: 0
DEPRESSION: 1
MYALGIAS: 1
BACK PAIN: 1

## 2023-11-25 ASSESSMENT — PAIN DESCRIPTION - PAIN TYPE
TYPE: ACUTE PAIN

## 2023-11-25 ASSESSMENT — FIBROSIS 4 INDEX: FIB4 SCORE: 0.78

## 2023-11-25 NOTE — PROGRESS NOTES
Report received from outgoing nurse, care transferred at 1900. Patient currently in  on the monitor and A&Ox 3, disoriented to situation. Patient actively having hallucinations and word salad. Pt able to answer concise and direct questions but rambles in between and gives nonsensical answers to open ended questions and when left alone. Productive congested cough noted with thick white sputum. Whiteboard updated with plan for the day and names of staff. No other questions or concerns at this time from the patient. Call light within reach, fall precautions in place.

## 2023-11-25 NOTE — PROGRESS NOTES
4 Eyes Skin Assessment Completed by JONA Brandon and LUCY Rader.    Head WDL  Ears Redness and Blanching  Nose WDL  Mouth WDL  Neck WDL  Breast/Chest WDL  Shoulder Blades WDL  Spine WDL  (R) Arm/Elbow/Hand Redness and Blanching  (L) Arm/Elbow/Hand WDL  Abdomen WDL  Groin WDL  Scrotum/Coccyx/Buttocks WDL  (R) Leg Scab  (L) Leg Scab  (R) Heel/Foot/Toe Redness and Blanching  (L) Heel/Foot/Toe Redness and Blanching, calluses          Devices In Places Tele Box      Interventions In Place Pillows and Heels Loaded W/Pillows    Possible Skin Injury No    Pictures Uploaded Into Epic N/A  Wound Consult Placed N/A  RN Wound Prevention Protocol Ordered No

## 2023-11-25 NOTE — PROGRESS NOTES
Received report, assumed pt care. Pt awake without any signs of distress, VSS. Resting comfortably in bed with call light, bedside table in reach. No c/o at this time. Side rails up 2. Instructed to use call light when needing to get OOB verbalized understanding. Bed alarm on, bed in low position. Will continue to monitor.

## 2023-11-25 NOTE — PROGRESS NOTES
Assessment completed. Pt a&o 4, VSS. Resting comfortably in bed with call light, bedside table in reach. States 10/10 r rib pain. Side rails up 2. Instructed to use call light when needing to get OOB verbalized understanding. Bed alarm on, bed in low position. Will continue to monitor.

## 2023-11-25 NOTE — CARE PLAN
The patient is Stable - Low risk of patient condition declining or worsening    Shift Goals  Clinical Goals: Monitor Labs, safety  Patient Goals: pain control, sleep, find family  Family Goals: alfonso    Progress made toward(s) clinical / shift goals:    Problem: Psychosocial  Goal: Patient's ability to identify and develop effective coping behaviors will improve  Outcome: Progressing     Problem: Hemodynamics  Goal: Patient's hemodynamics, fluid balance and neurologic status will be stable or improve  Outcome: Progressing     Problem: Respiratory  Goal: Patient will achieve/maintain optimum respiratory ventilation and gas exchange  Outcome: Progressing       Patient is not progressing towards the following goals:

## 2023-11-25 NOTE — PROGRESS NOTES
[facility] Medicine Daily Progress Note    Date of Service  2023    Chief Complaint  Paulette Concepcion is a 63 y.o. female admitted 2023 with confusion, failure to thrive, psychosis, hyponatremia    Hospital Course    Paulette Concepcion is a 63 y.o. female with history of hypertension, anxiety, depression, neuropathy, asthma, tobacco abuse who presented 2023 with evaluation for failure to thrive. EMS was contacted by patient's friend for wellness check, found patient to be in a paranoid state and brought patient to ER for evaluation.  Patient stated her dog was mauled by another dog approximately a week ago and unfortunately .  She stated she has been depressed with passing of her dog.  She admits to not eating drinking well, depressed moods.  She was found to have concerning sodium level 117 in ER  Patient apparently is chronically psychiatrically ill, followed by an outpatient psychiatrist  The patient gives a somewhat convoluted story of her current living situation, she has had some difficulty with construction, on and off power outage, appears to have failed to care for himself and in the current environment The patient exhibits some paranoid and psychotic features.   She was admitted to [facility] for severe hyponatremia and close electrolyte monitoring, this has slowly been up uptrending, however she has continued to have abnormal behavior. Psychiatry is following.       Interval Problem Update  Patient seen and examined today, she is sleeping but awakens easily, she is calm and pleasant, says she cannot move due to diffuse pain, pain mainly in the rt chest wall and back. She c/o of SOB  Still expressing some paranoid ideas, still tangential.   - she is tachycardic, her BP is intermittently elevated. She is on RA.   - labs show increase in WBC, and Na is down 123-125 has been up trending. Will given 500 ml bolus of NS and repeat BMP   - continue lidocaine patch and pain meds  for rib fx pain. Encourage IS, pt does have rhronchi in Rt side   - psychiatry following   - therapy recommending post acute care    I have discussed this patient's plan of care and discharge plan at IDT rounds today with Case Management, Nursing, Nursing leadership, and other members of the IDT team.    Consultants/Specialty  critical care and psychiatry    Code Status  Full Code    Disposition  The patient is not medically cleared for discharge to home or a post-acute facility.  Anticipate discharge to: skilled nursing facility    I have placed the appropriate orders for post-discharge needs.    Review of Systems    Review of Systems   Constitutional:  Positive for malaise/fatigue.   Respiratory:  Positive for cough and shortness of breath.    Cardiovascular:  Negative for leg swelling.   Gastrointestinal:  Negative for abdominal pain, nausea and vomiting.   Musculoskeletal:  Positive for back pain, joint pain and myalgias.   Psychiatric/Behavioral:  Positive for depression and suicidal ideas.         Physical Exam  Temp:  [36.7 °C (98.1 °F)-37.2 °C (99 °F)] 36.7 °C (98.1 °F)  Pulse:  [] 98  Resp:  [16-20] 16  BP: (102-168)/() 129/78  SpO2:  [90 %-92 %] 92 %    Physical Exam  Vitals and nursing note reviewed.   Constitutional:       General: She is not in acute distress.     Appearance: She is well-developed and normal weight. She is ill-appearing. She is not diaphoretic.   HENT:      Head: Normocephalic and atraumatic.      Mouth/Throat:      Mouth: Mucous membranes are dry.      Pharynx: Oropharynx is clear.   Eyes:      Conjunctiva/sclera: Conjunctivae normal.   Neck:      Thyroid: No thyromegaly.      Vascular: No JVD.   Cardiovascular:      Rate and Rhythm: Regular rhythm. Tachycardia present.      Heart sounds: Normal heart sounds.      No friction rub. No gallop.   Pulmonary:      Effort: Pulmonary effort is normal.      Breath sounds: Rhonchi present. No wheezing or rales.      Comments: Chest  wall tenderness, rhonchi in Rt upper lobe  Abdominal:      General: Bowel sounds are normal. There is no distension.      Palpations: Abdomen is soft. There is no mass.      Tenderness: There is no abdominal tenderness. There is no guarding or rebound.   Musculoskeletal:         General: No tenderness. Normal range of motion.      Cervical back: Normal range of motion and neck supple.   Lymphadenopathy:      Cervical: No cervical adenopathy.   Skin:     General: Skin is warm and dry.   Neurological:      Mental Status: She is alert. She is disoriented.      Cranial Nerves: No cranial nerve deficit.   Psychiatric:         Mood and Affect: Mood is anxious. Affect is labile and inappropriate.         Speech: Speech is tangential.         Thought Content: Thought content is paranoid.         Cognition and Memory: Cognition is impaired.         Judgment: Judgment is inappropriate.         Fluids    Intake/Output Summary (Last 24 hours) at 11/25/2023 1235  Last data filed at 11/24/2023 2015  Gross per 24 hour   Intake 800 ml   Output --   Net 800 ml       Laboratory  Recent Labs     11/23/23  0340 11/24/23  0240 11/25/23  0215   WBC 8.7 12.2* 15.6*   RBC 4.08* 3.97* 4.06*   HEMOGLOBIN 13.6 12.9 13.4   HEMATOCRIT 36.4* 36.5* 37.8   MCV 89.2 91.9 93.1   MCH 33.3* 32.5 33.0   MCHC 37.4* 35.3 35.4   RDW 38.8 41.7 43.1   PLATELETCT 326 340 330   MPV 8.6* 8.7* 8.8*     Recent Labs     11/23/23  0340 11/23/23  0827 11/24/23  0240 11/24/23  0600 11/24/23  2139 11/25/23  0215 11/25/23  0847   SODIUM 123*   < > 127*   < > 123* 125* 124*   POTASSIUM 3.6  --  4.5  --   --  4.6  --    CHLORIDE 89*  --  93*  --   --  90*  --    CO2 23 -- 23  --   --  21  --    GLUCOSE 106*  --  126*  --   --  122*  --    BUN 3*  --  5*  --   --  8  --    CREATININE 0.23*  --  0.40*  --   --  0.38*  --    CALCIUM 8.8  --  8.6  --   --  9.0  --     < > = values in this interval not displayed.                   Imaging  CT-HEAD W/O   Final Result       1.  No acute intracranial abnormality.   2.  Redemonstration of a small amount of fat along the left aspect of the jacinta of uncertain clinical significance.   3.  Mild right maxillary sinus disease.         DX-CHEST-PORTABLE (1 VIEW)   Final Result      1.  No acute cardiac or pulmonary abnormalities are identified.   2.  Mildly displaced, acute right lateral third and fourth rib fractures.           Assessment/Plan  * Acute hyponatremia- (present on admission)  Assessment & Plan  Admission Na 117 @21:08 on 11/22  Slow correction, monitor  Back down a bit today, will trial 500 ml bolus as pt appears dry  Repeat BMP and monitor  She is on fluid restriction, may need to liberate  Serum and urine osmolarity suggest appropriate ADH response suggesting polydipsia (which clinically doesn't appear to be happening) vs antipsychotic medications    Monitor BMP closely     Delusional disorder (HCC)  Assessment & Plan  Patient refusing to interact with medical staff, refusing medication administration yesterday, more interactive today however still with paranoid behavior   Psychiatry following  Continue Risperdal     Closed fracture of multiple ribs of right side with routine healing  Assessment & Plan  Conservative treatment, pain control, incentive spirometry  Lidocaine patch, oral oxy     ACP (advance care planning)  Assessment & Plan  Currently unable to engage secondary to her parents delirium, psychosis    Hypertension  Assessment & Plan  BP stable, continue Lisinopril    Neuropathy- (present on admission)  Assessment & Plan  Continue home Gabapentin 800 mg TID    Mood disorder (HCC)- (present on admission)  Assessment & Plan  Chronic Psychiatric illness, psych consulted, appreciate recommendations  Pt started on Risperdol 1 mg BID  Continue duloxetine and gabapentin     Tobacco dependence- (present on admission)  Assessment & Plan  Admit to smoking >10 cigarettes daily  Tobacco cessation recommended      VTE  prophylaxis:    Xarelto 10mg daily as prophylaxis      I have performed a physical exam and reviewed and updated ROS and Plan today (11/25/2023). In review of yesterday's note (11/24/2023), there are no changes except as documented above.    Greater than 51 minutes spent prepping to see patient (e.g. review of tests) obtaining and/or reviewing separately obtained history. Performing a medically appropriate examination and/ evaluation.  Counseling and educating the patient/family/caregiver.  Ordering medications, tests, or procedures.  Referring and communicating with other health care professionals.  Documenting clinical information in EPIC.  Independently interpreting results and communicating results to patient/family/caregiver.  Care coordination.

## 2023-11-25 NOTE — DISCHARGE PLANNING
"TCN following. HTH/SCP chart review completed. Note per review, pt remains with hallucinations and word salad and limited participation with care at times 2' to current behavior/mental status. She is actively being followed by psychiatry as well.     Note per MD note from 11/24 @9:22AM, \"Anticipate discharge to: a psychiatric hospital\" as well. Pt has been seen by PT on 11/23 with recs for post acute placement, though would note per PT note, pt ambulatory with CGA with and without FWW. 6 clicks mobility is 21 at this time. Noted per most recent psychiatry MD note on 11/24 @9:18AM, \"Discharge recommendations: per tx tm: if she doesn't clear, tries to leave or refuses meds. A LH may be needed \". Also noted per CM note from 11/24, \"RNCM attempted to meet with patient but she would not respond and remained covered up. Patient was brought in by EMS after a friend had called over concern of not hearing from her. Patient did not have electricity and her dog was found dead in her home. RNCM completed on like APS intake form\".     Given aforementioned, TCN will monitor for dc planning needs, though current transitional care needs will be highly dependent on response to medical management and behavior/mental status affects on POC and GOC. Will not actively follow at this time, though will monitor for progression and discuss dc planning once more appropriate if indicated. Please reach out to TCN via VOALTE if needed for dc planning. Thank you.      "

## 2023-11-25 NOTE — PROGRESS NOTES
4 Eyes Skin Assessment NOT Completed by JONA Fuchs and JONA Hanley as patient refused to stand up / roll / participate in full assessment due to psychosis & paranoid behavior.

## 2023-11-25 NOTE — CARE PLAN
The patient is Stable - Low risk of patient condition declining or worsening    Shift Goals  Clinical Goals: monitor labs, OT, monitor AMS  Patient Goals: pain control  Family Goals: ALBA    Progress made toward(s) clinical / shift goals:  Rest    Patient is not progressing towards the following goals:      Problem: Pain - Standard  Goal: Alleviation of pain or a reduction in pain to the patient’s comfort goal  Outcome: Not Progressing  Note: Pt states 10/10 rib pain, pt given PRN oxy and zanaflex. RN will continue to assess. Goal is for pt to be able to mobilize with tolerable level of pain.      Problem: Psychosocial  Goal: Patient's ability to identify and develop effective coping behaviors will improve  Outcome: Not Progressing  Note: Pt alert and oriented if asked direct questions but will almost immediately will go off on an irrelevant tangent that does not make since. Pt very paranoid about her security and concerned that her bank account information and social security information being hacked. Goal is for pt metal status to clear. RN will continue to assess.

## 2023-11-26 ENCOUNTER — APPOINTMENT (OUTPATIENT)
Dept: RADIOLOGY | Facility: MEDICAL CENTER | Age: 63
DRG: 640 | End: 2023-11-26
Attending: STUDENT IN AN ORGANIZED HEALTH CARE EDUCATION/TRAINING PROGRAM
Payer: MEDICARE

## 2023-11-26 LAB
APPEARANCE UR: CLEAR
BILIRUB UR QL STRIP.AUTO: NEGATIVE
CHLORIDE UR-SCNC: <20 MMOL/L
COLOR UR: YELLOW
CREAT UR-MCNC: 36.41 MG/DL
GLUCOSE UR STRIP.AUTO-MCNC: NEGATIVE MG/DL
KETONES UR STRIP.AUTO-MCNC: ABNORMAL MG/DL
LEUKOCYTE ESTERASE UR QL STRIP.AUTO: NEGATIVE
MICRO URNS: ABNORMAL
NITRITE UR QL STRIP.AUTO: NEGATIVE
OSMOLALITY UR: 242 MOSM/KG H2O (ref 300–900)
PH UR STRIP.AUTO: 7 [PH] (ref 5–8)
POTASSIUM UR-SCNC: 18 MMOL/L
PROT UR QL STRIP: NEGATIVE MG/DL
PROT UR-MCNC: 11 MG/DL (ref 0–15)
PROT/CREAT UR: 302 MG/G (ref 10–107)
RBC UR QL AUTO: NEGATIVE
SODIUM SERPL-SCNC: 119 MMOL/L (ref 135–145)
SODIUM SERPL-SCNC: 120 MMOL/L (ref 135–145)
SODIUM SERPL-SCNC: 120 MMOL/L (ref 135–145)
SODIUM UR-SCNC: <20 MMOL/L
SP GR UR STRIP.AUTO: 1.01
UROBILINOGEN UR STRIP.AUTO-MCNC: 1 MG/DL

## 2023-11-26 PROCEDURE — 99233 SBSQ HOSP IP/OBS HIGH 50: CPT | Performed by: STUDENT IN AN ORGANIZED HEALTH CARE EDUCATION/TRAINING PROGRAM

## 2023-11-26 PROCEDURE — 71045 X-RAY EXAM CHEST 1 VIEW: CPT

## 2023-11-26 PROCEDURE — 83935 ASSAY OF URINE OSMOLALITY: CPT

## 2023-11-26 PROCEDURE — 84295 ASSAY OF SERUM SODIUM: CPT | Mod: 91

## 2023-11-26 PROCEDURE — A9270 NON-COVERED ITEM OR SERVICE: HCPCS | Performed by: STUDENT IN AN ORGANIZED HEALTH CARE EDUCATION/TRAINING PROGRAM

## 2023-11-26 PROCEDURE — 700101 HCHG RX REV CODE 250: Performed by: STUDENT IN AN ORGANIZED HEALTH CARE EDUCATION/TRAINING PROGRAM

## 2023-11-26 PROCEDURE — 770020 HCHG ROOM/CARE - TELE (206)

## 2023-11-26 PROCEDURE — 700102 HCHG RX REV CODE 250 W/ 637 OVERRIDE(OP): Performed by: HOSPITALIST

## 2023-11-26 PROCEDURE — 81003 URINALYSIS AUTO W/O SCOPE: CPT

## 2023-11-26 PROCEDURE — 82436 ASSAY OF URINE CHLORIDE: CPT

## 2023-11-26 PROCEDURE — 84156 ASSAY OF PROTEIN URINE: CPT

## 2023-11-26 PROCEDURE — 700102 HCHG RX REV CODE 250 W/ 637 OVERRIDE(OP): Performed by: STUDENT IN AN ORGANIZED HEALTH CARE EDUCATION/TRAINING PROGRAM

## 2023-11-26 PROCEDURE — 36415 COLL VENOUS BLD VENIPUNCTURE: CPT

## 2023-11-26 PROCEDURE — A9270 NON-COVERED ITEM OR SERVICE: HCPCS | Performed by: HOSPITALIST

## 2023-11-26 PROCEDURE — 82570 ASSAY OF URINE CREATININE: CPT

## 2023-11-26 PROCEDURE — 700102 HCHG RX REV CODE 250 W/ 637 OVERRIDE(OP): Performed by: INTERNAL MEDICINE

## 2023-11-26 PROCEDURE — A9270 NON-COVERED ITEM OR SERVICE: HCPCS | Performed by: INTERNAL MEDICINE

## 2023-11-26 PROCEDURE — 84133 ASSAY OF URINE POTASSIUM: CPT

## 2023-11-26 PROCEDURE — 51798 US URINE CAPACITY MEASURE: CPT

## 2023-11-26 PROCEDURE — 84300 ASSAY OF URINE SODIUM: CPT

## 2023-11-26 RX ORDER — DULOXETIN HYDROCHLORIDE 30 MG/1
30 CAPSULE, DELAYED RELEASE ORAL DAILY
Status: DISCONTINUED | OUTPATIENT
Start: 2023-11-27 | End: 2023-11-26

## 2023-11-26 RX ORDER — OXYCODONE HYDROCHLORIDE 5 MG/1
2.5 TABLET ORAL EVERY 6 HOURS PRN
Status: DISCONTINUED | OUTPATIENT
Start: 2023-11-26 | End: 2023-11-28

## 2023-11-26 RX ORDER — SODIUM CHLORIDE 1 G/1
1 TABLET ORAL
Status: DISCONTINUED | OUTPATIENT
Start: 2023-11-26 | End: 2023-11-28

## 2023-11-26 RX ORDER — GABAPENTIN 400 MG/1
400 CAPSULE ORAL 3 TIMES DAILY
Status: DISCONTINUED | OUTPATIENT
Start: 2023-11-26 | End: 2023-12-02 | Stop reason: HOSPADM

## 2023-11-26 RX ORDER — GUAIFENESIN 600 MG/1
600 TABLET, EXTENDED RELEASE ORAL EVERY 12 HOURS
Status: DISCONTINUED | OUTPATIENT
Start: 2023-11-26 | End: 2023-12-02 | Stop reason: HOSPADM

## 2023-11-26 RX ORDER — DULOXETIN HYDROCHLORIDE 60 MG/1
60 CAPSULE, DELAYED RELEASE ORAL DAILY
Status: DISCONTINUED | OUTPATIENT
Start: 2023-11-27 | End: 2023-11-27

## 2023-11-26 RX ADMIN — OXYCODONE 5 MG: 5 TABLET ORAL at 06:01

## 2023-11-26 RX ADMIN — DULOXETINE HYDROCHLORIDE 60 MG: 60 CAPSULE, DELAYED RELEASE ORAL at 06:01

## 2023-11-26 RX ADMIN — Medication 1 TABLET: at 06:00

## 2023-11-26 RX ADMIN — ACETAMINOPHEN 650 MG: 325 TABLET, FILM COATED ORAL at 12:15

## 2023-11-26 RX ADMIN — LIDOCAINE 1 PATCH: 4 PATCH TOPICAL at 22:20

## 2023-11-26 RX ADMIN — ACETAMINOPHEN 650 MG: 325 TABLET, FILM COATED ORAL at 06:01

## 2023-11-26 RX ADMIN — GUAIFENESIN 600 MG: 600 TABLET, EXTENDED RELEASE ORAL at 17:24

## 2023-11-26 RX ADMIN — OXYCODONE 2.5 MG: 5 TABLET ORAL at 22:20

## 2023-11-26 RX ADMIN — Medication 1000 UNITS: at 06:00

## 2023-11-26 RX ADMIN — SODIUM CHLORIDE 1 G: 1 TABLET ORAL at 17:24

## 2023-11-26 RX ADMIN — RISPERIDONE 1 MG: 1 TABLET ORAL at 17:25

## 2023-11-26 RX ADMIN — TIZANIDINE 4 MG: 4 TABLET ORAL at 06:00

## 2023-11-26 RX ADMIN — LISINOPRIL 20 MG: 20 TABLET ORAL at 06:00

## 2023-11-26 RX ADMIN — RIVAROXABAN 10 MG: 10 TABLET, FILM COATED ORAL at 17:25

## 2023-11-26 RX ADMIN — ACETAMINOPHEN 650 MG: 325 TABLET, FILM COATED ORAL at 17:25

## 2023-11-26 RX ADMIN — ACETAMINOPHEN 650 MG: 325 TABLET, FILM COATED ORAL at 22:20

## 2023-11-26 RX ADMIN — RISPERIDONE 1 MG: 1 TABLET ORAL at 06:00

## 2023-11-26 RX ADMIN — AMLODIPINE BESYLATE 5 MG: 5 TABLET ORAL at 06:00

## 2023-11-26 RX ADMIN — GABAPENTIN 400 MG: 400 CAPSULE ORAL at 17:25

## 2023-11-26 RX ADMIN — OXYCODONE 5 MG: 5 TABLET ORAL at 01:46

## 2023-11-26 RX ADMIN — NICOTINE TRANSDERMAL SYSTEM 21 MG: 21 PATCH, EXTENDED RELEASE TRANSDERMAL at 06:00

## 2023-11-26 RX ADMIN — GABAPENTIN 800 MG: 400 CAPSULE ORAL at 05:59

## 2023-11-26 RX ADMIN — ALPRAZOLAM 0.5 MG: 0.5 TABLET ORAL at 08:16

## 2023-11-26 ASSESSMENT — PAIN DESCRIPTION - PAIN TYPE
TYPE: ACUTE PAIN

## 2023-11-26 ASSESSMENT — ENCOUNTER SYMPTOMS
BACK PAIN: 1
DEPRESSION: 1
NAUSEA: 0
ABDOMINAL PAIN: 0
VOMITING: 0
SPUTUM PRODUCTION: 1
MYALGIAS: 1
COUGH: 1
SHORTNESS OF BREATH: 1

## 2023-11-26 ASSESSMENT — FIBROSIS 4 INDEX: FIB4 SCORE: 0.78

## 2023-11-26 NOTE — PROGRESS NOTES
[facility] Medicine Daily Progress Note    Date of Service  2023    Chief Complaint  Paulette Concepcion is a 63 y.o. female admitted 2023 with confusion, failure to thrive, psychosis, hyponatremia    Hospital Course    Paulette Concepcion is a 63 y.o. female with history of hypertension, anxiety, depression, neuropathy, asthma, tobacco abuse who presented 2023 with evaluation for failure to thrive. EMS was contacted by patient's friend for wellness check, found patient to be in a paranoid state and brought patient to ER for evaluation.  Patient stated her dog was mauled by another dog approximately a week ago and unfortunately .  She stated she has been depressed with passing of her dog.  She admits to not eating drinking well, depressed moods.  She was found to have concerning sodium level 117 in ER  Patient apparently is chronically psychiatrically ill, followed by an outpatient psychiatrist  The patient gives a somewhat convoluted story of her current living situation, she has had some difficulty with construction, on and off power outage, appears to have failed to care for himself and in the current environment The patient exhibits some paranoid and psychotic features.   She was admitted to [facility] for severe hyponatremia and close electrolyte monitoring, this has slowly been up uptrending, however she has continued to have abnormal behavior. Psychiatry is following.       Interval Problem Update  Patient seen and examined today, she is cognitively improving, but does have slowed mentation and difficulty completing thoughts  - Na continues to trend down, 119 on last draw  - I have consulted and discussed case with urology, repeat urine studies ordered, likely due to poor intake/low solute. Started on salt tabs   - will monitor Na Q6H to avoid rapid corrected   - BP improved with amlodipine   - okay to continue psych meds  - rhronci on exam/congestion, repeat chest xray with  possible pulm contusion, start decongestant and encourage IS     - psychiatry following   - therapy recommending post acute care    I have discussed this patient's plan of care and discharge plan at IDT rounds today with Case Management, Nursing, Nursing leadership, and other members of the IDT team.    Consultants/Specialty  critical care and psychiatry    Code Status  Full Code    Disposition  The patient is not medically cleared for discharge to home or a post-acute facility.      I have placed the appropriate orders for post-discharge needs.    Review of Systems    Review of Systems   Constitutional:  Positive for malaise/fatigue.   Respiratory:  Positive for cough, sputum production and shortness of breath.    Cardiovascular:  Negative for leg swelling.   Gastrointestinal:  Negative for abdominal pain, nausea and vomiting.   Musculoskeletal:  Positive for back pain, joint pain and myalgias.   Psychiatric/Behavioral:  Positive for depression. Negative for suicidal ideas.         Physical Exam  Temp:  [36.4 °C (97.5 °F)-37.1 °C (98.8 °F)] 36.4 °C (97.5 °F)  Pulse:  [] 88  Resp:  [15-18] 18  BP: (107-185)/() 143/83  SpO2:  [86 %-100 %] 95 %    Physical Exam  Vitals and nursing note reviewed.   Constitutional:       General: She is not in acute distress.     Appearance: She is well-developed and normal weight. She is ill-appearing. She is not diaphoretic.   HENT:      Head: Normocephalic and atraumatic.      Mouth/Throat:      Mouth: Mucous membranes are dry.      Pharynx: Oropharynx is clear.   Eyes:      Conjunctiva/sclera: Conjunctivae normal.   Neck:      Thyroid: No thyromegaly.      Vascular: No JVD.   Cardiovascular:      Rate and Rhythm: Regular rhythm. Tachycardia present.      Heart sounds: Normal heart sounds.      No friction rub. No gallop.   Pulmonary:      Effort: Pulmonary effort is normal.      Breath sounds: Rhonchi present. No wheezing or rales.      Comments: Chest wall tenderness,  rhonchi in Rt upper lobe  Abdominal:      General: Bowel sounds are normal. There is no distension.      Palpations: Abdomen is soft. There is no mass.      Tenderness: There is no abdominal tenderness. There is no guarding or rebound.   Musculoskeletal:         General: No tenderness. Normal range of motion.      Cervical back: Normal range of motion and neck supple.   Lymphadenopathy:      Cervical: No cervical adenopathy.   Skin:     General: Skin is warm and dry.   Neurological:      Mental Status: She is alert. She is disoriented.      Cranial Nerves: No cranial nerve deficit.   Psychiatric:         Attention and Perception: Attention normal.         Mood and Affect: Mood is anxious. Affect is labile.         Speech: Speech is tangential.         Thought Content: Thought content is paranoid.         Cognition and Memory: Cognition is impaired.         Fluids    Intake/Output Summary (Last 24 hours) at 11/26/2023 1523  Last data filed at 11/26/2023 1400  Gross per 24 hour   Intake 1600 ml   Output 1378 ml   Net 222 ml       Laboratory  Recent Labs     11/24/23  0240 11/25/23  0215   WBC 12.2* 15.6*   RBC 3.97* 4.06*   HEMOGLOBIN 12.9 13.4   HEMATOCRIT 36.5* 37.8   MCV 91.9 93.1   MCH 32.5 33.0   MCHC 35.3 35.4   RDW 41.7 43.1   PLATELETCT 340 330   MPV 8.7* 8.8*     Recent Labs     11/24/23  0240 11/24/23  0600 11/25/23  0215 11/25/23  0847 11/25/23  2122 11/26/23  0851 11/26/23  1157   SODIUM 127*   < > 125*   < > 122* 119* 120*   POTASSIUM 4.5  --  4.6  --  4.5  --   --    CHLORIDE 93*  --  90*  --  88*  --   --    CO2 23  --  21  --  21  --   --    GLUCOSE 126*  --  122*  --  146*  --   --    BUN 5*  --  8  --  11  --   --    CREATININE 0.40*  --  0.38*  --  0.35*  --   --    CALCIUM 8.6  --  9.0  --  9.0  --   --     < > = values in this interval not displayed.                   Imaging  DX-CHEST-PORTABLE (1 VIEW)   Final Result      Interstitial prominence could indicate pulmonary edema versus  hypoventilatory change/atelectasis.      Right rib fractures with some adjacent increased density could indicate pulmonary contusion without pneumothorax.      CT-HEAD W/O   Final Result      1.  No acute intracranial abnormality.   2.  Redemonstration of a small amount of fat along the left aspect of the jacinta of uncertain clinical significance.   3.  Mild right maxillary sinus disease.         DX-CHEST-PORTABLE (1 VIEW)   Final Result      1.  No acute cardiac or pulmonary abnormalities are identified.   2.  Mildly displaced, acute right lateral third and fourth rib fractures.           Assessment/Plan  * Acute hyponatremia- (present on admission)  Assessment & Plan  Admission Na 117 @21:08 on 11/22  Slow correction, monitor  Trending back down, 119. Increase Na checks to Q6H   Consulted and discussed with nephrology, likely due to low solute diet  Patient started on salt tabs continue free water restriction, monitor sodium checks every 6 hours to avoid overcorrection  Repeat osmolarity studies ordered  Repeat BMP and monitor    Delusional disorder (HCC)  Assessment & Plan  Patient refusing to interact with medical staff, refusing medication administration yesterday, more interactive today however still with paranoid behavior   Psychiatry following  Continue Risperdal     Closed fracture of multiple ribs of right side with routine healing  Assessment & Plan  Conservative treatment, pain control, incentive spirometry  Lidocaine patch, oral oxy     ACP (advance care planning)  Assessment & Plan  Currently unable to engage secondary to her parents delirium, psychosis    Hypertension  Assessment & Plan  BP elevated, added amlodipine, continue Lisinopril    Neuropathy- (present on admission)  Assessment & Plan  Continue home Gabapentin, decreased dose to 400 mg TID due to lethargy     Mood disorder (HCC)- (present on admission)  Assessment & Plan  Chronic Psychiatric illness, psych consulted, appreciate  recommendations  Pt started on Risperdol 1 mg BID  Continue duloxetine and gabapentin     Tobacco dependence- (present on admission)  Assessment & Plan  Admit to smoking >10 cigarettes daily  Tobacco cessation recommended      VTE prophylaxis:    Xarelto 10mg daily as prophylaxis      I have performed a physical exam and reviewed and updated ROS and Plan today (11/26/2023). In review of yesterday's note (11/25/2023), there are no changes except as documented above.    Greater than 51 minutes spent prepping to see patient (e.g. review of tests) obtaining and/or reviewing separately obtained history. Performing a medically appropriate examination and/ evaluation.  Counseling and educating the patient/family/caregiver.  Ordering medications, tests, or procedures.  Referring and communicating with other health care professionals.  Documenting clinical information in EPIC.  Independently interpreting results and communicating results to patient/family/caregiver.  Care coordination.

## 2023-11-26 NOTE — PROGRESS NOTES
Assumed care of patient and received report from RN. Tele monitor in place and patient in SR/ST. VS stable. A&Ox4. Pain 6/10 and medicated per MAR. POC discussed with patient and verbalized understanding. Call light in reach. Fall precautions in place. Bed locked in lowest position. Bed alarm on.

## 2023-11-26 NOTE — CONSULTS
"[facility] -  CONSULTATION NOTE    Date & Time:   11/26/2023  2:42 PM    Author:   Carlos Moore D.O.      REASON FOR CONSULTATION:   - Hyponatremia      CHIEF COMPLAINT:   -  \"Low Na \"      HISTORY OF PRESENT ILLNESS:    Ms. Toussaint is a very pleasant 63-year-old female without any known history of any kidney disease who presented herself on around 11/22 to [facility] with complaints of \"failure to thrive\".  Currently she has a long history of anxiety and depression.  It looks as though a patient's friend stopped by as a wellness check and found her to be in a paranoid state.  [facility] was called.  Upon entry they found her to be paranoid and a disheveled house with a dead dog present.  She apparently has been depressed at the passing of the dog.  With home medications that include lisinopril Sudafed on her chronic basis, Cymbalta, meloxicam, Adderall among others.  At the time of presentation her serum sodium was 117.  Previous labs done approximately 2 weeks prior to admission found a sodium of 120.  Initial labs also reflect a BUN of 4 with a creatinine is 0.3.  Conservative management of her hyponatremia improved to her sodium to 127 over the course of several days however more recently her serum sodium has been falling to a level of 119, 120 again today.  Additional information finds a urinary sodium of less than 20 with a urine osmolality of less than 50 (day of admission).  She tells me she drinks a lot of water.  She feels as though she does not eat any proteins.  She has had some chest congestion with cough.  She does have a bit of a white count of 15.6 with slight left shift hemoglobin of 13.4 urinalysis was done which was benign head CT shows no acute process chest x-ray is reviewed.  Speaking with her she few seems a bit confused.  She has tangential thoughts and a bit rambling in her speech.  We been asked to see her regarding her hyponatremia.    No F/C/N/V    No " melena, hematochezia, hematemesis.    No HA, visual changes, or abdominal pain.    REVIEW OF SYSTEMS:    10 point ROS was performed and is as per HPI or otherwise negative      PAST MEDICAL HISTORY:   Past Medical History:   Diagnosis Date    Dyslipidemia 4/4/2023    Back muscle spasm 1/25/2022    Abnormal mammogram 10/14/2021    Hyperglycemia 4/8/2021    Other specified anxiety disorders 4/8/2021    Situational depression 4/8/2021    Primary insomnia 4/8/2021    Osteopenia of lumbar spine 4/8/2021    Vitamin D deficiency 4/8/2021    Allergic reaction caused by a drug 12/2020    Cymbalta    Lumbar burst fracture (HCC) 2013    L1 burst, tx'd with rods/screws [facility]    Depression 2013    Anxiety     Chronic back pain     Fall     History of spinal fusion     T10-L3          PAST SURGICAL HISTORY:   Past Surgical History:   Procedure Laterality Date    FUSION, SPINE, LUMBAR, PLIF  2013    rods and screw placement    ROTATOR CUFF REPAIR Right Mar 2009    OTHER ORTHOPEDIC SURGERY  2009    Rotator cuff  surgery Right    OTHER ORTHOPEDIC SURGERY      back surgery    PRIMARY C SECTION      x2          FAMILY HISTORY:   - Reviewed and non contributory to current illness  Family History   Problem Relation Age of Onset    Cancer Mother 45        colon    Heart Disease Mother         HEART ATTACK    Heart Disease Half-brother         Heart Bypass @ 45/stent    No Known Problems Half-brother     No Known Problems Half-brother     Diabetes Neg Hx     Stroke Neg Hx        SOCIAL HISTORY:   Social History     Tobacco Use    Smoking status: Former     Current packs/day: 1.00     Average packs/day: 1 pack/day for 44.9 years (44.9 ttl pk-yrs)     Types: Cigarettes     Start date: 1979    Smokeless tobacco: Never   Vaping Use    Vaping Use: Some days    Substances: CBD, Flavoring    Devices: Disposable   Substance Use Topics    Alcohol use: Not Currently     Comment: socially    Drug use: Not Currently     Types: Marijuana,  "Inhaled     Comment: occ edible THC            HOME MEDICATIONS:   - Reviewed and documented in chart    ALLERGIES:  Sulfa drugs and Seasonal      VITAL SIGNS:  BP (!) 144/85   Pulse 97   Temp 36.6 °C (97.9 °F) (Temporal)   Resp 16   Ht 1.6 m (5' 2.99\")   Wt 66.5 kg (146 lb 9.7 oz)   SpO2 94%   BMI 25.98 kg/m²   Physical Exam  Nursing note reviewed.   Constitutional:       General: She is not in acute distress.     Appearance: Normal appearance.   HENT:      Head: Normocephalic and atraumatic.      Mouth/Throat:      Mouth: Mucous membranes are dry.   Eyes:      General: No scleral icterus.     Extraocular Movements: Extraocular movements intact.      Pupils: Pupils are equal, round, and reactive to light.   Cardiovascular:      Rate and Rhythm: Normal rate and regular rhythm.      Heart sounds: Normal heart sounds.      No friction rub.   Pulmonary:      Effort: Pulmonary effort is normal. No respiratory distress.      Breath sounds: Wheezing present.   Abdominal:      General: Abdomen is flat. There is no distension.      Palpations: Abdomen is soft.      Tenderness: There is no abdominal tenderness.   Musculoskeletal:         General: No swelling, tenderness or deformity.      Cervical back: Normal range of motion and neck supple.   Skin:     General: Skin is warm and dry.      Findings: No rash.   Neurological:      Mental Status: She is oriented to person, place, and time. She is lethargic and confused.      Motor: Weakness and tremor present.   Psychiatric:         Behavior: Behavior is cooperative.         Thought Content: Thought content is paranoid.           FLUID BALANCE:  In: 960 [P.O.:960]  Out: 1808       LABS:  Recent Labs     11/24/23  0240 11/24/23  0600 11/25/23  0215 11/25/23  0847 11/25/23  2122 11/26/23  0851 11/26/23  1157   SODIUM 127*   < > 125*   < > 122* 119* 120*   POTASSIUM 4.5  --  4.6  --  4.5  --   --    CHLORIDE 93*  --  90*  --  88*  --   --    CO2 23  --  21  --  21  --   --  " "  GLUCOSE 126*  --  122*  --  146*  --   --    BUN 5*  --  8  --  11  --   --    CREATININE 0.40*  --  0.38*  --  0.35*  --   --    CALCIUM 8.6  --  9.0  --  9.0  --   --     < > = values in this interval not displayed.      Recent Labs     11/24/23  0240 11/24/23  0600 11/25/23  0215 11/25/23  0847 11/25/23  2122 11/26/23  0851 11/26/23  1157   SODIUM 127*   < > 125*   < > 122* 119* 120*   POTASSIUM 4.5  --  4.6  --  4.5  --   --    CHLORIDE 93*  --  90*  --  88*  --   --    CO2 23  --  21  --  21  --   --    GLUCOSE 126*  --  122*  --  146*  --   --    BUN 5*  --  8  --  11  --   --    CREATININE 0.40*  --  0.38*  --  0.35*  --   --     < > = values in this interval not displayed.          IMAGING:  - Imaging studies reviewed by me      ASSESSMENTS:  # Hyponatremia, hypovolemic, hypotonic, asymptomatic  Low urinary osmolality suggests some degree of psychogenic polydipsia   on top of \"tea and toast diet\" with poor urinary solute excretion.  Without a certain amount of solute excretion the kidney cannot excrete   electrolyte free water.  This urine osmolality was done at the time of admission when she was   \"drinking a lot of water\" it would be useful to recheck at this point.  There is no evidence of SIADH.  Focus of care will be to augment protein intake and continue fluid restrictions.  Recheck urine studies for direct ongoing care.  # Preserved renal function  # Low BUN/creatinine   Consistent with poor nutritional intake associated with hyponatremia  # Altered mentation  May have psychiatric component  # Leukocytosis with left shift  # Hyperglycemia    SUGGESTIONS:  Recheck urine studies to include urine sodium and urine osmolality  Continue fluid restrictions  Need to increase dietary protein intake  Suggest adding urea 15 g p.o. daily (if unavailable will add sodium tablets)  Check TSH  Continue frequent testing of sodium to avoid overcorrection  Avoid further use of NSAIDs  Okay to continue with ACE  Okay " to continue with Cymbalta (at this point no evidence of SIADH  Labs with further recommendations to follow      Thank you for this interesting consult and allowing us to participate in his care.  Will follow with you.

## 2023-11-26 NOTE — PROGRESS NOTES
Report received from outgoing nurse, care transferred at 1900. Patient currently in  on the monitor and A&Ox 4, forgetful on specific details of the situation, understands she is being treated for ongoing psych issues and low sodium. Pt reports ongoing severe pain in R ribs. Whiteboard updated with plan for the day and names of staff. No other questions or concerns at this time from the patient. Call light within reach, fall precautions in place.

## 2023-11-26 NOTE — PROGRESS NOTES
Monitor Summary:   Rhythm: ST  Rate: 107-116  Measurement: .12/.04/.29  Ectopy: PVC BIGEM, 8 bts PSVT

## 2023-11-26 NOTE — CONSULTS
[facility] PSYCHIATRIC FOLLOW-UP:(established)  *Reason for admission:  *Reason for admission:    Pt BIBA for failure to thrive. Per EMS point found in home with dead dog, paranoid, not properly eating or drinking. Pt and dog had been attacked by another dog.   *Legal Hold Status: not applicable    S:  heavily asleep  not rousable to touch or name    O: Medical ROS (as pertinent):                      *Psychiatric Examination:   Vitals:   Vitals:    11/26/23 0042 11/26/23 0459 11/26/23 0548 11/26/23 0713   BP: 126/74 (!) 157/92  107/67   Pulse: (!) 110 (!) 112  98   Resp: 16 15  18   Temp: 36.7 °C (98.1 °F) 36.7 °C (98.1 °F)  36.8 °C (98.2 °F)   TempSrc: Temporal Temporal  Temporal   SpO2: 93% 96%  96%   Weight:   66.5 kg (146 lb 9.7 oz)    Height:         General Appearance:  poor eye contact  Abnormal Movements: none   Gait and Posture: not observed  Speech: none  Thought Process: unable to assess  Associations:   unable to assess  Abnormal or Psychotic Thoughts: unable to assess  Judgement and Insight: unable to assess  Orientation: unable to determine  Recent and Remote Memory: unable to determine  Attention Span and Concentration: diminished  Language:unable to assess  Fund of Knowledge: unable to assess  Mood and Affect: unable to asssess  SI/HI:  unable to assess        Current Medications:  Scheduled Medications   Medication Dose Frequency    gabapentin  400 mg TID    lisinopril  20 mg DAILY    amLODIPine  5 mg Q DAY    rivaroxaban  10 mg DAILY AT 1800    vitamin D3  1,000 Units DAILY    risperiDONE  1 mg BID    nicotine  21 mg Daily-0600    senna-docusate  2 Tablet BID    therapeutic multivitamin-minerals  1 Tablet DAILY    lidocaine  1 Patch Q24HR    acetaminophen  650 mg Q6HRS      Latest Reference Range & Units 11/10/23 14:54 11/22/23 21:08 11/23/23 03:40 11/23/23 08:27 11/23/23 17:57 11/24/23 02:40 11/24/23 06:00 11/24/23 12:00 11/24/23 21:39 11/25/23 02:15 11/25/23 08:47 11/25/23  "21:22 11/26/23 08:51   Sodium 135 - 145 mmol/L 120 (L) 117 (LL) 123 (L) 127 (L) 126 (L) 127 (L) 127 (L) 127 (L) 123 (L) 125 (L) 124 (L) 122 (L) 119 (LL)   (LL): Data is critically low  (L): Data is abnormally low     Latest Reference Range & Units Most Recent   WBC 4.8 - 10.8 K/uL 15.6 (H)  11/25/23 02:15   (H): Data is abnormally high   Latest Reference Range & Units Most Recent   Neutrophils-Polys 44.00 - 72.00 % 80.00 (H)  11/24/23 02:40   Neutrophils (Absolute) 1.82 - 7.42 K/uL 9.80 (H)  11/24/23 02:40   Lymphocytes 22.00 - 41.00 % 12.20 (L)  11/24/23 02:40   (H): Data is abnormally high  (L): Data is abnormally low     *ASSESSMENT/RECOMENDATIONS:  1..Major depression with psychosis      R/O encephalopathy with psychosis \" intermittent confusion in conversation,impulsive.\"   2   PTSD  3:  per first encounter: generalized anxiety disorder, ADHD as well.  4  THC use    Medical:   -L shift  -hyponatremia  vit D deficiency   -R sided rib fx  -HTN  -Neuropathy  -chr pain issues: pt reported she has a \"back brace\" at home yesterday     Legal hold: not applicable: but if she tries to leave or becomes agitated or aggressive consider LH. When seen tomorrow, if she refuses meds, most likely I will initiate a LH for inability to care for self, and psychosis Observation status:    2  Discussed/voalted: DIAMOND Ramirez MD    Medication and Other Recommendations: final orders as per Tx Tm  The cymbalta was started before she was admitted to the hospital and she told me she had been on it 2 months. Since it can cause hyponatremia though not commonly, consider dc of it just in case. Consider dc of risperdol: it is not the cause of hyponatremia: she had the latter   before she came in. But could it be making it worse? Lisinopril can also cause it  The Lshift: consider an EEG and if + for encephalopathy, consider an LP if appropriate    Will continue to follow with you.         Discharge recommendations: per tx tm    If released from " [facility]: Discharge Instructions:  -Reviewed safety plan: 911, ER, PCM, MHC, Suicide crisis line  -Please assist with outpatient Psychiatric/substance use follow up appointments at discharge once medically cleared.

## 2023-11-26 NOTE — CONSULTS
"  [facility] PSYCHIATRIC FOLLOW-UP:(established)  *Reason for admission:    Pt BIBA for failure to thrive. Per EMS point found in home with dead dog, paranoid, not properly eating or drinking. Pt and dog had been attacked by another dog.   *Legal Hold Status: not applicable                 S:  though keeps her eyes shut during most of the interview, she is much more coherent and less disorganized but vague about information given. Avoids talking about dog or recent or PTSD experiences other than to say she has nightmares. She is unclear whether the cymbalta works or not, with pressure finally stated her mood. A \"8\". Not clearly SI. Unclear about whether psychosis has ever happened before to her. She deflects answering directly. Was Ox3.    O: Medical ROS (as pertinent):                      *Psychiatric Examination:   Vitals:   Vitals:    11/26/23 0042 11/26/23 0459 11/26/23 0548 11/26/23 0713   BP: 126/74 (!) 157/92  107/67   Pulse: (!) 110 (!) 112  98   Resp: 16 15  18   Temp: 36.7 °C (98.1 °F) 36.7 °C (98.1 °F)  36.8 °C (98.2 °F)   TempSrc: Temporal Temporal  Temporal   SpO2: 93% 96%  96%   Weight:   66.5 kg (146 lb 9.7 oz)    Height:         General Appearance:  intermittent eye contact to poor eye contact. Information not helpful  Abnormal Movements: none   Gait and Posture: not observed  Speech: within normal limits  Thought Process: normal rate  Associations:   linear  Abnormal or Psychotic Thoughts:  none overtly but devoid of details  Judgement and Insight: impaired   Orientation: grossly intact  Recent and Remote Memory: grossly intact  Attention Span and Concentration: intact  Language:fluent  Fund of Knowledge: not tested  Mood and Affect:  sad  SI/HI:   when asked she took a minute to answer and then it was vague. Suspect she is        Current Medications:  Scheduled Medications   Medication Dose Frequency    lisinopril  20 mg DAILY    amLODIPine  5 mg Q DAY    rivaroxaban  10 mg DAILY " AT 1800    vitamin D3  1,000 Units DAILY    risperiDONE  1 mg BID    DULoxetine  60 mg DAILY    nicotine  21 mg Daily-0600    senna-docusate  2 Tablet BID    gabapentin  800 mg TID    therapeutic multivitamin-minerals  1 Tablet DAILY    lidocaine  1 Patch Q24HR    acetaminophen  650 mg Q6HRS      Latest Reference Range & Units 11/10/23 14:54 11/22/23 21:08 11/23/23 03:40 11/23/23 08:27 11/23/23 17:57 11/24/23 02:40 11/24/23 06:00 11/24/23 12:00 11/24/23 21:39 11/25/23 02:15 11/25/23 08:47 11/25/23 21:22   Sodium 135 - 145 mmol/L 120 (L) 117 (LL) 123 (L) 127 (L) 126 (L) 127 (L) 127 (L) 127 (L) 123 (L) 125 (L) 124 (L) 122 (L)   (LL): Data is critically low  (L): Data is abnormally low   Latest Reference Range & Units Most Recent   WBC 4.8 - 10.8 K/uL 15.6 (H)  11/25/23 02:15   (H): Data is abnormally high   Latest Reference Range & Units Most Recent   Neutrophils-Polys 44.00 - 72.00 % 80.00 (H)  11/24/23 02:40   Neutrophils (Absolute) 1.82 - 7.42 K/uL 9.80 (H)  11/24/23 02:40   (H): Data is abnormally high    Cranial Imaging: personally reviewed  CT: neg     *ASSESSMENT/RECOMENDATIONS:  1.Major depression with psychosis      R/O encephalopathy with psychosis   2   PTSD  3:  per first encounter: generalized anxiety disorder, ADHD as well.  4  THC use     Medical:   -vit D deficiency   -R sided rib fx  -HTN  -Neuropathy  -chr pain issues  -hyponatremia     Legal hold: not applicable: but if she tries to leave or becomes agitated or aggressive consider LH. When seen tomorrow, if she refuses meds, most likely I will initiate a LH for inability to care for self, and psychosis     Medication and Other Recommendations: final orders as per Tx Tm  No changes today     Will continue to follow with you.       Discharge recommendations: per tx tm: if she doesn't clear, tries to leave or refuses meds. A LH may be needed.     If released from [facility]: Discharge Instructions:  -Reviewed safety plan: 911, ER, PCM, MHC, Suicide crisis  line  -Please assist with outpatient Psychiatric/substance use follow up appointments at discharge once medically cleared.

## 2023-11-26 NOTE — PROGRESS NOTES
Assumed care of pt, received bedside report from night RN. Pt A/Ox4, answers orientation questions appropriately, has intermittent confusion in conversation,impulsive. Fall and safety precautions in place, bed alarm on, pt educated using call light, call light within reach, bed locked and in lowest position. Hourly rounding in place.

## 2023-11-26 NOTE — CARE PLAN
The patient is Stable - Low risk of patient condition declining or worsening    Shift Goals  Clinical Goals: monitor WOB, pain control, neuro checks  Patient Goals: pain control and comfort  Family Goals: ALBA    Progress made toward(s) clinical / shift goals:      Problem: Pain - Standard  Goal: Alleviation of pain or a reduction in pain to the patient’s comfort goal  11/26/2023 0344 by Fay Morales RMADAI  Outcome: Progressing  Note: Pain assessed using the 0-10 pain scale. Pt experiencing 6-8/10 pain in R rib and medicated per MAR. Pt educated on non-pharmacological interventions including repositioning, distraction and heat/cold packs.   11/26/2023 0344 by Fay Morales RMADAI  Outcome: Progressing     Problem: Fall Risk  Goal: Patient will remain free from falls  Outcome: Progressing      Pt educated on importance of calling nurse before getting up. Fall precautions in place. Bed locked in lowest position. Call light and belongings within reach. Wristband and proper sign placed. Bed alarm on.

## 2023-11-26 NOTE — PROGRESS NOTES
Patient requesting female nurse stating that she feels anxious and would prefer someone else. Bedside report given to Fay TENORIO.

## 2023-11-27 LAB
ALBUMIN SERPL BCP-MCNC: 3.4 G/DL (ref 3.2–4.9)
ALBUMIN/GLOB SERPL: 1.4 G/DL
ALP SERPL-CCNC: 74 U/L (ref 30–99)
ALT SERPL-CCNC: 19 U/L (ref 2–50)
ANION GAP SERPL CALC-SCNC: 10 MMOL/L (ref 7–16)
AST SERPL-CCNC: 19 U/L (ref 12–45)
BASOPHILS # BLD AUTO: 0.1 % (ref 0–1.8)
BASOPHILS # BLD: 0.01 K/UL (ref 0–0.12)
BILIRUB SERPL-MCNC: 0.4 MG/DL (ref 0.1–1.5)
BUN SERPL-MCNC: 7 MG/DL (ref 8–22)
CALCIUM ALBUM COR SERPL-MCNC: 9.1 MG/DL (ref 8.5–10.5)
CALCIUM SERPL-MCNC: 8.6 MG/DL (ref 8.5–10.5)
CHLORIDE SERPL-SCNC: 86 MMOL/L (ref 96–112)
CO2 SERPL-SCNC: 24 MMOL/L (ref 20–33)
CREAT SERPL-MCNC: 0.19 MG/DL (ref 0.5–1.4)
EOSINOPHIL # BLD AUTO: 0.03 K/UL (ref 0–0.51)
EOSINOPHIL NFR BLD: 0.2 % (ref 0–6.9)
ERYTHROCYTE [DISTWIDTH] IN BLOOD BY AUTOMATED COUNT: 42.3 FL (ref 35.9–50)
GFR SERPLBLD CREATININE-BSD FMLA CKD-EPI: 133 ML/MIN/1.73 M 2
GLOBULIN SER CALC-MCNC: 2.4 G/DL (ref 1.9–3.5)
GLUCOSE SERPL-MCNC: 111 MG/DL (ref 65–99)
HCT VFR BLD AUTO: 31.2 % (ref 37–47)
HGB BLD-MCNC: 11 G/DL (ref 12–16)
IMM GRANULOCYTES # BLD AUTO: 0.04 K/UL (ref 0–0.11)
IMM GRANULOCYTES NFR BLD AUTO: 0.3 % (ref 0–0.9)
LYMPHOCYTES # BLD AUTO: 2.09 K/UL (ref 1–4.8)
LYMPHOCYTES NFR BLD: 17.2 % (ref 22–41)
MAGNESIUM SERPL-MCNC: 1.9 MG/DL (ref 1.5–2.5)
MCH RBC QN AUTO: 32.9 PG (ref 27–33)
MCHC RBC AUTO-ENTMCNC: 35.3 G/DL (ref 32.2–35.5)
MCV RBC AUTO: 93.4 FL (ref 81.4–97.8)
MONOCYTES # BLD AUTO: 0.95 K/UL (ref 0–0.85)
MONOCYTES NFR BLD AUTO: 7.8 % (ref 0–13.4)
NEUTROPHILS # BLD AUTO: 9.05 K/UL (ref 1.82–7.42)
NEUTROPHILS NFR BLD: 74.4 % (ref 44–72)
NRBC # BLD AUTO: 0 K/UL
NRBC BLD-RTO: 0 /100 WBC (ref 0–0.2)
PHOSPHATE SERPL-MCNC: 3.6 MG/DL (ref 2.5–4.5)
PLATELET # BLD AUTO: 294 K/UL (ref 164–446)
PMV BLD AUTO: 8.7 FL (ref 9–12.9)
POTASSIUM SERPL-SCNC: 4.4 MMOL/L (ref 3.6–5.5)
PROT SERPL-MCNC: 5.8 G/DL (ref 6–8.2)
RBC # BLD AUTO: 3.34 M/UL (ref 4.2–5.4)
SODIUM SERPL-SCNC: 120 MMOL/L (ref 135–145)
SODIUM SERPL-SCNC: 121 MMOL/L (ref 135–145)
SODIUM SERPL-SCNC: 123 MMOL/L (ref 135–145)
SODIUM SERPL-SCNC: 127 MMOL/L (ref 135–145)
TSH SERPL DL<=0.005 MIU/L-ACNC: 1.11 UIU/ML (ref 0.38–5.33)
URATE SERPL-MCNC: 2.9 MG/DL (ref 1.9–8.2)
WBC # BLD AUTO: 12.2 K/UL (ref 4.8–10.8)

## 2023-11-27 PROCEDURE — 84550 ASSAY OF BLOOD/URIC ACID: CPT

## 2023-11-27 PROCEDURE — 84443 ASSAY THYROID STIM HORMONE: CPT

## 2023-11-27 PROCEDURE — 84295 ASSAY OF SERUM SODIUM: CPT

## 2023-11-27 PROCEDURE — 700102 HCHG RX REV CODE 250 W/ 637 OVERRIDE(OP): Performed by: STUDENT IN AN ORGANIZED HEALTH CARE EDUCATION/TRAINING PROGRAM

## 2023-11-27 PROCEDURE — 770020 HCHG ROOM/CARE - TELE (206)

## 2023-11-27 PROCEDURE — 97167 OT EVAL HIGH COMPLEX 60 MIN: CPT

## 2023-11-27 PROCEDURE — A9270 NON-COVERED ITEM OR SERVICE: HCPCS | Performed by: STUDENT IN AN ORGANIZED HEALTH CARE EDUCATION/TRAINING PROGRAM

## 2023-11-27 PROCEDURE — 80053 COMPREHEN METABOLIC PANEL: CPT

## 2023-11-27 PROCEDURE — 700111 HCHG RX REV CODE 636 W/ 250 OVERRIDE (IP): Mod: JZ

## 2023-11-27 PROCEDURE — 99233 SBSQ HOSP IP/OBS HIGH 50: CPT | Performed by: STUDENT IN AN ORGANIZED HEALTH CARE EDUCATION/TRAINING PROGRAM

## 2023-11-27 PROCEDURE — 84100 ASSAY OF PHOSPHORUS: CPT

## 2023-11-27 PROCEDURE — 700101 HCHG RX REV CODE 250: Performed by: STUDENT IN AN ORGANIZED HEALTH CARE EDUCATION/TRAINING PROGRAM

## 2023-11-27 PROCEDURE — A9270 NON-COVERED ITEM OR SERVICE: HCPCS | Performed by: INTERNAL MEDICINE

## 2023-11-27 PROCEDURE — A9270 NON-COVERED ITEM OR SERVICE: HCPCS | Performed by: HOSPITALIST

## 2023-11-27 PROCEDURE — 85025 COMPLETE CBC W/AUTO DIFF WBC: CPT

## 2023-11-27 PROCEDURE — 36415 COLL VENOUS BLD VENIPUNCTURE: CPT

## 2023-11-27 PROCEDURE — 700102 HCHG RX REV CODE 250 W/ 637 OVERRIDE(OP): Performed by: HOSPITALIST

## 2023-11-27 PROCEDURE — 83735 ASSAY OF MAGNESIUM: CPT

## 2023-11-27 PROCEDURE — 97530 THERAPEUTIC ACTIVITIES: CPT

## 2023-11-27 PROCEDURE — 700102 HCHG RX REV CODE 250 W/ 637 OVERRIDE(OP): Performed by: INTERNAL MEDICINE

## 2023-11-27 RX ORDER — HALOPERIDOL 5 MG/ML
2.5 INJECTION INTRAMUSCULAR ONCE
Status: COMPLETED | OUTPATIENT
Start: 2023-11-27 | End: 2023-11-27

## 2023-11-27 RX ORDER — AMLODIPINE BESYLATE 10 MG/1
10 TABLET ORAL
Status: DISCONTINUED | OUTPATIENT
Start: 2023-11-28 | End: 2023-12-02 | Stop reason: HOSPADM

## 2023-11-27 RX ORDER — DULOXETIN HYDROCHLORIDE 30 MG/1
30 CAPSULE, DELAYED RELEASE ORAL DAILY
Status: DISCONTINUED | OUTPATIENT
Start: 2023-11-28 | End: 2023-12-02 | Stop reason: HOSPADM

## 2023-11-27 RX ADMIN — Medication 1000 UNITS: at 06:21

## 2023-11-27 RX ADMIN — ACETAMINOPHEN 650 MG: 325 TABLET, FILM COATED ORAL at 06:20

## 2023-11-27 RX ADMIN — OXYCODONE 2.5 MG: 5 TABLET ORAL at 21:07

## 2023-11-27 RX ADMIN — LIDOCAINE 1 PATCH: 4 PATCH TOPICAL at 23:35

## 2023-11-27 RX ADMIN — AMLODIPINE BESYLATE 5 MG: 5 TABLET ORAL at 06:21

## 2023-11-27 RX ADMIN — Medication 1 TABLET: at 06:20

## 2023-11-27 RX ADMIN — SODIUM CHLORIDE 1 G: 1 TABLET ORAL at 17:03

## 2023-11-27 RX ADMIN — POLYETHYLENE GLYCOL 3350 1 PACKET: 17 POWDER, FOR SOLUTION ORAL at 09:57

## 2023-11-27 RX ADMIN — DOCUSATE SODIUM 50 MG AND SENNOSIDES 8.6 MG 2 TABLET: 8.6; 5 TABLET, FILM COATED ORAL at 17:03

## 2023-11-27 RX ADMIN — RISPERIDONE 1 MG: 1 TABLET ORAL at 06:20

## 2023-11-27 RX ADMIN — RISPERIDONE 1 MG: 1 TABLET ORAL at 17:03

## 2023-11-27 RX ADMIN — GUAIFENESIN 600 MG: 600 TABLET, EXTENDED RELEASE ORAL at 18:48

## 2023-11-27 RX ADMIN — ALPRAZOLAM 1 MG: 0.5 TABLET ORAL at 21:07

## 2023-11-27 RX ADMIN — SODIUM CHLORIDE 1 G: 1 TABLET ORAL at 15:00

## 2023-11-27 RX ADMIN — GABAPENTIN 400 MG: 400 CAPSULE ORAL at 12:00

## 2023-11-27 RX ADMIN — NICOTINE TRANSDERMAL SYSTEM 21 MG: 21 PATCH, EXTENDED RELEASE TRANSDERMAL at 06:21

## 2023-11-27 RX ADMIN — GABAPENTIN 400 MG: 400 CAPSULE ORAL at 06:20

## 2023-11-27 RX ADMIN — LISINOPRIL 20 MG: 20 TABLET ORAL at 06:21

## 2023-11-27 RX ADMIN — ACETAMINOPHEN 650 MG: 325 TABLET, FILM COATED ORAL at 17:02

## 2023-11-27 RX ADMIN — ACETAMINOPHEN 650 MG: 325 TABLET, FILM COATED ORAL at 11:59

## 2023-11-27 RX ADMIN — GABAPENTIN 400 MG: 400 CAPSULE ORAL at 17:03

## 2023-11-27 RX ADMIN — RIVAROXABAN 10 MG: 10 TABLET, FILM COATED ORAL at 17:03

## 2023-11-27 RX ADMIN — OXYCODONE 2.5 MG: 5 TABLET ORAL at 06:20

## 2023-11-27 RX ADMIN — SODIUM CHLORIDE 1 G: 1 TABLET ORAL at 09:45

## 2023-11-27 RX ADMIN — GUAIFENESIN 600 MG: 600 TABLET, EXTENDED RELEASE ORAL at 06:21

## 2023-11-27 RX ADMIN — HALOPERIDOL LACTATE 2.5 MG: 5 INJECTION, SOLUTION INTRAMUSCULAR at 01:38

## 2023-11-27 RX ADMIN — DULOXETINE HYDROCHLORIDE 60 MG: 60 CAPSULE, DELAYED RELEASE ORAL at 06:21

## 2023-11-27 RX ADMIN — OXYCODONE 2.5 MG: 5 TABLET ORAL at 14:58

## 2023-11-27 ASSESSMENT — COGNITIVE AND FUNCTIONAL STATUS - GENERAL
DRESSING REGULAR UPPER BODY CLOTHING: A LITTLE
MOBILITY SCORE: 15
TURNING FROM BACK TO SIDE WHILE IN FLAT BAD: A LITTLE
MOVING TO AND FROM BED TO CHAIR: A LOT
DRESSING REGULAR LOWER BODY CLOTHING: A LOT
TOILETING: A LITTLE
DAILY ACTIVITIY SCORE: 17
SUGGESTED CMS G CODE MODIFIER MOBILITY: CK
WALKING IN HOSPITAL ROOM: A LITTLE
CLIMB 3 TO 5 STEPS WITH RAILING: A LOT
HELP NEEDED FOR BATHING: A LOT
SUGGESTED CMS G CODE MODIFIER DAILY ACTIVITY: CK
PERSONAL GROOMING: A LITTLE
STANDING UP FROM CHAIR USING ARMS: A LITTLE
MOVING FROM LYING ON BACK TO SITTING ON SIDE OF FLAT BED: A LOT

## 2023-11-27 ASSESSMENT — GAIT ASSESSMENTS
GAIT LEVEL OF ASSIST: CONTACT GUARD ASSIST
DISTANCE (FEET): 50
DEVIATION: SHUFFLED GAIT;DECREASED BASE OF SUPPORT
DISTANCE (FEET): 15
ASSISTIVE DEVICE: FRONT WHEEL WALKER

## 2023-11-27 ASSESSMENT — ENCOUNTER SYMPTOMS
DEPRESSION: 1
COUGH: 1
BACK PAIN: 1
ABDOMINAL PAIN: 0
SPUTUM PRODUCTION: 1
NAUSEA: 0
VOMITING: 0
MYALGIAS: 1
SHORTNESS OF BREATH: 1

## 2023-11-27 ASSESSMENT — PAIN DESCRIPTION - PAIN TYPE
TYPE: ACUTE PAIN

## 2023-11-27 ASSESSMENT — FIBROSIS 4 INDEX: FIB4 SCORE: 0.92

## 2023-11-27 NOTE — PROGRESS NOTES
Hospital Medicine Daily Progress Note    Date of Service  2023    Chief Complaint  Paulette Concepcion is a 63 y.o. female admitted 2023 with confusion, failure to thrive, psychosis, hyponatremia    Hospital Course    Paulette Concepcion is a 63 y.o. female with history of hypertension, anxiety, depression, neuropathy, asthma, tobacco abuse who presented 2023 with evaluation for failure to thrive. EMS was contacted by patient's friend for wellness check, found patient to be in a paranoid state and brought patient to ER for evaluation.  Patient stated her dog was mauled by another dog approximately a week ago and unfortunately .  She stated she has been depressed with passing of her dog.  She admits to not eating drinking well, depressed moods.  She was found to have concerning sodium level 117 in ER  Patient apparently is chronically psychiatrically ill, followed by an outpatient psychiatrist  The patient gives a somewhat convoluted story of her current living situation, she has had some difficulty with construction, on and off power outage, appears to have failed to care for himself and in the current environment The patient exhibits some paranoid and psychotic features.   She was admitted to [facility] for severe hyponatremia and close electrolyte monitoring, this has slowly been up uptrending, however she has continued to have abnormal behavior. Psychiatry is following.       Interval Problem Update  Patient seen at bedside, sleeping, more withdrawn today  - restrained overnight due to throwing tea at nursing staff and threatening behavior, pt frustrated with fluid restriction   - discussed with nephro, continue 1.2L fluid restriction. Na 121 today, okay to space out draws   - continue salt tabs, I have decreased duloxetine dose in case these needs to be tapered or stopped secondary to her persistently hyponatremia     - BP elevated, increase amlodipine   - continue supportive care  with decongestant, throat lozenges     - psychiatry following   - therapy recommending post acute care, not yet medically clear     I have discussed this patient's plan of care and discharge plan at IDT rounds today with Case Management, Nursing, Nursing leadership, and other members of the IDT team.    Consultants/Specialty  critical care and psychiatry    Code Status  Full Code    Disposition  The patient is not medically cleared for discharge to home or a post-acute facility.      I have placed the appropriate orders for post-discharge needs.    Review of Systems    Review of Systems   Unable to perform ROS: Psychiatric disorder   Constitutional:  Positive for malaise/fatigue.   Respiratory:  Positive for cough, sputum production and shortness of breath.    Cardiovascular:  Negative for leg swelling.   Gastrointestinal:  Negative for abdominal pain, nausea and vomiting.   Musculoskeletal:  Positive for back pain, joint pain and myalgias.   Psychiatric/Behavioral:  Positive for depression. Negative for suicidal ideas.         Physical Exam  Temp:  [36.7 °C (98.1 °F)-37 °C (98.6 °F)] 36.7 °C (98.1 °F)  Pulse:  [] 93  Resp:  [15-17] 16  BP: (109-157)/(62-91) 109/62  SpO2:  [92 %-95 %] 92 %    Physical Exam  Vitals and nursing note reviewed.   Constitutional:       General: She is not in acute distress.     Appearance: She is well-developed and normal weight. She is ill-appearing. She is not diaphoretic.   HENT:      Head: Normocephalic and atraumatic.      Mouth/Throat:      Mouth: Mucous membranes are dry.      Pharynx: Oropharynx is clear.   Eyes:      Conjunctiva/sclera: Conjunctivae normal.   Neck:      Thyroid: No thyromegaly.      Vascular: No JVD.   Cardiovascular:      Rate and Rhythm: Regular rhythm. Tachycardia present.      Heart sounds: Normal heart sounds.      No friction rub. No gallop.   Pulmonary:      Effort: Pulmonary effort is normal.      Breath sounds: Rhonchi present. No wheezing or  rales.      Comments: Chest wall tenderness, rhonchi in Rt upper lobe  Abdominal:      General: Bowel sounds are normal. There is no distension.      Palpations: Abdomen is soft. There is no mass.      Tenderness: There is no abdominal tenderness. There is no guarding or rebound.   Musculoskeletal:         General: No tenderness. Normal range of motion.      Cervical back: Normal range of motion and neck supple.   Lymphadenopathy:      Cervical: No cervical adenopathy.   Skin:     General: Skin is warm and dry.   Neurological:      Mental Status: She is alert. She is disoriented.      Cranial Nerves: No cranial nerve deficit.   Psychiatric:         Attention and Perception: Attention normal.         Mood and Affect: Affect is labile.         Cognition and Memory: Cognition is impaired.         Fluids    Intake/Output Summary (Last 24 hours) at 11/27/2023 1525  Last data filed at 11/27/2023 1500  Gross per 24 hour   Intake 1215 ml   Output 1050 ml   Net 165 ml       Laboratory  Recent Labs     11/25/23  0215 11/27/23  0109   WBC 15.6* 12.2*   RBC 4.06* 3.34*   HEMOGLOBIN 13.4 11.0*   HEMATOCRIT 37.8 31.2*   MCV 93.1 93.4   MCH 33.0 32.9   MCHC 35.4 35.3   RDW 43.1 42.3   PLATELETCT 330 294   MPV 8.8* 8.7*     Recent Labs     11/25/23  0215 11/25/23  0847 11/25/23  2122 11/26/23  0851 11/26/23  1750 11/27/23  0109 11/27/23  0610   SODIUM 125*   < > 122*   < > 120* 120* 121*   POTASSIUM 4.6  --  4.5  --   --  4.4  --    CHLORIDE 90*  --  88*  --   --  86*  --    CO2 21  --  21  --   --  24  --    GLUCOSE 122*  --  146*  --   --  111*  --    BUN 8  --  11  --   --  7*  --    CREATININE 0.38*  --  0.35*  --   --  0.19*  --    CALCIUM 9.0  --  9.0  --   --  8.6  --     < > = values in this interval not displayed.                   Imaging  DX-CHEST-PORTABLE (1 VIEW)   Final Result      Interstitial prominence could indicate pulmonary edema versus hypoventilatory change/atelectasis.      Right rib fractures with some  adjacent increased density could indicate pulmonary contusion without pneumothorax.      CT-HEAD W/O   Final Result      1.  No acute intracranial abnormality.   2.  Redemonstration of a small amount of fat along the left aspect of the jacinta of uncertain clinical significance.   3.  Mild right maxillary sinus disease.         DX-CHEST-PORTABLE (1 VIEW)   Final Result      1.  No acute cardiac or pulmonary abnormalities are identified.   2.  Mildly displaced, acute right lateral third and fourth rib fractures.           Assessment/Plan  * Acute hyponatremia- (present on admission)  Assessment & Plan  Admission Na 117 @21:08 did improve initially but now trending back down to 119 on 11/26  discussed with nephrology Dr. Victoria today, likely due to low solute diet, continue fluid restriction, high protein diet and salt tabs started.   BID Na checks adequate per nephro   Repeat osmolarity studies suggesting mixed picture   Monitor labs, avoid over correction     Delusional disorder (HCC)  Assessment & Plan  Patient refusing to interact with medical staff, refusing medication administration yesterday, more interactive today however still with paranoid behavior   Psychiatry following  Continue Risperdal     Closed fracture of multiple ribs of right side with routine healing  Assessment & Plan  Conservative treatment, pain control, incentive spirometry  Lidocaine patch, oral oxy     ACP (advance care planning)  Assessment & Plan  Currently unable to engage secondary to her parents delirium, psychosis    Hypertension  Assessment & Plan  BP elevated, added amlodipine, continue Lisinopril    Neuropathy- (present on admission)  Assessment & Plan  Continue home Gabapentin, decreased dose to 400 mg TID 11/26 due to lethargy and possible contributing factor to low Na    Mood disorder (HCC)- (present on admission)  Assessment & Plan  Chronic Psychiatric illness, psych consulted, appreciate recommendations  Pt started on Risperdol 1  mg BID  Continue duloxetine and gabapentin, if persistent hyponatremia may need to stop   Decrease duloxetine dosing and gabapentin     Tobacco dependence- (present on admission)  Assessment & Plan  Admit to smoking >10 cigarettes daily  Tobacco cessation recommended      VTE prophylaxis:    Xarelto 10mg daily as prophylaxis      I have performed a physical exam and reviewed and updated ROS and Plan today (11/27/2023). In review of yesterday's note (11/26/2023), there are no changes except as documented above.

## 2023-11-27 NOTE — PROGRESS NOTES
Monitor Summary:   Rhythm: SR  Rate: 76-97  Measurement: .13/.05/.39  Ectopy: PVC, bigem, couplet

## 2023-11-27 NOTE — PROGRESS NOTES
Assumed care of patient and received report from RN. Tele monitor in place and patient in SR/ST. VS stable. A&Ox4. Pain 3/10 and declines. POC discussed with patient and verbalized understanding. Call light in reach. Fall precautions in place. Bed locked in lowest position. Bed alarm on. Tele sitter at bedside.

## 2023-11-27 NOTE — DIETARY
Nutrition Update:    Day 5 of admit.  Paulette Concepcion is a 63 y.o. female with admitting DX of Acute hyponatremia [E87.1].  Patient being followed to optimize nutrition.    RD attempted to meet with pt at bedside, pt in a dep sleep and did not wake to name. RN reports mentation has improved and pt ate well at breakfast today. Nephrology and Psychiatry continue to follow.     Current Diet: Regular, 1200 mL fluid restriction  PO intake: % x1 meal, 0% x1 meal, <25% x1 meal, 25-50% x1 meal    Problem: Nutritional:  Goal: Achieve adequate nutritional intake  Description: Patient will consume >50% of meals  Outcome: goal not met     Recommendations/Plan:   Continue current det order  Encourage intake of > 50% meals.  Document intake of all meals as % taken in ADL's to provide interdisciplinary communication across all shifts.   Monitor weight.  Nutrition rep will continue to see patient for ongoing meal and snack preferences.     RD following

## 2023-11-27 NOTE — PROGRESS NOTES
"[facility] -  PROGRESS NOTE               Author: Reinaldo Victoria M.D. Date & Time: 11/27/2023  7:48 AM     HPI:  Ms. Toussaint is a very pleasant 63-year-old female without any known history of any kidney disease who presented herself on around 11/22 to [facility] with complaints of \"failure to thrive\".  Currently she has a long history of anxiety and depression.  It looks as though a patient's friend stopped by as a wellness check and found her to be in a paranoid state.  [facility] was called.  Upon entry they found her to be paranoid and a disheveled house with a dead dog present.  She apparently has been depressed at the passing of the dog.  With home medications that include lisinopril Sudafed on her chronic basis, Cymbalta, meloxicam, Adderall among others.  At the time of presentation her serum sodium was 117.  Previous labs done approximately 2 weeks prior to admission found a sodium of 120.  Initial labs also reflect a BUN of 4 with a creatinine is 0.3.  Conservative management of her hyponatremia improved to her sodium to 127 over the course of several days however more recently her serum sodium has been falling to a level of 119, 120 again today.  Additional information finds a urinary sodium of less than 20 with a urine osmolality of less than 50 (day of admission).  She tells me she drinks a lot of water.  She feels as though she does not eat any proteins.  She has had some chest congestion with cough.  She does have a bit of a white count of 15.6 with slight left shift hemoglobin of 13.4 urinalysis was done which was benign head CT shows no acute process chest x-ray is reviewed.  Speaking with her she few seems a bit confused.  She has tangential thoughts and a bit rambling in her speech.  We been asked to see her regarding her hyponatremia.    DAILY NEPHROLOGY SUMMARY:  11/26: consult done  11/27: 1.1L intake, 1.2: UOP, sodium upn from 119 to 121, in restraints, " "fatigued    PAST FAMILY HISTORY: Reviewed and Unchanged  SOCIAL HISTORY: Reviewed and Unchanged  CURRENT MEDICATIONS: Reviewed  IMAGING STUDIES: Reviewed    ROS  10 Point ROS performed, negative other than stated above    PHYSICAL EXAM  VS:  BP (!) 152/79   Pulse 95   Temp 36.8 °C (98.2 °F) (Temporal)   Resp 15   Ht 1.6 m (5' 2.99\")   Wt 63.5 kg (139 lb 15.9 oz)   SpO2 94%   BMI 24.80 kg/m²   GENERAL: no acute distress  CV: RRR, No edema  RESP: non-labored  GI: Soft  MSK: No joint deformities   SKIN: No concerning rashes  NEURO: No tremor  PSYCH: Cooperative    Fluids:  In: 1180 [P.O.:1180]  Out: 1200     LABS:  Recent Results (from the past 24 hour(s))   SODIUM SERUM (NA)    Collection Time: 11/26/23  8:51 AM   Result Value Ref Range    Sodium 119 (LL) 135 - 145 mmol/L   SODIUM SERUM (NA)    Collection Time: 11/26/23 11:57 AM   Result Value Ref Range    Sodium 120 (L) 135 - 145 mmol/L   URINALYSIS    Collection Time: 11/26/23  3:55 PM    Specimen: Urine, Clean Catch   Result Value Ref Range    Color Yellow     Character Clear     Specific Gravity 1.010 <1.035    Ph 7.0 5.0 - 8.0    Glucose Negative Negative mg/dL    Ketones Trace (A) Negative mg/dL    Protein Negative Negative mg/dL    Bilirubin Negative Negative    Urobilinogen, Urine 1.0 Negative    Nitrite Negative Negative    Leukocyte Esterase Negative Negative    Occult Blood Negative Negative    Micro Urine Req see below    URINE CHLORIDE RANDOM    Collection Time: 11/26/23  3:55 PM   Result Value Ref Range    Chloride, Urine-per volume <20 mmol/L   URINE SODIUM RANDOM    Collection Time: 11/26/23  3:55 PM   Result Value Ref Range    Sodium, Urine -per volume <20 mmol/L   URINE POTASSIUM RANDOM    Collection Time: 11/26/23  3:55 PM   Result Value Ref Range    Potassium 18.0 mmol/L   OSMOLALITY URINE    Collection Time: 11/26/23  3:55 PM   Result Value Ref Range    Osmolality Urine 242 (L) 300 - 900 mOsm/kg H2O   PROTEIN/CREAT RATIO URINE    Collection " Time: 11/26/23  3:55 PM   Result Value Ref Range    Total Protein, Urine 11.0 0.0 - 15.0 mg/dL    Creatinine, Random Urine 36.41 mg/dL    Protein Creatinine Ratio 302 (H) 10 - 107 mg/g   SODIUM SERUM (NA)    Collection Time: 11/26/23  5:50 PM   Result Value Ref Range    Sodium 120 (L) 135 - 145 mmol/L   Comp Metabolic Panel    Collection Time: 11/27/23  1:09 AM   Result Value Ref Range    Sodium 120 (L) 135 - 145 mmol/L    Potassium 4.4 3.6 - 5.5 mmol/L    Chloride 86 (L) 96 - 112 mmol/L    Co2 24 20 - 33 mmol/L    Anion Gap 10.0 7.0 - 16.0    Glucose 111 (H) 65 - 99 mg/dL    Bun 7 (L) 8 - 22 mg/dL    Creatinine 0.19 (L) 0.50 - 1.40 mg/dL    Calcium 8.6 8.5 - 10.5 mg/dL    Correct Calcium 9.1 8.5 - 10.5 mg/dL    AST(SGOT) 19 12 - 45 U/L    ALT(SGPT) 19 2 - 50 U/L    Alkaline Phosphatase 74 30 - 99 U/L    Total Bilirubin 0.4 0.1 - 1.5 mg/dL    Albumin 3.4 3.2 - 4.9 g/dL    Total Protein 5.8 (L) 6.0 - 8.2 g/dL    Globulin 2.4 1.9 - 3.5 g/dL    A-G Ratio 1.4 g/dL   MAGNESIUM    Collection Time: 11/27/23  1:09 AM   Result Value Ref Range    Magnesium 1.9 1.5 - 2.5 mg/dL   PHOSPHORUS    Collection Time: 11/27/23  1:09 AM   Result Value Ref Range    Phosphorus 3.6 2.5 - 4.5 mg/dL   URIC ACID    Collection Time: 11/27/23  1:09 AM   Result Value Ref Range    Uric Acid 2.9 1.9 - 8.2 mg/dL   CBC WITH DIFFERENTIAL    Collection Time: 11/27/23  1:09 AM   Result Value Ref Range    WBC 12.2 (H) 4.8 - 10.8 K/uL    RBC 3.34 (L) 4.20 - 5.40 M/uL    Hemoglobin 11.0 (L) 12.0 - 16.0 g/dL    Hematocrit 31.2 (L) 37.0 - 47.0 %    MCV 93.4 81.4 - 97.8 fL    MCH 32.9 27.0 - 33.0 pg    MCHC 35.3 32.2 - 35.5 g/dL    RDW 42.3 35.9 - 50.0 fL    Platelet Count 294 164 - 446 K/uL    MPV 8.7 (L) 9.0 - 12.9 fL    Neutrophils-Polys 74.40 (H) 44.00 - 72.00 %    Lymphocytes 17.20 (L) 22.00 - 41.00 %    Monocytes 7.80 0.00 - 13.40 %    Eosinophils 0.20 0.00 - 6.90 %    Basophils 0.10 0.00 - 1.80 %    Immature Granulocytes 0.30 0.00 - 0.90 %     "Nucleated RBC 0.00 0.00 - 0.20 /100 WBC    Neutrophils (Absolute) 9.05 (H) 1.82 - 7.42 K/uL    Lymphs (Absolute) 2.09 1.00 - 4.80 K/uL    Monos (Absolute) 0.95 (H) 0.00 - 0.85 K/uL    Eos (Absolute) 0.03 0.00 - 0.51 K/uL    Baso (Absolute) 0.01 0.00 - 0.12 K/uL    Immature Granulocytes (abs) 0.04 0.00 - 0.11 K/uL    NRBC (Absolute) 0.00 K/uL   TSH    Collection Time: 11/27/23  1:09 AM   Result Value Ref Range    TSH 1.110 0.380 - 5.330 uIU/mL   ESTIMATED GFR    Collection Time: 11/27/23  1:09 AM   Result Value Ref Range    GFR (CKD-EPI) 133 >60 mL/min/1.73 m 2   SODIUM SERUM (NA)    Collection Time: 11/27/23  6:10 AM   Result Value Ref Range    Sodium 121 (L) 135 - 145 mmol/L       (click the triangle to expand results)      ASSESSMENT:  # Hyponatremia, hypovolemic, hypotonic, asymptomatic  Initially Low urinary osmolality suggests some degree of psychogenic polydipsia on top of \"tea and toast diet\" with poor urinary solute excretion.  Repeat urine osom 242 with repeat urine sodium <20  ON gabapentin and cymbalta  # Preserved renal function  # Low BUN/creatinine   Consistent with poor nutritional intake associated with hyponatremia  # Altered mentation  May have psychiatric component  # Leukocytosis with left shift  # Hyperglycemia     SUGGESTIONS:  1.2L fluid restriction  1g nacl TID  Ok to decrease sodium checks to BID  Encourage solute intake/nutrition  If sodium fails to improved may need to discuss stopping cymbalta +/- gabapentin  Okay to continue with ACE for now  Labs with further recommendations to follow    Reinaldo Victoria MD    "

## 2023-11-27 NOTE — CONSULTS
[facility] PSYCHIATRIC FOLLOW-UP:(established)  *Reason for admission:   Pt BIBA for failure to thrive. Per EMS point found in home with dead dog, paranoid, not properly eating or drinking. Pt and dog had been attacked by another dog.   *Legal Hold Status: not applicable            S:  pt is trying to sleep and is cold. Given blankets but she did not want to talk today. Talked to staff: she ended up in restraints yesterday because she wanted to drink more and because of the fluid restriction she was not allowed to so she thru her tea at the nurse.     O: Medical ROS (as pertinent):                      *Psychiatric Examination:   Vitals:   Vitals:    11/27/23 0042 11/27/23 0605 11/27/23 0830 11/27/23 1154   BP: (!) 142/72 (!) 157/91 (!) 152/79 109/62   Pulse: 92 (!) 105 95 93   Resp: 16 16 15 16   Temp: 36.8 °C (98.2 °F) 36.8 °C (98.2 °F) 36.8 °C (98.2 °F) 36.7 °C (98.1 °F)   TempSrc: Temporal Temporal Temporal Temporal   SpO2: 93% 94% 94% 92%   Weight:  63.5 kg (139 lb 15.9 oz)     Height:         General Appearance:  poor eye contact  Abnormal Movements: none   Gait and Posture: not observed  Speech: none  Thought Process: unable to assess  Associations:   unable to assess  Abnormal or Psychotic Thoughts: unable to assess  Judgement and Insight: unable to assess  Orientation: unable to determine  Recent and Remote Memory: unable to determine  Attention Span and Concentration: diminished  Language:unable to assess  Fund of Knowledge: unable to assess  Mood and Affect: unable to asssess  SI/HI:  unable to assess     Current Medications:  Scheduled Medications   Medication Dose Frequency    [START ON 11/28/2023] amLODIPine  10 mg Q DAY    gabapentin  400 mg TID    guaiFENesin ER  600 mg Q12HRS    sodium chloride  1 g TID WITH MEALS    DULoxetine  60 mg DAILY    lisinopril  20 mg DAILY    rivaroxaban  10 mg DAILY AT 1800    vitamin D3  1,000 Units DAILY    risperiDONE  1 mg BID    nicotine  21 mg  "Daily-0600    senna-docusate  2 Tablet BID    therapeutic multivitamin-minerals  1 Tablet DAILY    lidocaine  1 Patch Q24HR    acetaminophen  650 mg Q6HRS          *ASSESSMENT/RECOMENDATIONS:  1..Major depression with psychosis      R/O encephalopathy with psychosis \" intermittent confusion in conversation,impulsive.\"   2   PTSD  3:  per first encounter: generalized anxiety disorder, ADHD as well.  4  THC use     Medical:   -L shift  -hyponatremia  vit D deficiency   -R sided rib fx  -HTN  -Neuropathy  -chr pain issues: pt reported she has a \"back brace\" at home yesterday      Legal hold: not applicable: but if she tries to leave or becomes agitated or aggressive consider LH. When seen tomorrow, if she refuses meds, most likely I will initiate a LH for inability to care for self, and psychosis      Medication and Other Recommendations: final orders as per Tx Tm  Gatorade is likely very safe for her to drink.  2    no changes    Will continue to follow with you.     Discharge recommendations: per tx tm    If released from [facility]: Discharge Instructions:  -Reviewed safety plan: 911, ER, PCM, MHC, Suicide crisis line  -Please assist with outpatient Psychiatric/substance use follow up appointments at discharge once medically cleared.         "

## 2023-11-27 NOTE — CARE PLAN
The patient is Stable - Low risk of patient condition declining or worsening    Shift Goals  Clinical Goals: monitor Na, q4 neuro, safety, wean O2  Patient Goals: comfort  Family Goals: na    Progress made toward(s) clinical / shift goals:     Problem: Pain - Standard  Goal: Alleviation of pain or a reduction in pain to the patient’s comfort goal  Outcome: Progressing  Note: Pain assessed using the 0-10 pain scale. Pt experiencing 6-8/10 pain in R rib and medicated per MAR. Pt educated on non-pharmacological interventions including repositioning, distraction and heat/cold packs.      Problem: Safety - Medical Restraint  Goal: Remains free of injury from restraints (Restraint for Interference with Medical Device)  Outcome: Progressing  Note: Less restrictive measures including pain control, bed alarm, de-escalation, and telesitter attempted prior to restraints application. Pt assessed q2 hours for safety, comfort, circulation, resp status, LOC, nutrition and hydration.

## 2023-11-27 NOTE — DISCHARGE PLANNING
Case Management Discharge Planning    Admission Date: 11/22/2023  GMLOS: 2.6  ALOS: 5    6-Clicks ADL Score: 17  6-Clicks Mobility Score: 15  PT and/or OT Eval ordered: Yes  Post-acute Referrals Ordered: Yes  Post-acute Choice Obtained: No  Has referral(s) been sent to post-acute provider:  No      Anticipated Discharge Dispo: Discharge Disposition: D/T to SNF with Medicare cert in anticipation of skilled care (03)    DME Needed:  pending hospital course    Action(s) Taken: chart reviewed,Pt discussed during IDT rounds. Bed side nurse will try taking pt off restraints.     Escalations Completed: None    Medically Clear: No    Next Steps: f/u with pt and medical team to discuss dc needs and barriers.Assessment to be completed to assess for HCM needs.    Barriers to Discharge: Medical clearance    Is the patient up for discharge tomorrow: No

## 2023-11-27 NOTE — THERAPY
"Occupational Therapy   Initial Evaluation     Patient Name: Paulette Concepcion  Age:  63 y.o., Sex:  female  Medical Record #: [id number]  Today's Date: 2023     Precautions: Fall Risk, Swallow Precautions  Comments: Seizure precautions; Suicide risk    Assessment    Patient is 63 y.o. female with h/o anxiety & depression, lumbar fx s/p PLIF (), found in home with no power and  dog. Pt dx with hyponatremia, delirium, FTT. CT showed R rib fx with possible pulmonary contusion. Pt seen for OT eval. Received in bed eating breakfast, requesting use of restroom. Pt able to mobilize to/from bathroom using FWW as well as stand at sink for hygiene. She has R rib pain which significantly limits ROM and functional use of RUE. Pt demos extremely flat affect and expresses fatigue. Pt is limited by: pain, ROM and strength restrictions, generalized weakness, activity intolerance, cognitive deficits. She will benefit from ongoing acute OT to maximize functional independence and safety.     Plan    Occupational Therapy Initial Treatment Plan   Treatment Interventions: Self Care / Activities of Daily Living, Adaptive Equipment, Therapeutic Activity, Therapeutic Exercises, Neuro Re-Education / Balance, Cognitive Skill Development  Treatment Frequency: 4 Times per Week  Duration: Until Therapy Goals Met    DC Equipment Recommendations: Unable to determine at this time  Discharge Recommendations: Recommend post-acute placement for additional occupational therapy services prior to discharge home     Subjective    \"I'm guarding it because of my ribs.\" (RUE)     Objective       23 1056   Prior Living Situation   Prior Services Unable To Determine At This Time   Housing / Facility 1 Story House   Steps Into Home 1   Steps In Home   (full flight to basement where other tenants live)   Equipment Owned Front-Wheel Walker;Single Point Cane  (per PT note)   Lives with - Patient's Self Care Capacity Alone and Unable " to Care For Self   Comments Pt lives alone; per notes was found in home without power with signs of self-neglect   Prior Level of ADL Function   Comments Pt reports she is normally independent but notes indicate recent self-neglect   Prior Level of IADL Function   Comments Unable to determine   Vitals   Pulse 93   Patient BP Position Sitting   Blood Pressure 117/71  (following standing bathroom activity)   Pulse Oximetry 94 %   O2 (LPM) 4   O2 Delivery Device Silicone Nasal Cannula   Cognition    Level of Consciousness Alert   Ability To Follow Commands 1 Step   Safety Awareness Impaired   New Learning Impaired   Attention Impaired   Initiation Impaired   Comments oriented to month, year, place; very flat affect   Active ROM Upper Body   Rt Shoulder Flexion Degrees 45  (pain limits)   Comments all else WNL   Strength Upper Body   Gross Strength Generalized Weakness, Equal Bilaterally.    Comments resistive testing to RUE deferred due to rib/shoulder pain   Sensation Upper Body   Comments intact to light touch, able to localize   Coordination Upper Body   Comments bradykinetic motor BUE; very limited reach RUE due to shoulder and rib pain   Balance Assessment   Sitting Balance (Static) Fair   Sitting Balance (Dynamic) Fair   Standing Balance (Static) Fair -   Standing Balance (Dynamic) Fair -   Weight Shift Sitting Fair   Weight Shift Standing Fair   Comments FWW for standing   Bed Mobility    Supine to Sit Contact Guard Assist   Sit to Supine Moderate Assist   Scooting Moderate Assist  (seated; able to scoot to EOB, but unable to scoot laterally (appeared to be motor planning deficit))   ADL Assessment   Grooming Standby Assist  (face wash, hand hygiene and mouth rinse at sink)   Lower Body Dressing Maximal Assist  (able to doff B socks; total A to re-don due to rib pain)   Toileting Contact Guard Assist  (urination on toilet; able to wipe (clothing management NT))   Functional Mobility   Sit to Stand Contact Guard  Assist   Bed, Chair, Wheelchair Transfer Contact Guard Assist   Toilet Transfers Contact Guard Assist  (required use of grab bar)   Transfer Method Stand Step  (FWW)   Mobility Supine > < EOB, short gait and transfers with FWW   Activity Tolerance   Sitting in Chair 10 min   Sitting Edge of Bed 5 min   Standing 4 min   Comments limited by fatigue   Short Term Goals   Short Term Goal # 1 Pt will complete ADL transfers with supv   Short Term Goal # 2 Pt will complete LB dressing with supv using AE as needed   Short Term Goal # 3 Pt will complete UB dressing with supv using danielito technique   Short Term Goal # 4 Pt will complete standing grooming with supv   Session Information   Date / Session Number  11/27 #1 (1/4, 12/3)

## 2023-11-27 NOTE — CARE PLAN
The patient is Watcher - Medium risk of patient condition declining or worsening    Shift Goals  Clinical Goals: Monitor NA/trend Na, mentation, q4 neuro, safety  Patient Goals: Rest  Family Goals: na    Progress made toward(s) clinical / shift goals:    Problem: Pain - Standard  Goal: Alleviation of pain or a reduction in pain to the patient’s comfort goal  Outcome: Progressing     Problem: Urinary - Renal Perfusion  Goal: Ability to achieve and maintain adequate renal perfusion and functioning will improve  Outcome: Progressing     Problem: Physical Regulation  Goal: Diagnostic test results will improve  Outcome: Progressing  Trending NA Q 6 hrs, next draw at 1800, NA at 120 currently.      Problem: Fall Risk  Goal: Patient will remain free from falls  Outcome: Progressing   All fall precautions in place, tele sitter at bedside.     Patient is not progressing towards the following goals:

## 2023-11-27 NOTE — PROGRESS NOTES
Assumed care of pt. Bedside report received from JONA Aponte. Pt was updated on plan of care. Call light at bedside table, patient in bilateral wrist restraints and R ankle restraint. Bed alarm on and working properly, bed in lowest position, and locked.

## 2023-11-27 NOTE — THERAPY
Physical Therapy   Daily Treatment     Patient Name: Paulette Concepcion  Age:  63 y.o., Sex:  female  Medical Record #: [id number]  Today's Date: 11/27/2023     Precautions  Precautions: Fall Risk;Swallow Precautions  Comments: Seizure precautions; Suicide risk    Assessment    Pt agreeable to PT tx session, received up in bathroom with OT.  Pt perseverative on drinking water but redirectable with time.  She mobilized as detailed below with CGA, required extra time for all tasks.  Pt would benefit from sitting in chair for all meals and mobilizing daily with nursing staff, as appropriate.  Will continue to follow.     Plan    Treatment Plan Status: Continue Current Treatment Plan  Type of Treatment: Bed Mobility, Gait Training, Neuro Re-Education / Balance, Therapeutic Activities, Therapeutic Exercise  Treatment Frequency: 4 Times per Week  Treatment Duration: Until Therapy Goals Met    DC Equipment Recommendations: Unable to determine at this time  Discharge Recommendations: Recommend post-acute placement for additional physical therapy services prior to discharge home     Objective     11/27/23 1058   Precautions   Precautions Fall Risk;Swallow Precautions   Comments Seizure precautions; Suicide risk   Vitals   Patient BP Position Sitting (after returning from bathroom)   Blood Pressure 117/71   Pain 0 - 10 Group   Therapist Pain Assessment Post Activity Pain Same as Prior to Activity;Nurse Notified   Cognition    Cognition / Consciousness X   Level of Consciousness Alert   Comments Cooperative, perseverative on drinking water.  Extended time for all tasks   Balance   Sitting Balance (Static) Fair +   Sitting Balance (Dynamic) Fair   Standing Balance (Static) Fair   Standing Balance (Dynamic) Fair -   Weight Shift Sitting Fair   Weight Shift Standing Fair   Skilled Intervention Verbal Cuing;Tactile Cuing   Bed Mobility    Supine to Sit (NT, up with OT pre session)   Sit to Supine Minimal Assist   Skilled  Intervention Verbal Cuing   Comments w/ extended time   Gait Analysis   Gait Level Of Assist Contact Guard Assist   Assistive Device Front Wheel Walker   Distance (Feet) 50   # of Times Distance was Traveled 2   Deviation Shuffled Gait;Decreased Base Of Support   Weight Bearing Status No restrictions   Skilled Intervention Verbal Cuing;Tactile Cuing   Comments limited by c/o dizziness   Functional Mobility   Sit to Stand Contact Guard Assist   Bed, Chair, Wheelchair Transfer Contact Guard Assist   Transfer Method Stand Step   Mobility amb in room & hallway, back to bed   Skilled Intervention Verbal Cuing   Short Term Goals    Short Term Goal # 1 Patient will perform supine-sit with HOB flat with supervision in 6 visits   Goal Outcome # 1 goal not met   Short Term Goal # 2 Patient will perform sit-stand and chair transfers with LRAD with supervision in 6 visits   Goal Outcome # 2 Goal not met   Short Term Goal # 3 Patient will ambulate > 100 feet with LRAD with supervision in 6 visits   Goal Outcome # 3 Goal not met   Physical Therapy Treatment Plan   Physical Therapy Treatment Plan Continue Current Treatment Plan

## 2023-11-27 NOTE — PROGRESS NOTES
Monitor Summary:   Rhythm: SR/ST  Rate:   Measurement: .12/.07/.30  Ectopy: r pvc, big, O pvc

## 2023-11-28 ENCOUNTER — APPOINTMENT (OUTPATIENT)
Dept: RADIOLOGY | Facility: MEDICAL CENTER | Age: 63
DRG: 640 | End: 2023-11-28
Attending: INTERNAL MEDICINE
Payer: MEDICARE

## 2023-11-28 LAB
FLUAV RNA SPEC QL NAA+PROBE: NEGATIVE
FLUBV RNA SPEC QL NAA+PROBE: NEGATIVE
RSV RNA SPEC QL NAA+PROBE: NEGATIVE
SARS-COV-2 RNA RESP QL NAA+PROBE: NOTDETECTED
SODIUM SERPL-SCNC: 129 MMOL/L (ref 135–145)
SPECIMEN SOURCE: NORMAL

## 2023-11-28 PROCEDURE — 700102 HCHG RX REV CODE 250 W/ 637 OVERRIDE(OP): Performed by: STUDENT IN AN ORGANIZED HEALTH CARE EDUCATION/TRAINING PROGRAM

## 2023-11-28 PROCEDURE — 0241U HCHG SARS-COV-2 COVID-19 NFCT DS RESP RNA 4 TRGT MIC: CPT

## 2023-11-28 PROCEDURE — A9270 NON-COVERED ITEM OR SERVICE: HCPCS | Performed by: STUDENT IN AN ORGANIZED HEALTH CARE EDUCATION/TRAINING PROGRAM

## 2023-11-28 PROCEDURE — 36415 COLL VENOUS BLD VENIPUNCTURE: CPT

## 2023-11-28 PROCEDURE — A9270 NON-COVERED ITEM OR SERVICE: HCPCS | Performed by: HOSPITALIST

## 2023-11-28 PROCEDURE — 770020 HCHG ROOM/CARE - TELE (206)

## 2023-11-28 PROCEDURE — 700101 HCHG RX REV CODE 250: Performed by: STUDENT IN AN ORGANIZED HEALTH CARE EDUCATION/TRAINING PROGRAM

## 2023-11-28 PROCEDURE — C9803 HOPD COVID-19 SPEC COLLECT: HCPCS | Performed by: INTERNAL MEDICINE

## 2023-11-28 PROCEDURE — 97530 THERAPEUTIC ACTIVITIES: CPT | Mod: CO

## 2023-11-28 PROCEDURE — 84295 ASSAY OF SERUM SODIUM: CPT

## 2023-11-28 PROCEDURE — 700102 HCHG RX REV CODE 250 W/ 637 OVERRIDE(OP): Performed by: HOSPITALIST

## 2023-11-28 PROCEDURE — 99406 BEHAV CHNG SMOKING 3-10 MIN: CPT | Performed by: INTERNAL MEDICINE

## 2023-11-28 PROCEDURE — 97535 SELF CARE MNGMENT TRAINING: CPT | Mod: CO

## 2023-11-28 PROCEDURE — 84145 PROCALCITONIN (PCT): CPT

## 2023-11-28 PROCEDURE — 99233 SBSQ HOSP IP/OBS HIGH 50: CPT | Mod: 25 | Performed by: INTERNAL MEDICINE

## 2023-11-28 PROCEDURE — 99418 PROLNG IP/OBS E/M EA 15 MIN: CPT | Mod: 25 | Performed by: INTERNAL MEDICINE

## 2023-11-28 PROCEDURE — 71045 X-RAY EXAM CHEST 1 VIEW: CPT

## 2023-11-28 RX ORDER — OXYCODONE HYDROCHLORIDE 5 MG/1
2.5 TABLET ORAL
Status: DISCONTINUED | OUTPATIENT
Start: 2023-11-28 | End: 2023-12-02 | Stop reason: HOSPADM

## 2023-11-28 RX ORDER — GUAIFENESIN 200 MG/10ML
10 LIQUID ORAL EVERY 4 HOURS PRN
Status: DISCONTINUED | OUTPATIENT
Start: 2023-11-28 | End: 2023-11-29

## 2023-11-28 RX ORDER — ALPRAZOLAM 0.5 MG/1
0.5 TABLET ORAL 2 TIMES DAILY PRN
Status: DISCONTINUED | OUTPATIENT
Start: 2023-11-28 | End: 2023-12-02 | Stop reason: HOSPADM

## 2023-11-28 RX ORDER — OXYCODONE HYDROCHLORIDE 5 MG/1
5 TABLET ORAL
Status: DISCONTINUED | OUTPATIENT
Start: 2023-11-28 | End: 2023-12-02 | Stop reason: HOSPADM

## 2023-11-28 RX ADMIN — ACETAMINOPHEN 650 MG: 325 TABLET, FILM COATED ORAL at 02:23

## 2023-11-28 RX ADMIN — LIDOCAINE 1 PATCH: 4 PATCH TOPICAL at 23:34

## 2023-11-28 RX ADMIN — GABAPENTIN 400 MG: 400 CAPSULE ORAL at 18:20

## 2023-11-28 RX ADMIN — DOCUSATE SODIUM 50 MG AND SENNOSIDES 8.6 MG 2 TABLET: 8.6; 5 TABLET, FILM COATED ORAL at 18:20

## 2023-11-28 RX ADMIN — GUAIFENESIN 600 MG: 600 TABLET, EXTENDED RELEASE ORAL at 05:42

## 2023-11-28 RX ADMIN — Medication 1000 UNITS: at 05:42

## 2023-11-28 RX ADMIN — ALBUTEROL SULFATE 1 PUFF: 90 AEROSOL, METERED RESPIRATORY (INHALATION) at 00:58

## 2023-11-28 RX ADMIN — AMLODIPINE BESYLATE 10 MG: 10 TABLET ORAL at 05:42

## 2023-11-28 RX ADMIN — DOCUSATE SODIUM 50 MG AND SENNOSIDES 8.6 MG 2 TABLET: 8.6; 5 TABLET, FILM COATED ORAL at 05:43

## 2023-11-28 RX ADMIN — RIVAROXABAN 10 MG: 10 TABLET, FILM COATED ORAL at 18:20

## 2023-11-28 RX ADMIN — OXYCODONE 2.5 MG: 5 TABLET ORAL at 06:28

## 2023-11-28 RX ADMIN — OXYCODONE 2.5 MG: 5 TABLET ORAL at 12:32

## 2023-11-28 RX ADMIN — RISPERIDONE 1 MG: 1 TABLET ORAL at 05:43

## 2023-11-28 RX ADMIN — GUAIFENESIN 600 MG: 600 TABLET, EXTENDED RELEASE ORAL at 18:20

## 2023-11-28 RX ADMIN — DULOXETINE HYDROCHLORIDE 30 MG: 30 CAPSULE, DELAYED RELEASE ORAL at 05:42

## 2023-11-28 RX ADMIN — Medication 1 TABLET: at 05:42

## 2023-11-28 RX ADMIN — GABAPENTIN 400 MG: 400 CAPSULE ORAL at 05:43

## 2023-11-28 RX ADMIN — LISINOPRIL 20 MG: 20 TABLET ORAL at 05:42

## 2023-11-28 RX ADMIN — RISPERIDONE 1 MG: 1 TABLET ORAL at 18:20

## 2023-11-28 RX ADMIN — OXYCODONE 2.5 MG: 5 TABLET ORAL at 20:10

## 2023-11-28 RX ADMIN — NICOTINE TRANSDERMAL SYSTEM 21 MG: 21 PATCH, EXTENDED RELEASE TRANSDERMAL at 05:43

## 2023-11-28 RX ADMIN — GABAPENTIN 400 MG: 400 CAPSULE ORAL at 12:29

## 2023-11-28 ASSESSMENT — PAIN DESCRIPTION - PAIN TYPE
TYPE: ACUTE PAIN
TYPE: CHRONIC PAIN;ACUTE PAIN
TYPE: ACUTE PAIN;CHRONIC PAIN
TYPE: CHRONIC PAIN;ACUTE PAIN
TYPE: ACUTE PAIN

## 2023-11-28 ASSESSMENT — COGNITIVE AND FUNCTIONAL STATUS - GENERAL
HELP NEEDED FOR BATHING: A LOT
SUGGESTED CMS G CODE MODIFIER DAILY ACTIVITY: CK
EATING MEALS: A LITTLE
DRESSING REGULAR UPPER BODY CLOTHING: A LITTLE
DRESSING REGULAR LOWER BODY CLOTHING: A LITTLE
PERSONAL GROOMING: A LITTLE
TOILETING: A LITTLE
DAILY ACTIVITIY SCORE: 17

## 2023-11-28 ASSESSMENT — ENCOUNTER SYMPTOMS
SHORTNESS OF BREATH: 1
BACK PAIN: 1
NAUSEA: 0
ABDOMINAL PAIN: 0
VOMITING: 0
COUGH: 1
SPUTUM PRODUCTION: 1
MYALGIAS: 1
DEPRESSION: 1

## 2023-11-28 ASSESSMENT — GAIT ASSESSMENTS: DISTANCE (FEET): 38

## 2023-11-28 ASSESSMENT — FIBROSIS 4 INDEX: FIB4 SCORE: 0.92

## 2023-11-28 NOTE — THERAPY
"Occupational Therapy  Daily Treatment     Patient Name: Paulette Concepcion  Age:  63 y.o., Sex:  female  Medical Record #: [id number]  Today's Date: 11/28/2023     Precautions  Precautions: Fall Risk, Swallow Precautions  Comments: Seizure precautions; Suicide risk    Assessment    Pt was seen for OT treatment. Attempted OT tx X2 today. First attempt,  pt eating breakfast and stated she \"wanted to eat and rest before therapy.\" Second attempt, pt needed encouragement to participate. Pt appears to be slightly confused at times. Pt presents with a flat affect and needs increased time to process MP and sequence tasks at hand. Pt focuses on her pain with movement. Pt tends to say \"I can't before even trying\".  Pt needed increased encouragement to try and to move slow. Pt required  Min A /CGA  for bed mobility. Pt c/o pain in RLE with all movement and stated she needs someone to move her RLE up off and out of bed. CGA for scooting to EOB. Min A for UB dressing changes using BUE's equally. Min A for LB dressing seated base using tailor tech and energy conservation techs with extended time. Pt stood with CGA and FWW for clothing management up over hips needing assist to tie pants  tightly. CGA/Min A for ambulating ADL's with FWW to BR. CGA for toilet transfers. Pt able to do seated toilet hygiene post urination. No need for BM at this time so full toilet hygiene not yet assessed. CGA for STS from toilet using FWW and grab bar. Pt stood at sink with SBA and accomplished H/G tasks with SBA as well. Pt used BUE's equally today when distracted not c/o pain. Pt declined  to stay up in chair post OT session , requested BTB. Pt did  need Min A for BTB to lift RLE up onto bed. Pt is limited by pain, decreased activity tolerance, general weakness and slight cognitive deficits impacting Indep with ADL's.  RN updated on OT treatment findings and recommendations. Will continue to follow.       Plan    Treatment Plan Status: (P) " "Continue Current Treatment Plan  Type of Treatment: Self Care / Activities of Daily Living, Adaptive Equipment, Therapeutic Activity, Therapeutic Exercises, Neuro Re-Education / Balance, Cognitive Skill Development  Treatment Frequency: 4 Times per Week  Treatment Duration: Until Therapy Goals Met    DC Equipment Recommendations: (P) Unable to determine at this time  Discharge Recommendations: (P) Recommend post-acute placement for additional occupational therapy services prior to discharge home    Subjective    \"I'm confused, what do you want me to do?\"     Objective       11/28/23 1041   Cognition    Cognition / Consciousness X   Level of Consciousness Alert   Ability To Follow Commands 1 Step   Safety Awareness Impaired   New Learning Impaired   Attention Impaired   Initiation Impaired   Comments Pleasant and Cooperative, perseverating on drinking water throughout session .  Extended time for all tasks. Flat affect.   Passive ROM Upper Body   Passive ROM Upper Body X   Comments RUE limited due to pain from rib fxs . LUE WFL. Pt states she is ambidextrous.   Active ROM Upper Body   Active ROM Upper Body  X   Dominant Hand Ambidextrous  (per pt)   Comments Rt shoulder flexion, pain limits; 50 degrees; all else WNL   Strength Upper Body   Upper Body Strength  X   Gross Strength Generalized Weakness, Equal Bilaterally.    Comments resistive testing to RUE deferred due to rib/shoulder pain   Other Treatments   Other Treatments Provided Psychosocial intervention addressed. Educated pt in the need for getting OOB more often to increase strength and activity tolerance needed for all ADL's and ADL transfers.   Balance   Sitting Balance (Static) Fair +   Sitting Balance (Dynamic) Fair   Standing Balance (Static) Fair   Standing Balance (Dynamic) Fair -   Weight Shift Sitting Fair   Weight Shift Standing Fair   Comments with FWW in standing   Bed Mobility    Supine to Sit Contact Guard Assist   Sit to Supine Minimal Assist " "  Scooting Moderate Assist   Rolling Standby Assist   Skilled Intervention Verbal Cuing;Sequencing;Compensatory Strategies   Comments with extended time for all movement.   Activities of Daily Living   Grooming Standby Assist;Standing   Bathing   (declined)   Upper Body Dressing Minimal Assist   Lower Body Dressing Minimal Assist  (with extended time using tailor tech seated base)   Toileting Standby Assist   Skilled Intervention Verbal Cuing;Tactile Cuing;Sequencing;Compensatory Strategies   Comments At this time pt will need assist with some seated  ADL's, all I ADL's and ADL transfers.   Functional Mobility   Sit to Stand Contact Guard Assist   Bed, Chair, Wheelchair Transfer Contact Guard Assist   Toilet Transfers Contact Guard Assist   Transfer Method Stand Step   Skilled Intervention Verbal Cuing;Tactile Cuing;Compensatory Strategies   Comments with FWW   Activity Tolerance   Comments limited by fatigue and pain   Patient / Family Goals   Patient / Family Goal #1 \"To drink water\"   Goal #1 Outcome Progressing slower than expected   Short Term Goals   Short Term Goal # 1 Pt will complete ADL transfers with supv   Goal Outcome # 1 Progressing as expected   Short Term Goal # 2 Pt will complete LB dressing with supv using AE as needed   Goal Outcome # 2 Progressing as expected   Short Term Goal # 3 Pt will complete UB dressing with supv using danielito technique   Goal Outcome # 3 Progressing as expected   Short Term Goal # 4 Pt will complete standing grooming with supv   Goal Outcome # 4 Progressing as expected   Occupational Therapy Treatment Plan    O.T. Treatment Plan Continue Current Treatment Plan   Anticipated Discharge Equipment and Recommendations   DC Equipment Recommendations Unable to determine at this time   Discharge Recommendations Recommend post-acute placement for additional occupational therapy services prior to discharge home   Interdisciplinary Plan of Care Collaboration   IDT Collaboration with  " Nursing   Collaboration Comments RN updated

## 2023-11-28 NOTE — CONSULTS
"[facility]  PSYCHIATRIC CONSULTATION - Follow-up  Established Patient    DOS: 11/28/23     Reason for Admission:  63 y.o. female with history of hypertension, anxiety depression, neuropathy, asthma, tobacco abuse who presented 11/22/2023 with evaluation for failure to thrive   Legal Hold Status: not applicable    CC:   Chief Complaint   Patient presents with    Psych Eval     Pt BIBA for failure to thrive. Per EMS point found in home with dead dog, paranoid, not properly eating or drinking. GCS 15. Denies SI/HI.                S:   Patient observed in bed, repositioning 2/2 pain, which she states is an identified trigger to anxiety. Vocalizes fear of being hospitalization, expressing she has limited knowledge about the situation and plan towards dc, presents with disorientation to time, reason for admission, able to talk about situation leading to hospitalized, recent recall appears altered, per nursing staff, increasing confused as day progresses. Patient repeats her identified needs including, reason for fluid restriction, current medication regimen, appointments with OP provider, and phone numbers for important contacts including her son, and neighbor Enma. Reports adequate sleep, decreased energy, and appetite. \"Scared\" mood 2/2 lack of available information, states people leave before she has an understanding ahout her current condition, and situation; however, provided reframing opportunity, including the multiple times we had to discuss her situation while I was present, and how often she required redirection and frequent reminders about her fluid intake, standing without assistance, remains impulsive throughout, often triggering alarm despite multiple prompts about need for assistance, which she agrees with because she has recollection of her fall prior to hospitalization. Denies SI/HI, paranoia vocalized 2/2 electronic devices, states she wants to go through her purse, but also understands " "how must distress she has been in about her \"hacked cell phone.\" Admits GI distress, vocalizing constipation and difficulty passing stool, or flatulence; denies HA, dizziness, appears to be tolerating medications.    O:   Medical ROS (as pertinent):   Recent Labs     23   WBC 12.2*   RBC 3.34*   HEMOGLOBIN 11.0*   HEMATOCRIT 31.2*   MCV 93.4   MCH 32.9   RDW 42.3   PLATELETCT 294   MPV 8.7*   NEUTSPOLYS 74.40*   LYMPHOCYTES 17.20*   MONOCYTES 7.80   EOSINOPHILS 0.20   BASOPHILS 0.10     Recent Labs     23  0851 23  0109 23  0610 23  1550 23  0904   SODIUM 122*   < > 120*   < > 123* 127* 129*   POTASSIUM 4.5  --  4.4  --   --   --   --    CHLORIDE 88*  --  86*  --   --   --   --    CO2 21  --  24  --   --   --   --    GLUCOSE 146*  --  111*  --   --   --   --    BUN 11  --  7*  --   --   --   --     < > = values in this interval not displayed.     Recent Labs     23   ALBUMIN  --  3.4   TBILIRUBIN  --  0.4   ALKPHOSPHAT  --  74   TOTPROTEIN  --  5.8*   ALTSGPT  --  19   ASTSGOT  --  19   CREATININE 0.35* 0.19*       EKG:   Results for orders placed or performed during the hospital encounter of 23   EKG   Result Value Ref Range    Report       [facility] Emergency Dept.    Test Date:  2023  Pt Name:    EDUARDO GUNDERSON      Department: ER  MRN:        [id number]                      Room:        30  Gender:     Female                       Technician: [id number]  :        [id number]                   Requested By:MARC MESSINA  Order #:    [id number]                    Reading MD:    Measurements  Intervals                                Axis  Rate:       71                           P:          77  MD:         137                          QRS:        83  QRSD:       94                           T:          61  QT:         427  QTc:        465    Interpretive Statements  Sinus " "rhythm  Atrial premature complexes  Probable left atrial enlargement  Borderline right axis deviation  Compared to ECG 08/11/2020 14:20:02  Atrial premature complex(es) now present  Sinus tachycardia no longer present  ST (T wave) deviation no longer present          MSE:   BP (!) 145/80   Pulse (!) 103   Temp 36.7 °C (98.1 °F) (Temporal)   Resp 18   Ht 1.6 m (5' 2.99\")   Wt 64.1 kg (141 lb 5 oz)   SpO2 96%     Constitutional: as noted above  General Appearance/Behavior: 63 y.o. appears obese intermittent eye contact superficially cooperative friendly, Poor impulse control  Abnormal Movements: none, no PMA/PMR or tremor observed.  Gait and Posture: shuffles and needs assistance  Musculoskeletal: as noted above  Mood: \"scared\"  Affect: Inappropriate   Speech: normal rate, normal rhythm, normal tone, normal volume, and normal fluency  Language:  spontaneous, comprehends spoken commands, and fluent   Thought Process: Perseverative, Future Oriented  Thought Content: Denies SI/HI, A/VH. Paranoia present Insight/Judgement:  unable to assess/unable to assess  Alert/Orientation: alert, person, place  Attn/Concentration: not tested  MMSE: deferred this visit     Medications:  Scheduled Medications   Medication Dose Frequency    amLODIPine  10 mg Q DAY    DULoxetine  30 mg DAILY    gabapentin  400 mg TID    guaiFENesin ER  600 mg Q12HRS    lisinopril  20 mg DAILY    rivaroxaban  10 mg DAILY AT 1800    vitamin D3  1,000 Units DAILY    risperiDONE  1 mg BID    nicotine  21 mg Daily-0600    senna-docusate  2 Tablet BID    therapeutic multivitamin-minerals  1 Tablet DAILY    lidocaine  1 Patch Q24HR       Allergies:   Allergies   Allergen Reactions    Sulfa Drugs Nausea     Pt states \"I get very sick, I was over 20 years ago\".    Seasonal Runny Nose and Itching              Assessment:   Diagnosis:   1. Hyponatremia    2. Delirium         Psychiatric:   Depressive disorder with psychosis  Anxiety disorder, " generalized  PTSD  ADHD  Cannabis use    Medical: as noted by the medical treatment team.      Recommendations:  Legal Status: not applicable - voluntary    Observation status:   - Telesitter    Discussed/voalted: MONSTER Siddiqi MD    Medication Recommendations: Final orders as per Treatment Team  Consider change Alprazolam to 0.5mg PO BID PRN for anxiety  Continue Duloxetine 30mg PO QAM, Risperidone 1mg PO BID  Risks/benefits/side effects discussed  Medication reconciliation was completed.    Reviewed safety plan: 911, ER, PCM, MHC, suicide crisis line, nursing staff while inpatient.    Will Continue to Follow. Thank you for the consult.       Discharge recommendations:   Please provide referrals to outpatient psychiatric services in the community when appropriate for dc, established with provider who is leaving service.  Patient could benefit from referral for PT/OT services, home health.

## 2023-11-28 NOTE — PROGRESS NOTES
Monitor Summary     Rhythm: SR/ST  Heart Rate:   Ectopy: PVC, bigeminy, bigeminy couplets  Measurement: .12/.05/.33

## 2023-11-28 NOTE — CARE PLAN
The patient is Stable - Low risk of patient condition declining or worsening    Shift Goals  Clinical Goals: Moniotr NA, Fluid restriction, wean O2  Patient Goals: Reduce anxiety, comfort  Family Goals: ALBA    Progress made toward(s) clinical / shift goals:    Problem: Pain - Standard  Goal: Alleviation of pain or a reduction in pain to the patient’s comfort goal  Description: Target End Date:  Prior to discharge or change in level of care    Document on Vitals flowsheet    1.  Document pain using the appropriate pain scale per order or unit policy  2.  Educate and implement non-pharmacologic comfort measures (i.e. relaxation, distraction, massage, cold/heat therapy, etc.)  3.  Pain management medications as ordered  4.  Reassess pain after pain med administration per policy  5.  If opiods administered assess patient's response to pain medication is appropriate per POSS sedation scale  6.  Follow pain management plan developed in collaboration with patient and interdisciplinary team (including palliative care or pain specialists if applicable)  Outcome: Progressing  Note: Pt complaints of moderate 6/10 pain in R rib cage and mid back. See MAR for implemented pharmacological intervention. Pt repositioned with pillows and lights dimmed.     Problem: Fall Risk  Goal: Patient will remain free from falls  Description: Target End Date:  Prior to discharge or change in level of care    Document interventions on the Linder Pako Fall Risk Assessment    1.  Assess for fall risk factors  2.  Implement fall precautions  Outcome: Progressing  Note: Pt is a high fall risk. Bed is in lowest, locked position, call light and belongings within reach, bed alarm on. Upper bed rails in place. Pt utilizes call light appropriately prior to any ambulation.

## 2023-11-28 NOTE — PROGRESS NOTES
"[facility] -  PROGRESS NOTE               Author: Reinaldo Victoria M.D. Date & Time: 11/28/2023  7:43 AM     HPI:  Ms. Toussaint is a very pleasant 63-year-old female without any known history of any kidney disease who presented herself on around 11/22 to [facility] with complaints of \"failure to thrive\".  Currently she has a long history of anxiety and depression.  It looks as though a patient's friend stopped by as a wellness check and found her to be in a paranoid state.  [facility] was called.  Upon entry they found her to be paranoid and a disheveled house with a dead dog present.  She apparently has been depressed at the passing of the dog.  With home medications that include lisinopril Sudafed on her chronic basis, Cymbalta, meloxicam, Adderall among others.  At the time of presentation her serum sodium was 117.  Previous labs done approximately 2 weeks prior to admission found a sodium of 120.  Initial labs also reflect a BUN of 4 with a creatinine is 0.3.  Conservative management of her hyponatremia improved to her sodium to 127 over the course of several days however more recently her serum sodium has been falling to a level of 119, 120 again today.  Additional information finds a urinary sodium of less than 20 with a urine osmolality of less than 50 (day of admission).  She tells me she drinks a lot of water.  She feels as though she does not eat any proteins.  She has had some chest congestion with cough.  She does have a bit of a white count of 15.6 with slight left shift hemoglobin of 13.4 urinalysis was done which was benign head CT shows no acute process chest x-ray is reviewed.  Speaking with her she few seems a bit confused.  She has tangential thoughts and a bit rambling in her speech.  We been asked to see her regarding her hyponatremia.    DAILY NEPHROLOGY SUMMARY:  11/26: consult done  11/27: 1.1L intake, 1.2: UOP, sodium upn from 119 to 121, in restraints, " "fatigued  11/28: 2.3L intake, 1.4L UOP, sodium to 129 this am, mention improving     PAST FAMILY HISTORY: Reviewed and Unchanged  SOCIAL HISTORY: Reviewed and Unchanged  CURRENT MEDICATIONS: Reviewed  IMAGING STUDIES: Reviewed    ROS  10 Point ROS performed, negative other than stated above    PHYSICAL EXAM  VS:  BP (!) 141/76   Pulse 96   Temp 36.7 °C (98.1 °F) (Temporal)   Resp 18   Ht 1.6 m (5' 2.99\")   Wt 64.1 kg (141 lb 5 oz)   SpO2 97%   BMI 25.04 kg/m²   GENERAL: no acute distress  CV: RRR, No edema  RESP: non-labored  GI: Soft  MSK: No joint deformities   SKIN: No concerning rashes  NEURO: No tremor  PSYCH: Cooperative    Fluids:  In: 2335 [P.O.:2335]  Out: 1400     LABS:  Recent Results (from the past 24 hour(s))   SODIUM SERUM (NA)    Collection Time: 11/27/23  3:50 PM   Result Value Ref Range    Sodium 123 (L) 135 - 145 mmol/L   SODIUM SERUM (NA)    Collection Time: 11/27/23  9:17 PM   Result Value Ref Range    Sodium 127 (L) 135 - 145 mmol/L       (click the triangle to expand results)      ASSESSMENT:  # Hyponatremia, hypovolemic, hypotonic, asymptomatic  Initially Low urinary osmolality suggests some degree of psychogenic polydipsia on top of \"tea and toast diet\" with poor urinary solute excretion.  Repeat urine osom 242 with repeat urine sodium <20  ON gabapentin and cymbalta  # Preserved renal function  # Low BUN/creatinine   Consistent with poor nutritional intake associated with hyponatremia  # Altered mentation  May have psychiatric component  # Leukocytosis with left shift  # Hyperglycemia     SUGGESTIONS:  1.5L fluid restriction  Stop salt tabs  sodium checks to BID  Encourage solute intake/nutrition  If sodium fails to improved may need to discuss stopping cymbalta +/- gabapentin  Okay to continue with ACE for now  Labs with further recommendations to follow    Reinaldo Victoria MD    "

## 2023-11-28 NOTE — PROGRESS NOTES
Bedside report received from day RN, pt care assumed, assessment completed. Pt is A&Ox3 -- disoriented to situation. SR on the monitor. Pt satting mid to high 90s on 4L NC. Updated on POC, questions answered. Bed in lowest, locked position, treaded socks on, call light and belongings within reach.

## 2023-11-29 LAB
ALBUMIN SERPL BCP-MCNC: 3.6 G/DL (ref 3.2–4.9)
BUN SERPL-MCNC: 7 MG/DL (ref 8–22)
CALCIUM ALBUM COR SERPL-MCNC: 9.2 MG/DL (ref 8.5–10.5)
CALCIUM SERPL-MCNC: 8.9 MG/DL (ref 8.5–10.5)
CHLORIDE SERPL-SCNC: 93 MMOL/L (ref 96–112)
CO2 SERPL-SCNC: 23 MMOL/L (ref 20–33)
CREAT SERPL-MCNC: 0.34 MG/DL (ref 0.5–1.4)
ERYTHROCYTE [DISTWIDTH] IN BLOOD BY AUTOMATED COUNT: 43.6 FL (ref 35.9–50)
GFR SERPLBLD CREATININE-BSD FMLA CKD-EPI: 116 ML/MIN/1.73 M 2
GLUCOSE SERPL-MCNC: 142 MG/DL (ref 65–99)
HCT VFR BLD AUTO: 33.9 % (ref 37–47)
HGB BLD-MCNC: 11.6 G/DL (ref 12–16)
MCH RBC QN AUTO: 32.3 PG (ref 27–33)
MCHC RBC AUTO-ENTMCNC: 34.2 G/DL (ref 32.2–35.5)
MCV RBC AUTO: 94.4 FL (ref 81.4–97.8)
PHOSPHATE SERPL-MCNC: 3.9 MG/DL (ref 2.5–4.5)
PLATELET # BLD AUTO: 386 K/UL (ref 164–446)
PMV BLD AUTO: 8.9 FL (ref 9–12.9)
POTASSIUM SERPL-SCNC: 4.3 MMOL/L (ref 3.6–5.5)
PROCALCITONIN SERPL-MCNC: 0.07 NG/ML
RBC # BLD AUTO: 3.59 M/UL (ref 4.2–5.4)
SODIUM SERPL-SCNC: 128 MMOL/L (ref 135–145)
SODIUM SERPL-SCNC: 129 MMOL/L (ref 135–145)
SODIUM SERPL-SCNC: 129 MMOL/L (ref 135–145)
WBC # BLD AUTO: 9 K/UL (ref 4.8–10.8)

## 2023-11-29 PROCEDURE — 99418 PROLNG IP/OBS E/M EA 15 MIN: CPT | Performed by: INTERNAL MEDICINE

## 2023-11-29 PROCEDURE — 700102 HCHG RX REV CODE 250 W/ 637 OVERRIDE(OP): Performed by: INTERNAL MEDICINE

## 2023-11-29 PROCEDURE — 770001 HCHG ROOM/CARE - MED/SURG/GYN PRIV*

## 2023-11-29 PROCEDURE — 700101 HCHG RX REV CODE 250: Performed by: STUDENT IN AN ORGANIZED HEALTH CARE EDUCATION/TRAINING PROGRAM

## 2023-11-29 PROCEDURE — 85027 COMPLETE CBC AUTOMATED: CPT

## 2023-11-29 PROCEDURE — 700102 HCHG RX REV CODE 250 W/ 637 OVERRIDE(OP): Performed by: HOSPITALIST

## 2023-11-29 PROCEDURE — A9270 NON-COVERED ITEM OR SERVICE: HCPCS | Performed by: STUDENT IN AN ORGANIZED HEALTH CARE EDUCATION/TRAINING PROGRAM

## 2023-11-29 PROCEDURE — 700102 HCHG RX REV CODE 250 W/ 637 OVERRIDE(OP): Performed by: STUDENT IN AN ORGANIZED HEALTH CARE EDUCATION/TRAINING PROGRAM

## 2023-11-29 PROCEDURE — 36415 COLL VENOUS BLD VENIPUNCTURE: CPT

## 2023-11-29 PROCEDURE — 700102 HCHG RX REV CODE 250 W/ 637 OVERRIDE(OP)

## 2023-11-29 PROCEDURE — A9270 NON-COVERED ITEM OR SERVICE: HCPCS | Performed by: INTERNAL MEDICINE

## 2023-11-29 PROCEDURE — A9270 NON-COVERED ITEM OR SERVICE: HCPCS

## 2023-11-29 PROCEDURE — A9270 NON-COVERED ITEM OR SERVICE: HCPCS | Performed by: HOSPITALIST

## 2023-11-29 PROCEDURE — 97530 THERAPEUTIC ACTIVITIES: CPT

## 2023-11-29 PROCEDURE — 84295 ASSAY OF SERUM SODIUM: CPT

## 2023-11-29 PROCEDURE — 80069 RENAL FUNCTION PANEL: CPT

## 2023-11-29 PROCEDURE — 99233 SBSQ HOSP IP/OBS HIGH 50: CPT | Mod: 25 | Performed by: INTERNAL MEDICINE

## 2023-11-29 RX ORDER — SODIUM CHLORIDE 1 G/1
1 TABLET ORAL 2 TIMES DAILY WITH MEALS
Status: DISCONTINUED | OUTPATIENT
Start: 2023-11-29 | End: 2023-12-02 | Stop reason: HOSPADM

## 2023-11-29 RX ORDER — ACETAMINOPHEN 500 MG
1000 TABLET ORAL EVERY 6 HOURS PRN
Status: DISCONTINUED | OUTPATIENT
Start: 2023-11-29 | End: 2023-12-02 | Stop reason: HOSPADM

## 2023-11-29 RX ADMIN — RISPERIDONE 1 MG: 1 TABLET ORAL at 04:38

## 2023-11-29 RX ADMIN — Medication 1 TABLET: at 04:38

## 2023-11-29 RX ADMIN — ACETAMINOPHEN 1000 MG: 500 TABLET, FILM COATED ORAL at 00:20

## 2023-11-29 RX ADMIN — TIZANIDINE 4 MG: 4 TABLET ORAL at 14:41

## 2023-11-29 RX ADMIN — GABAPENTIN 400 MG: 400 CAPSULE ORAL at 17:29

## 2023-11-29 RX ADMIN — DULOXETINE HYDROCHLORIDE 30 MG: 30 CAPSULE, DELAYED RELEASE ORAL at 04:38

## 2023-11-29 RX ADMIN — ACETAMINOPHEN 1000 MG: 500 TABLET, FILM COATED ORAL at 06:27

## 2023-11-29 RX ADMIN — GABAPENTIN 400 MG: 400 CAPSULE ORAL at 04:38

## 2023-11-29 RX ADMIN — NICOTINE TRANSDERMAL SYSTEM 21 MG: 21 PATCH, EXTENDED RELEASE TRANSDERMAL at 04:36

## 2023-11-29 RX ADMIN — RIVAROXABAN 10 MG: 10 TABLET, FILM COATED ORAL at 17:29

## 2023-11-29 RX ADMIN — GABAPENTIN 400 MG: 400 CAPSULE ORAL at 12:27

## 2023-11-29 RX ADMIN — ACETAMINOPHEN 1000 MG: 500 TABLET, FILM COATED ORAL at 23:20

## 2023-11-29 RX ADMIN — LISINOPRIL 20 MG: 20 TABLET ORAL at 04:38

## 2023-11-29 RX ADMIN — SODIUM CHLORIDE 1 G: 1 TABLET ORAL at 08:57

## 2023-11-29 RX ADMIN — ALPRAZOLAM 0.5 MG: 0.5 TABLET ORAL at 10:37

## 2023-11-29 RX ADMIN — GUAIFENESIN 600 MG: 600 TABLET, EXTENDED RELEASE ORAL at 17:29

## 2023-11-29 RX ADMIN — RISPERIDONE 1 MG: 1 TABLET ORAL at 17:28

## 2023-11-29 RX ADMIN — SODIUM CHLORIDE 1 G: 1 TABLET ORAL at 17:27

## 2023-11-29 RX ADMIN — GUAIFENESIN 600 MG: 600 TABLET, EXTENDED RELEASE ORAL at 04:38

## 2023-11-29 RX ADMIN — LIDOCAINE 1 PATCH: 4 PATCH TOPICAL at 23:19

## 2023-11-29 RX ADMIN — AMLODIPINE BESYLATE 10 MG: 10 TABLET ORAL at 04:36

## 2023-11-29 RX ADMIN — ACETAMINOPHEN 1000 MG: 500 TABLET, FILM COATED ORAL at 12:27

## 2023-11-29 RX ADMIN — Medication 1000 UNITS: at 04:38

## 2023-11-29 RX ADMIN — ALPRAZOLAM 0.5 MG: 0.5 TABLET ORAL at 23:25

## 2023-11-29 ASSESSMENT — COGNITIVE AND FUNCTIONAL STATUS - GENERAL
SUGGESTED CMS G CODE MODIFIER MOBILITY: CK
MOVING FROM LYING ON BACK TO SITTING ON SIDE OF FLAT BED: A LITTLE
TURNING FROM BACK TO SIDE WHILE IN FLAT BAD: A LITTLE
MOBILITY SCORE: 18
STANDING UP FROM CHAIR USING ARMS: A LITTLE
WALKING IN HOSPITAL ROOM: A LITTLE
CLIMB 3 TO 5 STEPS WITH RAILING: A LITTLE
MOVING TO AND FROM BED TO CHAIR: A LITTLE

## 2023-11-29 ASSESSMENT — ENCOUNTER SYMPTOMS
MYALGIAS: 1
NAUSEA: 0
SPUTUM PRODUCTION: 1
COUGH: 1
DEPRESSION: 1
ABDOMINAL PAIN: 0
BACK PAIN: 1
SHORTNESS OF BREATH: 1
VOMITING: 0

## 2023-11-29 ASSESSMENT — FIBROSIS 4 INDEX: FIB4 SCORE: 0.7

## 2023-11-29 ASSESSMENT — GAIT ASSESSMENTS
DISTANCE (FEET): 15
ASSISTIVE DEVICE: FRONT WHEEL WALKER
GAIT LEVEL OF ASSIST: CONTACT GUARD ASSIST
DEVIATION: SHUFFLED GAIT;DECREASED BASE OF SUPPORT

## 2023-11-29 ASSESSMENT — PAIN DESCRIPTION - PAIN TYPE
TYPE: ACUTE PAIN
TYPE: ACUTE PAIN

## 2023-11-29 NOTE — CONSULTS
[facility]  PSYCHIATRIC CONSULTATION - Follow-up  Established Patient    DOS: 11/29/23     Reason for Admission:  63 y.o. female with history of hypertension, anxiety depression, neuropathy, asthma, tobacco abuse who presented 11/22/2023 with evaluation for failure to thrive   Legal Hold Status: not applicable    CC:   Chief Complaint   Patient presents with    Psych Eval     Pt BIBA for failure to thrive. Per EMS point found in home with dead dog, paranoid, not properly eating or drinking. GCS 15. Denies SI/HI.                S:   Patient observed sitting in chair, family at bedside. Reports improved sleep, energy, appetite, improved mood, denies SI/HI, continues to perseverate on medical condition with hydration and nutrition status. Able to celebrate small victories about decreased need for O2, and cardiac monitoring. Denies GI distress, HA, dizziness, appears to be tolerating medications. Expressing she has a psychiatric appointment today, inquiring about getting in contact with her OP provider, reassurances provided to help reduce anxiety, reports decreased anxiety compared to previous encounter, continues to create multiple concerns, able to actively engage in problem solving.     O:   Medical ROS (as pertinent):   Recent Labs     11/27/23  0109 11/29/23 0052   WBC 12.2* 9.0   RBC 3.34* 3.59*   HEMOGLOBIN 11.0* 11.6*   HEMATOCRIT 31.2* 33.9*   MCV 93.4 94.4   MCH 32.9 32.3   RDW 42.3 43.6   PLATELETCT 294 386   MPV 8.7* 8.9*   NEUTSPOLYS 74.40*  --    LYMPHOCYTES 17.20*  --    MONOCYTES 7.80  --    EOSINOPHILS 0.20  --    BASOPHILS 0.10  --      Recent Labs     11/27/23  0109 11/27/23  0610 11/28/23  2101 11/29/23  0052 11/29/23  0838   SODIUM 120*   < > 128* 129* 129*   POTASSIUM 4.4  --   --  4.3  --    CHLORIDE 86*  --   --  93*  --    CO2 24  --   --  23  --    GLUCOSE 111*  --   --  142*  --    BUN 7*  --   --  7*  --     < > = values in this interval not displayed.     Recent Labs      "23  0109 23  0052   ALBUMIN 3.4 3.6   TBILIRUBIN 0.4  --    ALKPHOSPHAT 74  --    TOTPROTEIN 5.8*  --    ALTSGPT 19  --    ASTSGOT 19  --    CREATININE 0.19* 0.34*       EKG:   Results for orders placed or performed during the hospital encounter of 23   EKG   Result Value Ref Range    Report       [facility] Emergency Dept.    Test Date:  2023  Pt Name:    EDUARDO GUNDERSON      Department: ER  MRN:        [id number]                      Room:       [facility]  Gender:     Female                       Technician: [id number]  :        1960                   Requested By:MARC MESSINA  Order #:    [id number]                    Reading MD:    Measurements  Intervals                                Axis  Rate:       71                           P:          77  PA:         137                          QRS:        83  QRSD:       94                           T:          61  QT:         427  QTc:        465    Interpretive Statements  Sinus rhythm  Atrial premature complexes  Probable left atrial enlargement  Borderline right axis deviation  Compared to ECG 2020 14:20:02  Atrial premature complex(es) now present  Sinus tachycardia no longer present  ST (T wave) deviation no longer present          MSE:   /62   Pulse 88   Temp 36.3 °C (97.3 °F) (Temporal)   Resp 16   Ht 1.6 m (5' 2.99\")   Wt 64.1 kg (141 lb 5 oz)   SpO2 96%     Constitutional: as noted above  General Appearance/Behavior: 63 y.o. appears normal habitus intermittent eye contact cooperative, Poor impulse control  Abnormal Movements: none, no PMA/PMR or tremor observed.  Gait and Posture: not observed  Musculoskeletal: as noted above  Mood: \"better\"  Affect: Mood/Congruent and Appropriate   Speech: normal rate, normal rhythm, normal tone, normal volume, and normal fluency  Language:  spontaneous, comprehends spoken commands, and fluent   Thought Process: Goal Directed, Future Oriented  Thought " "Content: Denies SI/HI, A/VH. Paranoia 2/2 electronics   Insight/Judgement:  unable to assess/improved  Alert/Orientation: alert, oriented to person, place and time  Attn/Concentration: not tested  MMSE: deferred this visit     Medications:  Scheduled Medications   Medication Dose Frequency    sodium chloride  1 g BID WITH MEALS    Pharmacy Consult Request  1 Each PHARMACY TO DOSE    amLODIPine  10 mg Q DAY    DULoxetine  30 mg DAILY    gabapentin  400 mg TID    guaiFENesin ER  600 mg Q12HRS    lisinopril  20 mg DAILY    rivaroxaban  10 mg DAILY AT 1800    vitamin D3  1,000 Units DAILY    risperiDONE  1 mg BID    nicotine  21 mg Daily-0600    senna-docusate  2 Tablet BID    therapeutic multivitamin-minerals  1 Tablet DAILY    lidocaine  1 Patch Q24HR       Allergies:   Allergies   Allergen Reactions    Sulfa Drugs Nausea     Pt states \"I get very sick, I was over 20 years ago\".    Seasonal Runny Nose and Itching              Assessment:   Diagnosis:   1. Hyponatremia    2. Delirium         Psychiatric:   Depressive disorder with psychosis  Anxiety disorder, generalized  PTSD  ADHD  Cannabis use     Medical: as noted by the medical treatment team.      Recommendations:  Legal Status: not applicable - voluntary     Observation status:   - Telesitter     Medication Recommendations: Final orders as per Treatment Team  Continue medications   Risks/benefits/side effects discussed  Medication reconciliation was completed.     Reviewed safety plan: 911, ER, PCM, MHC, suicide crisis line, nursing staff while inpatient.     Will Continue to Follow. Thank you for the consult.        Discharge recommendations:   Please provide referrals to outpatient psychiatric services in the community when appropriate for dc, established with provider who is leaving service.  Patient could benefit from referral for PT/OT services, home health.     "

## 2023-11-29 NOTE — PROGRESS NOTES
Assumed care of patient, bedside report received from Nathalia day shift RN. Updated on plan of care, call light within reach and fall precautions are in place and bed lock and in lowest position. Patient instructed to call for assistance before getting out of bed. All questions answered at this time. Requesting hot water. No other needs.

## 2023-11-29 NOTE — THERAPY
"Physical Therapy   Daily Treatment     Patient Name: Paulette Concepcion  Age:  63 y.o., Sex:  female  Medical Record #: [id number]  Today's Date: 11/29/2023     Precautions  Precautions: Fall Risk;Swallow Precautions  Comments: Seizure precautions; Suicide risk    Assessment    Pt progressing with mobility and toward functional goals. Pt required CGA grossly with all mobility, including ambulating <> BR with FWW. Pt did have 1 LOB toward right while standing in BR, requiring Hallie to recover. Continue to recommend placement as pt is at risk for falls and re-admission, performing tasks below her functional baseline. Will continue to follow to address pt's weakness, impaired balance, cognition, safety awareness, activity tolerance and endurance.     Plan    Treatment Plan Status: Continue Current Treatment Plan  Type of Treatment: Bed Mobility, Gait Training, Neuro Re-Education / Balance, Therapeutic Activities, Therapeutic Exercise  Treatment Frequency: 4 Times per Week  Treatment Duration: Until Therapy Goals Met    DC Equipment Recommendations: Unable to determine at this time  Discharge Recommendations: Recommend post-acute placement for additional physical therapy services prior to discharge home (as pt at risk for falls and re-admission, below functional baseline)      Subjective    \"I'm on a 3 second time delay.\"      Objective     11/29/23 1401   Vitals   Pulse Oximetry 96 %   O2 Delivery Device None - Room Air   Pain 0 - 10 Group   Therapist Pain Assessment Nurse Notified;During Activity  (c/o R rib pain, not rated, agreeable to mobility)   Cognition    Cognition / Consciousness X   Speech/ Communication Delayed Responses   Level of Consciousness Alert   Ability To Follow Commands 2 Step   Attention Impaired   Initiation Impaired   Comments Pleasant and cooperative. Extended time for all tasks. Pt reporting she is on a 3 second time delay   Passive ROM Lower Body   Passive ROM Lower Body WDL   Active ROM " Lower Body    Active ROM Lower Body  WDL   Strength Lower Body   Lower Body Strength  X   Comments BLE grossly 3+ to 4/5   Vision   Vision Comments pt not wearing glasses this session   Other Treatments   Other Treatments Provided Pt performing own pericare in restroom. Requested assistance with putting hair into a ponytail. Provided wet washcloth to wash own face as well   Balance   Sitting Balance (Static) Fair +   Sitting Balance (Dynamic) Fair   Standing Balance (Static) Fair   Standing Balance (Dynamic) Fair -   Weight Shift Sitting Fair   Weight Shift Standing Fair   Skilled Intervention Verbal Cuing;Tactile Cuing;Sequencing;Compensatory Strategies   Comments w/ FWW. 1 LOB during standing pericare, toward R requiring Hallie to recover   Bed Mobility    Comments NT, up in chair pre/post session   Gait Analysis   Gait Level Of Assist Contact Guard Assist   Assistive Device Front Wheel Walker   Distance (Feet) 15   # of Times Distance was Traveled 2  (<> BR, seated break for BM)   Deviation Shuffled Gait;Decreased Base Of Support   Weight Bearing Status No restrictions   Skilled Intervention Verbal Cuing;Tactile Cuing   Comments no c/o dizziness this sesion, limited by fatigue   Functional Mobility   Sit to Stand Contact Guard Assist   Bed, Chair, Wheelchair Transfer Contact Guard Assist   Transfer Method Stand Step   Mobility w/ FWW in room, <> BR   Skilled Intervention Verbal Cuing;Tactile Cuing;Compensatory Strategies   Comments cues for FWW managment and safety, pt leaving walker behind for transfers. Good correction with cues   How much difficulty does the patient currently have...   Turning over in bed (including adjusting bedclothes, sheets and blankets)? 3   Sitting down on and standing up from a chair with arms (e.g., wheelchair, bedside commode, etc.) 3   Moving from lying on back to sitting on the side of the bed? 3   How much help from another person does the patient currently need...   Moving to and  from a bed to a chair (including a wheelchair)? 3   Need to walk in a hospital room? 3   Climbing 3-5 steps with a railing? 3   6 clicks Mobility Score 18   Activity Tolerance   Sitting in Chair 7 min toilet   Sitting Edge of Bed NT   Standing 8 min total   Comments limited by fatigue   Patient / Family Goals    Patient / Family Goal #1 To get better   Goal #1 Outcome Progressing as expected   Short Term Goals    Short Term Goal # 1 Patient will perform supine-sit with HOB flat with supervision in 6 visits   Goal Outcome # 1 goal not met   Short Term Goal # 2 Patient will perform sit-stand and chair transfers with LRAD with supervision in 6 visits   Goal Outcome # 2 Goal not met   Short Term Goal # 3 Patient will ambulate > 100 feet with LRAD with supervision in 6 visits   Goal Outcome # 3 Goal not met   Education Group   Education Provided Use of Assistive Device   Use of Assistive Device Patient Response Patient;Acceptance;Explanation;Action Demonstration;Reinforcement Needed   Physical Therapy Treatment Plan   Physical Therapy Treatment Plan Continue Current Treatment Plan   Anticipated Discharge Equipment and Recommendations   DC Equipment Recommendations Unable to determine at this time   Discharge Recommendations Recommend post-acute placement for additional physical therapy services prior to discharge home  (as pt at risk for falls and re-admission, below functional baseline)   Interdisciplinary Plan of Care Collaboration   IDT Collaboration with  Nursing;Family / Caregiver   Patient Position at End of Therapy Seated;Chair Alarm On;Call Light within Reach;Tray Table within Reach;Phone within Reach;Family / Friend in Room   Collaboration Comments RN updated   Session Information   Date / Session Number  11/29 - 3 (3/4, 11/29)

## 2023-11-29 NOTE — PROGRESS NOTES
"[facility] -  PROGRESS NOTE               Author: Reinaldo Victoria M.D. Date & Time: 11/29/2023  7:36 AM     HPI:  Ms. Toussaint is a very pleasant 63-year-old female without any known history of any kidney disease who presented herself on around 11/22 to [facility] with complaints of \"failure to thrive\".  Currently she has a long history of anxiety and depression.  It looks as though a patient's friend stopped by as a wellness check and found her to be in a paranoid state.  [facility] was called.  Upon entry they found her to be paranoid and a disheveled house with a dead dog present.  She apparently has been depressed at the passing of the dog.  With home medications that include lisinopril Sudafed on her chronic basis, Cymbalta, meloxicam, Adderall among others.  At the time of presentation her serum sodium was 117.  Previous labs done approximately 2 weeks prior to admission found a sodium of 120.  Initial labs also reflect a BUN of 4 with a creatinine is 0.3.  Conservative management of her hyponatremia improved to her sodium to 127 over the course of several days however more recently her serum sodium has been falling to a level of 119, 120 again today.  Additional information finds a urinary sodium of less than 20 with a urine osmolality of less than 50 (day of admission).  She tells me she drinks a lot of water.  She feels as though she does not eat any proteins.  She has had some chest congestion with cough.  She does have a bit of a white count of 15.6 with slight left shift hemoglobin of 13.4 urinalysis was done which was benign head CT shows no acute process chest x-ray is reviewed.  Speaking with her she few seems a bit confused.  She has tangential thoughts and a bit rambling in her speech.  We been asked to see her regarding her hyponatremia.    DAILY NEPHROLOGY SUMMARY:  11/26: consult done  11/27: 1.1L intake, 1.2: UOP, sodium upn from 119 to 121, in restraints, " "fatigued  11/28: 2.3L intake, 1.4L UOP, sodium to 129 this am, mention improving   11/29: hypertensive, 1.7L UOP, urine sodium 128 this AM, feeling imrpoved-unhappy about fluid restriction    PAST FAMILY HISTORY: Reviewed and Unchanged  SOCIAL HISTORY: Reviewed and Unchanged  CURRENT MEDICATIONS: Reviewed  IMAGING STUDIES: Reviewed    ROS  10 Point ROS performed, negative other than stated above    PHYSICAL EXAM  VS:  /62   Pulse 88   Temp 36.3 °C (97.3 °F) (Temporal)   Resp 16   Ht 1.6 m (5' 2.99\")   Wt 64.1 kg (141 lb 5 oz)   SpO2 92%   BMI 25.04 kg/m²   GENERAL: no acute distress  CV: RRR, No edema  RESP: non-labored  GI: Soft  MSK: No joint deformities   SKIN: No concerning rashes  NEURO: No tremor  PSYCH: Cooperative    Fluids:  In: 1113 [P.O.:1113]  Out: 1700     LABS:  Recent Results (from the past 24 hour(s))   SODIUM SERUM (NA)    Collection Time: 11/28/23  9:04 AM   Result Value Ref Range    Sodium 129 (L) 135 - 145 mmol/L   CoV-2, Flu A/B, And RSV by PCR ([facility]ogic[facility])    Collection Time: 11/28/23  6:30 PM    Specimen: Nasal; Respirate   Result Value Ref Range    Influenza virus A RNA Negative Negative    Influenza virus B, PCR Negative Negative    RSV, PCR Negative Negative    SARS-CoV-2 by PCR NotDetected     SARS-CoV-2 Source NP Swab    SODIUM SERUM (NA)    Collection Time: 11/28/23  9:01 PM   Result Value Ref Range    Sodium 128 (L) 135 - 145 mmol/L   PROCALCITONIN    Collection Time: 11/28/23  9:01 PM   Result Value Ref Range    Procalcitonin 0.07 <0.25 ng/mL   CBC WITHOUT DIFFERENTIAL    Collection Time: 11/29/23 12:52 AM   Result Value Ref Range    WBC 9.0 4.8 - 10.8 K/uL    RBC 3.59 (L) 4.20 - 5.40 M/uL    Hemoglobin 11.6 (L) 12.0 - 16.0 g/dL    Hematocrit 33.9 (L) 37.0 - 47.0 %    MCV 94.4 81.4 - 97.8 fL    MCH 32.3 27.0 - 33.0 pg    MCHC 34.2 32.2 - 35.5 g/dL    RDW 43.6 35.9 - 50.0 fL    Platelet Count 386 164 - 446 K/uL    MPV 8.9 (L) 9.0 - 12.9 fL       (click the triangle to expand " "results)      ASSESSMENT:  # Hyponatremia, hypovolemic, hypotonic, asymptomatic  Initially Low urinary osmolality suggests some degree of psychogenic polydipsia on top of \"tea and toast diet\" with poor urinary solute excretion.  Repeat urine osom 242 with repeat urine sodium <20  ON gabapentin and cymbalta  # Preserved renal function  # Low BUN/creatinine   Consistent with poor nutritional intake associated with hyponatremia  # Altered mentation  May have psychiatric component  # Leukocytosis with left shift  # Hyperglycemia     SUGGESTIONS:  1.5L fluid restriction  Restart nacl 1G BID  sodium checks to daily  Encourage solute intake/nutrition  Ok to continue cymbalta +/- gabapentin  Labs with further recommendations to follow    Reinaldo Victoria MD    "

## 2023-11-29 NOTE — PROGRESS NOTES
Hospital Medicine Daily Progress Note    Date of Service  2023    Chief Complaint  Paulette Concepcion is a 63 y.o. female admitted 2023 with confusion, failure to thrive, psychosis, hyponatremia    Hospital Course    Paulette Concepcion is a 63 y.o. female with history of hypertension, anxiety, depression, neuropathy, asthma, tobacco abuse who presented 2023 with evaluation for failure to thrive. EMS was contacted by patient's friend for wellness check, found patient to be in a paranoid state and brought patient to ER for evaluation.  Patient stated her dog was mauled by another dog approximately a week ago and unfortunately .  She stated she has been depressed with passing of her dog.  She admits to not eating drinking well, depressed moods.  She was found to have concerning sodium level 117 in ER  Patient apparently is chronically psychiatrically ill, followed by an outpatient psychiatrist  The patient gives a somewhat convoluted story of her current living situation, she has had some difficulty with construction, on and off power outage, appears to have failed to care for himself and in the current environment The patient exhibits some paranoid and psychotic features.   She was admitted to [facility] for severe hyponatremia and close electrolyte monitoring, this has slowly been up uptrending, however she has continued to have abnormal behavior. Psychiatry is following.       Interval Problem Update  Patient seen at bedside, sleeping, more withdrawn today  - restrained overnight due to throwing tea at nursing staff and threatening behavior, pt frustrated with fluid restriction   - discussed with nephro, continue 1.2L fluid restriction. Na 121 today, okay to space out draws   - continue salt tabs, I have decreased duloxetine dose in case these needs to be tapered or stopped secondary to her persistently hyponatremia     - BP elevated, increase amlodipine   - continue supportive care  "with decongestant, throat lozenges     - psychiatry following   - therapy recommending post acute care, not yet medically clear     I have discussed this patient's plan of care and discharge plan at IDT rounds today with Case Management, Nursing, Nursing leadership, and other members of the IDT team.\"    11/28. I reviewed the chart along with vitals, labs, imaging, test (both pending and resulted) and recommendations from specialists and interdisciplinary team.  Currently patient is not agitated or aggressive. Psychiatry stated legal hold if patient is such. Discussed with Psychiatry and staffPatient has limited capacity however can carry a conversation. Na 129. Patient thirsty. I eased fluid restriction to 1500 and ordered free water restriction. OK to salt food. She also complains of congestion and cough. Leukocytosis 11/27 but it has been trending down. CXR 11/26 reviewed changes. Ordered guaifenessin. Ordered repeat CXR for tomorrow, procalcitonin and labs. If no idication for hold plan for SNF    Patient is has a high medical complexity, complex decision making and is at high risk for complication, morbidity, and mortality, thus requiring the highest level of my preparedness for sudden, emergent intervention. Medical decision making is therefore complex. I provided  services, which included ordering labs and/or imaging, and discussing the case with various consultants.medication orders, frequent reevaluations of the patient's condition and response to treatment. Time was also devoted to counseling and coordinating care including review of records, pertinent lab data and studies, as well as discussing diagnostic evaluation and work up, planned therapeutic interventions and future disposition of care. Where indicated, the assessment and plan reflect discussion of patient with consultants, other healthcare providers, family members, and additional research needed to obtain further information in formulating the " plan of care for Paulette Concepcion. Total time spent was 65 minutes.         Consultants/Specialty  critical care and psychiatry    Code Status  Full Code    Disposition  The patient is not medically cleared for discharge to home or a post-acute facility.      I have placed the appropriate orders for post-discharge needs.    Review of Systems    Review of Systems   Unable to perform ROS: Psychiatric disorder   Constitutional:  Positive for malaise/fatigue.   Respiratory:  Positive for cough, sputum production and shortness of breath.    Cardiovascular:  Negative for leg swelling.   Gastrointestinal:  Negative for abdominal pain, nausea and vomiting.   Musculoskeletal:  Positive for back pain, joint pain and myalgias.   Psychiatric/Behavioral:  Positive for depression. Negative for suicidal ideas.         Physical Exam  Temp:  [36.7 °C (98.1 °F)] 36.7 °C (98.1 °F)  Pulse:  [] 103  Resp:  [18] 18  BP: (104-145)/(56-80) 145/80  SpO2:  [92 %-98 %] 96 %    Physical Exam  Vitals and nursing note reviewed.   Constitutional:       General: She is not in acute distress.     Appearance: She is well-developed and normal weight. She is ill-appearing. She is not diaphoretic.   HENT:      Head: Normocephalic and atraumatic.      Mouth/Throat:      Mouth: Mucous membranes are dry.      Pharynx: Oropharynx is clear.   Eyes:      Conjunctiva/sclera: Conjunctivae normal.   Neck:      Thyroid: No thyromegaly.      Vascular: No JVD.   Cardiovascular:      Rate and Rhythm: Regular rhythm. Tachycardia present.      Heart sounds: Normal heart sounds.      No friction rub. No gallop.   Pulmonary:      Effort: Pulmonary effort is normal.      Breath sounds: Rhonchi present. No wheezing or rales.      Comments: Chest wall tenderness, rhonchi in Rt upper lobe  Abdominal:      General: Bowel sounds are normal. There is no distension.      Palpations: Abdomen is soft. There is no mass.      Tenderness: There is no abdominal  tenderness. There is no guarding or rebound.   Musculoskeletal:         General: No tenderness. Normal range of motion.      Cervical back: Normal range of motion and neck supple.   Lymphadenopathy:      Cervical: No cervical adenopathy.   Skin:     General: Skin is warm and dry.   Neurological:      Mental Status: She is alert.      Cranial Nerves: No cranial nerve deficit.      Comments: Limited capacity  COnfusion but can carry a conversation  Baseline mobility is with walker and wheelchair   Psychiatric:         Attention and Perception: Attention normal.         Mood and Affect: Affect is labile.         Cognition and Memory: Cognition is impaired.         Fluids    Intake/Output Summary (Last 24 hours) at 11/28/2023 1620  Last data filed at 11/28/2023 1138  Gross per 24 hour   Intake 1535 ml   Output 1300 ml   Net 235 ml         Laboratory  Recent Labs     11/27/23  0109   WBC 12.2*   RBC 3.34*   HEMOGLOBIN 11.0*   HEMATOCRIT 31.2*   MCV 93.4   MCH 32.9   MCHC 35.3   RDW 42.3   PLATELETCT 294   MPV 8.7*       Recent Labs     11/25/23  2122 11/26/23  0851 11/27/23  0109 11/27/23  0610 11/27/23  1550 11/27/23  2117 11/28/23  0904   SODIUM 122*   < > 120*   < > 123* 127* 129*   POTASSIUM 4.5  --  4.4  --   --   --   --    CHLORIDE 88*  --  86*  --   --   --   --    CO2 21  --  24  --   --   --   --    GLUCOSE 146*  --  111*  --   --   --   --    BUN 11  --  7*  --   --   --   --    CREATININE 0.35*  --  0.19*  --   --   --   --    CALCIUM 9.0  --  8.6  --   --   --   --     < > = values in this interval not displayed.                     Imaging  DX-CHEST-PORTABLE (1 VIEW)   Final Result      Interstitial prominence could indicate pulmonary edema versus hypoventilatory change/atelectasis.      Right rib fractures with some adjacent increased density could indicate pulmonary contusion without pneumothorax.      CT-HEAD W/O   Final Result      1.  No acute intracranial abnormality.   2.  Redemonstration of a small  "amount of fat along the left aspect of the jacinta of uncertain clinical significance.   3.  Mild right maxillary sinus disease.         DX-CHEST-PORTABLE (1 VIEW)   Final Result      1.  No acute cardiac or pulmonary abnormalities are identified.   2.  Mildly displaced, acute right lateral third and fourth rib fractures.           Assessment/Plan  * Acute hyponatremia, encephalopathy/delusional disorder- (present on admission)  Assessment & Plan  Admission Na 117 @21:08 did improve initially but now trending back down to 119 on 11/26  discussed with nephrology Dr. Victoria today, likely due to low solute diet, continue fluid restriction, high protein diet and salt tabs started.   BID Na checks adequate per nephro   Repeat osmolarity studies suggesting mixed picture   Monitor labs, avoid over correction\"    Ease on fluid restriction, do free water restrictin and OK to salt food  Mentation and behavior improving. On psychotropics. Last QTc normal range 11/22. Repeat EKG at some point.  Repeat CXR, ordered labs, COVID/flu/RSV and procalcitonin for cough and congestion  If no indication for psych facility, SNF likely    Delusional disorder (HCC)  Assessment & Plan  Patient refusing to interact with medical staff, refusing medication administration yesterday, more interactive today however still with paranoid behavior   Psychiatry following  Continue Risperdal     Closed fracture of multiple ribs of right side with routine healing  Assessment & Plan  Conservative treatment, pain control, incentive spirometry  Lidocaine patch, oral oxy     ACP (advance care planning)  Assessment & Plan  Currently unable to engage secondary to her parents delirium, psychosis    Hypertension  Assessment & Plan  BP elevated, added amlodipine, continue Lisinopril\"    Vitals:    11/28/23 1205   BP: (!) 145/80   Pulse: (!) 103   Resp: 18   Temp: 36.7 °C (98.1 °F)   SpO2: 96%     Stable.    Neuropathy- (present on admission)  Assessment & " "Plan  Continue home Gabapentin, decreased dose to 400 mg TID 11/26 due to lethargy and possible contributing factor to low Na    Mood disorder (HCC)- (present on admission)  Assessment & Plan  Chronic Psychiatric illness, psych consulted, appreciate recommendations  Pt started on Risperdol 1 mg BID  Continue duloxetine and gabapentin, if persistent hyponatremia may need to stop   Decrease duloxetine dosing and gabapentin     Tobacco dependence- (present on admission)  Assessment & Plan  Admit to smoking >10 cigarettes daily  Tobacco cessation recommended\"    I spent 4 minutes, discussing tobacco dependence and cardiac as well as pulmonary risk. Smoking cessation instructions. Code 21044        VTE prophylaxis: Xarelto ppx    I have performed a physical exam and reviewed and updated ROS and Plan today (11/28/2023). In review of yesterday's note (11/27/2023), there are no changes except as documented above.      "

## 2023-11-29 NOTE — CARE PLAN
The patient is Stable - Low risk of patient condition declining or worsening    Shift Goals  Clinical Goals: patient fluid restriction will remain priority this shift, monitor NA lab results. educate patient on fluid restriction compliance  Patient Goals: get more to drink and talk to the Dr.  Family Goals: ALBA    Progress made toward(s) clinical / shift goals:  patient requiring frequent reminder as to why fluid restriction is in place, pt is compliant with restriction once RN explains to her. Latest Na 129. Patient updated on increase in fluid restriction. Patient has tele sitter. Patient has been more restless this afternoon patient reports secondary to back pain, heat packs and repositioning decreased back discomfort to a 3/10 per patient.        Problem: Pain - Standard  Goal: Alleviation of pain or a reduction in pain to the patient’s comfort goal  Outcome: Progressing     Problem: Psychosocial  Goal: Patient's ability to identify and develop effective coping behaviors will improve  Outcome: Progressing     Patient is not progressing towards the following goals:

## 2023-11-30 ENCOUNTER — PATIENT OUTREACH (OUTPATIENT)
Dept: SCHEDULING | Facility: IMAGING CENTER | Age: 63
End: 2023-11-30
Payer: MEDICARE

## 2023-11-30 LAB
ALBUMIN SERPL BCP-MCNC: 3.5 G/DL (ref 3.2–4.9)
BUN SERPL-MCNC: 6 MG/DL (ref 8–22)
CALCIUM ALBUM COR SERPL-MCNC: 9.3 MG/DL (ref 8.5–10.5)
CALCIUM SERPL-MCNC: 8.9 MG/DL (ref 8.5–10.5)
CHLORIDE SERPL-SCNC: 96 MMOL/L (ref 96–112)
CO2 SERPL-SCNC: 26 MMOL/L (ref 20–33)
CREAT SERPL-MCNC: 0.34 MG/DL (ref 0.5–1.4)
ERYTHROCYTE [DISTWIDTH] IN BLOOD BY AUTOMATED COUNT: 42.7 FL (ref 35.9–50)
GFR SERPLBLD CREATININE-BSD FMLA CKD-EPI: 116 ML/MIN/1.73 M 2
GLUCOSE SERPL-MCNC: 115 MG/DL (ref 65–99)
HCT VFR BLD AUTO: 32.9 % (ref 37–47)
HGB BLD-MCNC: 11.4 G/DL (ref 12–16)
MCH RBC QN AUTO: 32.6 PG (ref 27–33)
MCHC RBC AUTO-ENTMCNC: 34.7 G/DL (ref 32.2–35.5)
MCV RBC AUTO: 94 FL (ref 81.4–97.8)
PHOSPHATE SERPL-MCNC: 4.3 MG/DL (ref 2.5–4.5)
PLATELET # BLD AUTO: 446 K/UL (ref 164–446)
PMV BLD AUTO: 8.6 FL (ref 9–12.9)
POTASSIUM SERPL-SCNC: 3.8 MMOL/L (ref 3.6–5.5)
RBC # BLD AUTO: 3.5 M/UL (ref 4.2–5.4)
SODIUM SERPL-SCNC: 132 MMOL/L (ref 135–145)
WBC # BLD AUTO: 9.7 K/UL (ref 4.8–10.8)

## 2023-11-30 PROCEDURE — 700102 HCHG RX REV CODE 250 W/ 637 OVERRIDE(OP): Performed by: INTERNAL MEDICINE

## 2023-11-30 PROCEDURE — 700102 HCHG RX REV CODE 250 W/ 637 OVERRIDE(OP)

## 2023-11-30 PROCEDURE — A9270 NON-COVERED ITEM OR SERVICE: HCPCS | Performed by: INTERNAL MEDICINE

## 2023-11-30 PROCEDURE — 90834 PSYTX W PT 45 MINUTES: CPT | Performed by: SOCIAL WORKER

## 2023-11-30 PROCEDURE — 700102 HCHG RX REV CODE 250 W/ 637 OVERRIDE(OP): Performed by: STUDENT IN AN ORGANIZED HEALTH CARE EDUCATION/TRAINING PROGRAM

## 2023-11-30 PROCEDURE — 99233 SBSQ HOSP IP/OBS HIGH 50: CPT | Performed by: INTERNAL MEDICINE

## 2023-11-30 PROCEDURE — A9270 NON-COVERED ITEM OR SERVICE: HCPCS

## 2023-11-30 PROCEDURE — 85027 COMPLETE CBC AUTOMATED: CPT

## 2023-11-30 PROCEDURE — A9270 NON-COVERED ITEM OR SERVICE: HCPCS | Performed by: STUDENT IN AN ORGANIZED HEALTH CARE EDUCATION/TRAINING PROGRAM

## 2023-11-30 PROCEDURE — 700102 HCHG RX REV CODE 250 W/ 637 OVERRIDE(OP): Performed by: HOSPITALIST

## 2023-11-30 PROCEDURE — 36415 COLL VENOUS BLD VENIPUNCTURE: CPT

## 2023-11-30 PROCEDURE — 80069 RENAL FUNCTION PANEL: CPT

## 2023-11-30 PROCEDURE — A9270 NON-COVERED ITEM OR SERVICE: HCPCS | Performed by: HOSPITALIST

## 2023-11-30 PROCEDURE — 770001 HCHG ROOM/CARE - MED/SURG/GYN PRIV*

## 2023-11-30 RX ADMIN — Medication 1000 UNITS: at 05:55

## 2023-11-30 RX ADMIN — ACETAMINOPHEN 1000 MG: 500 TABLET, FILM COATED ORAL at 05:55

## 2023-11-30 RX ADMIN — Medication 1 TABLET: at 05:55

## 2023-11-30 RX ADMIN — OXYCODONE 5 MG: 5 TABLET ORAL at 18:53

## 2023-11-30 RX ADMIN — ACETAMINOPHEN 1000 MG: 500 TABLET, FILM COATED ORAL at 16:04

## 2023-11-30 RX ADMIN — LISINOPRIL 20 MG: 20 TABLET ORAL at 05:55

## 2023-11-30 RX ADMIN — GABAPENTIN 400 MG: 400 CAPSULE ORAL at 11:16

## 2023-11-30 RX ADMIN — GUAIFENESIN 600 MG: 600 TABLET, EXTENDED RELEASE ORAL at 17:34

## 2023-11-30 RX ADMIN — ALPRAZOLAM 0.5 MG: 0.5 TABLET ORAL at 11:16

## 2023-11-30 RX ADMIN — GABAPENTIN 400 MG: 400 CAPSULE ORAL at 05:54

## 2023-11-30 RX ADMIN — RISPERIDONE 1 MG: 1 TABLET ORAL at 05:55

## 2023-11-30 RX ADMIN — ALBUTEROL SULFATE 1 PUFF: 90 AEROSOL, METERED RESPIRATORY (INHALATION) at 08:57

## 2023-11-30 RX ADMIN — GABAPENTIN 400 MG: 400 CAPSULE ORAL at 17:34

## 2023-11-30 RX ADMIN — GUAIFENESIN 600 MG: 600 TABLET, EXTENDED RELEASE ORAL at 05:54

## 2023-11-30 RX ADMIN — OXYCODONE 5 MG: 5 TABLET ORAL at 13:10

## 2023-11-30 RX ADMIN — OXYCODONE 2.5 MG: 5 TABLET ORAL at 08:03

## 2023-11-30 RX ADMIN — SODIUM CHLORIDE 1 G: 1 TABLET ORAL at 17:34

## 2023-11-30 RX ADMIN — SODIUM CHLORIDE 1 G: 1 TABLET ORAL at 08:57

## 2023-11-30 RX ADMIN — DULOXETINE HYDROCHLORIDE 30 MG: 30 CAPSULE, DELAYED RELEASE ORAL at 05:55

## 2023-11-30 RX ADMIN — AMLODIPINE BESYLATE 10 MG: 10 TABLET ORAL at 05:55

## 2023-11-30 RX ADMIN — RISPERIDONE 1 MG: 1 TABLET ORAL at 17:34

## 2023-11-30 RX ADMIN — RIVAROXABAN 10 MG: 10 TABLET, FILM COATED ORAL at 17:34

## 2023-11-30 ASSESSMENT — ENCOUNTER SYMPTOMS
COUGH: 1
MYALGIAS: 1
VOMITING: 0
ABDOMINAL PAIN: 0
SPUTUM PRODUCTION: 1
NAUSEA: 0
SHORTNESS OF BREATH: 1
DEPRESSION: 1
BACK PAIN: 1

## 2023-11-30 ASSESSMENT — PAIN DESCRIPTION - PAIN TYPE
TYPE: ACUTE PAIN
TYPE: ACUTE PAIN

## 2023-11-30 ASSESSMENT — FIBROSIS 4 INDEX: FIB4 SCORE: 0.61

## 2023-11-30 NOTE — CARE PLAN
The patient is Stable - Low risk of patient condition declining or worsening    Shift Goals  Clinical Goals: Promote sleep  Patient Goals: take a walk with her son  Family Goals: n/a    Progress made toward(s) clinical / shift goals:    Problem: Pain - Standard  Goal: Alleviation of pain or a reduction in pain to the patient’s comfort goal  Outcome: Progressing     Problem: Urinary - Renal Perfusion  Goal: Ability to achieve and maintain adequate renal perfusion and functioning will improve  Outcome: Progressing     Problem: Respiratory  Goal: Patient will achieve/maintain optimum respiratory ventilation and gas exchange  Outcome: Progressing     Problem: Fall Risk  Goal: Patient will remain free from falls  Outcome: Progressing       Patient is not progressing towards the following goals:

## 2023-11-30 NOTE — DISCHARGE PLANNING
DC Transport Scheduled    Received request at: 11/30/2023 at 1032    Transport Company Scheduled: [employer]    Scheduled Date: 12/1/2023  Scheduled Time: 1400    Destination: [facility] at [address] [facility] [address]     Notified care team of scheduled transport via Vo[employer].     If there are any changes needed to the DC transportation scheduled, please contact [facility] at ext. [phone] between the hours of 0543-9958 Mon-Fri. If outside those hours, contact the ED Case Manager at ext. [phone].

## 2023-11-30 NOTE — CARE PLAN
Problem: Pain - Standard  Goal: Alleviation of pain or a reduction in pain to the patient’s comfort goal  Outcome: Progressing  Note: Assess and monitor for pain. Provide pharmacological and non-pharmacological interventions as appropriate. Re-evaluate and continue to monitor.        Problem: Respiratory  Goal: Patient will achieve/maintain optimum respiratory ventilation and gas exchange  Outcome: Progressing  Note: Pt titrated to room air     Problem: Knowledge Deficit - Standard  Goal: Patient and family/care givers will demonstrate understanding of plan of care, disease process/condition, diagnostic tests and medications  Outcome: Progressing     Problem: Fall Risk  Goal: Patient will remain free from falls  Outcome: Progressing   The patient is Watcher - Medium risk of patient condition declining or worsening    Shift Goals  Clinical Goals: safety, placement  Patient Goals: pain control  Family Goals: not present    Progress made toward(s) clinical / shift goals:  Safety    Patient is not progressing towards the following goals: Pain

## 2023-11-30 NOTE — DISCHARGE PLANNING
11/29/23 155 Choice received for [facility]. Sent to [facility]. Pt only wanted this SNF due to the location and did not want to choose another SNF.     0829 Called Michell from [facility] to see if bed was available today. Michell will give SW a call back.     1007 Michell informed SW that a female bed should be open tomorrow. SW will set up transportation for tomorrow at 1400.

## 2023-11-30 NOTE — PROGRESS NOTES
"[facility] -  PROGRESS NOTE               Author: Reinaldo Victoria M.D. Date & Time: 11/30/2023  7:42 AM     HPI:  Ms. Toussaint is a very pleasant 63-year-old female without any known history of any kidney disease who presented herself on around 11/22 to [facility] with complaints of \"failure to thrive\".  Currently she has a long history of anxiety and depression.  It looks as though a patient's friend stopped by as a wellness check and found her to be in a paranoid state.  [facility] was called.  Upon entry they found her to be paranoid and a disheveled house with a dead dog present.  She apparently has been depressed at the passing of the dog.  With home medications that include lisinopril Sudafed on her chronic basis, Cymbalta, meloxicam, Adderall among others.  At the time of presentation her serum sodium was 117.  Previous labs done approximately 2 weeks prior to admission found a sodium of 120.  Initial labs also reflect a BUN of 4 with a creatinine is 0.3.  Conservative management of her hyponatremia improved to her sodium to 127 over the course of several days however more recently her serum sodium has been falling to a level of 119, 120 again today.  Additional information finds a urinary sodium of less than 20 with a urine osmolality of less than 50 (day of admission).  She tells me she drinks a lot of water.  She feels as though she does not eat any proteins.  She has had some chest congestion with cough.  She does have a bit of a white count of 15.6 with slight left shift hemoglobin of 13.4 urinalysis was done which was benign head CT shows no acute process chest x-ray is reviewed.  Speaking with her she few seems a bit confused.  She has tangential thoughts and a bit rambling in her speech.  We been asked to see her regarding her hyponatremia.    DAILY NEPHROLOGY SUMMARY:  11/26: consult done  11/27: 1.1L intake, 1.2: UOP, sodium upn from 119 to 121, in restraints, " "fatigued  11/28: 2.3L intake, 1.4L UOP, sodium to 129 this am, mention improving   11/29: hypertensive, 1.7L UOP, urine sodium 128 this AM, feeling imrpoved-unhappy about fluid restriction  11/30: No acute vents, 1.2L UOP, serum sodium up to 132, feeling stronger    PAST FAMILY HISTORY: Reviewed and Unchanged  SOCIAL HISTORY: Reviewed and Unchanged  CURRENT MEDICATIONS: Reviewed  IMAGING STUDIES: Reviewed    ROS  10 Point ROS performed, negative other than stated above    PHYSICAL EXAM  VS:  BP (!) 153/84 Comment: RN notified   Pulse 76   Temp 36.7 °C (98.1 °F) (Temporal)   Resp 18   Ht 1.6 m (5' 2.99\")   Wt 62.7 kg (138 lb 3.7 oz)   SpO2 97%   BMI 24.49 kg/m²   GENERAL: no acute distress  CV: RRR, No edema  RESP: non-labored  GI: Soft  MSK: No joint deformities   SKIN: No concerning rashes  NEURO: No tremor  PSYCH: Cooperative    Fluids:  In: 940 [P.O.:940]  Out: 1250     LABS:  Recent Results (from the past 24 hour(s))   SODIUM SERUM (NA)    Collection Time: 11/29/23  8:38 AM   Result Value Ref Range    Sodium 129 (L) 135 - 145 mmol/L   CBC WITHOUT DIFFERENTIAL    Collection Time: 11/30/23  3:48 AM   Result Value Ref Range    WBC 9.7 4.8 - 10.8 K/uL    RBC 3.50 (L) 4.20 - 5.40 M/uL    Hemoglobin 11.4 (L) 12.0 - 16.0 g/dL    Hematocrit 32.9 (L) 37.0 - 47.0 %    MCV 94.0 81.4 - 97.8 fL    MCH 32.6 27.0 - 33.0 pg    MCHC 34.7 32.2 - 35.5 g/dL    RDW 42.7 35.9 - 50.0 fL    Platelet Count 446 164 - 446 K/uL    MPV 8.6 (L) 9.0 - 12.9 fL   Renal Function Panel    Collection Time: 11/30/23  3:48 AM   Result Value Ref Range    Sodium 132 (L) 135 - 145 mmol/L    Potassium 3.8 3.6 - 5.5 mmol/L    Chloride 96 96 - 112 mmol/L    Co2 26 20 - 33 mmol/L    Glucose 115 (H) 65 - 99 mg/dL    Creatinine 0.34 (L) 0.50 - 1.40 mg/dL    Bun 6 (L) 8 - 22 mg/dL    Calcium 8.9 8.5 - 10.5 mg/dL    Correct Calcium 9.3 8.5 - 10.5 mg/dL    Phosphorus 4.3 2.5 - 4.5 mg/dL    Albumin 3.5 3.2 - 4.9 g/dL   ESTIMATED GFR    Collection Time: " "11/30/23  3:48 AM   Result Value Ref Range    GFR (CKD-EPI) 116 >60 mL/min/1.73 m 2       (click the triangle to expand results)      ASSESSMENT:  # Hyponatremia, hypovolemic, hypotonic, asymptomatic  Initially Low urinary osmolality suggests some degree of psychogenic polydipsia on top of \"tea and toast diet\" with poor urinary solute excretion.  Repeat urine osom 242 with repeat urine sodium <20  On gabapentin and cymbalta  # Preserved renal function  # Low BUN/creatinine   Consistent with poor nutritional intake associated with hyponatremia  # Altered mentation  May have psychiatric component  # Leukocytosis with left shift  # Hyperglycemia     SUGGESTIONS:  Increase to 1.8L fluid restriction  Nacl 1G BID until has outpatient FU  Encourage solute intake/nutrition  Ok to continue cymbalta  OK for discharge from nephrology standpoint. We will schedule 2 week FU    Reinaldo Victoria MD    "

## 2023-11-30 NOTE — PROGRESS NOTES
Bedside report received from night RN, pt care assumed, assessment completed. Pt is A&O4, pain 6/10, pt is medical. Updated on POC, questions answered. Bed in lowest, locked position, treaded socks on, call light and belongings within reach.

## 2023-11-30 NOTE — CONSULTS
[facility]  PSYCHIATRIC CONSULTATION - Follow-up  Established Patient    DOS: 11/30/23     Reason for Admission:  63 y.o. female with history of hypertension, anxiety depression, neuropathy, asthma, tobacco abuse who presented 11/22/2023 with evaluation for failure to thrive   Legal Hold Status: not applicable    CC:   Chief Complaint   Patient presents with    Psych Eval     Pt BIBA for failure to thrive. Per EMS point found in home with dead dog, paranoid, not properly eating or drinking. GCS 15. Denies SI/HI.                S:   Patient observed in bed, awake. Reports adequate sleep, energy, improved appetite, and mood, denies SI/HI, A.VH, continues to vocalize concerns about electronics, some  relief from previous encounter after talking with son and verifying her phone is not present. Able to vocalize Sx of depression, and warning signs, future focused, agreeable to transition to facility to gain strength and mobility. Vocalizes benefit from new medication regimen and adjustments, denies GI distress, HA, dizziness, appears to be tolerating medications. Could benefit from psychotherapy to focus on negative thinking, black and white, catastrophizing, and reframing situations.     O:   Medical ROS (as pertinent):   Recent Labs     11/29/23 0052 11/30/23  0348   WBC 9.0 9.7   RBC 3.59* 3.50*   HEMOGLOBIN 11.6* 11.4*   HEMATOCRIT 33.9* 32.9*   MCV 94.4 94.0   MCH 32.3 32.6   RDW 43.6 42.7   PLATELETCT 386 446   MPV 8.9* 8.6*     Recent Labs     11/29/23 0052 11/29/23  0838 11/30/23  0348   SODIUM 129* 129* 132*   POTASSIUM 4.3  --  3.8   CHLORIDE 93*  --  96   CO2 23  --  26   GLUCOSE 142*  --  115*   BUN 7*  --  6*     Recent Labs     11/29/23 0052 11/30/23  0348   ALBUMIN 3.6 3.5   CREATININE 0.34* 0.34*       EKG:   Results for orders placed or performed during the hospital encounter of 11/22/23   EKG   Result Value Ref Range    Report       [facility]  "Dept.    Test Date:  2023  Pt Name:    EDUARDO GUNDERSON      Department: ER  MRN:        [id number]                      Room:        30  Gender:     Female                       Technician: [id number]  :        1960                   Requested By:MARC MESSINA  Order #:    [id number]                    Reading MD:    Measurements  Intervals                                Axis  Rate:       71                           P:          77  PA:         137                          QRS:        83  QRSD:       94                           T:          61  QT:         427  QTc:        465    Interpretive Statements  Sinus rhythm  Atrial premature complexes  Probable left atrial enlargement  Borderline right axis deviation  Compared to ECG 2020 14:20:02  Atrial premature complex(es) now present  Sinus tachycardia no longer present  ST (T wave) deviation no longer present          MSE:   /68   Pulse 81   Temp 36.6 °C (97.9 °F) (Temporal)   Resp 16   Ht 1.6 m (5' 2.99\")   Wt 62.7 kg (138 lb 3.7 oz)   SpO2 91%     Constitutional: as noted above  General Appearance/Behavior: 63 y.o. appears normal habitus intermittent eye contact cooperative friendly, No behavioral disturbances  Abnormal Movements: none, no PMA/PMR or tremor observed.  Gait and Posture: not observed  Musculoskeletal: as noted above  Mood: \"good\"  Affect: Mood/Congruent and Appropriate   Speech: normal rate, normal rhythm, normal tone, normal volume, and normal fluency  Language:  spontaneous, comprehends spoken commands, and fluent   Thought Process: Goal Directed and Rumanitive, Future Oriented  Thought Content: Denies SI/HI, A/VH. Paranoia noted, vocalizing concern about medical records and people overhearing conversations  Insight/Judgement:  impaired /improved  Alert/Orientation: alert, oriented to person, place and time  Attn/Concentration: not tested  MMSE: deferred this visit     Medications:  Scheduled Medications " "  Medication Dose Frequency    sodium chloride  1 g BID WITH MEALS    Pharmacy Consult Request  1 Each PHARMACY TO DOSE    amLODIPine  10 mg Q DAY    DULoxetine  30 mg DAILY    gabapentin  400 mg TID    guaiFENesin ER  600 mg Q12HRS    lisinopril  20 mg DAILY    rivaroxaban  10 mg DAILY AT 1800    vitamin D3  1,000 Units DAILY    risperiDONE  1 mg BID    nicotine  21 mg Daily-0600    senna-docusate  2 Tablet BID    therapeutic multivitamin-minerals  1 Tablet DAILY    lidocaine  1 Patch Q24HR       Allergies:   Allergies   Allergen Reactions    Sulfa Drugs Nausea     Pt states \"I get very sick, I was over 20 years ago\".    Seasonal Runny Nose and Itching              Assessment:   Diagnosis:   1. Hyponatremia    2. Delirium         Psychiatric:   Depressive disorder with psychosis  Anxiety disorder, generalized  PTSD  ADHD  Cannabis use     Medical: as noted by the medical treatment team.      Recommendations:  Legal Status: not applicable - voluntary     Observation status:   - Telesitter     Medication Recommendations: Final orders as per Treatment Team  Psychotherapy ordered 11/30  Continue medications   Risks/benefits/side effects discussed  Medication reconciliation was completed.     Reviewed safety plan: 911, ER, PCM, MHC, suicide crisis line, nursing staff while inpatient.     Will Continue to Follow. Thank you for the consult.        Discharge recommendations:   Please provide referrals to outpatient psychiatric services in the community when appropriate for dc, established with provider who is leaving service.  Patient could benefit from referral for PT/OT services, home health.     "

## 2023-11-30 NOTE — DISCHARGE PLANNING
"HTH/[facility]P TCN chart review completed. Collaborated with SW.  Per CM / DPA note, plan is discharge to [facility] at 1400 on 12/1.  Noted CM obtained choice.   Noted per MD, \"If no indication for psych facility, SNF likely.\"  Per Physchiatry, \"Discharge recommendations:   Please provide referrals to outpatient psychiatric services in the community when appropriate for dc.\"  Noted PT/OT recommending post acute placement.   "
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
## 2023-11-30 NOTE — PROGRESS NOTES
Hospital Medicine Daily Progress Note    Date of Service  2023    Chief Complaint  Paulette Concepcion is a 63 y.o. female admitted 2023 with confusion, failure to thrive, psychosis, hyponatremia    Hospital Course    Paulette Concepcion is a 63 y.o. female with history of hypertension, anxiety, depression, neuropathy, asthma, tobacco abuse who presented 2023 with evaluation for failure to thrive. EMS was contacted by patient's friend for wellness check, found patient to be in a paranoid state and brought patient to ER for evaluation.  Patient stated her dog was mauled by another dog approximately a week ago and unfortunately .  She stated she has been depressed with passing of her dog.  She admits to not eating drinking well, depressed moods.  She was found to have concerning sodium level 117 in ER  Patient apparently is chronically psychiatrically ill, followed by an outpatient psychiatrist  The patient gives a somewhat convoluted story of her current living situation, she has had some difficulty with construction, on and off power outage, appears to have failed to care for himself and in the current environment The patient exhibits some paranoid and psychotic features.   She was admitted to [facility] for severe hyponatremia and close electrolyte monitoring, this has slowly been up uptrending, however she has continued to have abnormal behavior. Psychiatry is following.       Interval Problem Update  Patient seen at bedside, sleeping, more withdrawn today  - restrained overnight due to throwing tea at nursing staff and threatening behavior, pt frustrated with fluid restriction   - discussed with nephro, continue 1.2L fluid restriction. Na 121 today, okay to space out draws   - continue salt tabs, I have decreased duloxetine dose in case these needs to be tapered or stopped secondary to her persistently hyponatremia     - BP elevated, increase amlodipine   - continue supportive care  "with decongestant, throat lozenges     - psychiatry following   - therapy recommending post acute care, not yet medically clear     I have discussed this patient's plan of care and discharge plan at IDT rounds today with Case Management, Nursing, Nursing leadership, and other members of the IDT team.\"    11/28. I reviewed the chart along with vitals, labs, imaging, test (both pending and resulted) and recommendations from specialists and interdisciplinary team.  Currently patient is not agitated or aggressive. Psychiatry stated legal hold if patient is such. Discussed with Psychiatry and staffPatient has limited capacity however can carry a conversation. Na 129. Patient thirsty. I eased fluid restriction to 1500 and ordered free water restriction. OK to salt food. She also complains of congestion and cough. Leukocytosis 11/27 but it has been trending down. CXR 11/26 reviewed changes. Ordered guaifenessin. Ordered repeat CXR for tomorrow, procalcitonin and labs. If no idication for hold plan for SNF  11/29. Patient has poor insight but mentation slightly improved, can carry conversation. Son at bedside. Spoke with him at length and updated plan. Basically SNF planned, currently off restraints and sitter and Na 129. Patient getting Gatorade despite electrolytes is still hypotonic. Nursing staff inquiring as patient while stable still chronically hyponatremic. Patient is variably compliant with thicker liquids. I clarified with staff that Gatorade and even free water can be given provided that strict fluid restriction AND urine output>intake where more water than salt is excreted in the urine. I did speak with Dr. Victoria to confirm that Gatorade is not the treatment for replacing sodium. He agrees. Ultimately the only reason she is given some hypotonic fluids is because she has a component of polydipsia and not compliant on thickened liquids. While we can continue this regimen in the hospital as total fluid intake and " output is monitored, this is not realistic in the outpatient. Ultimately we may have to instead add salt tabs and I advised patient to generously salt the food. I updated nursing, patient and Nephrology.    Patient is has a high medical complexity, complex decision making and is at high risk for complication, morbidity, and mortality, thus requiring the highest level of my preparedness for sudden, emergent intervention. Medical decision making is therefore complex. I provided  services, which included ordering labs and/or imaging, and discussing the case with various consultants.medication orders, frequent reevaluations of the patient's condition and response to treatment. Time was also devoted to counseling and coordinating care including review of records, pertinent lab data and studies, as well as discussing diagnostic evaluation and work up, planned therapeutic interventions and future disposition of care. Where indicated, the assessment and plan reflect discussion of patient with consultants, other healthcare providers, family members, and additional research needed to obtain further information in formulating the plan of care for Paulette Concepcion. Total time spent was 69 minutes.         Consultants/Specialty  critical care and psychiatry    Code Status  Full Code    Disposition  Medically Cleared  I have placed the appropriate orders for post-discharge needs.    Review of Systems    Review of Systems   Unable to perform ROS: Psychiatric disorder   Constitutional:  Positive for malaise/fatigue.   Respiratory:  Positive for cough, sputum production and shortness of breath.    Cardiovascular:  Negative for leg swelling.   Gastrointestinal:  Negative for abdominal pain, nausea and vomiting.   Musculoskeletal:  Positive for back pain, joint pain and myalgias.   Psychiatric/Behavioral:  Positive for depression. Negative for suicidal ideas.         Physical Exam  Temp:  [36.3 °C (97.3 °F)-36.9 °C (98.4  °F)] 36.7 °C (98.1 °F)  Pulse:  [] 99  Resp:  [16-18] 16  BP: (125-155)/(62-87) 138/67  SpO2:  [92 %-97 %] 97 %    Physical Exam  Vitals and nursing note reviewed.   Constitutional:       General: She is not in acute distress.     Appearance: She is well-developed and normal weight. She is ill-appearing. She is not diaphoretic.   HENT:      Head: Normocephalic and atraumatic.      Mouth/Throat:      Mouth: Mucous membranes are dry.      Pharynx: Oropharynx is clear.   Eyes:      Conjunctiva/sclera: Conjunctivae normal.   Neck:      Thyroid: No thyromegaly.      Vascular: No JVD.   Cardiovascular:      Rate and Rhythm: Regular rhythm. Tachycardia present.      Heart sounds: Normal heart sounds.      No friction rub. No gallop.   Pulmonary:      Effort: Pulmonary effort is normal.      Breath sounds: Rhonchi present. No wheezing or rales.      Comments: Chest wall tenderness, rhonchi in Rt upper lobe  Abdominal:      General: Bowel sounds are normal. There is no distension.      Palpations: Abdomen is soft. There is no mass.      Tenderness: There is no abdominal tenderness. There is no guarding or rebound.   Musculoskeletal:         General: No tenderness. Normal range of motion.      Cervical back: Normal range of motion and neck supple.   Lymphadenopathy:      Cervical: No cervical adenopathy.   Skin:     General: Skin is warm and dry.   Neurological:      Mental Status: She is alert.      Cranial Nerves: No cranial nerve deficit.      Comments: Limited capacity, poor insight  COnfusion but can carry a conversation  Baseline mobility is with walker and wheelchair   Psychiatric:         Attention and Perception: Attention normal.         Mood and Affect: Affect is labile.         Cognition and Memory: Cognition is impaired.         Fluids    Intake/Output Summary (Last 24 hours) at 11/29/2023 1721  Last data filed at 11/29/2023 1400  Gross per 24 hour   Intake 958 ml   Output 1200 ml   Net -242 ml          Laboratory  Recent Labs     11/27/23  0109 11/29/23  0052   WBC 12.2* 9.0   RBC 3.34* 3.59*   HEMOGLOBIN 11.0* 11.6*   HEMATOCRIT 31.2* 33.9*   MCV 93.4 94.4   MCH 32.9 32.3   MCHC 35.3 34.2   RDW 42.3 43.6   PLATELETCT 294 386   MPV 8.7* 8.9*       Recent Labs     11/27/23  0109 11/27/23  0610 11/28/23  2101 11/29/23  0052 11/29/23  0838   SODIUM 120*   < > 128* 129* 129*   POTASSIUM 4.4  --   --  4.3  --    CHLORIDE 86*  --   --  93*  --    CO2 24  --   --  23  --    GLUCOSE 111*  --   --  142*  --    BUN 7*  --   --  7*  --    CREATININE 0.19*  --   --  0.34*  --    CALCIUM 8.6  --   --  8.9  --     < > = values in this interval not displayed.                     Imaging  DX-CHEST-PORTABLE (1 VIEW)   Final Result      Left basilar consolidation.      Right-sided rib fractures without evidence of pneumothorax.         DX-CHEST-PORTABLE (1 VIEW)   Final Result      Interstitial prominence could indicate pulmonary edema versus hypoventilatory change/atelectasis.      Right rib fractures with some adjacent increased density could indicate pulmonary contusion without pneumothorax.      CT-HEAD W/O   Final Result      1.  No acute intracranial abnormality.   2.  Redemonstration of a small amount of fat along the left aspect of the jacinta of uncertain clinical significance.   3.  Mild right maxillary sinus disease.         DX-CHEST-PORTABLE (1 VIEW)   Final Result      1.  No acute cardiac or pulmonary abnormalities are identified.   2.  Mildly displaced, acute right lateral third and fourth rib fractures.           Assessment/Plan  * Acute hyponatremia, encephalopathy/delusional disorder- (present on admission)  Assessment & Plan  Admission Na 117 @21:08 did improve initially but now trending back down to 119 on 11/26  discussed with nephrology Dr. Victoria today, likely due to low solute diet, continue fluid restriction, high protein diet and salt tabs started.   BID Na checks adequate per nephro   Repeat  "osmolarity studies suggesting mixed picture   Monitor labs, avoid over correction\"    Ease on fluid restriction, do free water restrictin and OK to salt food. Since we are easing on it, try to avoid hypotonic fluids if possible. Mentation and behavior improving. On psychotropics. Last QTc normal range 11/22. Repeat EKG at some point. Repeat CXR, ordered labs, COVID/flu/RSV and procalcitonin for cough and congestion. Procalc negative and COVID/flu/RSV negative. No indication for Abx  If no indication for psych facility, SNF likely    Delusional disorder (HCC)  Assessment & Plan  Patient refusing to interact with medical staff, refusing medication administration yesterday, more interactive today however still with paranoid behavior   Psychiatry following  Continue Risperdal     Closed fracture of multiple ribs of right side with routine healing  Assessment & Plan  Conservative treatment, pain control, incentive spirometry  Lidocaine patch, oral oxy     ACP (advance care planning)  Assessment & Plan  Currently unable to engage secondary to her parents delirium, psychosis    Hypertension  Assessment & Plan  BP elevated, added amlodipine, continue Lisinopril\"    Vitals:    11/29/23 1603   BP: 138/67   Pulse: 99   Resp: 16   Temp: 36.7 °C (98.1 °F)   SpO2: 97%     Stable.    Neuropathy- (present on admission)  Assessment & Plan  Continue home Gabapentin, decreased dose to 400 mg TID 11/26 due to lethargy and possible contributing factor to low Na    Mood disorder (HCC)- (present on admission)  Assessment & Plan  Chronic Psychiatric illness, psych consulted, appreciate recommendations  Pt started on Risperdol 1 mg BID  Continue duloxetine and gabapentin, if persistent hyponatremia may need to stop   Decrease duloxetine dosing and gabapentin     Tobacco dependence- (present on admission)  Assessment & Plan  Admit to smoking >10 cigarettes daily  Tobacco cessation recommended\"    I spent 4 minutes, discussing tobacco " dependence and cardiac as well as pulmonary risk. Smoking cessation instructions. Code 21515        VTE prophylaxis: Xarelto ppx    I have performed a physical exam and reviewed and updated ROS and Plan today (11/29/2023). In review of yesterday's note (11/28/2023), there are no changes except as documented above.

## 2023-12-01 LAB
ALBUMIN SERPL BCP-MCNC: 3.5 G/DL (ref 3.2–4.9)
BUN SERPL-MCNC: 7 MG/DL (ref 8–22)
CALCIUM ALBUM COR SERPL-MCNC: 9.2 MG/DL (ref 8.5–10.5)
CALCIUM SERPL-MCNC: 8.8 MG/DL (ref 8.5–10.5)
CHLORIDE SERPL-SCNC: 98 MMOL/L (ref 96–112)
CO2 SERPL-SCNC: 24 MMOL/L (ref 20–33)
CREAT SERPL-MCNC: 0.29 MG/DL (ref 0.5–1.4)
ERYTHROCYTE [DISTWIDTH] IN BLOOD BY AUTOMATED COUNT: 42 FL (ref 35.9–50)
GFR SERPLBLD CREATININE-BSD FMLA CKD-EPI: 120 ML/MIN/1.73 M 2
GLUCOSE SERPL-MCNC: 118 MG/DL (ref 65–99)
HCT VFR BLD AUTO: 34.4 % (ref 37–47)
HGB BLD-MCNC: 12 G/DL (ref 12–16)
MCH RBC QN AUTO: 32.5 PG (ref 27–33)
MCHC RBC AUTO-ENTMCNC: 34.9 G/DL (ref 32.2–35.5)
MCV RBC AUTO: 93.2 FL (ref 81.4–97.8)
PHOSPHATE SERPL-MCNC: 4.1 MG/DL (ref 2.5–4.5)
PLATELET # BLD AUTO: 463 K/UL (ref 164–446)
PMV BLD AUTO: 8.3 FL (ref 9–12.9)
POTASSIUM SERPL-SCNC: 4 MMOL/L (ref 3.6–5.5)
RBC # BLD AUTO: 3.69 M/UL (ref 4.2–5.4)
SODIUM SERPL-SCNC: 132 MMOL/L (ref 135–145)
WBC # BLD AUTO: 12 K/UL (ref 4.8–10.8)

## 2023-12-01 PROCEDURE — 700102 HCHG RX REV CODE 250 W/ 637 OVERRIDE(OP): Performed by: INTERNAL MEDICINE

## 2023-12-01 PROCEDURE — 700101 HCHG RX REV CODE 250: Performed by: STUDENT IN AN ORGANIZED HEALTH CARE EDUCATION/TRAINING PROGRAM

## 2023-12-01 PROCEDURE — A9270 NON-COVERED ITEM OR SERVICE: HCPCS | Performed by: INTERNAL MEDICINE

## 2023-12-01 PROCEDURE — A9270 NON-COVERED ITEM OR SERVICE: HCPCS | Performed by: STUDENT IN AN ORGANIZED HEALTH CARE EDUCATION/TRAINING PROGRAM

## 2023-12-01 PROCEDURE — 36415 COLL VENOUS BLD VENIPUNCTURE: CPT

## 2023-12-01 PROCEDURE — 700102 HCHG RX REV CODE 250 W/ 637 OVERRIDE(OP): Performed by: STUDENT IN AN ORGANIZED HEALTH CARE EDUCATION/TRAINING PROGRAM

## 2023-12-01 PROCEDURE — 700102 HCHG RX REV CODE 250 W/ 637 OVERRIDE(OP)

## 2023-12-01 PROCEDURE — 99233 SBSQ HOSP IP/OBS HIGH 50: CPT | Performed by: INTERNAL MEDICINE

## 2023-12-01 PROCEDURE — A9270 NON-COVERED ITEM OR SERVICE: HCPCS

## 2023-12-01 PROCEDURE — A9270 NON-COVERED ITEM OR SERVICE: HCPCS | Performed by: HOSPITALIST

## 2023-12-01 PROCEDURE — 80069 RENAL FUNCTION PANEL: CPT

## 2023-12-01 PROCEDURE — 770001 HCHG ROOM/CARE - MED/SURG/GYN PRIV*

## 2023-12-01 PROCEDURE — 3E02340 INTRODUCTION OF INFLUENZA VACCINE INTO MUSCLE, PERCUTANEOUS APPROACH: ICD-10-PCS | Performed by: INTERNAL MEDICINE

## 2023-12-01 PROCEDURE — 90686 IIV4 VACC NO PRSV 0.5 ML IM: CPT | Performed by: INTERNAL MEDICINE

## 2023-12-01 PROCEDURE — 90471 IMMUNIZATION ADMIN: CPT

## 2023-12-01 PROCEDURE — 700111 HCHG RX REV CODE 636 W/ 250 OVERRIDE (IP): Performed by: INTERNAL MEDICINE

## 2023-12-01 PROCEDURE — 85027 COMPLETE CBC AUTOMATED: CPT

## 2023-12-01 PROCEDURE — 700102 HCHG RX REV CODE 250 W/ 637 OVERRIDE(OP): Performed by: HOSPITALIST

## 2023-12-01 RX ORDER — AMOXICILLIN 250 MG
2 CAPSULE ORAL 2 TIMES DAILY
Qty: 30 TABLET | Refills: 0 | Status: SHIPPED
Start: 2023-12-01 | End: 2024-01-02

## 2023-12-01 RX ORDER — LISINOPRIL 20 MG/1
20 TABLET ORAL DAILY
Qty: 30 TABLET | Status: SHIPPED
Start: 2023-12-02

## 2023-12-01 RX ORDER — SODIUM CHLORIDE 1 G/1
1 TABLET ORAL 2 TIMES DAILY WITH MEALS
Qty: 90 TABLET | Refills: 3 | Status: SHIPPED
Start: 2023-12-01

## 2023-12-01 RX ORDER — OXYCODONE HYDROCHLORIDE 5 MG/1
5 TABLET ORAL EVERY 8 HOURS PRN
Qty: 9 TABLET | Refills: 0 | Status: SHIPPED | OUTPATIENT
Start: 2023-12-01 | End: 2023-12-04

## 2023-12-01 RX ORDER — DULOXETIN HYDROCHLORIDE 30 MG/1
30 CAPSULE, DELAYED RELEASE ORAL DAILY
Qty: 30 CAPSULE | Status: SHIPPED
Start: 2023-12-02 | End: 2024-01-25 | Stop reason: SDUPTHER

## 2023-12-01 RX ORDER — GUAIFENESIN 600 MG/1
600 TABLET, EXTENDED RELEASE ORAL EVERY 12 HOURS
Qty: 28 TABLET | Status: SHIPPED
Start: 2023-12-01

## 2023-12-01 RX ORDER — ONDANSETRON 4 MG/1
4 TABLET, ORALLY DISINTEGRATING ORAL EVERY 4 HOURS PRN
Qty: 10 TABLET | Refills: 0 | Status: SHIPPED
Start: 2023-12-01

## 2023-12-01 RX ORDER — RISPERIDONE 1 MG/1
1 TABLET ORAL 2 TIMES DAILY
Qty: 60 TABLET | Refills: 3 | Status: SHIPPED
Start: 2023-12-01 | End: 2023-12-22

## 2023-12-01 RX ORDER — POLYETHYLENE GLYCOL 3350 17 G/17G
17 POWDER, FOR SOLUTION ORAL
Refills: 3 | Status: SHIPPED
Start: 2023-12-01

## 2023-12-01 RX ORDER — GABAPENTIN 400 MG/1
400 CAPSULE ORAL 3 TIMES DAILY
Qty: 90 CAPSULE | Status: SHIPPED
Start: 2023-12-01

## 2023-12-01 RX ORDER — LIDOCAINE 4 G/G
1 PATCH TOPICAL EVERY 24 HOURS
Status: SHIPPED
Start: 2023-12-02 | End: 2024-01-02

## 2023-12-01 RX ORDER — AMLODIPINE BESYLATE 10 MG/1
10 TABLET ORAL DAILY
Qty: 30 TABLET | Status: SHIPPED
Start: 2023-12-02 | End: 2023-12-22 | Stop reason: SDUPTHER

## 2023-12-01 RX ADMIN — GABAPENTIN 400 MG: 400 CAPSULE ORAL at 16:02

## 2023-12-01 RX ADMIN — TIZANIDINE 4 MG: 4 TABLET ORAL at 20:50

## 2023-12-01 RX ADMIN — GUAIFENESIN 600 MG: 600 TABLET, EXTENDED RELEASE ORAL at 16:08

## 2023-12-01 RX ADMIN — LIDOCAINE 1 PATCH: 4 PATCH TOPICAL at 03:17

## 2023-12-01 RX ADMIN — RIVAROXABAN 10 MG: 10 TABLET, FILM COATED ORAL at 16:02

## 2023-12-01 RX ADMIN — ACETAMINOPHEN 1000 MG: 500 TABLET, FILM COATED ORAL at 03:07

## 2023-12-01 RX ADMIN — Medication 1000 UNITS: at 06:07

## 2023-12-01 RX ADMIN — TIZANIDINE 4 MG: 4 TABLET ORAL at 10:34

## 2023-12-01 RX ADMIN — GABAPENTIN 400 MG: 400 CAPSULE ORAL at 06:07

## 2023-12-01 RX ADMIN — DULOXETINE HYDROCHLORIDE 30 MG: 30 CAPSULE, DELAYED RELEASE ORAL at 06:07

## 2023-12-01 RX ADMIN — OXYCODONE 5 MG: 5 TABLET ORAL at 08:10

## 2023-12-01 RX ADMIN — Medication 1 TABLET: at 06:07

## 2023-12-01 RX ADMIN — ACETAMINOPHEN 1000 MG: 500 TABLET, FILM COATED ORAL at 21:57

## 2023-12-01 RX ADMIN — LISINOPRIL 20 MG: 20 TABLET ORAL at 06:07

## 2023-12-01 RX ADMIN — AMLODIPINE BESYLATE 10 MG: 10 TABLET ORAL at 06:07

## 2023-12-01 RX ADMIN — NICOTINE TRANSDERMAL SYSTEM 21 MG: 21 PATCH, EXTENDED RELEASE TRANSDERMAL at 06:10

## 2023-12-01 RX ADMIN — INFLUENZA A VIRUS A/VICTORIA/4897/2022 IVR-238 (H1N1) ANTIGEN (FORMALDEHYDE INACTIVATED), INFLUENZA A VIRUS A/DARWIN/9/2021 SAN-010 (H3N2) ANTIGEN (FORMALDEHYDE INACTIVATED), INFLUENZA B VIRUS B/PHUKET/3073/2013 ANTIGEN (FORMALDEHYDE INACTIVATED), AND INFLUENZA B VIRUS B/MICHIGAN/01/2021 ANTIGEN (FORMALDEHYDE INACTIVATED) 0.5 ML: 15; 15; 15; 15 INJECTION, SUSPENSION INTRAMUSCULAR at 12:45

## 2023-12-01 RX ADMIN — RISPERIDONE 1 MG: 1 TABLET ORAL at 06:07

## 2023-12-01 RX ADMIN — GUAIFENESIN 600 MG: 600 TABLET, EXTENDED RELEASE ORAL at 06:07

## 2023-12-01 RX ADMIN — SODIUM CHLORIDE 1 G: 1 TABLET ORAL at 16:01

## 2023-12-01 RX ADMIN — ACETAMINOPHEN 1000 MG: 500 TABLET, FILM COATED ORAL at 10:34

## 2023-12-01 RX ADMIN — ALPRAZOLAM 0.5 MG: 0.5 TABLET ORAL at 16:04

## 2023-12-01 RX ADMIN — ALPRAZOLAM 0.5 MG: 0.5 TABLET ORAL at 03:08

## 2023-12-01 RX ADMIN — OXYCODONE 5 MG: 5 TABLET ORAL at 20:44

## 2023-12-01 RX ADMIN — GABAPENTIN 400 MG: 400 CAPSULE ORAL at 12:45

## 2023-12-01 RX ADMIN — SODIUM CHLORIDE 1 G: 1 TABLET ORAL at 08:10

## 2023-12-01 RX ADMIN — RISPERIDONE 1 MG: 1 TABLET ORAL at 16:02

## 2023-12-01 ASSESSMENT — ENCOUNTER SYMPTOMS
DEPRESSION: 1
SPUTUM PRODUCTION: 1
VOMITING: 0
ABDOMINAL PAIN: 0
NAUSEA: 0
SHORTNESS OF BREATH: 1
BACK PAIN: 1
COUGH: 1
MYALGIAS: 1

## 2023-12-01 ASSESSMENT — PAIN DESCRIPTION - PAIN TYPE
TYPE: ACUTE PAIN

## 2023-12-01 NOTE — CARE PLAN
The patient is Watcher - Medium risk of patient condition declining or worsening    Shift Goals  Clinical Goals: Safety, rest  Patient Goals: Sleep  Family Goals: ALBA    Progress made toward(s) clinical / shift goals:      Problem: Pain - Standard  Goal: Alleviation of pain or a reduction in pain to the patient’s comfort goal  Outcome: Progressing  Note: Pain rating scale and pain goals discussed with patient . PT continues to endorse R rib pain. PT medicated per MAR     Problem: Knowledge Deficit - Standard  Goal: Patient and family/care givers will demonstrate understanding of plan of care, disease process/condition, diagnostic tests and medications  Outcome: Progressing  Note: Patient updated on plan of care. All questions answered. Patient verbalized understanding. No further questions at this time.       Problem: Fall Risk  Goal: Patient will remain free from falls  Outcome: Progressing  Note: Patient educated to utilize call light. Patient verbalized understanding. All fall precautions in place. Bed alarm is on.         Patient is not progressing towards the following goals:

## 2023-12-01 NOTE — PROGRESS NOTES
Assumed care of patient and received morning report from night RN. A&O x4. VS stable. Pt is medical. Plan of care discussed with patient and verbalized understanding. Bed locked and in lowest position. Pt educated to fall risk. Fall precautions in place. Call light within reach. All questions answered. No other needs indicated at this time.

## 2023-12-01 NOTE — PROGRESS NOTES
Hospital Medicine Daily Progress Note    Date of Service  2023    Chief Complaint  Paulette Concepcion is a 63 y.o. female admitted 2023 with confusion, failure to thrive, psychosis, hyponatremia    Hospital Course    Paulette Concepcion is a 63 y.o. female with history of hypertension, anxiety, depression, neuropathy, asthma, tobacco abuse who presented 2023 with evaluation for failure to thrive. EMS was contacted by patient's friend for wellness check, found patient to be in a paranoid state and brought patient to ER for evaluation.  Patient stated her dog was mauled by another dog approximately a week ago and unfortunately .  She stated she has been depressed with passing of her dog.  She admits to not eating drinking well, depressed moods.  She was found to have concerning sodium level 117 in ER  Patient apparently is chronically psychiatrically ill, followed by an outpatient psychiatrist  The patient gives a somewhat convoluted story of her current living situation, she has had some difficulty with construction, on and off power outage, appears to have failed to care for himself and in the current environment The patient exhibits some paranoid and psychotic features.   She was admitted to [facility] for severe hyponatremia and close electrolyte monitoring, this has slowly been up uptrending, however she has continued to have abnormal behavior. Psychiatry is following.       Interval Problem Update  Patient seen at bedside, sleeping, more withdrawn today  - restrained overnight due to throwing tea at nursing staff and threatening behavior, pt frustrated with fluid restriction   - discussed with nephro, continue 1.2L fluid restriction. Na 121 today, okay to space out draws   - continue salt tabs, I have decreased duloxetine dose in case these needs to be tapered or stopped secondary to her persistently hyponatremia     - BP elevated, increase amlodipine   - continue supportive care  "with decongestant, throat lozenges     - psychiatry following   - therapy recommending post acute care, not yet medically clear     I have discussed this patient's plan of care and discharge plan at IDT rounds today with Case Management, Nursing, Nursing leadership, and other members of the IDT team.\"    11/28. I reviewed the chart along with vitals, labs, imaging, test (both pending and resulted) and recommendations from specialists and interdisciplinary team.  Currently patient is not agitated or aggressive. Psychiatry stated legal hold if patient is such. Discussed with Psychiatry and staffPatient has limited capacity however can carry a conversation. Na 129. Patient thirsty. I eased fluid restriction to 1500 and ordered free water restriction. OK to salt food. She also complains of congestion and cough. Leukocytosis 11/27 but it has been trending down. CXR 11/26 reviewed changes. Ordered guaifenessin. Ordered repeat CXR for tomorrow, procalcitonin and labs. If no idication for hold plan for SNF  11/29. Patient has poor insight but mentation slightly improved, can carry conversation. Son at bedside. Spoke with him at length and updated plan. Basically SNF planned, currently off restraints and sitter and Na 129. Patient getting Gatorade despite electrolytes is still hypotonic. Nursing staff inquiring as patient while stable still chronically hyponatremic. Patient is variably compliant with thicker liquids. I clarified with staff that Gatorade and even free water can be given provided that strict fluid restriction AND urine output>intake where more water than salt is excreted in the urine. I did speak with Dr. Victoria to confirm that Gatorade is not the treatment for replacing sodium. He agrees. Ultimately the only reason she is given some hypotonic fluids is because she has a component of polydipsia and not compliant on thickened liquids. While we can continue this regimen in the hospital as total fluid intake and " output is monitored, this is not realistic in the outpatient. Ultimately we may have to instead add salt tabs and I advised patient to generously salt the food. I updated nursing, patient and Nephrology.  11/30.  Na 132  Patient mentation baseline, carries conversaation  Discussed with SW/CM planned for SNF tomorrow.   12/1. PLan for discharge to SNF. Psychiatry at bedside. I spoke to her at length about hyponatremia, fluid restriction, free water restriction, polydipsia, salt tabs.   I spoke with Psychiatry, Currently per nephrology patients acute on chronic hyponatremia due to water intoxication, tea and toast as well as initially hypovolemia.     Patient is has a high medical complexity, complex decision making and is at high risk for complication, morbidity, and mortality, thus requiring the highest level of my preparedness for sudden, emergent intervention. Medical decision making is therefore complex. I provided  services, which included ordering labs and/or imaging, and discussing the case with various consultants.medication orders, frequent reevaluations of the patient's condition and response to treatment. Time was also devoted to counseling and coordinating care including review of records, pertinent lab data and studies, as well as discussing diagnostic evaluation and work up, planned therapeutic interventions and future disposition of care. Where indicated, the assessment and plan reflect discussion of patient with consultants, other healthcare providers, family members, and additional research needed to obtain further information in formulating the plan of care for Paulette Concepcion. Total time spent was 52 minutes.     Consultants/Specialty  critical care and psychiatry    Code Status  Full Code    Disposition  Medically Cleared  I have placed the appropriate orders for post-discharge needs.    Review of Systems    Review of Systems   Unable to perform ROS: Psychiatric disorder    Constitutional:  Positive for malaise/fatigue.   Respiratory:  Positive for cough, sputum production and shortness of breath.    Cardiovascular:  Negative for leg swelling.   Gastrointestinal:  Negative for abdominal pain, nausea and vomiting.   Musculoskeletal:  Positive for back pain, joint pain and myalgias.   Psychiatric/Behavioral:  Positive for depression. Negative for suicidal ideas.         Physical Exam  Temp:  [36.6 °C (97.9 °F)-37 °C (98.6 °F)] 36.6 °C (97.9 °F)  Pulse:  [86-94] 86  Resp:  [17-18] 17  BP: (130-159)/(70-89) 156/87  SpO2:  [91 %-93 %] 91 %    Physical Exam  Vitals and nursing note reviewed.   Constitutional:       General: She is not in acute distress.     Appearance: She is well-developed and normal weight. She is ill-appearing. She is not diaphoretic.   HENT:      Head: Normocephalic and atraumatic.      Mouth/Throat:      Mouth: Mucous membranes are dry.      Pharynx: Oropharynx is clear.   Eyes:      Conjunctiva/sclera: Conjunctivae normal.   Neck:      Thyroid: No thyromegaly.      Vascular: No JVD.   Cardiovascular:      Rate and Rhythm: Regular rhythm. Tachycardia present.      Heart sounds: Normal heart sounds.      No friction rub. No gallop.   Pulmonary:      Effort: Pulmonary effort is normal.      Breath sounds: Rhonchi present. No wheezing or rales.      Comments: Chest wall tenderness, rhonchi in Rt upper lobe  Abdominal:      General: Bowel sounds are normal. There is no distension.      Palpations: Abdomen is soft. There is no mass.      Tenderness: There is no abdominal tenderness. There is no guarding or rebound.   Musculoskeletal:         General: No tenderness. Normal range of motion.      Cervical back: Normal range of motion and neck supple.   Lymphadenopathy:      Cervical: No cervical adenopathy.   Skin:     General: Skin is warm and dry.   Neurological:      Mental Status: She is alert.      Cranial Nerves: No cranial nerve deficit.      Comments: Limited  capacity, poor insight  COnfusion but can carry a conversation  Baseline mobility is with walker and wheelchair   Psychiatric:         Attention and Perception: Attention normal.         Mood and Affect: Affect is labile.         Cognition and Memory: Cognition is impaired.      Comments: Inquisitive but poor insight  More pleasant today         Fluids    Intake/Output Summary (Last 24 hours) at 12/1/2023 1431  Last data filed at 12/1/2023 1000  Gross per 24 hour   Intake 1040 ml   Output 500 ml   Net 540 ml         Laboratory  Recent Labs     11/29/23 0052 11/30/23 0348 12/01/23  0641   WBC 9.0 9.7 12.0*   RBC 3.59* 3.50* 3.69*   HEMOGLOBIN 11.6* 11.4* 12.0   HEMATOCRIT 33.9* 32.9* 34.4*   MCV 94.4 94.0 93.2   MCH 32.3 32.6 32.5   MCHC 34.2 34.7 34.9   RDW 43.6 42.7 42.0   PLATELETCT 386 446 463*   MPV 8.9* 8.6* 8.3*       Recent Labs     11/29/23 0052 11/29/23  0838 11/30/23 0348 12/01/23  0641   SODIUM 129* 129* 132* 132*   POTASSIUM 4.3  --  3.8 4.0   CHLORIDE 93*  --  96 98   CO2 23  --  26 24   GLUCOSE 142*  --  115* 118*   BUN 7*  --  6* 7*   CREATININE 0.34*  --  0.34* 0.29*   CALCIUM 8.9  --  8.9 8.8                     Imaging  DX-CHEST-PORTABLE (1 VIEW)   Final Result      Left basilar consolidation.      Right-sided rib fractures without evidence of pneumothorax.         DX-CHEST-PORTABLE (1 VIEW)   Final Result      Interstitial prominence could indicate pulmonary edema versus hypoventilatory change/atelectasis.      Right rib fractures with some adjacent increased density could indicate pulmonary contusion without pneumothorax.      CT-HEAD W/O   Final Result      1.  No acute intracranial abnormality.   2.  Redemonstration of a small amount of fat along the left aspect of the jacinta of uncertain clinical significance.   3.  Mild right maxillary sinus disease.         DX-CHEST-PORTABLE (1 VIEW)   Final Result      1.  No acute cardiac or pulmonary abnormalities are identified.   2.  Mildly displaced,  "acute right lateral third and fourth rib fractures.           Assessment/Plan  * Acute hyponatremia, encephalopathy/delusional disorder- (present on admission)  Assessment & Plan  Admission Na 117 @21:08 did improve initially but now trending back down to 119 on 11/26  discussed with nephrology Dr. Victoria today, likely due to low solute diet, continue fluid restriction, high protein diet and salt tabs started.   BID Na checks adequate per nephro   Repeat osmolarity studies suggesting mixed picture   Monitor labs, avoid over correction\"    Ease on fluid restriction, do free water restrictin and OK to salt food. Since we are easing on it, try to avoid hypotonic fluids if possible. Mentation and behavior improving. On psychotropics. Last QTc normal range 11/22. Repeat EKG at some point. Repeat CXR, ordered labs, COVID/flu/RSV and procalcitonin for cough and congestion. Procalc negative and COVID/flu/RSV negative. No indication for Abx. Na now 132. Ordered incentive spirometry for rib fx. Educated patient on hyponatremia and fluid balance.   SNF planned tomorrow.    Delusional disorder (HCC)  Assessment & Plan  Patient refusing to interact with medical staff, refusing medication administration yesterday, more interactive today however still with paranoid behavior   Psychiatry following  Continue Risperdal     Closed fracture of multiple ribs of right side with routine healing  Assessment & Plan  Conservative treatment, pain control, incentive spirometry  Lidocaine patch, oral oxy     ACP (advance care planning)  Assessment & Plan  Currently unable to engage secondary to her parents delirium, psychosis    Hypertension  Assessment & Plan  BP elevated, added amlodipine, continue Lisinopril\"    Vitals:    12/01/23 1500   BP: 129/73   Pulse: 83   Resp: 17   Temp:    SpO2: 99%     Stable.    Neuropathy- (present on admission)  Assessment & Plan  Continue home Gabapentin, decreased dose to 400 mg TID 11/26 due to lethargy and " "possible contributing factor to low Na    Mood disorder (HCC)- (present on admission)  Assessment & Plan  Chronic Psychiatric illness, psych consulted, appreciate recommendations  Pt started on Risperdol 1 mg BID  Continue duloxetine and gabapentin, if persistent hyponatremia may need to stop   Decrease duloxetine dosing and gabapentin     Tobacco dependence- (present on admission)  Assessment & Plan  Admit to smoking >10 cigarettes daily  Tobacco cessation recommended\"    I spent 4 minutes, discussing tobacco dependence and cardiac as well as pulmonary risk. Smoking cessation instructions. Code 67242        VTE prophylaxis: Xarelto ppx    I have performed a physical exam and reviewed and updated ROS and Plan today (12/1/2023). In review of yesterday's note (11/30/2023), there are no changes except as documented above.      "

## 2023-12-01 NOTE — PROGRESS NOTES
Hospital Medicine Daily Progress Note    Date of Service  2023    Chief Complaint  Paulette Concepcion is a 63 y.o. female admitted 2023 with confusion, failure to thrive, psychosis, hyponatremia    Hospital Course    Paulette Concepcion is a 63 y.o. female with history of hypertension, anxiety, depression, neuropathy, asthma, tobacco abuse who presented 2023 with evaluation for failure to thrive. EMS was contacted by patient's friend for wellness check, found patient to be in a paranoid state and brought patient to ER for evaluation.  Patient stated her dog was mauled by another dog approximately a week ago and unfortunately .  She stated she has been depressed with passing of her dog.  She admits to not eating drinking well, depressed moods.  She was found to have concerning sodium level 117 in ER  Patient apparently is chronically psychiatrically ill, followed by an outpatient psychiatrist  The patient gives a somewhat convoluted story of her current living situation, she has had some difficulty with construction, on and off power outage, appears to have failed to care for himself and in the current environment The patient exhibits some paranoid and psychotic features.   She was admitted to [facility] for severe hyponatremia and close electrolyte monitoring, this has slowly been up uptrending, however she has continued to have abnormal behavior. Psychiatry is following.       Interval Problem Update  Patient seen at bedside, sleeping, more withdrawn today  - restrained overnight due to throwing tea at nursing staff and threatening behavior, pt frustrated with fluid restriction   - discussed with nephro, continue 1.2L fluid restriction. Na 121 today, okay to space out draws   - continue salt tabs, I have decreased duloxetine dose in case these needs to be tapered or stopped secondary to her persistently hyponatremia     - BP elevated, increase amlodipine   - continue supportive care  "with decongestant, throat lozenges     - psychiatry following   - therapy recommending post acute care, not yet medically clear     I have discussed this patient's plan of care and discharge plan at IDT rounds today with Case Management, Nursing, Nursing leadership, and other members of the IDT team.\"    11/28. I reviewed the chart along with vitals, labs, imaging, test (both pending and resulted) and recommendations from specialists and interdisciplinary team.  Currently patient is not agitated or aggressive. Psychiatry stated legal hold if patient is such. Discussed with Psychiatry and staffPatient has limited capacity however can carry a conversation. Na 129. Patient thirsty. I eased fluid restriction to 1500 and ordered free water restriction. OK to salt food. She also complains of congestion and cough. Leukocytosis 11/27 but it has been trending down. CXR 11/26 reviewed changes. Ordered guaifenessin. Ordered repeat CXR for tomorrow, procalcitonin and labs. If no idication for hold plan for SNF  11/29. Patient has poor insight but mentation slightly improved, can carry conversation. Son at bedside. Spoke with him at length and updated plan. Basically SNF planned, currently off restraints and sitter and Na 129. Patient getting Gatorade despite electrolytes is still hypotonic. Nursing staff inquiring as patient while stable still chronically hyponatremic. Patient is variably compliant with thicker liquids. I clarified with staff that Gatorade and even free water can be given provided that strict fluid restriction AND urine output>intake where more water than salt is excreted in the urine. I did speak with Dr. Victoria to confirm that Gatorade is not the treatment for replacing sodium. He agrees. Ultimately the only reason she is given some hypotonic fluids is because she has a component of polydipsia and not compliant on thickened liquids. While we can continue this regimen in the hospital as total fluid intake and " output is monitored, this is not realistic in the outpatient. Ultimately we may have to instead add salt tabs and I advised patient to generously salt the food. I updated nursing, patient and Nephrology.    Patient is has a high medical complexity, complex decision making and is at high risk for complication, morbidity, and mortality, thus requiring the highest level of my preparedness for sudden, emergent intervention. Medical decision making is therefore complex. I provided  services, which included ordering labs and/or imaging, and discussing the case with various consultants.medication orders, frequent reevaluations of the patient's condition and response to treatment. Time was also devoted to counseling and coordinating care including review of records, pertinent lab data and studies, as well as discussing diagnostic evaluation and work up, planned therapeutic interventions and future disposition of care. Where indicated, the assessment and plan reflect discussion of patient with consultants, other healthcare providers, family members, and additional research needed to obtain further information in formulating the plan of care for Paulette Concepcion. Total time spent was 69 minutes.     11/30.  Na 132  Patient mentation baseline, carries conversaation  Discussed with SW/CM planned for SNF tomorrow.     Consultants/Specialty  critical care and psychiatry    Code Status  Full Code    Disposition  The patient is medically cleared for discharge to home or a post-acute facility.      I have placed the appropriate orders for post-discharge needs.    Review of Systems    Review of Systems   Unable to perform ROS: Psychiatric disorder   Constitutional:  Positive for malaise/fatigue.   Respiratory:  Positive for cough, sputum production and shortness of breath.    Cardiovascular:  Negative for leg swelling.   Gastrointestinal:  Negative for abdominal pain, nausea and vomiting.   Musculoskeletal:  Positive for  back pain, joint pain and myalgias.   Psychiatric/Behavioral:  Positive for depression. Negative for suicidal ideas.         Physical Exam  Temp:  [36.5 °C (97.7 °F)-37.3 °C (99.1 °F)] 36.6 °C (97.9 °F)  Pulse:  [76-98] 81  Resp:  [16-18] 16  BP: (130-153)/(68-87) 130/68  SpO2:  [91 %-97 %] 91 %    Physical Exam  Vitals and nursing note reviewed.   Constitutional:       General: She is not in acute distress.     Appearance: She is well-developed and normal weight. She is ill-appearing. She is not diaphoretic.   HENT:      Head: Normocephalic and atraumatic.      Mouth/Throat:      Mouth: Mucous membranes are dry.      Pharynx: Oropharynx is clear.   Eyes:      Conjunctiva/sclera: Conjunctivae normal.   Neck:      Thyroid: No thyromegaly.      Vascular: No JVD.   Cardiovascular:      Rate and Rhythm: Regular rhythm. Tachycardia present.      Heart sounds: Normal heart sounds.      No friction rub. No gallop.   Pulmonary:      Effort: Pulmonary effort is normal.      Breath sounds: Rhonchi present. No wheezing or rales.      Comments: Chest wall tenderness, rhonchi in Rt upper lobe  Abdominal:      General: Bowel sounds are normal. There is no distension.      Palpations: Abdomen is soft. There is no mass.      Tenderness: There is no abdominal tenderness. There is no guarding or rebound.   Musculoskeletal:         General: No tenderness. Normal range of motion.      Cervical back: Normal range of motion and neck supple.   Lymphadenopathy:      Cervical: No cervical adenopathy.   Skin:     General: Skin is warm and dry.   Neurological:      Mental Status: She is alert.      Cranial Nerves: No cranial nerve deficit.      Comments: Limited capacity, poor insight  COnfusion but can carry a conversation  Baseline mobility is with walker and wheelchair   Psychiatric:         Attention and Perception: Attention normal.         Mood and Affect: Affect is labile.         Cognition and Memory: Cognition is impaired.       Comments: Inquisitive but poor insight         Fluids    Intake/Output Summary (Last 24 hours) at 11/30/2023 1718  Last data filed at 11/30/2023 1500  Gross per 24 hour   Intake 800 ml   Output 1650 ml   Net -850 ml         Laboratory  Recent Labs     11/29/23  0052 11/30/23  0348   WBC 9.0 9.7   RBC 3.59* 3.50*   HEMOGLOBIN 11.6* 11.4*   HEMATOCRIT 33.9* 32.9*   MCV 94.4 94.0   MCH 32.3 32.6   MCHC 34.2 34.7   RDW 43.6 42.7   PLATELETCT 386 446   MPV 8.9* 8.6*       Recent Labs     11/29/23  0052 11/29/23  0838 11/30/23  0348   SODIUM 129* 129* 132*   POTASSIUM 4.3  --  3.8   CHLORIDE 93*  --  96   CO2 23  --  26   GLUCOSE 142*  --  115*   BUN 7*  --  6*   CREATININE 0.34*  --  0.34*   CALCIUM 8.9  --  8.9                     Imaging  DX-CHEST-PORTABLE (1 VIEW)   Final Result      Left basilar consolidation.      Right-sided rib fractures without evidence of pneumothorax.         DX-CHEST-PORTABLE (1 VIEW)   Final Result      Interstitial prominence could indicate pulmonary edema versus hypoventilatory change/atelectasis.      Right rib fractures with some adjacent increased density could indicate pulmonary contusion without pneumothorax.      CT-HEAD W/O   Final Result      1.  No acute intracranial abnormality.   2.  Redemonstration of a small amount of fat along the left aspect of the jacinta of uncertain clinical significance.   3.  Mild right maxillary sinus disease.         DX-CHEST-PORTABLE (1 VIEW)   Final Result      1.  No acute cardiac or pulmonary abnormalities are identified.   2.  Mildly displaced, acute right lateral third and fourth rib fractures.           Assessment/Plan  * Acute hyponatremia, encephalopathy/delusional disorder- (present on admission)  Assessment & Plan  Admission Na 117 @21:08 did improve initially but now trending back down to 119 on 11/26  discussed with nephrology Dr. Victoria today, likely due to low solute diet, continue fluid restriction, high protein diet and salt tabs  "started.   BID Na checks adequate per nephro   Repeat osmolarity studies suggesting mixed picture   Monitor labs, avoid over correction\"    Ease on fluid restriction, do free water restrictin and OK to salt food. Since we are easing on it, try to avoid hypotonic fluids if possible. Mentation and behavior improving. On psychotropics. Last QTc normal range 11/22. Repeat EKG at some point. Repeat CXR, ordered labs, COVID/flu/RSV and procalcitonin for cough and congestion. Procalc negative and COVID/flu/RSV negative. No indication for Abx. Na now 132.Ordered incentive spirometry for rib fx.  If no indication for psych facility, SNF likely    Delusional disorder (HCC)  Assessment & Plan  Patient refusing to interact with medical staff, refusing medication administration yesterday, more interactive today however still with paranoid behavior   Psychiatry following  Continue Risperdal     Closed fracture of multiple ribs of right side with routine healing  Assessment & Plan  Conservative treatment, pain control, incentive spirometry  Lidocaine patch, oral oxy     ACP (advance care planning)  Assessment & Plan  Currently unable to engage secondary to her parents delirium, psychosis    Hypertension  Assessment & Plan  BP elevated, added amlodipine, continue Lisinopril\"    Vitals:    11/30/23 1208   BP: 130/68   Pulse: 81   Resp: 16   Temp: 36.6 °C (97.9 °F)   SpO2: 91%     Stable.    Neuropathy- (present on admission)  Assessment & Plan  Continue home Gabapentin, decreased dose to 400 mg TID 11/26 due to lethargy and possible contributing factor to low Na    Mood disorder (HCC)- (present on admission)  Assessment & Plan  Chronic Psychiatric illness, psych consulted, appreciate recommendations  Pt started on Risperdol 1 mg BID  Continue duloxetine and gabapentin, if persistent hyponatremia may need to stop   Decrease duloxetine dosing and gabapentin     Tobacco dependence- (present on admission)  Assessment & Plan  Admit to " "smoking >10 cigarettes daily  Tobacco cessation recommended\"    I spent 4 minutes, discussing tobacco dependence and cardiac as well as pulmonary risk. Smoking cessation instructions. Code 18846        VTE prophylaxis: Xarelto ppx    I have performed a physical exam and reviewed and updated ROS and Plan today (11/30/2023). In review of yesterday's note (11/29/2023), there are no changes except as documented above.      "

## 2023-12-01 NOTE — CONSULTS
"  [facility] PSYCHIATRIC FOLLOW-UP:(established)  *Reason for admission:    Pt BIBA for failure to thrive. Per EMS point found in home with dead dog, paranoid, not properly eating or drinking.   *Legal Hold Status: not applicable                S: she is bright in affect though she says she is depressed and anxious. She may still be paranoid talking about the DNA studies being done on her and [facility] and [employer] but she isn't sure what they are. And she also worries that her \"father\" who is not really her father who is a step father (never knew real father) and who has a \"7th grade education and is a  and in his 90's, could get a hold of her records as he is her emergency contact. There were more concerns that were vauge and confusing.    She told a different story regarding her dog: he was a \"13-15 yo  90 lb tolbert\" she remembers going out the door and calling him and she fell down the stairs. She thinks they are still keeping him at the [facility]. She didn't recall much else.    But she is future oriented, wanting to get her life on track and has a good relationship with her son. She is sleeping.     O: Medical ROS (as pertinent):                      *Psychiatric Examination:   Vitals:   Vitals:    11/30/23 1807 11/30/23 1941 12/01/23 0600 12/01/23 0700   BP: 132/71 130/70 (!) 159/89 (!) 156/87   Pulse: 94 89 88 86   Resp: 18 17 18 17   Temp: 37 °C (98.6 °F)  36.6 °C (97.9 °F)    TempSrc: Temporal Temporal Temporal Temporal   SpO2: 92% 93% 91% 91%   Weight:       Height:         General Appearance:  good eye contact and cooperative  Abnormal Movements: none   Gait and Posture: not observed  Speech: within normal limits  Thought Process: normal rate  Associations: generally  linear  Abnormal or Psychotic Thoughts:  she might still have some paranoia or conversely, some confabulation around events she remembers poorly  Judgement and Insight: fair  Orientation: grossly intact  Recent and Remote " "Memory: grossly intact  Attention Span and Concentration: intact  Language:fluent  Fund of Knowledge: not tested  Mood and Affect: euthymic but reports she is depressed and anxious        Current Medications:  Scheduled Medications   Medication Dose Frequency    sodium chloride  1 g BID WITH MEALS    Pharmacy Consult Request  1 Each PHARMACY TO DOSE    amLODIPine  10 mg Q DAY    DULoxetine  30 mg DAILY    gabapentin  400 mg TID    guaiFENesin ER  600 mg Q12HRS    lisinopril  20 mg DAILY    rivaroxaban  10 mg DAILY AT 1800    vitamin D3  1,000 Units DAILY    risperiDONE  1 mg BID    nicotine  21 mg Daily-0600    senna-docusate  2 Tablet BID    therapeutic multivitamin-minerals  1 Tablet DAILY    lidocaine  1 Patch Q24HR          *ASSESSMENT/RECOMENDATIONS:  1..Major depression with psychosis: improved  2  R/O neurocognitive disorder  2   PTSD  3:  per first encounter: generalized anxiety disorder, ADHD as well.  4  THC use    Medical:   -Hyponatremia, hypovolemic, hypotonic, asymptomatic Initially Low urinary osmolality suggests some degree of psychogenic polydipsia on top of \"tea and toast diet\" with poor urinary solute excretion.  -Low BUN/creatinine Consistent with poor nutritional intake associated with hyponatremia  -R sided rib fx  -HTN  -Neuropathy  -chr pain issues: pt reported she has a \"back brace\" at home yesterday    Legal hold: not applicable     Discussed/voalted: MONSTER Siddiqi MD    Medication and Other Recommendations: final orders as per Tx Tm  Increase in cymbalta to get its antidepressant effect.     Being transferred today to [facility]. Signing off, please reconsult as needed.       Discharge recommendations: per tx tm    If released from [facility]: Discharge Instructions:  -Reviewed safety plan: 911, ER, PCM, MHC, Suicide crisis line  -Please assist with outpatient Psychiatric/substance use follow up appointments at discharge once medically cleared.         "

## 2023-12-01 NOTE — PROGRESS NOTES
"[facility] -  PROGRESS NOTE               Author: Reinaldo Victoria M.D. Date & Time: 12/1/2023  7:37 AM     HPI:  Ms. Toussaint is a very pleasant 63-year-old female without any known history of any kidney disease who presented herself on around 11/22 to [facility] with complaints of \"failure to thrive\".  Currently she has a long history of anxiety and depression.  It looks as though a patient's friend stopped by as a wellness check and found her to be in a paranoid state.  [facility] was called.  Upon entry they found her to be paranoid and a disheveled house with a dead dog present.  She apparently has been depressed at the passing of the dog.  With home medications that include lisinopril Sudafed on her chronic basis, Cymbalta, meloxicam, Adderall among others.  At the time of presentation her serum sodium was 117.  Previous labs done approximately 2 weeks prior to admission found a sodium of 120.  Initial labs also reflect a BUN of 4 with a creatinine is 0.3.  Conservative management of her hyponatremia improved to her sodium to 127 over the course of several days however more recently her serum sodium has been falling to a level of 119, 120 again today.  Additional information finds a urinary sodium of less than 20 with a urine osmolality of less than 50 (day of admission).  She tells me she drinks a lot of water.  She feels as though she does not eat any proteins.  She has had some chest congestion with cough.  She does have a bit of a white count of 15.6 with slight left shift hemoglobin of 13.4 urinalysis was done which was benign head CT shows no acute process chest x-ray is reviewed.  Speaking with her she few seems a bit confused.  She has tangential thoughts and a bit rambling in her speech.  We been asked to see her regarding her hyponatremia.    DAILY NEPHROLOGY SUMMARY:  11/26: consult done  11/27: 1.1L intake, 1.2: UOP, sodium upn from 119 to 121, in restraints, " "fatigued  11/28: 2.3L intake, 1.4L UOP, sodium to 129 this am, mention improving   11/29: hypertensive, 1.7L UOP, urine sodium 128 this AM, feeling imrpoved-unhappy about fluid restriction  11/30: No acute vents, 1.2L UOP, serum sodium up to 132, feeling stronger  12/1: No acute events, 1L UOP documented, sodium stable 132, energy improved, possible DC today    PAST FAMILY HISTORY: Reviewed and Unchanged  SOCIAL HISTORY: Reviewed and Unchanged  CURRENT MEDICATIONS: Reviewed  IMAGING STUDIES: Reviewed    ROS  10 Point ROS performed, negative other than stated above    PHYSICAL EXAM  VS:  BP (!) 159/89   Pulse 88   Temp 36.6 °C (97.9 °F) (Temporal)   Resp 18   Ht 1.6 m (5' 2.99\")   Wt 62.7 kg (138 lb 3.7 oz)   SpO2 91%   BMI 24.49 kg/m²   GENERAL: no acute distress  CV: RRR, No edema  RESP: non-labored  GI: Soft  MSK: No joint deformities   SKIN: No concerning rashes  NEURO: No tremor  PSYCH: Cooperative    Fluids:  In: 1090 [P.O.:1090]  Out: 1000     LABS:  Recent Results (from the past 24 hour(s))   CBC WITHOUT DIFFERENTIAL    Collection Time: 12/01/23  6:41 AM   Result Value Ref Range    WBC 12.0 (H) 4.8 - 10.8 K/uL    RBC 3.69 (L) 4.20 - 5.40 M/uL    Hemoglobin 12.0 12.0 - 16.0 g/dL    Hematocrit 34.4 (L) 37.0 - 47.0 %    MCV 93.2 81.4 - 97.8 fL    MCH 32.5 27.0 - 33.0 pg    MCHC 34.9 32.2 - 35.5 g/dL    RDW 42.0 35.9 - 50.0 fL    Platelet Count 463 (H) 164 - 446 K/uL    MPV 8.3 (L) 9.0 - 12.9 fL   Renal Function Panel    Collection Time: 12/01/23  6:41 AM   Result Value Ref Range    Sodium 132 (L) 135 - 145 mmol/L    Potassium 4.0 3.6 - 5.5 mmol/L    Chloride 98 96 - 112 mmol/L    Co2 24 20 - 33 mmol/L    Glucose 118 (H) 65 - 99 mg/dL    Creatinine 0.29 (L) 0.50 - 1.40 mg/dL    Bun 7 (L) 8 - 22 mg/dL    Calcium 8.8 8.5 - 10.5 mg/dL    Correct Calcium 9.2 8.5 - 10.5 mg/dL    Phosphorus 4.1 2.5 - 4.5 mg/dL    Albumin 3.5 3.2 - 4.9 g/dL   ESTIMATED GFR    Collection Time: 12/01/23  6:41 AM   Result Value Ref " "Range    GFR (CKD-EPI) 120 >60 mL/min/1.73 m 2       (click the triangle to expand results)      ASSESSMENT:  # Hyponatremia, hypovolemic, hypotonic, asymptomatic  Initially Low urinary osmolality suggests some degree of psychogenic polydipsia on top of \"tea and toast diet\" with poor urinary solute excretion.  Repeat urine osom 242 with repeat urine sodium <20  On gabapentin and cymbalta  # Preserved renal function  # Low BUN/creatinine   Consistent with poor nutritional intake associated with hyponatremia  # Psych     SUGGESTIONS:  -1.5L fluid restriction  -Nacl 1G BID until has outpatient FU  -Encourage solute intake/nutrition  -Ok to continue cymbalta for now  -OK for discharge from nephrology standpoint. Nephrology will sign-off. We will schedule 2 week FU    Reinaldo Victoria MD    "

## 2023-12-01 NOTE — PROGRESS NOTES
Assumed care of patient at bedside report from day shift RN. Updated on POC. Patient currently A & O x 4; on RA; PT has 8/10 R rib pain, pt medicated by day RN; Call light within reach. Whiteboard updated. Fall precautions in place. Bed locked and in lowest position. All questions answered. No other needs indicated at this time.

## 2023-12-01 NOTE — DISCHARGE PLANNING
DC Transport Scheduled    Received request at: 12/1/2023 at 1422    Transport Company Scheduled:  [employer]    Scheduled Date: 12/2/2023  Scheduled Time: 1400    Destination: [facility] at [address] [place]     Notified care team of scheduled transport via Voalte.     If there are any changes needed to the DC transportation scheduled, please contact [facility] Ride Line at ext. [phone] between the hours of 5832-0806 Mon-Fri. If outside those hours, contact the ED Case Manager at ext. [phone].      [facility] Counseling: I discussed with the patient the risks of risankizumab-rzaa including but not limited to immunosuppression, and serious infections.  The patient understands that monitoring is required including a PPD at baseline and must alert us or the primary physician if symptoms of infection or other concerning signs are noted.

## 2023-12-01 NOTE — DISCHARGE SUMMARY
Discharge Summary    CHIEF COMPLAINT ON ADMISSION  Chief Complaint   Patient presents with    Psych Eval     Pt BIBA for failure to thrive. Per EMS point found in home with dead dog, paranoid, not properly eating or drinking. GCS 15. Denies SI/HI.        Reason for Admission  ems    Admission Date  2023     CODE STATUS  Full Code    HPI & HOSPITAL COURSE  This is a 63 y.o. female here with Psych Eval (Pt BIBA for failure to thrive. Per EMS point found in home with dead dog, paranoid, not properly eating or drinking. GCS 15. Denies SI/HI. )  Please review Dr. Ibrahima Carlos M.D. notes for further details of history of present illness, past medical/social/family histories, allergies and medications. Please review Dr. Romeo, Psychiatry, Dr. [facility], dr. Hassan [facility] consultation notes.  She smokes cigarettes. She has a history of hypertension, anxiety depression, neuropathy, asthma, tobacco abuse. She is being evaluated for failure to thrive and behavioral issues. Part of her loss of self care was that her dog  and she was sad about that.   At the ED, afebrile, hypertensive.  Initially leukocytosis. Na 117. Cr- 0.37.  Because of critically low sodium intensivist consulted. Patient then admitted to IMCU. She was placed initially on NS but correction was fast thus this was discontinued. Nephrology consulted. It was felt that her hyponatremia was due to hypovolemia, psychologic polydypsia and tea and toast diet. She was then maintained on fluid restriction. Ultimately free water avoidance and salt tabs. Her hyponatremia slowly improved. Maintain fluid restriction, free water avoidance, salt tabs. Na levels can be checked by attending at SNF. Psychiatry consulted and wrote for psychotropics. Her mood improved and she was not combative or aggressive. SNF was arranged.    Incentive spirometry, pain control and deep breathing encouraged for rib fractures. She is advied NO MORE smoking.      At discharge  date, Paulette Concepcion afebrile and hemodynamically stable.  Paulette Concepcion wanted to be discharged today.      Imaging  DX-CHEST-PORTABLE (1 VIEW)   Final Result      Left basilar consolidation.      Right-sided rib fractures without evidence of pneumothorax.         DX-CHEST-PORTABLE (1 VIEW)   Final Result      Interstitial prominence could indicate pulmonary edema versus hypoventilatory change/atelectasis.      Right rib fractures with some adjacent increased density could indicate pulmonary contusion without pneumothorax.      CT-HEAD W/O   Final Result      1.  No acute intracranial abnormality.   2.  Redemonstration of a small amount of fat along the left aspect of the jacinta of uncertain clinical significance.   3.  Mild right maxillary sinus disease.         DX-CHEST-PORTABLE (1 VIEW)   Final Result      1.  No acute cardiac or pulmonary abnormalities are identified.   2.  Mildly displaced, acute right lateral third and fourth rib fractures.                Therefore, she is discharged in fair and stable condition to skilled nursing facility.    The patient met 2-midnight criteria for an inpatient stay at the time of discharge.      FOLLOW UP ITEMS POST DISCHARGE      DISCHARGE DIAGNOSES  Principal Problem:    Acute hyponatremia, encephalopathy/delusional disorder (POA: Yes)  Active Problems:    Mood disorder (HCC) (POA: Yes)    Tobacco dependence (POA: Yes)    Neuropathy (POA: Yes)    Hypertension (POA: Unknown)    ACP (advance care planning) (POA: Unknown)    Closed fracture of multiple ribs of right side with routine healing (POA: Unknown)    Delusional disorder (HCC) (POA: Unknown)        FOLLOW UP  Future Appointments   Date Time Provider Department Center   1/3/2024  2:00 PM Helder Starr PsyD [facility] [address] [address]  [phone]        Iraida Zavala M.D.  [address] [facility] [address]  [facility]  [address] [phone]    Follow up  Please call your primary care provider to schedule a hospital follow up.  Thank you  FOllow up with Iraida Zavala M.D. or attending at [facility] upon receipt. Incentive spirometry and pain control for rib fx  Follow up with Nephrology in 1-2 weeks or as needed for hyponatremia   Follow up with Psychiatry in 1 week for behavioral issues/mood disorder on psychotropic medications.  NURSING provide resources/pamphlets for  Monitor behavior, hyponatremia (currently fluid restriction with avoidance of free water recommended, use of salt tabs), smoking (NO MORE tobacco), hypertension (Check and record blood pressures at home at least twice a day for primary provider to review), narcotic use (Avoid swimming, driving or operating machinery. Treat constipation with prescribed bowel regimen)    MEDICATIONS ON DISCHARGE     Medication List        START taking these medications        Instructions   amLODIPine 10 MG Tabs  Start taking on: December 2, 2023  Commonly known as: Norvasc   Take 1 Tablet by mouth every day.  Dose: 10 mg     benzocaine-menthol 15-3.6 MG Lozg  Commonly known as: Cepacol   Dissolve 1 Lozenge in the mouth every 2 hours as needed (oral pain).  Dose: 1 Lozenge     gabapentin 400 MG Caps  Commonly known as: Neurontin  Replaces: gabapentin 800 MG tablet   Take 1 Capsule by mouth 3 times a day.  Dose: 400 mg     guaiFENesin  MG Tb12  Commonly known as: Mucinex  Replaces: Guaifenesin 400 MG Tabs   Take 1 Tablet by mouth every 12 hours.  Dose: 600 mg     lidocaine 4 % Ptch  Start taking on: December 2, 2023  Commonly known as: Asperflex   Place 1 Patch on the skin every 24 hours.  Dose: 1 Patch     ondansetron 4 MG Tbdp  Commonly known as: Zofran ODT   Take 1 Tablet by mouth every four hours as needed for Nausea/Vomiting (give PO if no IV route available).  Dose: 4 mg     oxyCODONE immediate-release 5 MG Tabs  Commonly known as: Roxicodone   Take 1 Tablet by mouth every 8  hours as needed for Severe Pain for up to 3 days.  Dose: 5 mg     polyethylene glycol/lytes Pack  Commonly known as: Miralax   Take 1 Packet by mouth 1 time a day as needed (if sennosides and docusate ineffective after 24 hours).  Dose: 17 g     risperiDONE 1 MG Tabs  Commonly known as: RisperDAL   Take 1 Tablet by mouth 2 times a day.  Dose: 1 mg     rivaroxaban 10 MG Tabs tablet  Commonly known as: Xarelto   Doctor's comments: STOP when more ambulatory  Take 1 Tablet by mouth every day at 6 PM.  Dose: 10 mg     senna-docusate 8.6-50 MG Tabs  Commonly known as: Pericolace Or Senokot S   Take 2 Tablets by mouth 2 times a day.  Dose: 2 Tablet     sodium chloride 1 GM Tabs  Commonly known as: Salt   Take 1 Tablet by mouth 2 times a day with meals.  Dose: 1 g     vitamin D 1000 UNIT Tabs  Start taking on: December 2, 2023  Commonly known as: Cholecalciferol   Take 1 Tablet by mouth every day.  Dose: 1,000 Units            CHANGE how you take these medications        Instructions   DULoxetine 30 MG Cpep  Start taking on: December 2, 2023  What changed:   medication strength  how much to take  when to take this  Commonly known as: Cymbalta   Take 1 Capsule by mouth every day.  Dose: 30 mg     lisinopril 20 MG Tabs  Start taking on: December 2, 2023  What changed:   medication strength  how much to take  Another medication with the same name was removed. Continue taking this medication, and follow the directions you see here.  Commonly known as: Prinivil   Take 1 Tablet by mouth every day.  Dose: 20 mg            CONTINUE taking these medications        Instructions   acetaminophen 500 MG Tabs  Commonly known as: Tylenol   Take 500-1,000 mg by mouth every 6 hours as needed.  Dose: 500-1,000 mg     albuterol 108 (90 Base) MCG/ACT Aers inhalation aerosol   Doctor's comments: PT REQUEST  INHALE 1 PUFF EVERY FOUR HOURS AS NEEDED FOR SHORTNESS OF BREATH.  Dose: 1 Puff     ALPRAZolam 1 MG Tabs  Commonly known as: Xanax   Take  "0.5-1 Tablets by mouth 1 time a day as needed for Anxiety.  Dose: 0.5-1 Tablet     cetirizine 10 MG Tabs  Commonly known as: ZyrTEC   Take 10 mg by mouth at bedtime as needed.  Dose: 10 mg     fish oil 1000 MG Caps capsule   Take 1,000 mg by mouth every day.  Dose: 1,000 mg     fluticasone 50 MCG/ACT nasal spray  Commonly known as: Flonase   Administer 2 Sprays into affected nostril(S) 2 times a day as needed.  Dose: 2 Spray     meloxicam 7.5 MG Tabs  Commonly known as: Mobic   TAKE 1 TABLET BY MOUTH TWICE A DAY     PROBIOTIC DAILY PO   Take 1 Cap by mouth every bedtime.  Dose: 1 Capsule     pseudoephedrine 30 MG Tabs  Commonly known as: Sudafed   Take 60 mg by mouth every four hours as needed for Congestion.  Dose: 60 mg     psyllium 58.12 % Pack  Commonly known as: Metamucil   Take 1 Packet by mouth every day.  Dose: 1 Packet     therapeutic multivitamin-minerals Tabs   Take 1 Tab by mouth every day.  Dose: 1 Tablet     tizanidine 4 MG Tabs  Commonly known as: Zanaflex   TAKE 1 TABLET BY MOUTH EVERY 6 HOURS AS NEEDED  Dose: 4 mg     Turmeric 400 MG Caps   Take 400 mg by mouth every day.  Dose: 400 mg            STOP taking these medications      amphetamine-dextroamphetamine 20 MG Tabs  Commonly known as: ADDERALL (20MG)     gabapentin 800 MG tablet  Commonly known as: Neurontin  Replaced by: gabapentin 400 MG Caps     Guaifenesin 400 MG Tabs  Replaced by: guaiFENesin  MG Tb12              Allergies  Allergies   Allergen Reactions    Sulfa Drugs Nausea     Pt states \"I get very sick, I was over 20 years ago\".    Seasonal Runny Nose and Itching             DIET  Orders Placed This Encounter   Procedures    Diet Order Diet: Regular; Fluid modifications: (optional): 1800 ml Fluid Restriction     Standing Status:   Standing     Number of Occurrences:   1     Order Specific Question:   Diet:     Answer:   Regular [1]     Order Specific Question:   Fluid modifications: (optional)     Answer:   1800 ml Fluid " Restriction [10]       ACTIVITY  Avoid heavy lifting or strenuous activity      LINES, DRAINS, AND WOUNDS  This is an automated list. Peripheral IVs will be removed prior to discharge.  Peripheral IV 11/27/23 Posterior;Right Forearm (Active)   Site Assessment Clean;Dry;Intact 12/01/23 0800   Dressing Type Transparent;Occlusive 12/01/23 0800   Line Status Saline locked 12/01/23 0800   Dressing Status Clean;Dry;Intact 12/01/23 0800   Dressing Intervention N/A 12/01/23 0800   Infiltration Grading ([facility]) 0 12/01/23 0800   Phlebitis Scale ([facility] Only) 0 12/01/23 0800          Peripheral IV 11/27/23 Posterior;Right Forearm (Active)   Site Assessment Clean;Dry;Intact 12/01/23 0800   Dressing Type Transparent;Occlusive 12/01/23 0800   Line Status Saline locked 12/01/23 0800   Dressing Status Clean;Dry;Intact 12/01/23 0800   Dressing Intervention N/A 12/01/23 0800   Infiltration Grading ([facility]) 0 12/01/23 0800   Phlebitis Scale ([facility] Only) 0 12/01/23 0800               MENTAL STATUS ON TRANSFER             CONSULTATIONS  See above    PROCEDURES      LABORATORY  Lab Results   Component Value Date    SODIUM 132 (L) 12/01/2023    POTASSIUM 4.0 12/01/2023    CHLORIDE 98 12/01/2023    CO2 24 12/01/2023    GLUCOSE 118 (H) 12/01/2023    BUN 7 (L) 12/01/2023    CREATININE 0.29 (L) 12/01/2023        Lab Results   Component Value Date    WBC 12.0 (H) 12/01/2023    HEMOGLOBIN 12.0 12/01/2023    HEMATOCRIT 34.4 (L) 12/01/2023    PLATELETCT 463 (H) 12/01/2023        Total time of the discharge process exceeds 40 minutes.

## 2023-12-01 NOTE — DIETARY
Nutrition Update:    Day 9 of admit.  Paulette Concepcion is a 63 y.o. female with admitting DX of Acute hyponatremia [E87.1].  Patient being followed to optimize nutrition.    Current Diet: Regular, 1900 mL fluid restriction  PO intake: % x4 meals, 50-75% x1 meal, 25-50% x2 meals    Problem: Nutritional:  Goal: Achieve adequate nutritional intake  Description: Patient will consume >50% of meals  Outcome: goal met    RD to follow per department policy, please consult PRN

## 2023-12-01 NOTE — DOCUMENTATION QUERY
[facility]                                                                       Query Response Note      PATIENT:               EDUARDO RACHEL  ACCT #:                  [id number]  MRN:                     [id number]  :                      1960  ADMIT DATE:       2023 8:02 PM  DISCH DATE:          RESPONDING  PROVIDER #:        [id number]           QUERY TEXT:    Patient with confusion admitted for hyponatremia.   Psych consult -  Dx Major depression with psychosis; R/O encephalopathy with psychosis: less likely.   PN - Dx Acute hyponatremia, encephalopathy/delusional disorder.    Please clarify the clinical relevance or significance for the clinical/diagnostic findings for encephalopathy, delirium, and psychosis.        The patient's clinical indicators include:  Admit labs: Na 117, , Osmolality serum 240    ED note - mildly confused   PN - admits confused, unable to care for herself in a organized fashion   Psych consult -  Dx Major depression with psychosis; R/O encephalopathy with psychosis: less likely   Psych consult -  intermittent confusion in conversation, impulsive   PN - Dx Acute hyponatremia, encephalopathy/delusional disorder    Treatment:   - IV NS infusion x3; PO Cymbalta, Seroquel, Xanax   - PO Risperidone   - PO Salt tab    Risk factors - hyponatremia, delirium, psychosis, confusion, failure to thrive, poor nutrition    Thank you,  Iraida NORMAN  Clinical   Connect via [facility]  Options provided:   -- Acute metabolic encephalopathy due to hyponatremia superimposed on psychosis is clinically relevant and ruled in   -- Only acute metabolic encephalopathy due to hyponatremia   -- Only major depression with psychosis   -- Other explanation, (please specify the other explanation)   -- Unable to determine      Query  created by: Iraida Kaur on 11/29/2023 11:25 AM    RESPONSE TEXT:    Acute metabolic encephalopathy due to hyponatremia superimposed on psychosis is clinically relevant and ruled in       QUERY TEXT:    Patient admitted for hyponatremia.  Rhonchi and shortness of breath were documented beginning in the 11/25 PN.  11/25 & 26 SpO2 @ 2056 86% on RA with RR 16.  At 0042 SpO2 increased to 93% on 4L O2 NC with RR 16 and P/F ratio of 250.     Please clarify the clinical relevance or significance for the clinical/diagnostic findings:        The patient's clinical indicators include:  Admit vitals:  SpO2 98% on RA, RR 18  11/24 SpO2 range from 1000 - 2017 [88-91%]  on RA)  11/25 & 26 SpO2 @ 2056 86% on RA, RR 16 -> @ 289984% on 4L O2 NC (P/F ratio = 90/0.36 = 250), RR 16    H&P - no respiratory distress  11/23 PN (Ange) - rhonci present  11/25 PN (Ashley) - Positive for cough, SOB... rhonchi in Rt upper lobe  11/26 PN (Ashley) -  Positive for cough, sputum production and shortness of breath...rhronci on exam/congestion, repeat CXR with possible pulm contusion,     Treatment - 11/26 &11/ 27 - Up to 5L supplemental O2 NC; PO Guaifenesin ER; encourage IS    Risk factors - Asthma, rhonci, shortness of breath, cough , sputum production, tobacco abuse    Thank you,  Iraida Kaur BSN  Clinical   Connect via [facility]  Options provided:   -- Acute respiratory failure with hypoxia, after admission, is clinically relevant and ruled in   -- Findings of no clinical significance   -- Other explanation, (please specify the other explanation)   -- Unable to determine      Query created by: Iraida Kaur on 11/29/2023 11:26 AM    RESPONSE TEXT:    Unable to determine          Electronically signed by:  JOSEPH ARNOLD MD 12/1/2023 7:07 AM

## 2023-12-02 VITALS
WEIGHT: 137.57 LBS | HEART RATE: 83 BPM | OXYGEN SATURATION: 92 % | DIASTOLIC BLOOD PRESSURE: 82 MMHG | TEMPERATURE: 98.4 F | SYSTOLIC BLOOD PRESSURE: 143 MMHG | HEIGHT: 63 IN | BODY MASS INDEX: 24.38 KG/M2 | RESPIRATION RATE: 17 BRPM

## 2023-12-02 PROCEDURE — 700101 HCHG RX REV CODE 250: Performed by: STUDENT IN AN ORGANIZED HEALTH CARE EDUCATION/TRAINING PROGRAM

## 2023-12-02 PROCEDURE — 700102 HCHG RX REV CODE 250 W/ 637 OVERRIDE(OP): Performed by: INTERNAL MEDICINE

## 2023-12-02 PROCEDURE — A9270 NON-COVERED ITEM OR SERVICE: HCPCS

## 2023-12-02 PROCEDURE — 700102 HCHG RX REV CODE 250 W/ 637 OVERRIDE(OP): Performed by: STUDENT IN AN ORGANIZED HEALTH CARE EDUCATION/TRAINING PROGRAM

## 2023-12-02 PROCEDURE — A9270 NON-COVERED ITEM OR SERVICE: HCPCS | Performed by: INTERNAL MEDICINE

## 2023-12-02 PROCEDURE — 99239 HOSP IP/OBS DSCHRG MGMT >30: CPT | Performed by: INTERNAL MEDICINE

## 2023-12-02 PROCEDURE — A9270 NON-COVERED ITEM OR SERVICE: HCPCS | Performed by: STUDENT IN AN ORGANIZED HEALTH CARE EDUCATION/TRAINING PROGRAM

## 2023-12-02 PROCEDURE — 700102 HCHG RX REV CODE 250 W/ 637 OVERRIDE(OP)

## 2023-12-02 PROCEDURE — 90834 PSYTX W PT 45 MINUTES: CPT | Performed by: SOCIAL WORKER

## 2023-12-02 PROCEDURE — A9270 NON-COVERED ITEM OR SERVICE: HCPCS | Performed by: HOSPITALIST

## 2023-12-02 PROCEDURE — 700102 HCHG RX REV CODE 250 W/ 637 OVERRIDE(OP): Performed by: HOSPITALIST

## 2023-12-02 RX ADMIN — Medication 1 TABLET: at 04:30

## 2023-12-02 RX ADMIN — GABAPENTIN 400 MG: 400 CAPSULE ORAL at 04:30

## 2023-12-02 RX ADMIN — TIZANIDINE 4 MG: 4 TABLET ORAL at 13:45

## 2023-12-02 RX ADMIN — RISPERIDONE 1 MG: 1 TABLET ORAL at 04:30

## 2023-12-02 RX ADMIN — DULOXETINE HYDROCHLORIDE 30 MG: 30 CAPSULE, DELAYED RELEASE ORAL at 04:30

## 2023-12-02 RX ADMIN — OXYCODONE 5 MG: 5 TABLET ORAL at 04:29

## 2023-12-02 RX ADMIN — GABAPENTIN 400 MG: 400 CAPSULE ORAL at 11:44

## 2023-12-02 RX ADMIN — NICOTINE TRANSDERMAL SYSTEM 21 MG: 21 PATCH, EXTENDED RELEASE TRANSDERMAL at 05:31

## 2023-12-02 RX ADMIN — LISINOPRIL 20 MG: 20 TABLET ORAL at 04:31

## 2023-12-02 RX ADMIN — OXYCODONE 5 MG: 5 TABLET ORAL at 09:55

## 2023-12-02 RX ADMIN — ALPRAZOLAM 0.5 MG: 0.5 TABLET ORAL at 04:30

## 2023-12-02 RX ADMIN — AMLODIPINE BESYLATE 10 MG: 10 TABLET ORAL at 04:30

## 2023-12-02 RX ADMIN — ACETAMINOPHEN 1000 MG: 500 TABLET, FILM COATED ORAL at 11:44

## 2023-12-02 RX ADMIN — LIDOCAINE 1 PATCH: 4 PATCH TOPICAL at 04:31

## 2023-12-02 RX ADMIN — Medication 1000 UNITS: at 04:31

## 2023-12-02 RX ADMIN — SODIUM CHLORIDE 1 G: 1 TABLET ORAL at 08:25

## 2023-12-02 RX ADMIN — ACETAMINOPHEN 1000 MG: 500 TABLET, FILM COATED ORAL at 05:30

## 2023-12-02 RX ADMIN — OXYCODONE 5 MG: 5 TABLET ORAL at 13:45

## 2023-12-02 RX ADMIN — GUAIFENESIN 600 MG: 600 TABLET, EXTENDED RELEASE ORAL at 04:30

## 2023-12-02 ASSESSMENT — PAIN DESCRIPTION - PAIN TYPE
TYPE: ACUTE PAIN

## 2023-12-02 ASSESSMENT — FIBROSIS 4 INDEX: FIB4 SCORE: 0.58

## 2023-12-02 NOTE — CONSULTS
"  [facility] PSYCHIATRIC FOLLOW-UP:(established)  *Reason for admission: Pt BIBA for failure to thrive. Per EMS point found in home with dead dog, paranoid, not properly eating or drinking.    *Legal Hold Status: not applicable                S:  she is doing well, slept again. Almost tearful when telling me about records and that \"there is no one that could duplicate my life because it is my life\" Asked to clarify her concern and she again talks about this step father in his 90's that she doesn't want to have access to \"my story\". Not sure if this is her way of talking about privacy or something more.     O: Medical ROS (as pertinent):                      *Psychiatric Examination:   Vitals:   Vitals:    12/01/23 2000 12/02/23 0430 12/02/23 0431 12/02/23 0719   BP: 134/76 (!) 169/97 (!) 169/97 (!) 143/82   Pulse: 85   83   Resp: 18  18 17   Temp: 36.7 °C (98 °F)   36.9 °C (98.4 °F)   TempSrc: Temporal  Temporal Temporal   SpO2: 89%  90% 92%   Weight:   62.4 kg (137 lb 9.1 oz)    Height:         General Appearance:  good eye contact and cooperative  Abnormal Movements: none   Gait and Posture: not observed  Speech: within normal limits  Thought Process: normal rate  Associations: generally  linear  Abnormal or Psychotic Thoughts:  she might still have some paranoia or conversely, some confabulation around events she remembers poorly  Judgement and Insight: fair  Orientation: grossly intact  Recent and Remote Memory: grossly intact  Attention Span and Concentration: intact  Language:fluent  Fund of Knowledge: not tested  Mood and Affect: euthymic but reports she is depressed and anxious. Wants increase in cymbalta     Current Medications:  Scheduled Medications   Medication Dose Frequency    sodium chloride  1 g BID WITH MEALS    Pharmacy Consult Request  1 Each PHARMACY TO DOSE    amLODIPine  10 mg Q DAY    DULoxetine  30 mg DAILY    gabapentin  400 mg TID    guaiFENesin ER  600 mg Q12HRS    " "lisinopril  20 mg DAILY    rivaroxaban  10 mg DAILY AT 1800    vitamin D3  1,000 Units DAILY    risperiDONE  1 mg BID    nicotine  21 mg Daily-0600    senna-docusate  2 Tablet BID    therapeutic multivitamin-minerals  1 Tablet DAILY    lidocaine  1 Patch Q24HR          *ASSESSMENT/RECOMENDATIONS:  1..Major depression with psychosis: improved and stable in terms of psychosis. Still reports depression and anxiety.  2  R/O neurocognitive disorder  2   PTSD  3:  per first encounter: generalized anxiety disorder, ADHD as well.  4  THC use     Medical:   -Hyponatremia, hypovolemic, hypotonic, asymptomatic Initially Low urinary osmolality suggests some degree of psychogenic polydipsia on top of \"tea and toast diet\" with poor urinary solute excretion.  -Low BUN/creatinine Consistent with poor nutritional intake associated with hyponatremia  -R sided rib fx  -HTN  -Neuropathy  -chr pain issues: pt reported she has a \"back brace\" at home yesterday      Medication and Other Recommendations: final orders as per Tx Tm  Cymbalta should be increased to 60 mg  Risperdal to continue for 2 more weeks: then trial off it and see how she does  If anxiety becomes a problem can use xanax 0.5 mg bid prn which she has been on with good effect. Risks and benefits discussed.   Limit free water, coffee and tea to a total of 1.5 l, all included daily. Can drink anything else as much as desired.       Signing off, please reconsult as needed.       Discharge recommendations: per tx tm: [facility]    If released from [facility]: Discharge Instructions:  -Reviewed safety plan: 911, ER, PCM, MHC, Suicide crisis line  -Please assist with outpatient Psychiatric/substance use follow up appointments at discharge once medically cleared.         "

## 2023-12-02 NOTE — DISCHARGE SUMMARY
Discharge Summary    CHIEF COMPLAINT ON ADMISSION  Chief Complaint   Patient presents with    Psych Eval     Pt BIBA for failure to thrive. Per EMS point found in home with dead dog, paranoid, not properly eating or drinking. GCS 15. Denies SI/HI.        Reason for Admission  ems    Admission Date  2023   Discharge Date:  2023  CODE STATUS  Full Code    HPI & HOSPITAL COURSE  This is a 63 y.o. female here with Psych Eval (Pt BIBA for failure to thrive. Per EMS point found in home with dead dog, paranoid, not properly eating or drinking. GCS 15. Denies SI/HI. )  Please review Dr. Ibrahima Carlos M.D. notes for further details of history of present illness, past medical/social/family histories, allergies and medications. Please review Dr. Romeo, Psychiatry, Dr. [facility], dr. Hassan [facility] consultation notes.  She smokes cigarettes. She has a history of hypertension, anxiety depression, neuropathy, asthma, tobacco abuse. She is being evaluated for failure to thrive and behavioral issues. Part of her loss of self care was that her dog  and she was sad about that.   At the ED, afebrile, hypertensive.  Initially leukocytosis. Na 117. Cr- 0.37.  Because of critically low sodium intensivist consulted. Patient then admitted to IMCU. She was placed initially on NS but correction was fast thus this was discontinued. Nephrology consulted. It was felt that her hyponatremia was due to hypovolemia, psychologic polydypsia and tea and toast diet. She was then maintained on fluid restriction. Ultimately free water avoidance and salt tabs. Her hyponatremia slowly improved. Maintain fluid restriction, free water avoidance, salt tabs. Na levels can be checked by attending at SNF. Psychiatry consulted and wrote for psychotropics. Her mood improved and she was not combative or aggressive. SNF was arranged.    Incentive spirometry, pain control and deep breathing encouraged for rib fractures. She is advied NO MORE  smoking.      At discharge date, Paulette Concepcion afebrile and hemodynamically stable.  Paulette Concepcion wanted to be discharged today.      Imaging  DX-CHEST-PORTABLE (1 VIEW)   Final Result      Left basilar consolidation.      Right-sided rib fractures without evidence of pneumothorax.         DX-CHEST-PORTABLE (1 VIEW)   Final Result      Interstitial prominence could indicate pulmonary edema versus hypoventilatory change/atelectasis.      Right rib fractures with some adjacent increased density could indicate pulmonary contusion without pneumothorax.      CT-HEAD W/O   Final Result      1.  No acute intracranial abnormality.   2.  Redemonstration of a small amount of fat along the left aspect of the jacinta of uncertain clinical significance.   3.  Mild right maxillary sinus disease.         DX-CHEST-PORTABLE (1 VIEW)   Final Result      1.  No acute cardiac or pulmonary abnormalities are identified.   2.  Mildly displaced, acute right lateral third and fourth rib fractures.                Therefore, she is discharged in fair and stable condition to skilled nursing facility.    The patient met 2-midnight criteria for an inpatient stay at the time of discharge.      FOLLOW UP ITEMS POST DISCHARGE      DISCHARGE DIAGNOSES  Principal Problem:    Acute hyponatremia, encephalopathy/delusional disorder (POA: Yes)  Active Problems:    Mood disorder (HCC) (POA: Yes)    Tobacco dependence (POA: Yes)    Neuropathy (POA: Yes)    Hypertension (POA: Unknown)    ACP (advance care planning) (POA: Unknown)    Closed fracture of multiple ribs of right side with routine healing (POA: Unknown)    Delusional disorder (HCC) (POA: Unknown)        FOLLOW UP  Future Appointments   Date Time Provider Department Center   1/3/2024  2:00 PM Helder Starr PsyD [facility] [address] [address]  [phone]        Iraida Zavala M.D.  [address] [facility]  [address]  [place] [address]  [phone]    Follow up  Please call your primary care provider to schedule a hospital follow up.  Thank you  FOllow up with Iraida Zavala M.D. or attending at [facility] upon receipt. Incentive spirometry and pain control for rib fx  Follow up with Nephrology in 1-2 weeks or as needed for hyponatremia   Follow up with Psychiatry in 1 week for behavioral issues/mood disorder on psychotropic medications.  NURSING provide resources/pamphlets for  Monitor behavior, hyponatremia (currently fluid restriction with avoidance of free water recommended, use of salt tabs), smoking (NO MORE tobacco), hypertension (Check and record blood pressures at home at least twice a day for primary provider to review), narcotic use (Avoid swimming, driving or operating machinery. Treat constipation with prescribed bowel regimen)    MEDICATIONS ON DISCHARGE     Medication List        START taking these medications        Instructions   amLODIPine 10 MG Tabs  Commonly known as: Norvasc   Take 1 Tablet by mouth every day.  Dose: 10 mg     benzocaine-menthol 15-3.6 MG Lozg  Commonly known as: Cepacol   Dissolve 1 Lozenge in the mouth every 2 hours as needed (oral pain).  Dose: 1 Lozenge     gabapentin 400 MG Caps  Commonly known as: Neurontin  Replaces: gabapentin 800 MG tablet   Take 1 Capsule by mouth 3 times a day.  Dose: 400 mg     guaiFENesin  MG Tb12  Commonly known as: Mucinex  Replaces: Guaifenesin 400 MG Tabs   Take 1 Tablet by mouth every 12 hours.  Dose: 600 mg     lidocaine 4 % Ptch  Commonly known as: Asperflex   Place 1 Patch on the skin every 24 hours.  Dose: 1 Patch     ondansetron 4 MG Tbdp  Commonly known as: Zofran ODT   Take 1 Tablet by mouth every four hours as needed for Nausea/Vomiting (give PO if no IV route available).  Dose: 4 mg     oxyCODONE immediate-release 5 MG Tabs  Commonly known as: Roxicodone   Take 1 Tablet by mouth every 8 hours as needed for Severe Pain for up to 3 days.  Dose: 5  mg     polyethylene glycol/lytes Pack  Commonly known as: Miralax   Take 1 Packet by mouth 1 time a day as needed (if sennosides and docusate ineffective after 24 hours).  Dose: 17 g     risperiDONE 1 MG Tabs  Commonly known as: RisperDAL   Take 1 Tablet by mouth 2 times a day.  Dose: 1 mg     rivaroxaban 10 MG Tabs tablet  Commonly known as: Xarelto   Doctor's comments: STOP when more ambulatory  Take 1 Tablet by mouth every day at 6 PM.  Dose: 10 mg     senna-docusate 8.6-50 MG Tabs  Commonly known as: Pericolace Or Senokot S   Take 2 Tablets by mouth 2 times a day.  Dose: 2 Tablet     sodium chloride 1 GM Tabs  Commonly known as: Salt   Take 1 Tablet by mouth 2 times a day with meals.  Dose: 1 g     vitamin D 1000 UNIT Tabs  Commonly known as: Cholecalciferol   Take 1 Tablet by mouth every day.  Dose: 1,000 Units            CHANGE how you take these medications        Instructions   DULoxetine 30 MG Cpep  What changed:   medication strength  how much to take  when to take this  Commonly known as: Cymbalta   Take 1 Capsule by mouth every day.  Dose: 30 mg     lisinopril 20 MG Tabs  What changed:   medication strength  how much to take  Another medication with the same name was removed. Continue taking this medication, and follow the directions you see here.  Commonly known as: Prinivil   Take 1 Tablet by mouth every day.  Dose: 20 mg            CONTINUE taking these medications        Instructions   acetaminophen 500 MG Tabs  Commonly known as: Tylenol   Take 500-1,000 mg by mouth every 6 hours as needed.  Dose: 500-1,000 mg     albuterol 108 (90 Base) MCG/ACT Aers inhalation aerosol   Doctor's comments: PT REQUEST  INHALE 1 PUFF EVERY FOUR HOURS AS NEEDED FOR SHORTNESS OF BREATH.  Dose: 1 Puff     ALPRAZolam 1 MG Tabs  Commonly known as: Xanax   Take 0.5-1 Tablets by mouth 1 time a day as needed for Anxiety.  Dose: 0.5-1 Tablet     cetirizine 10 MG Tabs  Commonly known as: ZyrTEC   Take 10 mg by mouth at bedtime  "as needed.  Dose: 10 mg     fish oil 1000 MG Caps capsule   Take 1,000 mg by mouth every day.  Dose: 1,000 mg     fluticasone 50 MCG/ACT nasal spray  Commonly known as: Flonase   Administer 2 Sprays into affected nostril(S) 2 times a day as needed.  Dose: 2 Spray     meloxicam 7.5 MG Tabs  Commonly known as: Mobic   TAKE 1 TABLET BY MOUTH TWICE A DAY     PROBIOTIC DAILY PO   Take 1 Cap by mouth every bedtime.  Dose: 1 Capsule     pseudoephedrine 30 MG Tabs  Commonly known as: Sudafed   Take 60 mg by mouth every four hours as needed for Congestion.  Dose: 60 mg     psyllium 58.12 % Pack  Commonly known as: Metamucil   Take 1 Packet by mouth every day.  Dose: 1 Packet     therapeutic multivitamin-minerals Tabs   Take 1 Tab by mouth every day.  Dose: 1 Tablet     tizanidine 4 MG Tabs  Commonly known as: Zanaflex   TAKE 1 TABLET BY MOUTH EVERY 6 HOURS AS NEEDED  Dose: 4 mg     Turmeric 400 MG Caps   Take 400 mg by mouth every day.  Dose: 400 mg            STOP taking these medications      amphetamine-dextroamphetamine 20 MG Tabs  Commonly known as: ADDERALL (20MG)     gabapentin 800 MG tablet  Commonly known as: Neurontin  Replaced by: gabapentin 400 MG Caps     Guaifenesin 400 MG Tabs  Replaced by: guaiFENesin  MG Tb12              Allergies  Allergies   Allergen Reactions    Sulfa Drugs Nausea     Pt states \"I get very sick, I was over 20 years ago\".    Seasonal Runny Nose and Itching             DIET  Orders Placed This Encounter   Procedures    Diet Order Diet: Regular; Fluid modifications: (optional): 1800 ml Fluid Restriction     Standing Status:   Standing     Number of Occurrences:   1     Order Specific Question:   Diet:     Answer:   Regular [1]     Order Specific Question:   Fluid modifications: (optional)     Answer:   1800 ml Fluid Restriction [10]       ACTIVITY  Avoid heavy lifting or strenuous activity      LINES, DRAINS, AND WOUNDS  This is an automated list. Peripheral IVs will be removed prior " to discharge.  Peripheral IV 11/27/23 Posterior;Right Forearm (Active)   Site Assessment Clean;Dry;Intact 12/01/23 0800   Dressing Type Transparent;Occlusive 12/01/23 0800   Line Status Saline locked 12/01/23 0800   Dressing Status Clean;Dry;Intact 12/01/23 0800   Dressing Intervention N/A 12/01/23 0800   Infiltration Grading ([facility]) 0 12/01/23 0800   Phlebitis Scale ([facility] Only) 0 12/01/23 0800          Peripheral IV 11/27/23 Posterior;Right Forearm (Active)   Site Assessment Clean;Dry;Intact 12/01/23 0800   Dressing Type Transparent;Occlusive 12/01/23 0800   Line Status Saline locked 12/01/23 0800   Dressing Status Clean;Dry;Intact 12/01/23 0800   Dressing Intervention N/A 12/01/23 0800   Infiltration Grading ([facility]) 0 12/01/23 0800   Phlebitis Scale ([facility] Only) 0 12/01/23 0800               MENTAL STATUS ON TRANSFER             CONSULTATIONS  See above    PROCEDURES      LABORATORY  Lab Results   Component Value Date    SODIUM 132 (L) 12/01/2023    POTASSIUM 4.0 12/01/2023    CHLORIDE 98 12/01/2023    CO2 24 12/01/2023    GLUCOSE 118 (H) 12/01/2023    BUN 7 (L) 12/01/2023    CREATININE 0.29 (L) 12/01/2023        Lab Results   Component Value Date    WBC 12.0 (H) 12/01/2023    HEMOGLOBIN 12.0 12/01/2023    HEMATOCRIT 34.4 (L) 12/01/2023    PLATELETCT 463 (H) 12/01/2023        Total time of the discharge process exceeds 40 minutes.

## 2023-12-02 NOTE — CONSULTS
"[facility]    INPATIENT ASSESSMENT    Name: Paulette Concepcion  MRN: [id number]  : 1960  Age: 63 y.o.  Date of assessment: 2023  PCP: Iraida Zavala M.D.  Persons in attendance: Patient      HPI:  Per Medical Record: \"Paulette Concepcion is a 63 y.o. female who presented on 23 to the emergency department for concern for failure to thrive. EMS reports a friend of the patient called because they had not heard from her in a few days EMS found the patient at home with a dead dog for the last 3 days at least all the electricity was also off in the home. \" Patient was referred to [facility] for a psychotherapy session.     CHIEF COMPLAINT/PRESENTING ISSUE (as stated by Patient): Yessica Concepcion is a 63 year old female seen sitting up on hospital bed, pleasant, oriented to self and location, speech clear and coherent, no suicidal or homicidal ideations. Patient was positive for paranoia and a delusional thought pattern. Denies depression or suicidal ideations. Patient reports anxiety.     Psychotherapy Session Summary     Clinician provided encouragement and support as patient discussed things in her life that causes anxiety. Clinician assisted patient in processing issues that cause her anxiety and worked on reality testing to determine the level of concern and the likelihood of any of the concerns manifesting. Patient continues to be concerned about the internet and people gaining access to her, stating that she no longer uses certain apps because of this fear. Patient also discussed her sadness of the loss of her dog and the plan for her son to have the dog cremated. Despite the thoughts related to the internet, patient was more clear to day and presented with decreased anxiety. Patient reports feeling hopeful about her transfer to the SNF for physical therapy with the goal of regaining strength.  Patient was receptive to ways of managing " distress and focusing on he future.        Chief Complaint   Patient presents with    Psych Eval     Pt BIBA for failure to thrive. Per EMS point found in home with dead dog, paranoid, not properly eating or drinking. GCS 15. Denies SI/HI.         CURRENT LIVING SITUATION/SOCIAL SUPPORT:  Patient is transferring to a SNF per RN case manager.         Crisis Safety Plan completed and copy given to patient? no      MENTAL STATUS              Participation: Active verbal participation  Grooming: Casual  Orientation: Alert and Confused  Behavior: Calm  Eye contact: Good  Mood: Happy  Affect: Full range  Thought process: Circumstantial  Thought content: Evidence of delusion  Speech: Soft  Perception:  some delusional thought patterns noted  Memory:  Recent:  Limited and Remote:  Limited  Insight: Limited  Judgment:  Adequate  Other:    Collateral information:   Source:   Significant other present in person:    Significant other by telephone   [facility] /-consulted   [facility] Nursing Staff   [facility] Medical Record-reviewed   Other: Psychiatry     Unable to complete full assessment due to:   Acute intoxication   Patient declined to participate/engage   Patient verbally unresponsive   Significant cognitive deficits   Significant perceptual distortions or behavioral disorganization   Other:                  CLINICAL IMPRESSIONS:  Primary:  History of Major Depressive Disorder per outpatient medical record; with psychotic features  Secondary:  Generalized Anxiety Disorder                                   IDENTIFIED NEEDS/PLAN:  [Trigger DISPOSITION list for any items marked]      Imminent safety risk - self  Imminent safety risk - others     Acute substance withdrawal x  Psychosis/Impaired reality testing   x  Mood/anxiety   Substance use/Addictive behavior    x Maladaptive behavior   Parent/child conflict     Family/Couples conflict   Biomedical     Housing   Financial      Legal  Occupational/Educational      Domestic violence   Other:     Recommendations and Observation Level:  Patient to continue with [facility], for psychotherapy and medication management. Sent email to outpatient, informing them of patients transfer.      Legal Hold: N/A          Maura Thomas, Ph.D., [facility]  12/2/2023   Length of intervention: 45 minutes

## 2023-12-02 NOTE — CARE PLAN
Problem: Pain - Standard  Goal: Alleviation of pain or a reduction in pain to the patient’s comfort goal  Outcome: Progressing  Note: Assess and monitor for pain. Provide pharmacological and non-pharmacological interventions as appropriate. Re-evaluate and continue to monitor.        Problem: Psychosocial  Goal: Patient's ability to identify and develop effective coping behaviors will improve  Outcome: Progressing  Goal: Patient's ability to identify and utilize available support systems will improve  Outcome: Progressing     Problem: Fluid Volume  Goal: Fluid volume balance will be maintained  Outcome: Progressing     Problem: Respiratory  Goal: Patient will achieve/maintain optimum respiratory ventilation and gas exchange  Outcome: Progressing   The patient is Watcher - Medium risk of patient condition declining or worsening    Shift Goals  Clinical Goals: safety, monitor labs, safe transport  Patient Goals: rest, pain & anxiety management  Family Goals: alfonso    Progress made toward(s) clinical / shift goals:  medically clear    Patient is not progressing towards the following goals:

## 2023-12-02 NOTE — PROGRESS NOTES
Discharge teaching completed, no further questions or concerns. All personal belongings with pt. Pt is A&OX4. No signs of distress. All lines removed, tele box removed and returned, and monitors notified. Pt to be transferred to [facility] at 1400. Report given to Beronica TENORIO.

## 2023-12-02 NOTE — PROGRESS NOTES
Assumed care of patient, bedside report received from Sariah TENORIO. Updated on POC, call light within reach and fall precautions in place. Bed locked and in lowest position. Patient instructed to call for assistance before getting out of bed. All questions answered, no other needs at this time.

## 2023-12-02 NOTE — CARE PLAN
The patient is Stable - Low risk of patient condition declining or worsening    Shift Goals  Clinical Goals: safety, ambulation  Patient Goals: anxiety and pain management  Family Goals: alfonso    Progress made toward(s) clinical / shift goals:      Problem: Pain - Standard  Goal: Alleviation of pain or a reduction in pain to the patient’s comfort goal  Outcome: Progressing  Note: Intermittent pain in R back/ribs. Daily lidocaine patch, PRN medications available. Heat pack given to pt.      Problem: Psychosocial  Goal: Patient's ability to identify and develop effective coping behaviors will improve  Outcome: Progressing  Note: RN discussed POC throughout night and going into tomorrow for discharge, this provided the patient with some relief of anxiety related to uncontrol of medical conditions.      Problem: Knowledge Deficit - Standard  Goal: Patient and family/care givers will demonstrate understanding of plan of care, disease process/condition, diagnostic tests and medications  Outcome: Progressing  Note: Gave patient update of POC and discharge plan for tomorrow.        Patient is not progressing towards the following goals:

## 2023-12-02 NOTE — PROGRESS NOTES
Bedside report received from night RN; assumed pt care. Pt assessment complete. Pt plan to be discharged today at 1400. Reviewed plan of care with pt. Pt would like a shower. Chart and labs reviewed. Bed in lowest position, and 2 side rails up. Pt educated on call light; call light with in reach.

## 2023-12-12 ENCOUNTER — APPOINTMENT (OUTPATIENT)
Dept: MEDICAL GROUP | Facility: PHYSICIAN GROUP | Age: 63
End: 2023-12-12
Payer: MEDICARE

## 2023-12-12 ENCOUNTER — TELEPHONE (OUTPATIENT)
Dept: MEDICAL GROUP | Facility: PHYSICIAN GROUP | Age: 63
End: 2023-12-12

## 2023-12-15 ENCOUNTER — HOME HEALTH ADMISSION (OUTPATIENT)
Dept: HOME HEALTH SERVICES | Facility: HOME HEALTHCARE | Age: 63
End: 2023-12-15
Payer: MEDICARE

## 2023-12-18 ENCOUNTER — APPOINTMENT (OUTPATIENT)
Dept: MEDICAL GROUP | Facility: PHYSICIAN GROUP | Age: 63
End: 2023-12-18
Payer: MEDICARE

## 2023-12-22 ENCOUNTER — OFFICE VISIT (OUTPATIENT)
Dept: MEDICAL GROUP | Facility: PHYSICIAN GROUP | Age: 63
End: 2023-12-22
Payer: MEDICARE

## 2023-12-22 VITALS
DIASTOLIC BLOOD PRESSURE: 72 MMHG | HEIGHT: 63 IN | HEART RATE: 107 BPM | SYSTOLIC BLOOD PRESSURE: 120 MMHG | RESPIRATION RATE: 18 BRPM | WEIGHT: 139.5 LBS | BODY MASS INDEX: 24.72 KG/M2 | TEMPERATURE: 99.1 F | OXYGEN SATURATION: 97 %

## 2023-12-22 DIAGNOSIS — D72.829 LEUKOCYTOSIS, UNSPECIFIED TYPE: ICD-10-CM

## 2023-12-22 DIAGNOSIS — G89.29 CHRONIC LOW BACK PAIN WITH SCIATICA, SCIATICA LATERALITY UNSPECIFIED, UNSPECIFIED BACK PAIN LATERALITY: ICD-10-CM

## 2023-12-22 DIAGNOSIS — M54.40 CHRONIC LOW BACK PAIN WITH SCIATICA, SCIATICA LATERALITY UNSPECIFIED, UNSPECIFIED BACK PAIN LATERALITY: ICD-10-CM

## 2023-12-22 DIAGNOSIS — Z09 HOSPITAL DISCHARGE FOLLOW-UP: ICD-10-CM

## 2023-12-22 DIAGNOSIS — I10 HYPERTENSION, UNSPECIFIED TYPE: ICD-10-CM

## 2023-12-22 DIAGNOSIS — I10 PRIMARY HYPERTENSION: ICD-10-CM

## 2023-12-22 DIAGNOSIS — E87.1 HYPONATREMIA: ICD-10-CM

## 2023-12-22 DIAGNOSIS — M54.6 ACUTE MIDLINE THORACIC BACK PAIN: ICD-10-CM

## 2023-12-22 DIAGNOSIS — M54.59 OTHER LOW BACK PAIN: ICD-10-CM

## 2023-12-22 DIAGNOSIS — U07.1 COVID-19 VIRUS INFECTION: ICD-10-CM

## 2023-12-22 DIAGNOSIS — Z11.52 ENCOUNTER FOR SCREENING FOR COVID-19: ICD-10-CM

## 2023-12-22 DIAGNOSIS — E55.9 VITAMIN D DEFICIENCY: ICD-10-CM

## 2023-12-22 LAB
FLUAV RNA SPEC QL NAA+PROBE: NEGATIVE
FLUBV RNA SPEC QL NAA+PROBE: NEGATIVE
RSV RNA SPEC QL NAA+PROBE: NEGATIVE
SARS-COV-2 RNA RESP QL NAA+PROBE: POSITIVE

## 2023-12-22 PROCEDURE — 0241U POCT CEPHEID COV-2, FLU A/B, RSV - PCR: CPT | Performed by: STUDENT IN AN ORGANIZED HEALTH CARE EDUCATION/TRAINING PROGRAM

## 2023-12-22 PROCEDURE — 99214 OFFICE O/P EST MOD 30 MIN: CPT | Performed by: STUDENT IN AN ORGANIZED HEALTH CARE EDUCATION/TRAINING PROGRAM

## 2023-12-22 PROCEDURE — 3078F DIAST BP <80 MM HG: CPT | Performed by: STUDENT IN AN ORGANIZED HEALTH CARE EDUCATION/TRAINING PROGRAM

## 2023-12-22 PROCEDURE — 3074F SYST BP LT 130 MM HG: CPT | Performed by: STUDENT IN AN ORGANIZED HEALTH CARE EDUCATION/TRAINING PROGRAM

## 2023-12-22 RX ORDER — OXYCODONE HYDROCHLORIDE 5 MG/1
5 TABLET ORAL EVERY 8 HOURS PRN
Qty: 90 TABLET | Refills: 0 | Status: SHIPPED | OUTPATIENT
Start: 2023-12-22 | End: 2023-12-26 | Stop reason: SDUPTHER

## 2023-12-22 RX ORDER — AMLODIPINE BESYLATE 10 MG/1
10 TABLET ORAL DAILY
Qty: 100 TABLET | Refills: 2 | Status: SHIPPED | OUTPATIENT
Start: 2023-12-22

## 2023-12-22 ASSESSMENT — ENCOUNTER SYMPTOMS
SENSORY CHANGE: 0
PALPITATIONS: 0
BLOOD IN STOOL: 0
DIZZINESS: 0
DEPRESSION: 1
SPEECH CHANGE: 0
BACK PAIN: 1
CHILLS: 0
FEVER: 0
EYE PAIN: 0
EYE DISCHARGE: 0
WEAKNESS: 0
FOCAL WEAKNESS: 0
SHORTNESS OF BREATH: 0

## 2023-12-22 ASSESSMENT — FIBROSIS 4 INDEX: FIB4 SCORE: 0.58

## 2023-12-22 NOTE — PATIENT INSTRUCTIONS
-Placed referral to pain medicine  -quit using marijuana, because we are giving you opioid medicine (oxycodone)  -Get MRI spine done  -wear masks for 5 more days

## 2023-12-22 NOTE — PROGRESS NOTES
Subjective:     Paulette Concepcion is a 63 y.o. female who presents for Hospital Follow-up.    Transitional Care Management     HPI:   Recently hospitalized for failure to thrive and hyponatremia  In hospital Psych Eval was done. Pt BIBA for failure to thrive. Per EMS point found in home with dead dog, paranoid, not properly eating or drinking. During the dog fight, she tripped and fell and broke her rib fracture (shown on x ray). GCS 15. Denied SI/HI. She was very sad about the dog loss.  At the ED, afebrile, hypertensive.  Initially leukocytosis. Na 117. Cr- 0.37.\Because of critically low sodium intensivist consulted. Patient then admitted to [facility]. She was placed initially on NS but correction was fast thus this was discontinued. Nephrology consulted. It was felt that her hyponatremia was due to hypovolemia, psychologic polydypsia and tea and toast diet. She was then maintained on fluid restriction. Ultimately free water avoidance and salt tabs. Her hyponatremia slowly improved. Maintained fluid restriction, free water avoidance, salt tabs.  Sodium improved to 132 on discharge. Discharged to SNF.  Discharged 2 days from the facility ([facility])  R Rib fracture still hurts, able to take a deep breath    Patient states that she is no longer taking risperidone, she discontinued it.    Chronic lumbar pain with sciatica traveling down to toes  Has been doing physical therapy recently. Does not want to do physical therapy anymore.  Takes tizanidine, meloxicam, tylenol for back pain but not adequate.  Tramadol made her feel weird, oxycodone helps with her pain currently.    Patient states that mood is stable and being followed by behavioral health    Patient was tested positive for COVID on 12/17/23, just had mild sore throat, and has had no symptom.    Current medicines (including reconciliation performed today)  Current Outpatient Medications   Medication Sig Dispense Refill    amLODIPine  (NORVASC) 10 MG Tab Take 1 Tablet by mouth every day. 100 Tablet 2    oxyCODONE immediate-release (ROXICODONE) 5 MG Tab Take 1 Tablet by mouth every 8 hours as needed for Severe Pain for up to 30 days. 90 Tablet 0    benzocaine-menthol (CEPACOL) 15-3.6 MG Lozenge Dissolve 1 Lozenge in the mouth every 2 hours as needed (oral pain).      DULoxetine (CYMBALTA) 30 MG Cap DR Particles Take 1 Capsule by mouth every day. 30 Capsule     gabapentin (NEURONTIN) 400 MG Cap Take 1 Capsule by mouth 3 times a day. 90 Capsule     guaiFENesin ER (MUCINEX) 600 MG TABLET SR 12 HR Take 1 Tablet by mouth every 12 hours. 28 Tablet     lidocaine (ASPERFLEX) 4 % Patch Place 1 Patch on the skin every 24 hours.      lisinopril (PRINIVIL) 20 MG Tab Take 1 Tablet by mouth every day. 30 Tablet     ondansetron (ZOFRAN ODT) 4 MG TABLET DISPERSIBLE Take 1 Tablet by mouth every four hours as needed for Nausea/Vomiting (give PO if no IV route available). 10 Tablet 0    rivaroxaban (XARELTO) 10 MG Tab tablet Take 1 Tablet by mouth every day at 6 PM. 30 Tablet     senna-docusate (PERICOLACE OR SENOKOT S) 8.6-50 MG Tab Take 2 Tablets by mouth 2 times a day. 30 Tablet 0    polyethylene glycol/lytes (MIRALAX) Pack Take 1 Packet by mouth 1 time a day as needed (if sennosides and docusate ineffective after 24 hours).  3    sodium chloride (SALT) 1 GM Tab Take 1 Tablet by mouth 2 times a day with meals. 90 Tablet 3    vitamin D3 (CHOLECALCIFEROL) 1000 UNIT Tab Take 1 Tablet by mouth every day. 60 Tablet     ALPRAZolam (XANAX) 1 MG Tab Take 0.5-1 Tablets by mouth 1 time a day as needed for Anxiety.      acetaminophen (TYLENOL) 500 MG Tab Take 500-1,000 mg by mouth every 6 hours as needed.      pseudoephedrine (SUDAFED) 30 MG Tab Take 60 mg by mouth every four hours as needed for Congestion.      Turmeric 400 MG Cap Take 400 mg by mouth every day.      Omega-3 Fatty Acids (FISH OIL) 1000 MG Cap capsule Take 1,000 mg by mouth every day.      psyllium  (METAMUCIL) 58.12 % Pack Take 1 Packet by mouth every day.      albuterol 108 (90 Base) MCG/ACT Aero Soln inhalation aerosol INHALE 1 PUFF EVERY FOUR HOURS AS NEEDED FOR SHORTNESS OF BREATH. 18 Each 1    meloxicam (MOBIC) 7.5 MG Tab TAKE 1 TABLET BY MOUTH TWICE A DAY (Patient taking differently: Take 7.5 mg by mouth 2 times a day.) 200 Tablet 3    tizanidine (ZANAFLEX) 4 MG Tab TAKE 1 TABLET BY MOUTH EVERY 6 HOURS AS NEEDED 100 Tablet 1    therapeutic multivitamin-minerals (THERAGRAN-M) Tab Take 1 Tab by mouth every day.      fluticasone (FLONASE) 50 MCG/ACT nasal spray Administer 2 Sprays into affected nostril(S) 2 times a day as needed.      cetirizine (ZYRTEC) 10 MG Tab Take 10 mg by mouth at bedtime as needed.      Probiotic Product (PROBIOTIC DAILY PO) Take 1 Cap by mouth every bedtime.       No current facility-administered medications for this visit.       Allergies:   Sulfa drugs and Seasonal    Social History     Tobacco Use    Smoking status: Former     Current packs/day: 1.00     Average packs/day: 1 pack/day for 45.0 years (45.0 ttl pk-yrs)     Types: Cigarettes     Start date: 1979    Smokeless tobacco: Never   Vaping Use    Vaping Use: Some days    Substances: CBD, Flavoring    Devices: Disposable   Substance Use Topics    Alcohol use: Not Currently     Comment: socially    Drug use: Not Currently     Types: Marijuana, Inhaled     Comment: occ edible THC       ROS:  Review of Systems   Constitutional:  Negative for chills and fever.   HENT:  Negative for ear discharge and ear pain.    Eyes:  Negative for pain and discharge.   Respiratory:  Negative for shortness of breath.    Cardiovascular:  Negative for chest pain and palpitations.   Gastrointestinal:  Negative for blood in stool and melena.   Musculoskeletal:  Positive for back pain.   Neurological:  Negative for dizziness, sensory change, speech change, focal weakness and weakness.   Psychiatric/Behavioral:  Positive for depression.      "    Objective:     Vitals:    12/22/23 1335   BP: 120/72   BP Location: Left arm   Patient Position: Sitting   BP Cuff Size: Adult   Pulse: (!) 107   Resp: 18   Temp: 37.3 °C (99.1 °F)   TempSrc: Temporal   SpO2: 97%   Weight: 63.3 kg (139 lb 8 oz)   Height: 1.6 m (5' 2.99\")     Body mass index is 24.72 kg/m².    Physical Exam:  Physical Exam  Vitals reviewed.   Constitutional:       General: She is not in acute distress.     Appearance: She is not ill-appearing or diaphoretic.   HENT:      Head: Normocephalic and atraumatic.      Right Ear: External ear normal.      Left Ear: External ear normal.      Nose: No rhinorrhea.      Mouth/Throat:      Pharynx: No oropharyngeal exudate.   Eyes:      General: No scleral icterus.        Right eye: No discharge.         Left eye: No discharge.      Extraocular Movements: Extraocular movements intact.   Cardiovascular:      Rate and Rhythm: Regular rhythm. Tachycardia present.      Heart sounds: Normal heart sounds. No murmur heard.  Pulmonary:      Effort: Pulmonary effort is normal. No respiratory distress.      Breath sounds: Normal breath sounds. No stridor. No wheezing, rhonchi or rales.   Abdominal:      General: There is no distension.      Palpations: Abdomen is soft. There is no mass.      Tenderness: There is no abdominal tenderness. There is no guarding or rebound.   Musculoskeletal:         General: Tenderness present.      Cervical back: No rigidity or tenderness.      Right lower leg: No edema.      Left lower leg: No edema.      Comments: Tender to palpation in midline in thoracic and lumbar areas   Lymphadenopathy:      Cervical: No cervical adenopathy.   Skin:     Capillary Refill: Capillary refill takes less than 2 seconds.   Neurological:      General: No focal deficit present.      Mental Status: She is alert and oriented to person, place, and time. Mental status is at baseline.      Sensory: No sensory deficit.      Motor: No weakness.      Gait: Gait " normal.      Deep Tendon Reflexes: Reflexes normal.   Psychiatric:         Mood and Affect: Mood normal.         Behavior: Behavior normal.        Assessment and Plan:   1. Hospital discharge follow-up  Patient was recently discharged from hospital after being admitted for failure to thrive and hyponatremia    2. Other low back pain  3. Chronic low back pain with sciatica, sciatica laterality unspecified, unspecified back pain laterality  Chronic, decompensated, patient states that her lower back pain has gotten worse.  Patient does not want to physical therapy anymore.  Patient has been using oxycodone since hospitalization; oxycodone 5 mg every 8 hours as needed.  Urine drug screen was done on in November 2023 and was positive for benzodiazepine and cannabinoid; I instructed patient to quit using cannabinoids especially since patient is taking benzodiazepine and oxycodone; patient states that she has naloxone as needed at home  -Based on the opioid risk calculator, patient is high risk of opioid addiction; so we would like to wean the patient from opioid as soon as possible once her pain is better controlled with procedures from pain management  -MRI spine in 2021 showed stable multilevel degenerative changes and stable chronic compression deformities of L1, L2 and L5; there is posterior fusion hardware spanning L2-L3  -Appreciate help from pain management  - MR-LUMBAR SPINE-W/O; Future  - Referral to Pain Management  - oxyCODONE immediate-release (ROXICODONE) 5 MG Tab; Take 1 Tablet by mouth every 8 hours as needed for Severe Pain for up to 30 days.  Dispense: 90 Tablet; Refill: 0  - Controlled Substance Treatment Agreement    4. Leukocytosis, unspecified type  New problem, likely reactive  - CBC WITH DIFFERENTIAL; Future    5. Hyponatremia  New problem, patient has had improved sodium level  (117->132)during hospitalization after getting fluid and salt tablets, nephrology doubt there is a component of  psychogenic polydipsia in the setting of hypovolemia  - Comp Metabolic Panel; Future    6. Hypertension, unspecified type  7.Primary hypertension  Chronic, stable, blood pressure in clinic is 120/72  -Continue amlodipine 10 mg daily  - MICROALBUMIN CREAT RATIO URINE; Future  - amLODIPine (NORVASC) 10 MG Tab; Take 1 Tablet by mouth every day.  Dispense: 100 Tablet; Refill: 2    8. Vitamin D deficiency  Chronic, stable, last vitamin D was 21 in November 2023, was not on supplementation then, but now on supplementation, continue vitamin D 1000 units daily  - VITAMIN D,25 HYDROXY (DEFICIENCY); Future      9. Acute midline thoracic back pain  Acute, new problem, given midline tenderness and no recent trauma there is concern for compression fracture proceeding with MRI  - MR-THORACIC SPINE-W/O; Future    10. Encounter for screening for COVID-19  Obtained in clinic today  - POCT Cepheid CoV-2, Flu A/B, RSV - PCR    11. COVID-19 virus infection  Acute, COVID is positive today, asymptomatic,   instructed patient to wear mask for 5 more days    - Chart and discharge summary were reviewed.   - Hospitalization and results reviewed with patient.   - Medications reviewed including instructions regarding high risk medications, dosing and side effects.  - Recommended Services: No services needed at this time  - Advance directive/POLST on file?  No  can discuss in the future.    Follow-up:Return in about 3 months (around 3/22/2024).    Face-to-face transitional care management services with HIGH (today's visit is within days post discharge & LACE+ score 59+) medical decision complexity were provided. Within 7 days after being discharged from nursing facility

## 2023-12-26 DIAGNOSIS — G89.29 CHRONIC LOW BACK PAIN WITH SCIATICA, SCIATICA LATERALITY UNSPECIFIED, UNSPECIFIED BACK PAIN LATERALITY: ICD-10-CM

## 2023-12-26 DIAGNOSIS — M54.40 CHRONIC LOW BACK PAIN WITH SCIATICA, SCIATICA LATERALITY UNSPECIFIED, UNSPECIFIED BACK PAIN LATERALITY: ICD-10-CM

## 2023-12-27 ENCOUNTER — HOME CARE VISIT (OUTPATIENT)
Dept: HOME HEALTH SERVICES | Facility: HOME HEALTHCARE | Age: 63
End: 2023-12-27
Payer: MEDICARE

## 2023-12-27 PROCEDURE — G0493 RN CARE EA 15 MIN HH/HOSPICE: HCPCS

## 2023-12-27 PROCEDURE — 665001 SOC-HOME HEALTH

## 2023-12-27 RX ORDER — OXYCODONE HYDROCHLORIDE 5 MG/1
TABLET ORAL
Qty: 69 TABLET | Refills: 0 | Status: SHIPPED | OUTPATIENT
Start: 2023-12-27 | End: 2024-01-18

## 2023-12-27 RX ORDER — LISINOPRIL 20 MG/1
20 TABLET ORAL DAILY
Qty: 30 TABLET | Status: CANCELLED | OUTPATIENT
Start: 2023-12-27

## 2023-12-27 ASSESSMENT — FIBROSIS 4 INDEX: FIB4 SCORE: 0.58

## 2023-12-28 ENCOUNTER — HOME CARE VISIT (OUTPATIENT)
Dept: HOME HEALTH SERVICES | Facility: HOME HEALTHCARE | Age: 63
End: 2023-12-28
Payer: MEDICARE

## 2023-12-28 ENCOUNTER — DOCUMENTATION (OUTPATIENT)
Dept: MEDICAL GROUP | Facility: PHYSICIAN GROUP | Age: 63
End: 2023-12-28
Payer: MEDICARE

## 2023-12-28 VITALS
TEMPERATURE: 98 F | HEIGHT: 63 IN | HEART RATE: 89 BPM | OXYGEN SATURATION: 96 % | RESPIRATION RATE: 16 BRPM | DIASTOLIC BLOOD PRESSURE: 84 MMHG | WEIGHT: 137.2 LBS | BODY MASS INDEX: 24.31 KG/M2 | SYSTOLIC BLOOD PRESSURE: 138 MMHG

## 2023-12-28 DIAGNOSIS — F41.1 GENERALIZED ANXIETY DISORDER: ICD-10-CM

## 2023-12-28 RX ORDER — ALPRAZOLAM 1 MG/1
.5-1 TABLET ORAL
Qty: 30 TABLET | Refills: 0 | Status: SHIPPED | OUTPATIENT
Start: 2023-12-28 | End: 2024-01-25 | Stop reason: SDUPTHER

## 2023-12-28 SDOH — ECONOMIC STABILITY: HOUSING INSECURITY: HOME SAFETY: REVIEWED UNEVEN WOOD FLOORING RISKS FOR TRIPPING WITH PATIENT

## 2023-12-28 ASSESSMENT — ENCOUNTER SYMPTOMS
PERSON REPORTING PAIN: PATIENT
PAIN LOCATION - PAIN FREQUENCY: INTERMITTENT
NAUSEA: DENIES
FATIGUE: 1
DEBILITATING PAIN: 1
LAST BOWEL MOVEMENT: 66835
PAIN: 1
THOUGHT CONTENT - SUSPICIOUS: 1
STOOL FREQUENCY: DAILY
BOWEL PATTERN NORMAL: 1
PAIN SEVERITY GOAL: 0/10
SUBJECTIVE PAIN PROGRESSION: GRADUALLY IMPROVING
PAIN LOCATION: RIGHT RIBS
LOWEST PAIN SEVERITY IN PAST 24 HOURS: 2/10
PAIN LOCATION - PAIN QUALITY: SHARP/ACHE
HIGHEST PAIN SEVERITY IN PAST 24 HOURS: 5/10
DEPRESSED MOOD: 1
PAIN LOCATION - PAIN SEVERITY: 5/10
VOMITING: DENIES

## 2023-12-28 NOTE — TELEPHONE ENCOUNTER
's patient.   Pt has appt on 1/23/24.      Received request via: Patient    Was the patient seen in the last year in this department? Yes    Does the patient have an active prescription (recently filled or refills available) for medication(s) requested? No    Does the patient have nursing home Plus and need 100 day supply (blood pressure, diabetes and cholesterol meds only)? Medication is not for cholesterol, blood pressure or diabetes and Patient does not have SCP

## 2023-12-28 NOTE — PROGRESS NOTES
Medication chart review for [facility] services    Received referral from [facility].   Medications reviewed  compared with discharge summary if available.  Discharge summary date, if applicable:   12/2/23    Current medication list per [facility]     Medication list one, patient is currently taking    Current Outpatient Medications:     oxyCODONE immediate-release, Take 1 Tablet by mouth every 8 hours as needed for Severe Pain for up to 23 days    Biofreeze, 1 Dose, Topical, TID PRN    amLODIPine, 10 mg, Oral, DAILY    benzocaine-menthol, 1 Lozenge, Mouth/Throat, Q2HRS PRN    DULoxetine, 30 mg, Oral, DAILY    gabapentin, 400 mg, Oral, TID    guaiFENesin ER, 600 mg, Oral, Q12HRS    lidocaine, 1 Patch, Transdermal, Q24HRS (Patient not taking: Reported on 12/27/2023)    lisinopril, 20 mg, Oral, DAILY (Patient taking differently: 20 mg, Oral, DAILY, Indications: Hypertension)    ondansetron, 4 mg, Oral, Q4HRS PRN    rivaroxaban, 10 mg, Oral, DAILY AT 1800 (Patient not taking: Reported on 12/27/2023)    senna-docusate, 2 Tablet, Oral, BID (Patient not taking: Reported on 12/27/2023)    polyethylene glycol/lytes, 17 g, Oral, QDAY PRN    sodium chloride, 1 g, Oral, BID WITH MEALS (Patient not taking: Reported on 12/27/2023)    vitamin D3, 1,000 Units, Oral, DAILY    ALPRAZolam, 0.5-1 Tablet, Oral, QDAY PRN    acetaminophen, 500-1,000 mg, Oral, Q6HRS PRN    pseudoephedrine, 60 mg, Oral, Q4HRS PRN    Turmeric, 400 mg, Oral, DAILY    fish oil, 1,000 mg, Oral, DAILY    psyllium, 1 Packet, Oral, DAILY    albuterol, 1 Puff, Inhalation, Q4HRS PRN    meloxicam, TAKE 1 TABLET BY MOUTH TWICE A DAY (Patient taking differently: 7.5 mg, Oral, 2 TIMES DAILY)    tizanidine, 4 mg, Oral, Q6HRS PRN    therapeutic multivitamin-minerals, 1 Tablet, Oral, DAILY    fluticasone, 2 Spray, Nasal, BID PRN    cetirizine, 10 mg, Oral, QHS PRN    Probiotic Product (PROBIOTIC DAILY PO), 1 Capsule, Oral, QHS      Medication list two, drugs  that the patient has been prescribed or recommended to take by their healthcare provider on discharge summary  START taking these medications         Instructions   amLODIPine 10 MG Tabs  Commonly known as: Norvasc    Take 1 Tablet by mouth every day.  Dose: 10 mg      benzocaine-menthol 15-3.6 MG Lozg  Commonly known as: Cepacol    Dissolve 1 Lozenge in the mouth every 2 hours as needed (oral pain).  Dose: 1 Lozenge      gabapentin 400 MG Caps  Commonly known as: Neurontin  Replaces: gabapentin 800 MG tablet    Take 1 Capsule by mouth 3 times a day.  Dose: 400 mg      guaiFENesin  MG Tb12  Commonly known as: Mucinex  Replaces: Guaifenesin 400 MG Tabs    Take 1 Tablet by mouth every 12 hours.  Dose: 600 mg      lidocaine 4 % Ptch  Commonly known as: Asperflex    Place 1 Patch on the skin every 24 hours.  Dose: 1 Patch      ondansetron 4 MG Tbdp  Commonly known as: Zofran ODT    Take 1 Tablet by mouth every four hours as needed for Nausea/Vomiting (give PO if no IV route available).  Dose: 4 mg      oxyCODONE immediate-release 5 MG Tabs  Commonly known as: Roxicodone    Take 1 Tablet by mouth every 8 hours as needed for Severe Pain for up to 3 days.  Dose: 5 mg      polyethylene glycol/lytes Pack  Commonly known as: Miralax    Take 1 Packet by mouth 1 time a day as needed (if sennosides and docusate ineffective after 24 hours).  Dose: 17 g      risperiDONE 1 MG Tabs  Commonly known as: RisperDAL    Take 1 Tablet by mouth 2 times a day.  Dose: 1 mg      rivaroxaban 10 MG Tabs tablet  Commonly known as: Xarelto    Doctor's comments: STOP when more ambulatory  Take 1 Tablet by mouth every day at 6 PM.  Dose: 10 mg      senna-docusate 8.6-50 MG Tabs  Commonly known as: Pericolace Or Senokot S    Take 2 Tablets by mouth 2 times a day.  Dose: 2 Tablet      sodium chloride 1 GM Tabs  Commonly known as: Salt    Take 1 Tablet by mouth 2 times a day with meals.  Dose: 1 g      vitamin D 1000 UNIT Tabs  Commonly known as:  Cholecalciferol    Take 1 Tablet by mouth every day.  Dose: 1,000 Units                CHANGE how you take these medications         Instructions   DULoxetine 30 MG Cpep  What changed:   medication strength  how much to take  when to take this  Commonly known as: Cymbalta    Take 1 Capsule by mouth every day.  Dose: 30 mg      lisinopril 20 MG Tabs  What changed:   medication strength  how much to take  Another medication with the same name was removed. Continue taking this medication, and follow the directions you see here.  Commonly known as: Prinivil    Take 1 Tablet by mouth every day.  Dose: 20 mg                CONTINUE taking these medications         Instructions   acetaminophen 500 MG Tabs  Commonly known as: Tylenol    Take 500-1,000 mg by mouth every 6 hours as needed.  Dose: 500-1,000 mg      albuterol 108 (90 Base) MCG/ACT Aers inhalation aerosol    Doctor's comments: PT REQUEST  INHALE 1 PUFF EVERY FOUR HOURS AS NEEDED FOR SHORTNESS OF BREATH.  Dose: 1 Puff      ALPRAZolam 1 MG Tabs  Commonly known as: Xanax    Take 0.5-1 Tablets by mouth 1 time a day as needed for Anxiety.  Dose: 0.5-1 Tablet      cetirizine 10 MG Tabs  Commonly known as: ZyrTEC    Take 10 mg by mouth at bedtime as needed.  Dose: 10 mg      fish oil 1000 MG Caps capsule    Take 1,000 mg by mouth every day.  Dose: 1,000 mg      fluticasone 50 MCG/ACT nasal spray  Commonly known as: Flonase    Administer 2 Sprays into affected nostril(S) 2 times a day as needed.  Dose: 2 Spray      meloxicam 7.5 MG Tabs  Commonly known as: Mobic    TAKE 1 TABLET BY MOUTH TWICE A DAY      PROBIOTIC DAILY PO    Take 1 Cap by mouth every bedtime.  Dose: 1 Capsule      pseudoephedrine 30 MG Tabs  Commonly known as: Sudafed    Take 60 mg by mouth every four hours as needed for Congestion.  Dose: 60 mg      psyllium 58.12 % Pack  Commonly known as: Metamucil    Take 1 Packet by mouth every day.  Dose: 1 Packet      therapeutic multivitamin-minerals Tabs     "Take 1 Tab by mouth every day.  Dose: 1 Tablet      tizanidine 4 MG Tabs  Commonly known as: Zanaflex    TAKE 1 TABLET BY MOUTH EVERY 6 HOURS AS NEEDED  Dose: 4 mg      Turmeric 400 MG Caps    Take 400 mg by mouth every day.  Dose: 400 mg                STOP taking these medications       amphetamine-dextroamphetamine 20 MG Tabs  Commonly known as: ADDERALL (20MG)      gabapentin 800 MG tablet  Commonly known as: Neurontin  Replaced by: gabapentin 400 MG Caps      Guaifenesin 400 MG Tabs  Replaced by: guaiFENesin  MG Tb12       Allergies   Allergen Reactions    Sulfa Drugs Nausea     Pt states \"I get very sick, I was over 20 years ago\".    Seasonal Runny Nose and Itching             Labs     Lab Results   Component Value Date/Time    SODIUM 132 (L) 12/01/2023 06:41 AM    POTASSIUM 4.0 12/01/2023 06:41 AM    CHLORIDE 98 12/01/2023 06:41 AM    CO2 24 12/01/2023 06:41 AM    GLUCOSE 118 (H) 12/01/2023 06:41 AM    BUN 7 (L) 12/01/2023 06:41 AM    CREATININE 0.29 (L) 12/01/2023 06:41 AM     Lab Results   Component Value Date/Time    ALKPHOSPHAT 74 11/27/2023 01:09 AM    ASTSGOT 19 11/27/2023 01:09 AM    ALTSGPT 19 11/27/2023 01:09 AM    TBILIRUBIN 0.4 11/27/2023 01:09 AM    INR 0.97 03/31/2016 11:08 PM    ALBUMIN 3.5 12/01/2023 06:41 AM        Assessment for clinically significant drug interactions, drug omissions/additions, duplicative therapies.            CC   Iraida Zavala M.D.  [address]  [place] [address]  Fax: [phone]    [facility]  Phone [phone] fax [phone]    This note was created using voice recognition software (Dragon). The accuracy of the dictation is limited by the abilities of the software. I have reviewed the note prior to signing, however some errors in grammar and context are still possible. If you have any questions related to this note please do not hesitate to contact our office.   "

## 2024-01-01 ENCOUNTER — HOME CARE VISIT (OUTPATIENT)
Dept: HOME HEALTH SERVICES | Facility: HOME HEALTHCARE | Age: 64
End: 2024-01-01
Payer: MEDICARE

## 2024-01-01 PROCEDURE — G0155 HHCP-SVS OF CSW,EA 15 MIN: HCPCS

## 2024-01-02 ASSESSMENT — ENCOUNTER SYMPTOMS: DEBILITATING PAIN: 1

## 2024-01-03 ENCOUNTER — OFFICE VISIT (OUTPATIENT)
Dept: BEHAVIORAL HEALTH | Facility: CLINIC | Age: 64
End: 2024-01-03
Payer: MEDICARE

## 2024-01-03 ENCOUNTER — HOME CARE VISIT (OUTPATIENT)
Dept: HOME HEALTH SERVICES | Facility: HOME HEALTHCARE | Age: 64
End: 2024-01-03
Payer: MEDICARE

## 2024-01-03 DIAGNOSIS — F43.12 PROLONGED POSTTRAUMATIC STRESS DISORDER: ICD-10-CM

## 2024-01-03 DIAGNOSIS — F43.12 CHRONIC POSTTRAUMATIC STRESS DISORDER: ICD-10-CM

## 2024-01-03 PROCEDURE — 90837 PSYTX W PT 60 MINUTES: CPT | Performed by: PSYCHOLOGIST

## 2024-01-03 ASSESSMENT — ACTIVITIES OF DAILY LIVING (ADL): OASIS_M1830: 05

## 2024-01-03 NOTE — CASE COMMUNICATION
Quality Review for 12.27.23 SOC [employer]IS performed on by STEVE Purdy RN on 1.3.2024:    Edits completed by STEVE Purdy RN:  1.  and  dx coding updated per chart review.   2. Added severe taxing effort to leave the home to HB status per narrative  3. Changed  to 12.27.23 per LSOC order  4. Changed  to 5 and  F to 4 per narrative and cg narrative, pt's sons live out of town  5. Added #5, 6 to  per chart review, pt h ad FTT and delusions/delirium   6. Changed  to 1 per MSW visit on 1.1.24. Changed  to 1.1.24 per the [employer] collaboration convention  7. Changed  D to yes, pt has HTN and HLD  8. Changed , , ,  to 2,  to 1,  to 2,  to 3, [id number] B-H to 4, [id number] D-K, P to 4 per narrative pt has freq falls, weakness and fatigue, debilitating pain and uses DME. Pt would need supervision level for safety. Carmella nged  to 5, pt would need showering DME for safety.   9. Added indication to  per MAR  10. Changed  to 3 per ambulation score   11. Added ambulate only with assistance to safety measures. Added low Na and low cholesterol diet to nutritional requirements  12.  Updated F2F data  13. Changed  to 2

## 2024-01-03 NOTE — Clinical Note
I agree with these changes  ----- Message -----  From: Kerline Purdy R.N.  Sent: 1/3/2024   7:28 AM PST  To: Sharona Tam R.N.      Quality Review for 12.27.23 SOC [employer]IS performed on by STEVE Purdy RN on 1.3.2024:    Edits completed by STEVE Purdy RN:  1.  and  dx coding updated per chart review.   2. Added severe taxing effort to leave the home to HB status per narrative  3. Changed  to 12.27.23 per LSOC order  4. Changed  to 5 and  F to 4 per narrative and cg narrative, pt's sons live out of town  5. Added #5, 6 to  per chart review, pt had FTT and delusions/delirium   6. Changed  to 1 per MSW visit on 1.1.24. Changed  to 1.1.24 per the [employer] collaboration convention  7. Changed  D to yes, pt has HTN and HLD  8. Changed , , ,  to 2,  to 1,  to 2,  to 3, [id number] B-H to 4, [id number] D-K, P to 4 per narrative pt has freq falls, weakness and fatigue, debilitating pain and uses DME. Pt would need supervision level for safety. Changed  to 5, pt would need showering DME for safety.   9. Added indication to  per MAR  10. Changed  to 3 per ambulation score   11. Added ambulate only with assistance to safety measures. Added low Na and low cholesterol diet to nutritional requirements  12.  Updated F2F data  13. Changed  to 2

## 2024-01-04 ENCOUNTER — HOME CARE VISIT (OUTPATIENT)
Dept: HOME HEALTH SERVICES | Facility: HOME HEALTHCARE | Age: 64
End: 2024-01-04
Payer: MEDICARE

## 2024-01-04 PROBLEM — F43.12 CHRONIC POSTTRAUMATIC STRESS DISORDER: Status: ACTIVE | Noted: 2024-01-04

## 2024-01-04 PROCEDURE — G0151 HHCP-SERV OF PT,EA 15 MIN: HCPCS

## 2024-01-04 NOTE — CASE COMMUNICATION
I agree with these changes  ----- Message -----  From: Kerline Purdy R.N.  Sent: 1/3/2024   7:28 AM PST  To: Sharona Tam R.N.      Quality Review for 12.27.23 SOC [employer]IS performed on by STEVE Purdy RN on 1.3.2024:    Edits completed by STEVE Purdy RN:  1.  and  dx coding updated per chart review.   2. Added severe taxing effort to leave the home to HB status per narrative  3. Changed  to 12.27.23 per LSOC order  4. Ch anged  to 5 and  F to 4 per narrative and cg narrative, pt's sons live out of town  5. Added #5, 6 to  per chart review, pt had FTT and delusions/delirium   6. Changed  to 1 per MSW visit on 1.1.24. Changed  to 1.1.24 per the [employer] collaboration convention  7. Changed  D to yes, pt has HTN and HLD  8. Changed , , ,  to 2,  to 1,  to 2,  to 3, [id number] B-H to 4, [id number] D-K, P  to 4 per narrative pt has freq falls, weakness and fatigue, debilitating pain and uses DME. Pt would need supervision level for safety. Changed  to 5, pt would need showering DME for safety.   9. Added indication to  per MAR  10. Changed  to 3 per ambulation score   11. Added ambulate only with assistance to safety measures. Added low Na and low cholesterol diet to nutritional requirements  12.  Updated F2F data  13. Carmella nged  to 2
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
## 2024-01-05 ENCOUNTER — HOME CARE VISIT (OUTPATIENT)
Dept: HOME HEALTH SERVICES | Facility: HOME HEALTHCARE | Age: 64
End: 2024-01-05
Payer: MEDICARE

## 2024-01-05 VITALS
HEART RATE: 98 BPM | OXYGEN SATURATION: 98 % | SYSTOLIC BLOOD PRESSURE: 139 MMHG | RESPIRATION RATE: 12 BRPM | TEMPERATURE: 97.9 F | DIASTOLIC BLOOD PRESSURE: 85 MMHG

## 2024-01-05 PROCEDURE — G0152 HHCP-SERV OF OT,EA 15 MIN: HCPCS

## 2024-01-05 NOTE — ED NOTES
Med rec complete per patient  Allergies reviewed.   Patient denies any outpatient antibiotics in the last 30 days.   Anticoagulants taken in the last 14 days? No     Patient is prescribed Lisinopril 5 mg daily, patient has been taking 15 mg for about a week now due to feeling like blood pressure is too high     Patients preferred pharmacy: [facility]    Lynne Burroughs CPhT       ·  Problem: Hypertension.   ·  Recommendation: Controlled; decrease losartan to allow uptitration of metoprolol

## 2024-01-06 ASSESSMENT — ACTIVITIES OF DAILY LIVING (ADL)
TOILETING: INDEPENDENT
BATHING ASSESSED: 1
LAUNDRY ASSESSED: 1
DRESSING_UB_CURRENT_FUNCTION: INDEPENDENT
LAUNDRY: INDEPENDENT
PREPARING MEALS: INDEPENDENT
DRESSING_LB_CURRENT_FUNCTION: INDEPENDENT
TOILETING: 1
BATHING_CURRENT_FUNCTION: INDEPENDENT

## 2024-01-07 VITALS
TEMPERATURE: 98 F | HEART RATE: 94 BPM | OXYGEN SATURATION: 97 % | SYSTOLIC BLOOD PRESSURE: 143 MMHG | RESPIRATION RATE: 16 BRPM | DIASTOLIC BLOOD PRESSURE: 102 MMHG

## 2024-01-07 ASSESSMENT — ENCOUNTER SYMPTOMS: DENIES PAIN: 1

## 2024-01-07 ASSESSMENT — ACTIVITIES OF DAILY LIVING (ADL)
AMBULATION ASSISTANCE ON FLAT SURFACES: 1
AMBULATION_DISTANCE/DURATION_TOLERATED: 2 X 60 FT

## 2024-01-09 ENCOUNTER — HOME CARE VISIT (OUTPATIENT)
Dept: HOME HEALTH SERVICES | Facility: HOME HEALTHCARE | Age: 64
End: 2024-01-09
Payer: MEDICARE

## 2024-01-09 PROCEDURE — G0151 HHCP-SERV OF PT,EA 15 MIN: HCPCS

## 2024-01-09 PROCEDURE — G0180 MD CERTIFICATION HHA PATIENT: HCPCS | Performed by: INTERNAL MEDICINE

## 2024-01-11 ENCOUNTER — HOME CARE VISIT (OUTPATIENT)
Dept: HOME HEALTH SERVICES | Facility: HOME HEALTHCARE | Age: 64
End: 2024-01-11
Payer: MEDICARE

## 2024-01-11 PROCEDURE — G0493 RN CARE EA 15 MIN HH/HOSPICE: HCPCS

## 2024-01-12 ENCOUNTER — HOME CARE VISIT (OUTPATIENT)
Dept: HOME HEALTH SERVICES | Facility: HOME HEALTHCARE | Age: 64
End: 2024-01-12
Payer: MEDICARE

## 2024-01-12 VITALS
HEART RATE: 104 BPM | RESPIRATION RATE: 16 BRPM | SYSTOLIC BLOOD PRESSURE: 138 MMHG | OXYGEN SATURATION: 96 % | DIASTOLIC BLOOD PRESSURE: 82 MMHG | TEMPERATURE: 97.4 F

## 2024-01-12 ASSESSMENT — ENCOUNTER SYMPTOMS
PAIN LOCATION - PAIN DURATION: DAILY
SUBJECTIVE PAIN PROGRESSION: UNCHANGED
PERSON REPORTING PAIN: PATIENT
PAIN LOCATION - PAIN SEVERITY: 6/10
HYPERTENSION: 1
PAIN: 1
STOOL FREQUENCY: DAILY
PAIN LOCATION - RELIEVING FACTORS: ORAL MEDICATION
LAST BOWEL MOVEMENT: 66849
PAIN LOCATION - PAIN FREQUENCY: CONSTANT
PAIN LOCATION - PAIN QUALITY: ACHE
FATIGUES EASILY: 1
PAIN LOCATION: BACK
BOWEL PATTERN NORMAL: 1

## 2024-01-12 ASSESSMENT — ACTIVITIES OF DAILY LIVING (ADL)
BATHING_WITHIN_DEFINED_LIMITS: 1
FEEDING_WITHIN_DEFINED_LIMITS: 1
GROOMING_WITHIN_DEFINED_LIMITS: 1
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
## 2024-01-14 ASSESSMENT — ENCOUNTER SYMPTOMS: DENIES PAIN: 1

## 2024-01-15 ENCOUNTER — OFFICE VISIT (OUTPATIENT)
Dept: BEHAVIORAL HEALTH | Facility: CLINIC | Age: 64
End: 2024-01-15
Payer: MEDICARE

## 2024-01-15 DIAGNOSIS — F43.12 PROLONGED POSTTRAUMATIC STRESS DISORDER: ICD-10-CM

## 2024-01-15 PROCEDURE — 90837 PSYTX W PT 60 MINUTES: CPT | Performed by: PSYCHOLOGIST

## 2024-01-16 ENCOUNTER — HOME CARE VISIT (OUTPATIENT)
Dept: HOME HEALTH SERVICES | Facility: HOME HEALTHCARE | Age: 64
End: 2024-01-16
Payer: MEDICARE

## 2024-01-16 PROCEDURE — G0151 HHCP-SERV OF PT,EA 15 MIN: HCPCS

## 2024-01-16 NOTE — PROGRESS NOTES
Name: Yessica Baker Date: 1/15/24 : [birth year] Time in session 60 minutes   [] Initial Dx Eval [] Family [] Cancelled/Rescheduled [] Office visit   [x] Individual [] Group [] Late Cancellation [] Virtual Session   [] Couple [] Testing [] No call/No show [] Late   [] Discharge [] Phone [x] On time: 2:00 PM []  (2)   Attended: Ms. Baker   Observations/ Appearance/ Affect: Ms. Baker appeared clean, well-groomed, and dressed in casual clothes. She was oriented to person, place, time, and purpose, was pleasant and cooperative, lucid, and articulate. Her affect was anxious, and mood was sad. No suicidal or homicidal ideation described or reported. No reports of hallucinations or confusions. She participated well in this session.   Content/ Topics: Ms. Baker reported that she felt sad and lonely. She reported that she wanted to crawl up in a ball and crying. Her feelings were validated, and reassurance given. She reported that she had a quiet holiday. She reported that the work on her home was finished, and she was healing, and her car was working well.   Risk issues assessed: Discussed with Ms. Baker strategies to decrease anxiety, lift depression and increase safety. Discussed how Polyvagal Theory explains how there are shifts in physiological and behavioral states that distort social awareness and establish habitual defensive reactions that replace appropriate spontaneous social behavior. Discussed with her how over the next several sessions she will learn how the body is wired for survival and connection. Discussed how the autonomic nervous system works as our personal surveillance system, always on guard, always alert to the question: “is it safe?” The work of the autonomic nervous system is to protect us by looking for cues of safety and risk, sensing moment to moment of what is happening in and around our bodies and in the connections, we have to others. Discussed how over the  next several sessions we will talk more about how this system works and can inform us about how the state of the nervous system sets the table for how we will respond, act, and feel. Discussed with Ms. Baker how ventral exercises help a person to be in a safe ventral state and be mindful. Discussed how a deep breath, a sigh, and a stretch are examples of ventral exercises and self-regulation. Discussed with her how she will learn strategies to increase ventral exercises at home. Encouraged her to be aware of her feelings of safety and connection here at [facility].   Psychosocial and environmental problems addressed: Discussed with Ms. Baker how the focus of this psychotherapy is on skill building, on her physical and mental health and increasing function. Discussed with Ms. Baker how this psychotherapy will encourage her to increase nutritional values in her diet to increase the healing process in her body and brain. Discussed how taking medicine was especially important to help her brain work better and how increasing the nutritional values in her diet will not only support healthy brain function, but it will also support the work of the medicines and may even help areas of her brain heal and work more efficiently. Discussed how, in the long run, she may need the help of medicines, but she might not need so many or so much. Discussed with Ms. Baker about Eye Movement Desensitization and Reprocessing (EMDR) as an approach to overcoming anxiety, stress, and trauma. Discussed EMDR and how it was an Information Processing Therapy that used an eight-phase approach. Discussed how in the first phase history taking sessions are done to assess her readiness for EMDR and to develop a treatment plan. Discussed how the work would be to identify possible targets for EMDR processing. These include recent distressing events, current situations that elicit emotional disturbance, related historical  incidents, and the development of specific skills and behaviors that will be needed by her in future situations. Discussed how during the second phase of treatment, this psychologist will work to ensure that she has adequate methods of handling emotional distress and good coping skills, and that she is in a relatively stable state. If further stabilization is required, or if additional skills are needed, therapy focuses on providing these. Discussed how she would then be able to use stress reducing techniques whenever necessary, during or between sessions. However, one goal is not to need these techniques once therapy is complete. Discussed how in phase three through six, a target is identified and processed using EMDR procedures. These involve her identifying the most vivid visual image related to the memory (if available), a negative belief about self, related emotions, and body sensations. She would also identify a preferred positive belief. The validity of the positive belief is rated, as is the intensity of the negative emotions. In phase seven, closure, this psychologist would ask her to keep a journal during the week to document any related material that may arise and remind her of the self-calming activities that were mastered in phase two. Discussed how the next session begins with phase eight, re-evaluation of the previous work, and of progress since the previous session. Discussed how EMDR treatment ensures processing of all related historical events, current incidents that elicit distress and future scenarios that will require different responses. The overall goal is producing the most comprehensive and profound treatment effects in the shortest period of time, while simultaneously maintaining a stable client within a balanced system. Discussed how after EMDR processing, clients generally report that the emotional distress related to the memory has been eliminated, or greatly decreased, and that they  have gained important cognitive insights. Importantly, these emotional and cognitive changes usually result in spontaneous behavioral and personal change, which are further enhanced with standard EMDR procedures. EMDR could be helpful. Before EMDR can be used she must gain a thorough understanding of this approach. Discussed how she must first identify and learn to access positive resources in her life, develop a safe place exercise using relaxation and stress reduction exercises. Discussed how EMDR was a client centered approach.   Current GAF: 55   Current medications: Xanax, Roxicodone, Norvasc, Cymbalta, Prinivil, Vitamin D3, Cepacol, Neurontin, Mucinex, Zofran ODT, Miralax, Salt, Albuterol, Mobic, Zanaflex   Significant/ Recent Events: Discussed the concept of change and what she is doing to help her change. Discussed the concepts of change postulated by Yosef Harrell and Odilia in their book, “Change.” Discussed their concepts of Persistence and Change and first-order change and second-order change. Discussed how she will come to understand her need to identify what she will do in order for second-order change to occur. Discussed how the therapist, Andi Muro noted that change in psychotherapy is a requirement, and once established, it necessitates other minor changes, and a snowball effect of these changes leads to other more significant changes that are important to an understanding of themselves and others. There are two kinds of change that we will identify, those that occur within a given system which itself remains unchanged, and those whose occurrence changes the system itself. Discussed how we are in search of the paradox that will create the needed change. Sometime the solution is the problem. If we can reframe the solutions, then we can engage in second-order change. Second order change usually appears weird, unexpected, and uncommonsensical: there is a puzzling, paradoxical element in the  process of change. We deal with the situation in the here and now. To reframe means to change the conceptual and/or emotional setting or viewpoint to which a situation is experienced and to place it into another frame which fits the facts and changes the meaning. Once we perceive the reframe, we cannot so easily go back to the trap and anguish of the former view. Discussed the analogy of how a professional magician controls the perspective of the audience to make his tricks seem like magic, when of course it is always a trick. We tend to be unaware of how our perspective is controlled and limited, until we gain an awareness of the paradox. The paradox: one (such as a person, situation, or action) having seemingly contradictory qualities or phases; a statement that is seemingly contradictory or opposed to common sense and yet is perhaps true; a self-contradictory statement that at first seems true; or an argument that apparently derives self-contradictory conclusions by valid deduction from acceptable premises. She agreed to keep a journal during the week to document any related material that may arise and to remind her of the self-calming activities that she would use. Discussed her “safe place” in her mind where she could safely imagine ridding herself of any disturbance where she would feel 100% safe. She described her safe place as on a masseuse table in a [facility] in [place]. She practiced that safe place using bilateral stimulation and guided imagery in the session. She agreed to identify positive resources she could engage to distract herself from any disturbing material. She used bilateral stimulation and used the imagery of a cleansing vortex to relax and rid herself of any tension, tightness, or unusual sensations and to enhance her safe place (on the masseuse table in [place]). She used bilateral stimulation to remind her to identify her positive resources to distract herself from any disturbing material,  to use her ventral exercises, to use mindful self-compassion daily and to use her affirmations to gain restful sleep, eat healthy, and to use affirmations easily. Discussed the work of Mima Collado and how she encourages people to think of food as medicine and encouraged her to look at some of her recipes in her book, “Eat Right, Feel Right.” She has recipes that reduce inflammation which occurs with depression, support vital energy (especially the adrenal glands), and reduce fatigue. Discussed how she reminds us that we need nourishment to pay attention and to focus for sustained periods and to heal from injuries. Discussed how she reminds us that difficulty sleeping is not just about difficulty falling asleep, but it may be about waking up in the middle of the night. She has some recipes that can help with each. Discussed how she has recipes in her book that would encourage her to eat foods that are calming and to eliminate foods that are stimulating. She reminds people that food preparation is one of the greatest self-care efforts we can make. Discussed the work of Ibis Hannah and encouraged her to read her workbook, “Mindful Self-Compassion” and discussed strategies to exercise mindful self-compassion as needed. Encouraged her to look at some of the writers describing Polyvagal Theory and to consider the work of Nadine Ratliff. Discussed looking at [employer] and consider nutritional supplements to increase the nutritional values in her diet. Discussed how Polyvagal Theory, Positive Psychology (Mindful Self-Compassion), Cognitive Behavioral Theory, Information Processing Theory and Mental Health Nutrition would inform this psychotherapy and how she will develop Cognitive Behavioral Skills and Strategies to decrease and manage anxieties and fears, process her grief, decrease posttraumatic stress, heal from her injuries, lift depression, gain control over her life, develop a healthier lifestyle, increase restful sleep,  and find meaning and balance in her life. She asked to return to [facility] weekly to develop this supportive psychotherapy. Discussed how this psychotherapy would focus on her health, wellbeing, and progress at her pace and in a positive direction.   Informed consent issues discussed: Ms. Baker reported that she understood the process of this evaluation and agreed to return to [facility]. Discussed how she expressed her desire to change and was willing to start the process. She reported that she was willing to make changes to her life toward a healthy lifestyle. She reported that she was aware of the problems she experienced and expressed the desire to solve those problems safely. She reported that she had a positive outlook for change. She reported that she had family and friends that were supportive and knew she needed to gain and maintain a healthy network of support.    Assignments/ Homework: Ms. Baker agreed to initiate some of the strategies discussed today to decrease anxieties and increase restful sleep and increase nutritional values in her diet.    Plan: Ms. Baker agreed to monitor closely her mood and behavior.    Diagnoses: F43.12 Posttraumatic Stress Disorder: F43.81 Complicated Bereavement [] Provisional   Treatment Plan: [x] Continued [] New [] Replaced Referral:   Suicidal [] Yes [x] No [] Medical:    Violence: [] Yes [x] No [] Psychiatric:    Next session:     [] Neurological:            Helder Starr Psy.D.  Clinical Psychologist  [facility]  NPI: [id number]   on the discharge service for the patient. I have reviewed and made amendments to the documentation where necessary.

## 2024-01-17 ENCOUNTER — HOME CARE VISIT (OUTPATIENT)
Dept: HOME HEALTH SERVICES | Facility: HOME HEALTHCARE | Age: 64
End: 2024-01-17
Payer: MEDICARE

## 2024-01-17 VITALS
TEMPERATURE: 98 F | DIASTOLIC BLOOD PRESSURE: 80 MMHG | SYSTOLIC BLOOD PRESSURE: 126 MMHG | OXYGEN SATURATION: 98 % | RESPIRATION RATE: 18 BRPM | HEART RATE: 95 BPM

## 2024-01-24 ENCOUNTER — HOSPITAL ENCOUNTER (OUTPATIENT)
Dept: RADIOLOGY | Facility: MEDICAL CENTER | Age: 64
End: 2024-01-24
Attending: STUDENT IN AN ORGANIZED HEALTH CARE EDUCATION/TRAINING PROGRAM
Payer: MEDICARE

## 2024-01-24 DIAGNOSIS — M54.6 ACUTE MIDLINE THORACIC BACK PAIN: ICD-10-CM

## 2024-01-24 DIAGNOSIS — M54.59 OTHER LOW BACK PAIN: ICD-10-CM

## 2024-01-24 DIAGNOSIS — G89.29 CHRONIC LOW BACK PAIN WITH SCIATICA, SCIATICA LATERALITY UNSPECIFIED, UNSPECIFIED BACK PAIN LATERALITY: ICD-10-CM

## 2024-01-24 DIAGNOSIS — M54.40 CHRONIC LOW BACK PAIN WITH SCIATICA, SCIATICA LATERALITY UNSPECIFIED, UNSPECIFIED BACK PAIN LATERALITY: ICD-10-CM

## 2024-01-24 PROCEDURE — 72148 MRI LUMBAR SPINE W/O DYE: CPT

## 2024-01-24 PROCEDURE — 72146 MRI CHEST SPINE W/O DYE: CPT

## 2024-01-25 ENCOUNTER — OFFICE VISIT (OUTPATIENT)
Dept: BEHAVIORAL HEALTH | Facility: CLINIC | Age: 64
End: 2024-01-25
Payer: MEDICARE

## 2024-01-25 DIAGNOSIS — F98.8 ADD (ATTENTION DEFICIT DISORDER) WITHOUT HYPERACTIVITY: Primary | ICD-10-CM

## 2024-01-25 DIAGNOSIS — F33.1 MAJOR DEPRESSIVE DISORDER, RECURRENT EPISODE, MODERATE (HCC): ICD-10-CM

## 2024-01-25 DIAGNOSIS — F41.1 GENERALIZED ANXIETY DISORDER: ICD-10-CM

## 2024-01-25 DIAGNOSIS — F22 DELUSIONAL DISORDER (HCC): ICD-10-CM

## 2024-01-25 PROCEDURE — 99214 OFFICE O/P EST MOD 30 MIN: CPT | Performed by: PSYCHIATRY & NEUROLOGY

## 2024-01-25 RX ORDER — ALPRAZOLAM 1 MG/1
1 TABLET ORAL 2 TIMES DAILY
Qty: 60 TABLET | Refills: 0 | Status: SHIPPED | OUTPATIENT
Start: 2024-01-25 | End: 2024-02-28 | Stop reason: SDUPTHER

## 2024-01-25 RX ORDER — DULOXETIN HYDROCHLORIDE 30 MG/1
30 CAPSULE, DELAYED RELEASE ORAL 3 TIMES DAILY
Qty: 270 CAPSULE | Refills: 0 | Status: SHIPPED | OUTPATIENT
Start: 2024-01-25 | End: 2024-04-24

## 2024-01-25 RX ORDER — DEXTROAMPHETAMINE SACCHARATE, AMPHETAMINE ASPARTATE, DEXTROAMPHETAMINE SULFATE AND AMPHETAMINE SULFATE 5; 5; 5; 5 MG/1; MG/1; MG/1; MG/1
20 TABLET ORAL 2 TIMES DAILY
Qty: 60 TABLET | Refills: 0 | Status: SHIPPED | OUTPATIENT
Start: 2024-01-25 | End: 2024-02-28 | Stop reason: SDUPTHER

## 2024-01-25 NOTE — PROGRESS NOTES
[facility]   Follow Up Assessment     This provider informed the patient their medical records are totally confidential except for the use by other providers involved in their care, or if the patient signs a release, or to report instances of child or elder abuse, or if it is determined they are an immediate risk to harm themselves or others.    Name: Paulette Concepcion  MRN: [id number]  : 1960  Age: 63 y.o.  Date of assessment: 2024  PCP: Iraida Zavala M.D.      Subjective:  Chart reviewed prior to seeing her in my office.  She was last seen on .  She fell and fractured ribs on the right lateral thorax in November-slowly recuperating.  She has started to see Dr. Starr for psychotherapy.  We reviewed her medications and discussed options.  Adderall 20 mg twice daily is working well for her ADD.  She is using Xanax 1 mg twice daily for anxiety.  She would like to increase her antidepressant to a total of 90 mg a.m.  Cymbalta could help with pain.    Objective:  She is alert, oriented, and cooperative.  Relatedness is good.  Grooming is good.  Speech is normal rate.  Anxious.  Memory is good.  Insight and judgment are fairly good.  No indication of psychotic thinking.    Current Risk:       Suicidal: Not suicidal       Homicidal: Not homicidal       Self-Harm: No plan to harm self       Relapse: moderate       Crisis Safety Plan Reviewed: Discussed with patient    Diagnosis:   ADD  Major depressive disorder, recurrent  Generalized anxiety disorder    Treatment Plan:  The current treatment plan consists of quarterly psychiatric sessions designed to evaluate her ADD, depression, and anxiety.    Duration will be for a minimum of 12 months and will be reviewed at each visit.    Goals: Improvement in ADD symptoms with remission of depression and control of anxiety in order to prevent relapse due to the chronic nature of her behavioral health problems and mental illness.  Continue  Adderall 20 mg twice daily.  Continue Xanax 1 mg twice daily.  Increase Cymbalta to a total of 90 mg a.m.    Daniel Krause M.D.      This note was created using voice recognition software (Dragon). The accuracy of the dictation is limited by the abilities of the software. I have reviewed the note prior to signing, however some errors in grammar and context are still possible. If you have any questions related to this note please do not hesitate to contact our office.

## 2024-01-26 ENCOUNTER — HOME CARE VISIT (OUTPATIENT)
Dept: HOME HEALTH SERVICES | Facility: HOME HEALTHCARE | Age: 64
End: 2024-01-26
Payer: MEDICARE

## 2024-01-26 PROCEDURE — G0151 HHCP-SERV OF PT,EA 15 MIN: HCPCS

## 2024-01-26 PROCEDURE — 665001 SOC-HOME HEALTH

## 2024-01-26 PROCEDURE — 665999 HH PPS REVENUE DEBIT

## 2024-01-26 PROCEDURE — 665997 HH PPS REVENUE ADJ

## 2024-01-26 PROCEDURE — 665998 HH PPS REVENUE CREDIT

## 2024-01-26 PROCEDURE — 665005 NO-PAY RAP - HOME HEALTH

## 2024-01-27 PROCEDURE — 665998 HH PPS REVENUE CREDIT

## 2024-01-27 PROCEDURE — 665999 HH PPS REVENUE DEBIT

## 2024-01-28 PROCEDURE — 665998 HH PPS REVENUE CREDIT

## 2024-01-28 PROCEDURE — 665999 HH PPS REVENUE DEBIT

## 2024-01-29 ENCOUNTER — APPOINTMENT (OUTPATIENT)
Dept: BEHAVIORAL HEALTH | Facility: CLINIC | Age: 64
End: 2024-01-29
Payer: MEDICARE

## 2024-01-29 VITALS
RESPIRATION RATE: 16 BRPM | SYSTOLIC BLOOD PRESSURE: 142 MMHG | OXYGEN SATURATION: 96 % | TEMPERATURE: 98 F | HEART RATE: 87 BPM | DIASTOLIC BLOOD PRESSURE: 79 MMHG

## 2024-01-29 PROCEDURE — 665998 HH PPS REVENUE CREDIT

## 2024-01-29 PROCEDURE — 665999 HH PPS REVENUE DEBIT

## 2024-01-30 ENCOUNTER — HOME CARE VISIT (OUTPATIENT)
Dept: HOME HEALTH SERVICES | Facility: HOME HEALTHCARE | Age: 64
End: 2024-01-30
Payer: MEDICARE

## 2024-01-30 PROCEDURE — 665999 HH PPS REVENUE DEBIT

## 2024-01-30 PROCEDURE — 665998 HH PPS REVENUE CREDIT

## 2024-01-30 ASSESSMENT — ACTIVITIES OF DAILY LIVING (ADL)
OASIS_M1830: 00
HOME_HEALTH_OASIS: 00

## 2024-01-30 ASSESSMENT — ENCOUNTER SYMPTOMS
DENIES PAIN: 1
PERSON REPORTING PAIN: PATIENT

## 2024-01-30 NOTE — CASE COMMUNICATION
Quality Review Completed for [place] [facility] by THOMAS Garcia RN on 2024:     Edits completed by THOMAS Garcia RN:  1.  is NA to E per care plan interventions  2.  changed to Z. The opioid  on  per medication history  3.  is NA for no supportive documentation found for significant medication criteria

## 2024-01-30 NOTE — Clinical Note
i agree with these changes   ----- Message -----  From: Angi Garcia R.N.  Sent: 2024   6:00 AM PST  To: Prasanth Webb, HALEY      Quality Review Completed for [place] OASIS by THOMAS Garcia, RN on 2024:     Edits completed by THOMAS Garcia RN:  1.  is NA to E per care plan interventions  2.  changed to Z. The opioid  on  per medication history  3.  is NA for no supportive documentation found for significant medication criteria

## 2024-01-31 PROCEDURE — 665998 HH PPS REVENUE CREDIT

## 2024-01-31 PROCEDURE — 665999 HH PPS REVENUE DEBIT

## 2024-02-01 PROCEDURE — 665998 HH PPS REVENUE CREDIT

## 2024-02-01 PROCEDURE — 665999 HH PPS REVENUE DEBIT

## 2024-02-01 NOTE — TELEPHONE ENCOUNTER
----- Message from Savage Wright M.D. sent at 9/8/2017  1:51 PM PDT -----  The bacterial stool culture also returned unremarkable with no specific stool pathogens being isolated. Suggest a trial of very low-dose ImodiumAD liquid such as perhaps a quarter teaspoon a day to try to slightly slow gut transit and allow better reabsorption of stool fluids. Second option would be a GI referral although would probably be several weeks to get in.  
Phone Number Called: [phone] (home)       Message:Pt informed of the following, she states she is still having pains and going to the bathroom at least 4 times daily. She would like to have the referral to Gi placed. : The bacterial stool culture also returned unremarkable with no specific stool pathogens being isolated. Suggest a trial of very low-dose ImodiumAD liquid such as perhaps a quarter teaspoon a day to try to slightly slow gut transit and allow better reabsorption of stool fluids. Second option would be a GI referral although would probably be several weeks to get in.     Left Message for patient to call back: no        
Referral order placed  
Attending with

## 2024-02-02 PROCEDURE — 665999 HH PPS REVENUE DEBIT

## 2024-02-02 PROCEDURE — 665998 HH PPS REVENUE CREDIT

## 2024-02-03 PROCEDURE — 665999 HH PPS REVENUE DEBIT

## 2024-02-03 PROCEDURE — 665998 HH PPS REVENUE CREDIT

## 2024-02-04 PROCEDURE — 665998 HH PPS REVENUE CREDIT

## 2024-02-04 PROCEDURE — 665999 HH PPS REVENUE DEBIT

## 2024-02-20 ENCOUNTER — APPOINTMENT (OUTPATIENT)
Dept: BEHAVIORAL HEALTH | Facility: CLINIC | Age: 64
End: 2024-02-20
Payer: MEDICARE

## 2024-02-28 ENCOUNTER — HOSPITAL ENCOUNTER (OUTPATIENT)
Dept: LAB | Facility: MEDICAL CENTER | Age: 64
End: 2024-02-28
Attending: STUDENT IN AN ORGANIZED HEALTH CARE EDUCATION/TRAINING PROGRAM
Payer: MEDICARE

## 2024-02-28 ENCOUNTER — OFFICE VISIT (OUTPATIENT)
Dept: BEHAVIORAL HEALTH | Facility: CLINIC | Age: 64
End: 2024-02-28
Payer: MEDICARE

## 2024-02-28 DIAGNOSIS — D72.829 LEUKOCYTOSIS, UNSPECIFIED TYPE: ICD-10-CM

## 2024-02-28 DIAGNOSIS — E87.1 HYPONATREMIA: ICD-10-CM

## 2024-02-28 DIAGNOSIS — E55.9 VITAMIN D DEFICIENCY: ICD-10-CM

## 2024-02-28 DIAGNOSIS — I10 HYPERTENSION, UNSPECIFIED TYPE: ICD-10-CM

## 2024-02-28 DIAGNOSIS — F43.12 PROLONGED POSTTRAUMATIC STRESS DISORDER: ICD-10-CM

## 2024-02-28 DIAGNOSIS — F98.8 ADD (ATTENTION DEFICIT DISORDER) WITHOUT HYPERACTIVITY: ICD-10-CM

## 2024-02-28 DIAGNOSIS — F41.1 GENERALIZED ANXIETY DISORDER: ICD-10-CM

## 2024-02-28 LAB
ALBUMIN SERPL BCP-MCNC: 4.9 G/DL (ref 3.2–4.9)
ALBUMIN/GLOB SERPL: 1.9 G/DL
ALP SERPL-CCNC: 127 U/L (ref 30–99)
ALT SERPL-CCNC: 14 U/L (ref 2–50)
ANION GAP SERPL CALC-SCNC: 13 MMOL/L (ref 7–16)
AST SERPL-CCNC: 17 U/L (ref 12–45)
BASOPHILS # BLD AUTO: 0.2 % (ref 0–1.8)
BASOPHILS # BLD: 0.02 K/UL (ref 0–0.12)
BILIRUB SERPL-MCNC: 0.5 MG/DL (ref 0.1–1.5)
BUN SERPL-MCNC: 8 MG/DL (ref 8–22)
CALCIUM ALBUM COR SERPL-MCNC: 8.9 MG/DL (ref 8.5–10.5)
CALCIUM SERPL-MCNC: 9.6 MG/DL (ref 8.5–10.5)
CHLORIDE SERPL-SCNC: 96 MMOL/L (ref 96–112)
CO2 SERPL-SCNC: 22 MMOL/L (ref 20–33)
CREAT SERPL-MCNC: 0.51 MG/DL (ref 0.5–1.4)
EOSINOPHIL # BLD AUTO: 0.03 K/UL (ref 0–0.51)
EOSINOPHIL NFR BLD: 0.3 % (ref 0–6.9)
ERYTHROCYTE [DISTWIDTH] IN BLOOD BY AUTOMATED COUNT: 46.6 FL (ref 35.9–50)
GFR SERPLBLD CREATININE-BSD FMLA CKD-EPI: 105 ML/MIN/1.73 M 2
GLOBULIN SER CALC-MCNC: 2.6 G/DL (ref 1.9–3.5)
GLUCOSE SERPL-MCNC: 97 MG/DL (ref 65–99)
HCT VFR BLD AUTO: 41.1 % (ref 37–47)
HGB BLD-MCNC: 13.9 G/DL (ref 12–16)
IMM GRANULOCYTES # BLD AUTO: 0.03 K/UL (ref 0–0.11)
IMM GRANULOCYTES NFR BLD AUTO: 0.3 % (ref 0–0.9)
LYMPHOCYTES # BLD AUTO: 2.03 K/UL (ref 1–4.8)
LYMPHOCYTES NFR BLD: 21 % (ref 22–41)
MCH RBC QN AUTO: 32.3 PG (ref 27–33)
MCHC RBC AUTO-ENTMCNC: 33.8 G/DL (ref 32.2–35.5)
MCV RBC AUTO: 95.4 FL (ref 81.4–97.8)
MONOCYTES # BLD AUTO: 0.44 K/UL (ref 0–0.85)
MONOCYTES NFR BLD AUTO: 4.6 % (ref 0–13.4)
NEUTROPHILS # BLD AUTO: 7.1 K/UL (ref 1.82–7.42)
NEUTROPHILS NFR BLD: 73.6 % (ref 44–72)
NRBC # BLD AUTO: 0 K/UL
NRBC BLD-RTO: 0 /100 WBC (ref 0–0.2)
PLATELET # BLD AUTO: 458 K/UL (ref 164–446)
PMV BLD AUTO: 9.1 FL (ref 9–12.9)
POTASSIUM SERPL-SCNC: 4.9 MMOL/L (ref 3.6–5.5)
PROT SERPL-MCNC: 7.5 G/DL (ref 6–8.2)
RBC # BLD AUTO: 4.31 M/UL (ref 4.2–5.4)
SODIUM SERPL-SCNC: 131 MMOL/L (ref 135–145)
WBC # BLD AUTO: 9.7 K/UL (ref 4.8–10.8)

## 2024-02-28 PROCEDURE — 80053 COMPREHEN METABOLIC PANEL: CPT

## 2024-02-28 PROCEDURE — 82570 ASSAY OF URINE CREATININE: CPT

## 2024-02-28 PROCEDURE — 36415 COLL VENOUS BLD VENIPUNCTURE: CPT

## 2024-02-28 PROCEDURE — 85025 COMPLETE CBC W/AUTO DIFF WBC: CPT

## 2024-02-28 PROCEDURE — 90837 PSYTX W PT 60 MINUTES: CPT | Performed by: PSYCHOLOGIST

## 2024-02-28 PROCEDURE — 82043 UR ALBUMIN QUANTITATIVE: CPT

## 2024-02-28 PROCEDURE — 82306 VITAMIN D 25 HYDROXY: CPT

## 2024-02-28 NOTE — TELEPHONE ENCOUNTER
Received request via: Patient    Was the patient seen in the last year in this department? Yes    Does the patient have an active prescription (recently filled or refills available) for medication(s) requested? No    Pharmacy Name: [facility]    Does the patient have [facility] and need 100 day supply (blood pressure, diabetes and cholesterol meds only)? Medication is not for cholesterol, blood pressure or diabetes.

## 2024-02-29 LAB
25(OH)D3 SERPL-MCNC: 41 NG/ML (ref 30–100)
CREAT UR-MCNC: 16.96 MG/DL
MICROALBUMIN UR-MCNC: <1.2 MG/DL
MICROALBUMIN/CREAT UR: NORMAL MG/G (ref 0–30)

## 2024-02-29 RX ORDER — ALPRAZOLAM 1 MG/1
1 TABLET ORAL 2 TIMES DAILY
Qty: 60 TABLET | Refills: 0 | Status: SHIPPED | OUTPATIENT
Start: 2024-02-29 | End: 2024-03-30

## 2024-02-29 RX ORDER — DEXTROAMPHETAMINE SACCHARATE, AMPHETAMINE ASPARTATE, DEXTROAMPHETAMINE SULFATE AND AMPHETAMINE SULFATE 5; 5; 5; 5 MG/1; MG/1; MG/1; MG/1
20 TABLET ORAL 2 TIMES DAILY
Qty: 60 EACH | Refills: 0 | Status: SHIPPED | OUTPATIENT
Start: 2024-02-29 | End: 2024-03-30

## 2024-03-01 NOTE — PROGRESS NOTES
Name: Yessica Baker Date: 24 : [birth year] Time in session 60 minutes   [] Initial Dx Eval [] Family [] Cancelled/Rescheduled [] Office visit   [x] Individual [] Group [] Late Cancellation [] Virtual Session   [] Couple [] Testing [] No call/No show [] Late   [] Discharge [] Phone [x] On time: 1:00 PM []  (3)   Attended: Ms. Baker   Observations/ Appearance/ Affect: Ms. Baker appeared clean, well-groomed, and dressed in casual clothes. She was oriented to person, place, time, and purpose, was pleasant and cooperative, lucid, and articulate. Her affect was anxious, and mood was sad. No suicidal or homicidal ideation described or reported. No reports of hallucinations or confusions. She participated well in this session.   Content/ Topics: Ms. Baker reported that she felt sad and lonely. She reported that she did not have anyone to talk to. She reported that she was sleeping better and eating healthily. She reported that she was volunteering at the [employer] a few days each week.   Risk issues assessed: Discussed with Ms. Baker strategies to decrease anxiety, lift depression and increase safety. Reviewed how Polyvagal Theory explains how there are shifts in physiological and behavioral states that distort social awareness and establish habitual defensive reactions that replace appropriate spontaneous social behavior. Discussed with her how over the next several sessions she will learn how the body is wired for survival and connection. Discussed how the autonomic nervous system works as our personal surveillance system, always on guard, always alert to the question: “is it safe?” The work of the autonomic nervous system is to protect us by looking for cues of safety and risk, sensing moment to moment of what is happening in and around our bodies and in the connections, we have to others. Discussed how over the next several sessions we will talk more about how this  system works and can inform us about how the state of the nervous system sets the table for how we will respond, act, and feel. Discussed with Ms. Baker how ventral exercises help a person to be in a safe ventral state and be mindful. Discussed how a deep breath, a sigh, and a stretch are examples of ventral exercises and self-regulation. Discussed with her how she will learn strategies to increase ventral exercises. Reminded her how mindful self-compassion is a skilled approach to helping a person to be more self-compassionate in their daily lives. To say aloud what you are feeling and the reasons you are feeling that way and then to be kind toward yourself and to say kind things to yourself and demonstrate self-compassion and self-caring for your well being and welfare. Reminded her how affirmations were helpful and skillful thoughts that constructively and effectively change the tone and purpose of an action. Reminded her how affirmations were brief, positively worded statements in the “present tense” about something she wanted to accomplish safely and effectively. Encouraged her to be aware of her feelings of safety and connection here at [facility].   Psychosocial and environmental problems addressed: Discussed with Ms. Baker how the focus of this psychotherapy is on skill building, on her physical and mental health and increasing function. Discussed with Ms. Baker how this psychotherapy encouraged her to increase nutritional values in her diet to increase the healing process in her body and brain. Discussed how taking medicine was especially important to help her brain work better and how increasing the nutritional values in her diet will not only support healthy brain function, but it will also support the work of the medicines and may even help areas of her brain heal and work more efficiently. Discussed how, in the long run, she may need the help of medicines, but she might not need so many  or so much. Discussed with Ms. Baker about Eye Movement Desensitization and Reprocessing (EMDR) as an approach to overcoming anxiety, stress, and trauma. Discussed her “safe place” in her mind where she could safely imagine ridding herself of any disturbance where she would feel 100% safe. She described her safe place as on a masseuse table in a grass [facility] in [place]. She practiced that safe place using bilateral stimulation and guided imagery in the session. She identified positive resources she could engage to distract herself from any disturbing material. She reported that she would play Candy Crush on her phone, play memory games, watch TV, listen to music, take care of her house plants, garden outside and volunteer at the [employer] [place]. She used bilateral stimulation and used the imagery of a cleansing vortex to relax and rid herself of any tension, tightness, or unusual sensations and to enhance her safe place (on the masseuse table in [place]). She used bilateral stimulation to remind her to use her positive resources to distract herself from any disturbing material, to use her ventral exercises, to use mindful self-compassion daily and to use her affirmations to gain restful sleep, eat healthy, and to use affirmations easily.   Current GAF: 55   Current medications: Xanax, Roxicodone, Norvasc, Cymbalta, Prinivil, Vitamin D3, Cepacol, Neurontin, Mucinex, Zofran ODT, Miralax, Salt, Albuterol, Mobic, Zanaflex   Significant/ Recent Events: Discussed the work of Mima Collado and encouraged her to look at some of her recipes in her book, “Eat Right, Feel Right.” She has recipes that reduce inflammation which occurs with depression, support vital energy (especially the adrenal glands), and reduce fatigue. Discussed how she reminds us that we need nourishment to pay attention and to focus for sustained periods and to heal from injuries. Discussed how she reminds us that difficulty sleeping is not just about  difficulty falling asleep, but it may be about waking up in the middle of the night. She has some recipes that can help with each. Discussed how she has recipes in her book that would encourage her to eat foods that are calming and to eliminate foods that are stimulating. She reminds people that food preparation is one of the greatest self-care efforts we can make. Discussed the work of Ibis Hannah and her workbook, “Mindful Self-Compassion” and to exercise mindful self-compassion as needed. Encouraged her to learn more about Polyvagal Theory and to consider the work of Nadine Ratliff. Discussed looking at Brainmd.com and consider nutritional supplements to increase the nutritional values in her diet. Discussed how Polyvagal Theory, Positive Psychology (Mindful Self-Compassion), Cognitive Behavioral Theory, Information Processing Theory and Mental Health Nutrition would inform this psychotherapy and how she will develop Cognitive Behavioral Skills and Strategies to decrease and manage anxieties and fears, process her grief, decrease posttraumatic stress, heal from her injuries, lift depression, gain control over her life, develop a healthier lifestyle, increase restful sleep, and find meaning and balance in her life. She asked to return to [facility] weekly to develop this supportive psychotherapy. Discussed how this psychotherapy would focus on her health, wellbeing, and progress at her pace and in a positive direction.   Informed consent issues discussed: Ms. Baker reported that she understood the process of this evaluation and agreed to return to [facility]. Discussed how she expressed her desire to change and was willing to start the process. She reported that she was willing to make changes to her life toward a healthy lifestyle. She reported that she was aware of the problems she experienced and expressed the desire to solve those problems safely. She reported that she had a positive outlook for change. She reported that  she had family and friends that were supportive and knew she needed to gain and maintain a healthy network of support.    Assignments/ Homework: Ms. Baker agreed to initiate some of the strategies discussed today to decrease anxieties and increase restful sleep and increase nutritional values in her diet.    Plan: Ms. Baker agreed to monitor closely her mood and behavior.    Diagnoses: F43.12 Posttraumatic Stress Disorder: F43.81 Complicated Bereavement [] Provisional   Treatment Plan: [x] Continued [] New [] Replaced Referral:   Suicidal [] Yes [x] No [] Medical:    Violence: [] Yes [x] No [] Psychiatric:    Next session:     [] Neurological:            Helder Starr Psy.D.  Clinical Psychologist  [facility]  NPI: [id number]

## 2024-03-05 ENCOUNTER — OFFICE VISIT (OUTPATIENT)
Dept: BEHAVIORAL HEALTH | Facility: CLINIC | Age: 64
End: 2024-03-05
Payer: MEDICARE

## 2024-03-05 DIAGNOSIS — F43.12 CHRONIC POSTTRAUMATIC STRESS DISORDER: ICD-10-CM

## 2024-03-05 PROCEDURE — 90837 PSYTX W PT 60 MINUTES: CPT | Performed by: PSYCHOLOGIST

## 2024-03-07 NOTE — PROGRESS NOTES
Name: Yessica Baker Date: 3/5/24 : [birth year] Time in session 60 minutes   [] Initial Dx Eval [] Family [] Cancelled/Rescheduled [] Office visit   [x] Individual [] Group [] Late Cancellation [] Virtual Session   [] Couple [] Testing [] No call/No show [] Late   [] Discharge [] Phone [x] On time: 2:00 PM []  (4)   Attended: Ms. Baker   Observations/ Appearance/ Affect: Ms. Baker appeared clean, well-groomed, and dressed in casual clothes. She was oriented to person, place, time, and purpose, was pleasant and cooperative, lucid, and articulate. Her affect was anxious, and mood was sad. No suicidal or homicidal ideation described or reported. No reports of hallucinations or confusions. She participated well in this session.   Content/ Topics: Ms. Baker reported that she continued to feel sad and lonely. She reported that she was not using her safe place. She reported that she was anxious about using EMDR. She reported that she was upset with her mother who told her, before she , that she had a brother, whom she gave up for adoption, before she was born. She reported that her mother  in . She reported that she also troubled recently discovering that her father was not her biological father.   Risk issues assessed: Discussed with Ms. Baker strategies to decrease anxiety, lift depression and increase safety. Reviewed how Polyvagal Theory explains how there are shifts in physiological and behavioral states that distort social awareness and establish habitual defensive reactions that replace appropriate spontaneous social behavior. Discussed with her how the body is wired for survival and connection. Discussed how the autonomic nervous system works as our personal surveillance system, always on guard, always alert to the question: “is it safe?” The work of the autonomic nervous system is to protect us by looking for cues of safety and risk, sensing moment to moment of  what is happening in and around our bodies and in the connections, we have to others. Discussed how the state of the nervous system sets the table for how we will respond, act, and feel. Discussed with Ms. Baker how ventral exercises help a person to be in a safe ventral state and be mindful. Discussed how a deep breath, a sigh, and a stretch are examples of ventral exercises and self-regulation. Discussed with her how she will learn strategies to increase ventral exercises. Reminded her how mindful self-compassion is a skilled approach to helping a person to be more self-compassionate in their daily lives. To say aloud what you are feeling and the reasons you are feeling that way and then to be kind toward yourself and to say kind things to yourself and demonstrate self-compassion and self-caring for your well being and welfare. Reminded her how affirmations were helpful and skillful thoughts that constructively and effectively change the tone and purpose of an action. Reminded her how affirmations were brief, positively worded statements in the “present tense” about something she wanted to accomplish safely and effectively. Encouraged her to be aware of her feelings of safety and connection here at [facility].   Psychosocial and environmental problems addressed: Discussed with Ms. Baker how the focus of this psychotherapy is on skill building, on her physical and mental health and increasing function. Discussed with Ms. Baker how this psychotherapy encouraged her to increase nutritional values in her diet to increase the healing process in her body and brain. Discussed how taking medicine was especially important to help her brain work better and how increasing the nutritional values in her diet will not only support healthy brain function, but it will also support the work of the medicines and may even help areas of her brain heal and work more efficiently. Discussed how, in the long run, she may  need the help of medicines, but she might not need so many or so much. Discussed with Ms. Baker about Eye Movement Desensitization and Reprocessing (EMDR) as an approach to overcoming anxiety, stress, and trauma. Discussed her “safe place” in her mind where she could safely imagine ridding herself of any disturbance where she would feel 100% safe. Discussed how EMDR treatment ensures processing of all related historical events, current incidents that elicit distress and future scenarios that will require different responses. The overall goal is producing the most comprehensive and profound treatment effects in the shortest period of time, while simultaneously maintaining a stable client within a balanced system. Discussed how after EMDR processing, clients generally report that the emotional distress related to the memory has been eliminated, or greatly decreased, and that they have gained important cognitive insights. Importantly, these emotional and cognitive changes usually result in spontaneous behavioral and personal change, which are further enhanced with standard EMDR procedures.  She expressed her interest in using Eye Movement Desensitization and Reprocessing (EMDR). Discussed her readiness to use EMDR. She reported that her life was stable and secure. She reported that she had friends and felt safe in her community. She reported that she is able to identify and express feelings confidently. She reported that she has an easy temperament, was flexible and has self-confidence and self-worth. She reported that she felt safe and generally marely well with adversity. She reported that she does use alcohol socially and does not use alcohol to cope with problems. She reported that she was in good health.   Using the Negative Cognition Questionnaire, she identified what Negative Cognitions describe negative beliefs she had about herself. She identified several: “I cannot trust anyone,” “I cannot let it out,”  “I am permanently damaged,” “It is not okay to feel (show) my emotions,” “I cannot stand up for myself,” “I cannot trust my judgment,” “I am powerless,” “I am weak,” and “I am insignificant.”    She reported that when she thought about how she was lied to about having a brother, the words that go best with that incident that expresses her negative belief about herself now were, “I cannot trust anyone.” She reported that the emotions she feels now when she brings up that incident and those words are those of betrayal, sadness, and emptiness. She reported that when she brings up that incident and those words and those emotions, she feels them in her back and “all over.” She reported that on a scale of 0 to 10, where 0 is no disturbance or neutral and 10 is the highest disturbance that she can imagine, how disturbing this feels to her right now, she reported that it was an 8. Discussed how this seemed an appropriate time to stop. Discussed how she worked hard to identify these events. She used bilateral stimulation and used the imagery of a cleansing vortex to relax and rid herself of any tension, tightness, or unusual sensations and to enhance her safe place (on the [employer] table in [place]). She used bilateral stimulation to remind her to use her positive resources to distract herself from any disturbing material (she would play Candy Crush on her phone, play memory games, watch TV, listen to music, take care of her house plants, garden outside and volunteer at the [employer]), to use her ventral exercises, to use mindful self-compassion daily and to use her affirmations to gain restful sleep, eat healthy, and to use affirmations easily.    Current GAF: 55   Current medications: Xanax, Roxicodone, Norvasc, Cymbalta, Prinivil, Vitamin D3, Cepacol, Neurontin, Mucinex, Zofran ODT, Miralax, Salt, Albuterol, Mobic, Zanaflex   Significant/ Recent Events: Discussed the work of Mima Collado and encouraged her to look at  some of her recipes in her book, “Eat Right, Feel Right.” She has recipes that reduce inflammation which occurs with depression, support vital energy (especially the adrenal glands), and reduce fatigue. Discussed how she reminds us that we need nourishment to pay attention and to focus for sustained periods and to heal from injuries. Discussed how she reminds us that difficulty sleeping is not just about difficulty falling asleep, but it may be about waking up in the middle of the night. She has some recipes that can help with each. Discussed how she has recipes in her book that would encourage her to eat foods that are calming and to eliminate foods that are stimulating. She reminds people that food preparation is one of the greatest self-care efforts we can make. Discussed the work of Ibis Hannah and her workbook, “Mindful Self-Compassion” and to exercise mindful self-compassion as needed. Encouraged her to learn more about Polyvagal Theory and to consider the work of Nadine Ratliff. Discussed looking at Brainmd.com and consider nutritional supplements to increase the nutritional values in her diet. Discussed how Polyvagal Theory, Positive Psychology (Mindful Self-Compassion), Cognitive Behavioral Theory, Information Processing Theory and Mental Health Nutrition would inform this psychotherapy and how she will develop Cognitive Behavioral Skills and Strategies to decrease and manage anxieties and fears, process her grief, decrease posttraumatic stress, heal from her injuries, lift depression, gain control over her life, develop a healthier lifestyle, increase restful sleep, and find meaning and balance in her life. She asked to return to [facility] weekly to develop this supportive psychotherapy. Discussed how this psychotherapy would focus on her health, wellbeing, and progress at her pace and in a positive direction.   Informed consent issues discussed: Ms. Baker reported that she understood the process of this  evaluation and agreed to return to [facility]. Discussed how she expressed her desire to change and was willing to start the process. She reported that she was willing to make changes to her life toward a healthy lifestyle. She reported that she was aware of the problems she experienced and expressed the desire to solve those problems safely. She reported that she had a positive outlook for change. She reported that she had family and friends that were supportive and knew she needed to gain and maintain a healthy network of support.    Assignments/ Homework: Ms. Baker agreed to initiate some of the strategies discussed today to decrease anxieties and increase restful sleep and increase nutritional values in her diet.    Plan: Ms. Baker agreed to monitor closely her mood and behavior.    Diagnoses: F43.12 Posttraumatic Stress Disorder: F43.81 Complicated Bereavement [] Provisional   Treatment Plan: [x] Continued [] New [] Replaced Referral:   Suicidal [] Yes [x] No [] Medical:    Violence: [] Yes [x] No [] Psychiatric:    Next session:     [] Neurological:            Helder Starr Psy.D.  Clinical Psychologist  [facility]  NPI: [id number]

## 2024-03-28 ENCOUNTER — APPOINTMENT (OUTPATIENT)
Dept: MEDICAL GROUP | Facility: PHYSICIAN GROUP | Age: 64
End: 2024-03-28
Payer: MEDICARE

## 2024-04-10 ENCOUNTER — APPOINTMENT (OUTPATIENT)
Dept: MEDICAL GROUP | Facility: PHYSICIAN GROUP | Age: 64
End: 2024-04-10
Payer: MEDICARE

## 2024-04-10 VITALS
HEIGHT: 62 IN | DIASTOLIC BLOOD PRESSURE: 80 MMHG | OXYGEN SATURATION: 98 % | SYSTOLIC BLOOD PRESSURE: 132 MMHG | TEMPERATURE: 98.6 F | RESPIRATION RATE: 20 BRPM | BODY MASS INDEX: 27.2 KG/M2 | HEART RATE: 101 BPM | WEIGHT: 147.8 LBS

## 2024-04-10 DIAGNOSIS — F33.1 MAJOR DEPRESSIVE DISORDER, RECURRENT EPISODE, MODERATE (HCC): ICD-10-CM

## 2024-04-10 DIAGNOSIS — M54.50 CHRONIC MIDLINE LOW BACK PAIN WITHOUT SCIATICA: ICD-10-CM

## 2024-04-10 DIAGNOSIS — I10 PRIMARY HYPERTENSION: ICD-10-CM

## 2024-04-10 DIAGNOSIS — E87.1 HYPONATREMIA: ICD-10-CM

## 2024-04-10 DIAGNOSIS — F13.20 SEDATIVE HYPNOTIC OR ANXIOLYTIC DEPENDENCE (HCC): ICD-10-CM

## 2024-04-10 DIAGNOSIS — G89.29 CHRONIC MIDLINE LOW BACK PAIN WITHOUT SCIATICA: ICD-10-CM

## 2024-04-10 DIAGNOSIS — I10 HYPERTENSION, UNSPECIFIED TYPE: ICD-10-CM

## 2024-04-10 DIAGNOSIS — F98.8 ADD (ATTENTION DEFICIT DISORDER) WITHOUT HYPERACTIVITY: ICD-10-CM

## 2024-04-10 DIAGNOSIS — I70.0 ATHEROSCLEROSIS OF AORTA (HCC): ICD-10-CM

## 2024-04-10 PROCEDURE — 3079F DIAST BP 80-89 MM HG: CPT | Performed by: INTERNAL MEDICINE

## 2024-04-10 PROCEDURE — 99215 OFFICE O/P EST HI 40 MIN: CPT | Performed by: INTERNAL MEDICINE

## 2024-04-10 PROCEDURE — 3075F SYST BP GE 130 - 139MM HG: CPT | Performed by: INTERNAL MEDICINE

## 2024-04-10 RX ORDER — DEXTROAMPHETAMINE SACCHARATE, AMPHETAMINE ASPARTATE, DEXTROAMPHETAMINE SULFATE AND AMPHETAMINE SULFATE 5; 5; 5; 5 MG/1; MG/1; MG/1; MG/1
20 TABLET ORAL 2 TIMES DAILY
COMMUNITY
End: 2024-04-12 | Stop reason: SDUPTHER

## 2024-04-10 RX ORDER — ALPRAZOLAM 1 MG/1
15 TABLET ORAL 2 TIMES DAILY
COMMUNITY
End: 2024-04-12 | Stop reason: SDUPTHER

## 2024-04-10 RX ORDER — TIZANIDINE 4 MG/1
4 TABLET ORAL EVERY 6 HOURS PRN
Qty: 100 TABLET | Refills: 1 | Status: SHIPPED | OUTPATIENT
Start: 2024-04-10

## 2024-04-10 RX ORDER — AMLODIPINE BESYLATE 10 MG/1
10 TABLET ORAL DAILY
Qty: 100 TABLET | Refills: 2 | Status: SHIPPED | OUTPATIENT
Start: 2024-04-10

## 2024-04-10 ASSESSMENT — PATIENT HEALTH QUESTIONNAIRE - PHQ9
5. POOR APPETITE OR OVEREATING: 3 - NEARLY EVERY DAY
CLINICAL INTERPRETATION OF PHQ2 SCORE: 5

## 2024-04-10 ASSESSMENT — ANXIETY QUESTIONNAIRES
3. WORRYING TOO MUCH ABOUT DIFFERENT THINGS: MORE THAN HALF THE DAYS
1. FEELING NERVOUS, ANXIOUS, OR ON EDGE: NEARLY EVERY DAY
2. NOT BEING ABLE TO STOP OR CONTROL WORRYING: SEVERAL DAYS
7. FEELING AFRAID AS IF SOMETHING AWFUL MIGHT HAPPEN: NOT AT ALL
IF YOU CHECKED OFF ANY PROBLEMS ON THIS QUESTIONNAIRE, HOW DIFFICULT HAVE THESE PROBLEMS MADE IT FOR YOU TO DO YOUR WORK, TAKE CARE OF THINGS AT HOME, OR GET ALONG WITH OTHER PEOPLE: VERY DIFFICULT
4. TROUBLE RELAXING: SEVERAL DAYS
5. BEING SO RESTLESS THAT IT IS HARD TO SIT STILL: NOT AT ALL
6. BECOMING EASILY ANNOYED OR IRRITABLE: NOT AT ALL
GAD7 TOTAL SCORE: 7

## 2024-04-10 ASSESSMENT — FIBROSIS 4 INDEX: FIB4 SCORE: 0.62

## 2024-04-10 NOTE — PROGRESS NOTES
CC: Chronic low back pain, medications, hospital visit.    HPI:  Paulette presents with the following:   Patient was last seen by me 4/4/2023.    1. Chronic midline low back pain without sciatica  Patient has a PMH of lumbar fusion in 2013.  Due to recent hospitalization in November, her low back pain has increased in severity.  Patient completed a lumbar and thoracic MRI in January.  Lumbar MRI shows mild chronic compression deformity of L1 and L2.  Moderate right L5 neural foraminal stenosis.  Thoracic MRI shows postsurgical and degenerative changes in the cervical spine as described.  Patient requesting referral.  Patient is treating pain with meloxicam and tizanidine.  Patient also weaned off of gabapentin.  No longer using Norco for pain relief.  Occasionally will need a walker for stability.    2. Major depressive disorder, recurrent episode, moderate (HCC)  Chronic.  Stable.  Patient is followed by psychiatry and psychologist.  On November 22, 2023, patient presented to the emergency room for paranoia and failure to thrive.  Per EMS, patient was found in the home with her dead dog, paranoid not eating or drinking. Per patient there was a dog fight, and sustained multiple right rib fractures.  Patient was hospitalized for 2 weeks and then spent 2 weeks in acute rehab.  Patient is no longer taking Celexa 40 mg daily.  Patient was started on Cymbalta 30 mg daily along with Xanax 1 mg tablet as needed.  PHQ-9 equals 5.    3. ADD (attention deficit disorder) without hyperactivity  Patient is also closely followed by psychiatry and was recently started on Adderall for presumed ADD.    4. Atherosclerosis of aorta (HCC)  Chronic.  Stable.  Treating risk factors and monitoring.    5. Sedative hypnotic or anxiolytic dependence (HCC)  Chronic.  Stable.  Patient is followed by psychiatry.  Patient is taking Xanax 1 mg tablets as needed.  Last filled December 17, 2023.    6. Hyponatremia  During her hospitalization  in November 2023, patient was found to be severely hyponatremic at 117.  Nephrology was consulted.  Hyponatremia most likely secondary to hypovolemia,psychogenic polydipsia, tea and toast diet.  Sodium level on discharge was 132.  Current sodium level 131.  Asymptomatic.    7. Hypertension, unspecified type  Patient was started on amlodipine 10 mg tablets daily.  Blood pressure has been stable.  Monitors blood pressure regularly at home.  Patient will need a refill on her amlodipine.      Patient Active Problem List    Diagnosis Date Noted    Chronic posttraumatic stress disorder 01/04/2024    Delusional disorder (HCC) 11/24/2023    Hypertension 11/23/2023    ACP (advance care planning) 11/23/2023    Closed fracture of multiple ribs of right side with routine healing 11/23/2023    Hyponatremia 11/22/2023    Dyslipidemia 04/04/2023    Traumatic lip pain 02/24/2023    Facial asymmetry 02/24/2023    Neuropathy 10/05/2022    Sedative hypnotic or anxiolytic dependence (Former[facility]) 10/05/2022    Atherosclerosis of aorta ([facility]) 10/05/2022    Elevated blood pressure reading 10/05/2022    Major depressive disorder, recurrent episode, moderate ([facility]) 04/22/2022    ADD (attention deficit disorder) without hyperactivity 04/22/2022    Generalized anxiety disorder 04/22/2022    Back muscle spasm 01/25/2022    Abnormal mammogram 10/14/2021    BMI 25.0-25.9,adult 09/27/2021    Hyperglycemia 04/08/2021    Anxiety 04/08/2021    Primary insomnia 04/08/2021    Osteoporosis without current pathological fracture 04/08/2021    Vitamin D deficiency 04/08/2021    Coronary arteriosclerosis 08/11/2020    Fam hx-ischem heart disease 06/20/2019    Seasonal allergies 08/10/2018    External hemorrhoids 04/05/2018    Functional diarrhea 04/05/2018    Mixed hyperlipidemia 05/26/2017    Tobacco dependence 04/01/2016    Mood disorder (HCC) 12/11/2015    Chronic midline low back pain without sciatica 12/11/2015       Current Outpatient Medications   Medication  Sig Dispense Refill    amphetamine-dextroamphetamine (ADDERALL) 20 MG Tab Take 20 mg by mouth 2 times a day.      ALPRAZolam (XANAX) 1 MG Tab Take 15 mg by mouth 2 times a day.      DULoxetine (CYMBALTA) 30 MG Cap DR Particles Take 1 Capsule by mouth in the morning, at noon, and at bedtime for 90 days. Indications: Major Depressive Disorder 270 Capsule 0    Menthol, Topical Analgesic, (BIOFREEZE) 4 % Gel Apply 1 Dose topically 3 times a day as needed (Pain). Indications: Pain      amLODIPine (NORVASC) 10 MG Tab Take 1 Tablet by mouth every day. 100 Tablet 2    benzocaine-menthol (CEPACOL) 15-3.6 MG Lozenge Dissolve 1 Lozenge in the mouth every 2 hours as needed (oral pain).      guaiFENesin ER (MUCINEX) 600 MG TABLET SR 12 HR Take 1 Tablet by mouth every 12 hours. 28 Tablet     ondansetron (ZOFRAN ODT) 4 MG TABLET DISPERSIBLE Take 1 Tablet by mouth every four hours as needed for Nausea/Vomiting (give PO if no IV route available). 10 Tablet 0    polyethylene glycol/lytes (MIRALAX) Pack Take 1 Packet by mouth 1 time a day as needed (if sennosides and docusate ineffective after 24 hours).  3    sodium chloride (SALT) 1 GM Tab Take 1 Tablet by mouth 2 times a day with meals. 90 Tablet 3    vitamin D3 (CHOLECALCIFEROL) 1000 UNIT Tab Take 1 Tablet by mouth every day. 60 Tablet     acetaminophen (TYLENOL) 500 MG Tab Take 500-1,000 mg by mouth every 6 hours as needed. Indications: Pain      pseudoephedrine (SUDAFED) 30 MG Tab Take 60 mg by mouth every four hours as needed for Congestion. Indications: Stuffy Nose      Turmeric 400 MG Cap Take 400 mg by mouth every day. Indications: Inflammation      Omega-3 Fatty Acids (FISH OIL) 1000 MG Cap capsule Take 1,000 mg by mouth every day. Indications: suppliment      psyllium (METAMUCIL) 58.12 % Pack Take 1 Packet by mouth every day. Indications: constipation      albuterol 108 (90 Base) MCG/ACT Aero Soln inhalation aerosol INHALE 1 PUFF EVERY FOUR HOURS AS NEEDED FOR SHORTNESS OF  BREATH. 18 Each 1    meloxicam (MOBIC) 7.5 MG Tab TAKE 1 TABLET BY MOUTH TWICE A DAY (Patient taking differently: Take 7.5 mg by mouth 2 times a day.) 200 Tablet 3    tizanidine (ZANAFLEX) 4 MG Tab TAKE 1 TABLET BY MOUTH EVERY 6 HOURS AS NEEDED 100 Tablet 1    therapeutic multivitamin-minerals (THERAGRAN-M) Tab Take 1 Tablet by mouth every day. Indications: suppliment      fluticasone (FLONASE) 50 MCG/ACT nasal spray Administer 2 Sprays into affected nostril(S) 2 times a day as needed. Indications: Stuffy Nose      cetirizine (ZYRTEC) 10 MG Tab Take 10 mg by mouth at bedtime as needed. Indications: Hayfever      Probiotic Product (PROBIOTIC DAILY PO) Take 1 Capsule by mouth at bedtime. Indications: suppliment      gabapentin (NEURONTIN) 400 MG Cap Take 1 Capsule by mouth 3 times a day. (Patient not taking: Reported on 4/10/2024) 90 Capsule     lisinopril (PRINIVIL) 20 MG Tab Take 1 Tablet by mouth every day. (Patient not taking: Reported on 4/10/2024) 30 Tablet      No current facility-administered medications for this visit.         Allergies as of 04/10/2024 - Reviewed 04/10/2024   Allergen Reaction Noted    Sulfa drugs Nausea 01/13/2014    Seasonal Runny Nose and Itching 01/13/2014        Social History     Socioeconomic History    Marital status: Single     Spouse name: Not on file    Number of children: Not on file    Years of education: Not on file    Highest education level: Not on file   Occupational History    Not on file   Tobacco Use    Smoking status: Former     Current packs/day: 1.00     Average packs/day: 1 pack/day for 45.3 years (45.3 ttl pk-yrs)     Types: Cigarettes     Start date: 1979    Smokeless tobacco: Never   Vaping Use    Vaping Use: Some days    Substances: CBD, Flavoring    Devices: Disposable   Substance and Sexual Activity    Alcohol use: Not Currently     Comment: socially    Drug use: Not Currently     Types: Marijuana, Inhaled     Comment: occ edible THC    Sexual activity: Never     " Comment: , ret    Other Topics Concern    Not on file   Social History Narrative    ** Merged History Encounter **          Social Determinants of Health     Financial Resource Strain: Not on file   Food Insecurity: No Food Insecurity (3/12/2020)    Hunger Vital Sign     Worried About Running Out of Food in the Last Year: Never true     Ran Out of Food in the Last Year: Never true   Transportation Needs: No Transportation Needs (3/12/2020)    PRAPARE - Transportation     Lack of Transportation (Medical): No     Lack of Transportation (Non-Medical): No   Physical Activity: Not on file   Stress: Not on file   Social Connections: Feeling Somewhat Isolated (1/26/2024)    OASIS : Social Isolation     Frequency of experiencing loneliness or isolation: Sometimes   Intimate Partner Violence: Not on file   Housing Stability: Not on file       Family History   Problem Relation Age of Onset    Cancer Mother 45        colon    Heart Disease Mother         HEART ATTACK    Heart Disease Half-brother         Heart Bypass @ 45/stent    No Known Problems Half-brother     No Known Problems Half-brother     Diabetes Neg Hx     Stroke Neg Hx        Past Surgical History:   Procedure Laterality Date    FUSION, SPINE, LUMBAR, PLIF  2013    rods and screw placement    ROTATOR CUFF REPAIR Right Mar 2009    OTHER ORTHOPEDIC SURGERY  2009    Rotator cuff  surgery Right    OTHER ORTHOPEDIC SURGERY      back surgery    PRIMARY C SECTION      x2       ROS: Positive ROS per HPI.  Denies any Headache,Chest pain,  Shortness of breath,  Abdominal pain, Changes of bowel or bladder, Lower ext edema, Fevers, Nights sweats, Weight Changes, Focal weakness or numbness.  All other systems are negative.    /80 (BP Location: Right arm, Patient Position: Sitting)   Pulse (!) 101   Temp 37 °C (98.6 °F) (Temporal)   Resp 20   Ht 1.575 m (5' 2\")   Wt 67 kg (147 lb 12.8 oz)   SpO2 98%   BMI 27.03 kg/m²    "   Constitutional: Alert, no distress, well-groomed.  Skin: Warm, dry, good turgor, no rashes in visible areas.  Eye: Equal, round and reactive, conjunctiva clear, lids normal.  ENMT: Lips without lesions, good dentition, moist mucous membranes.  Neck: Trachea midline, no masses, no thyromegaly.  Respiratory: Unlabored respiratory effort, no cough.  Abdomen: Soft, no gross masses.  MSK: Normal gait, moves all extremities.  Neuro: Grossly non-focal. No cranial nerve deficit. Strength and sensation intact.   Psych: Alert and oriented x3, normal affect and mood.      Assessment and Plan.   64 y.o. female presenting with the following.     1. Chronic midline low back pain without sciatica  Continue meloxicam, tizanidine.  - Referral to Orthopedics    2. Major depressive disorder, recurrent episode, moderate (HCC)  Chronic.  Stable.  Continue follow-up per psychiatry.  Continue current plan of care.  Medications reviewed.    3. ADD (attention deficit disorder) without hyperactivity  Stable.  Continue Adderall as prescribed by psychiatry.      4. Atherosclerosis of aorta (HCC)  Chronic.  Stable.  Continue to monitor and treat risk factors.    5. Sedative hypnotic or anxiolytic dependence (HCC)  Chronic.  Stable.   reports reviewed.  Continue Xanax as prescribed by psychiatry.    6. Hyponatremia  Continue to monitor.  Medications reviewed.    7. Hypertension, unspecified type  Refill amlodipine 10 mg daily.    My total time spent caring for the patient on the day of the encounter was 41 minutes.   This does not include time spent on separately billable procedures/tests.

## 2024-04-12 DIAGNOSIS — F22 DELUSIONAL DISORDER (HCC): ICD-10-CM

## 2024-04-12 DIAGNOSIS — F90.0 ADHD (ATTENTION DEFICIT HYPERACTIVITY DISORDER), INATTENTIVE TYPE: ICD-10-CM

## 2024-04-12 DIAGNOSIS — F41.1 GAD (GENERALIZED ANXIETY DISORDER): ICD-10-CM

## 2024-04-12 DIAGNOSIS — F33.1 MDD (MAJOR DEPRESSIVE DISORDER), RECURRENT EPISODE, MODERATE (HCC): ICD-10-CM

## 2024-04-12 RX ORDER — DEXTROAMPHETAMINE SACCHARATE, AMPHETAMINE ASPARTATE, DEXTROAMPHETAMINE SULFATE AND AMPHETAMINE SULFATE 5; 5; 5; 5 MG/1; MG/1; MG/1; MG/1
20 TABLET ORAL 2 TIMES DAILY
Qty: 60 TABLET | Refills: 0 | Status: SHIPPED | OUTPATIENT
Start: 2024-04-12 | End: 2024-05-12

## 2024-04-12 RX ORDER — ALPRAZOLAM 1 MG/1
1 TABLET ORAL 2 TIMES DAILY
Qty: 60 TABLET | Refills: 0 | Status: SHIPPED | OUTPATIENT
Start: 2024-04-12 | End: 2024-05-12

## 2024-04-12 RX ORDER — DULOXETIN HYDROCHLORIDE 60 MG/1
120 CAPSULE, DELAYED RELEASE ORAL 2 TIMES DAILY
Qty: 120 CAPSULE | Refills: 0 | Status: SHIPPED | OUTPATIENT
Start: 2024-04-12 | End: 2024-05-12

## 2024-04-12 NOTE — TELEPHONE ENCOUNTER
's patient.   Patient is requesting a refill on her medications. Patient states she has increased the Duloxetine to a total of 120 mg. Please advise.     Received request via: Patient    Was the patient seen in the last year in this department? Yes    Does the patient have an active prescription (recently filled or refills available) for medication(s) requested? No    Pharmacy Name: [facility]     Does the patient have [facility] and need 100 day supply (blood pressure, diabetes and cholesterol meds only)? Medication is not for cholesterol, blood pressure or diabetes and Patient does not have [facility]

## 2024-04-22 ENCOUNTER — OFFICE VISIT (OUTPATIENT)
Dept: BEHAVIORAL HEALTH | Facility: CLINIC | Age: 64
End: 2024-04-22
Payer: MEDICARE

## 2024-04-22 DIAGNOSIS — F43.12 CHRONIC POSTTRAUMATIC STRESS DISORDER: ICD-10-CM

## 2024-04-22 PROCEDURE — 90837 PSYTX W PT 60 MINUTES: CPT | Performed by: PSYCHOLOGIST

## 2024-04-24 NOTE — PROGRESS NOTES
Name: Yessica Baker Date: 24 : [birth year] Time in session 60 minutes   [] Initial Dx Eval [] Family [] Cancelled/Rescheduled [] Office visit   [x] Individual [] Group [] Late Cancellation [] Virtual Session   [] Couple [] Testing [] No call/No show [] Late   [] Discharge [] Phone [x] On time: 2:00 PM []  (5)   Attended: Ms. Baker   Observations/ Appearance/ Affect: Ms. Baker appeared clean, well-groomed, and dressed in casual clothes. She was oriented to person, place, time, and purpose, was pleasant and cooperative, lucid, and articulate. Her affect was anxious, and mood was sad. No suicidal or homicidal ideation described or reported. No reports of hallucinations or confusions. She participated well in this session.   Content/ Topics: Ms. Baker reported that she was in “freeze mode.” She reported that they did an MRI on her back. She reported that she was referred to pain management and to physical therapy. She reported that the surgeon wanted to remove the metal in her back. She reported that it was causing the pain. She reported that he wanted a CT scan to show her vertebrae were fused enough to hold her back straight without the metal. She reported that she was depressed about what she might be facing.    Risk issues assessed: Discussed with Ms. Baker strategies to decrease anxiety, lift depression and increase safety. Reviewed how Polyvagal Theory explains how there are shifts in physiological and behavioral states that distort social awareness and establish habitual defensive reactions that replace appropriate spontaneous social behavior. Discussed with her how the body is wired for survival and connection. Discussed how the autonomic nervous system works as our personal surveillance system, always on guard, always alert to the question: “is it safe?” The work of the autonomic nervous system is to protect us by looking for cues of safety and risk, sensing moment to  moment of what is happening in and around our bodies and in the connections, we have to others. Discussed how the state of the nervous system sets the table for how we will respond, act, and feel. Reminded her how affirmations were helpful and skillful thoughts that constructively and effectively change the tone and purpose of an action. Reminded her how affirmations were brief, positively worded statements in the “present tense” about something she wanted to accomplish safely and effectively. Encouraged her to be aware of her feelings of safety and connection here at [facility].   Psychosocial and environmental problems addressed: She asked what are somatic treatments? Discussed how there are many different type of approaches to decrease discomfort. Discussed how psychotherapy contains treatments to decrease pain and discomfort. Discussed with Ms. Baker how the focus of this psychotherapy is on skill building, on her physical and mental health and increasing function. Discussed with Ms. Baker how this psychotherapy encouraged her to increase nutritional values in her diet to increase the healing process in her body and brain. Discussed how taking medicine was especially important to help her brain work better and how increasing the nutritional values in her diet will not only support healthy brain function, but it will also support the work of the medicines and may even help areas of her body heal. Discussed with Ms. Baker about Eye Movement Desensitization and Reprocessing (EMDR) as an approach to overcoming anxiety, stress, and trauma. Discussed how EMDR treatment ensures processing of all related historical events, current incidents that elicit distress and future scenarios that will require different responses. The overall goal is producing the most comprehensive and profound treatment effects in the shortest period of time, while simultaneously maintaining a stable client within a balanced  system. Discussed how after EMDR processing, clients generally report that the emotional distress related to the memory has been eliminated, or greatly decreased, and that they have gained important cognitive insights. Importantly, these emotional and cognitive changes usually result in spontaneous behavioral and personal change, which are further enhanced with standard EMDR procedures. In terms of trauma, the information processing model posits that problems represent information that is blocked and unable to be processed. The “system “is retaining information in the form of distressing memories, thoughts, and feelings that are not useful. According to the information processing model, if the right conditions for processing to occur are created, a person should be able to reprocess any material that is not ecological, including sensation of physical discomfort. When targeting somatic pain, it is helpful to get the person to draw or imagine a picture of their pain. Describe it in terms of size, shape, color, temperature, etc., can also help make it more real. The AMILCAR (1-10) becomes the level of pain, since most pain starts with an injury, there may be residual weakness, damage or discomfort that is inevitable or necessary. Somatic has to do with what pain means in terms of physical pathology. Reprocessing of physical pain. Semantic pain has to do with what pain means in terms of its effects on the person's life and identity. Reprocessing is to improve the person's overall coping. Antidote Imagery is a client-generated set of images and affective associations with manageable levels of discomfort, which are reinforced until the client can recall them at will, to control their pain. Discussed with this person the use of hyperbaric oxygen therapy (HBOT). Discussed how HBOT was the use of a pressurized compartment with differing amounts of concentrations of oxygen. Hyperbaric oxygen therapy (HBOT) is the medical use of  oxygen in a pressurized environment, at a level higher than 1 atmosphere absolute (LAMBERT). Increased pressure allows oxygen to dissolve and saturate the blood plasma (independent of hemoglobin/red blood cells), which yields a broad variety of positive physiological, biochemical, and cellular effects. This noninvasive therapy is the most trusted way to increase oxygen levels to all organs of the body. The typical treatment lasts for 60-90 minutes, during which the patient lies down and breathes normally. HBOT has been demonstrated in several clinical studies to enhance the body's innate ability to repair and regenerate. It is used as an adjunct therapy to complement and enhance the healing process in both chronic and acute conditions ([employer]). Discussed how HBOT's ability to enhance blood flow, reduces swelling and inflammation, and stimulate growth hormones and stem cells have tremendous impacts on healing. HBOT helps to push past the limitations of our current state of healing that often create patterns of pain and other symptoms that it has difficulty overcoming ([place] [facility]). Acupressure is a type of massage therapy in which manual pressure is applied to specific points on the body. It is a practice of traditional Chinese medicine (TCM) practice that is similar to acupuncture, except that it uses fingertip pressure instead of needles. Acupressure is said to help with a range of conditions, from motion sickness to headache to muscle pain. TCM practitioners say acupressure benefits are achieved by using pressure points along the energy pathways in the body, to encourage the free flow of energy, or qi. Acupuncture is a traditional Chinese medicine practice that is based on the idea that a blockage or disturbance in the flow of the body's life energy, or qi, can cause health issues. Acupuncturists insert thin needles into specific points throughout the body to balance the body's energy,  stimulate healing, and promote relaxation. Researchers don't fully understand how acupuncture might work, but some theories include: Acupuncture may stimulate the release of endorphins, the body's natural pain-relieving chemicals. Acupuncture may influence the autonomic nervous system, and needle placement may impact breathing, blood pressure, and heart rate. She agreed to use the imagery of her safe place (on the table in her massage therapy in [place]). She agreed to use her positive resources to distract herself from any disturbing material (she would play Candy Crush on her phone, play memory games, watch TV, listen to music, take care of her house plants, garden outside and volunteer at the [facility] [employer]), to use her ventral exercises, to use mindful self-compassion daily and to use her affirmations to gain restful sleep, eat healthy, and to use affirmations easily.    Current GAF: 55   Current medications: Xanax, Roxicodone, Norvasc, Cymbalta, Prinivil, Vitamin D3, Cepacol, Neurontin, Mucinex, Zofran ODT, Miralax, Salt, Albuterol, Mobic, Zanaflex   Significant/ Recent Events: Discussed the work of Mima Collado and encouraged her to look at some of her recipes in her book, “Eat Right, Feel Right.” She has recipes that reduce inflammation which occurs with depression, support vital energy (especially the adrenal glands), and reduce fatigue. Discussed how she reminds us that we need nourishment to pay attention and to focus for sustained periods and to heal from injuries. Discussed how she reminds us that difficulty sleeping is not just about difficulty falling asleep, but it may be about waking up in the middle of the night. She has some recipes that can help with each. Discussed how she has recipes in her book that would encourage her to eat foods that are calming and to eliminate foods that are stimulating. She reminds people that food preparation is one of the greatest self-care efforts we can make.  Discussed the work of Ibis Hannah and her workbook, “Mindful Self-Compassion” and to exercise mindful self-compassion as needed. Encouraged her to learn more about Polyvagal Theory and to consider the work of Nadine Ratliff. Discussed looking at [employer] and consider nutritional supplements to increase the nutritional values in her diet. Discussed how Polyvagal Theory, Positive Psychology (Mindful Self-Compassion), Cognitive Behavioral Theory, Information Processing Theory and Mental Health Nutrition would inform this psychotherapy and how she will develop Cognitive Behavioral Skills and Strategies to decrease and manage anxieties and fears, process her grief, decrease posttraumatic stress, heal from her injuries, lift depression, gain control over her life, develop a healthier lifestyle, increase restful sleep, and find meaning and balance in her life. She asked to return to [facility] weekly to develop this supportive psychotherapy. Discussed how this psychotherapy would focus on her health, wellbeing, and progress at her pace and in a positive direction.   Informed consent issues discussed: Ms. Baker reported that she understood the process of this evaluation and agreed to return to [facility]. Discussed how she expressed her desire to change and was willing to start the process. She reported that she was willing to make changes to her life toward a healthy lifestyle. She reported that she was aware of the problems she experienced and expressed the desire to solve those problems safely. She reported that she had a positive outlook for change. She reported that she had family and friends that were supportive and knew she needed to gain and maintain a healthy network of support.    Assignments/ Homework: Ms. Baker agreed to initiate some of the strategies discussed today to decrease anxieties and increase restful sleep and increase nutritional values in her diet.    Plan: Ms. Baker agreed to monitor closely  her mood and behavior.    Diagnoses: F43.12 Posttraumatic Stress Disorder: F43.81 Complicated Bereavement [] Provisional   Treatment Plan: [x] Continued [] New [] Replaced Referral:   Suicidal [] Yes [x] No [] Medical:    Violence: [] Yes [x] No [] Psychiatric:    Next session:     [] Neurological:            Helder Starr Psy.D.  Clinical Psychologist  [facility]  NPI: [id number]

## 2024-04-25 ENCOUNTER — APPOINTMENT (OUTPATIENT)
Dept: BEHAVIORAL HEALTH | Facility: CLINIC | Age: 64
End: 2024-04-25
Payer: MEDICARE

## 2024-05-04 DIAGNOSIS — F41.1 GAD (GENERALIZED ANXIETY DISORDER): ICD-10-CM

## 2024-05-04 DIAGNOSIS — F33.1 MDD (MAJOR DEPRESSIVE DISORDER), RECURRENT EPISODE, MODERATE (HCC): ICD-10-CM

## 2024-05-06 ENCOUNTER — OFFICE VISIT (OUTPATIENT)
Dept: BEHAVIORAL HEALTH | Facility: CLINIC | Age: 64
End: 2024-05-06
Payer: MEDICARE

## 2024-05-06 DIAGNOSIS — F43.12 PROLONGED POSTTRAUMATIC STRESS DISORDER: ICD-10-CM

## 2024-05-06 PROCEDURE — 90837 PSYTX W PT 60 MINUTES: CPT | Performed by: PSYCHOLOGIST

## 2024-05-06 RX ORDER — DULOXETIN HYDROCHLORIDE 60 MG/1
120 CAPSULE, DELAYED RELEASE ORAL 2 TIMES DAILY
Qty: 360 CAPSULE | Refills: 0 | Status: SHIPPED | OUTPATIENT
Start: 2024-05-06 | End: 2024-05-31 | Stop reason: SDUPTHER

## 2024-05-06 NOTE — TELEPHONE ENCOUNTER
Insurance is requesting a 90 day supply.    Received request via: Pharmacy    Was the patient seen in the last year in this department? Yes    Does the patient have an active prescription (recently filled or refills available) for medication(s) requested? No    Pharmacy Name: [facility]    Does the patient have [facility] and need 100 day supply (blood pressure, diabetes and cholesterol meds only)? Medication is not for cholesterol, blood pressure or diabetes
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
## 2024-05-15 DIAGNOSIS — F90.0 ADHD (ATTENTION DEFICIT HYPERACTIVITY DISORDER), INATTENTIVE TYPE: ICD-10-CM

## 2024-05-15 DIAGNOSIS — F41.1 GAD (GENERALIZED ANXIETY DISORDER): ICD-10-CM

## 2024-05-16 RX ORDER — DEXTROAMPHETAMINE SACCHARATE, AMPHETAMINE ASPARTATE, DEXTROAMPHETAMINE SULFATE AND AMPHETAMINE SULFATE 5; 5; 5; 5 MG/1; MG/1; MG/1; MG/1
20 TABLET ORAL 2 TIMES DAILY
Qty: 60 TABLET | Refills: 0 | Status: SHIPPED | OUTPATIENT
Start: 2024-05-16 | End: 2024-05-31 | Stop reason: SDUPTHER

## 2024-05-16 RX ORDER — ALPRAZOLAM 1 MG/1
1 TABLET ORAL 2 TIMES DAILY
Qty: 60 TABLET | Refills: 0 | Status: SHIPPED | OUTPATIENT
Start: 2024-05-16 | End: 2024-05-31 | Stop reason: SDUPTHER

## 2024-05-20 ENCOUNTER — OFFICE VISIT (OUTPATIENT)
Dept: BEHAVIORAL HEALTH | Facility: CLINIC | Age: 64
End: 2024-05-20
Payer: MEDICARE

## 2024-05-20 DIAGNOSIS — F43.12 PROLONGED POSTTRAUMATIC STRESS DISORDER: ICD-10-CM

## 2024-05-20 PROCEDURE — 90837 PSYTX W PT 60 MINUTES: CPT | Performed by: PSYCHOLOGIST

## 2024-05-20 NOTE — PROGRESS NOTES
Name: Yessica Baker Date: 24 : [birth year] Time in session 60 minutes   [] Initial Dx Eval [] Family [] Cancelled/Rescheduled [] Office visit   [x] Individual [] Group [] Late Cancellation [] Virtual Session   [] Couple [] Testing [] No call/No show [] Late   [] Discharge [] Phone [x] On time: 2:00 PM []  (7)   Attended: Ms. Baker   Observations/ Appearance/ Affect: Ms. Baker appeared clean, well-groomed, and dressed in casual clothes. She was oriented to person, place, time, and purpose, was pleasant and cooperative, lucid, and articulate. Her affect was anxious, and mood was sad. No suicidal or homicidal ideation described or reported. No reports of hallucinations or confusions. She participated well in this session.   Content/ Topics: Ms. Baker reported that she was sad that she had not heard from her son for over a month. She reported that her back was still sore. She reported that was using her safe place and positive resources.    Risk issues assessed: Discussed with Ms. Baker strategies to decrease anxiety, lift depression and increase safety. Reviewed how Polyvagal Theory explains how there are shifts in physiological and behavioral states that distort social awareness and establish habitual defensive reactions that replace appropriate spontaneous social behavior. Discussed with her how the body is wired for survival and connection. Discussed how the autonomic nervous system works as our personal surveillance system, always on guard, always alert to the question: “is it safe?” The work of the autonomic nervous system is to protect us by looking for cues of safety and risk, sensing moment to moment of what is happening in and around our bodies and in the connections, we have to others. Discussed how the state of the nervous system sets the table for how we will respond, act, and feel. Reminded her how affirmations were helpful and skillful thoughts that  constructively and effectively change the tone and purpose of an action. Reminded her how affirmations were brief, positively worded statements in the “present tense” about something she wanted to accomplish safely and effectively. Encouraged her to be aware of her feelings of safety and connection here at [facility].   Psychosocial and environmental problems addressed: Ms. Baker reported that she wanted to use Eye Movement Desensitization and Reprocessing (EMDR). She followed the EMDR worksheet protocol. She was reminded to use her hand to signal and say stop and to use her safe place (massage therapy in [place]) to rid herself of any disturbance and feel 100% safe. She indicated that she wanted to use EMDR to rid herself of the disturbance associated with not hearing from her son on Mother's Day. She identified the picture or image that represented the worst part of this experience as an image of her broken heart and of her concern for her son's welfare. She identified the negative cognitions that expressed her negative beliefs about herself now as, “I am insignificant,” and “I did something wrong.” She identified what she would like to believe about herself now (Positive Cognitions) were, “I am significant,” and “I can learn from it.” She indicated that the Validation of Cognition, (on a scale from 1 to 7 where one felt completely false and a seven felt completely true) the positive cognitions felt completely false: 1. She identified the emotions she felt now, when she brings up that picture and that negative cognition were those of heartbreak, sadness, anger and concern for his welfare. She reported that on a scale of 0 to 10, where 0 is no disturbance or neutral and 10 is the highest disturbance that she can imagine (Subjective Unit of Disturbance), how disturbing does this feel to her right now, she reported that it was a 9. She reported that when she brings it up, she feels it in her chest and heart. Ms.  Olivia completed several sets using EMDR. She reported that she realized that there was a calm, an emptiness, and an alone feeling. She reported that she felt calm and relaxed. She reported that the image changed to one of her other son, how did bring her flowers and took her to dinner and introduced his girlfriend to her. She reported that her AMILCAR rating went down to a 1. She reported that her positive cognition, “I am significant” now felt completely true; a 6. She reported that the other positive cognition, “I can learn from it” was not a concern now because she did nothing wrong. This was installed. She completed a Body Scan to rid herself of any tension, tightness, or unusual sensations. As a relaxation exercise, she used the imagery of her safe place (massage therapy in [place]). The closure debriefing of the experiences was recited and discussed. She reported that she felt good about using EMDR today. Discussed how well she did in this session. She agreed to use her positive resources to relax and calm herself down and distract herself whenever necessary, and that she would practice her Safe Place to feel 100% safe and secure daily.   Current GAF: 55   Current medications: Xanax, Roxicodone, Norvasc, Cymbalta, Prinivil, Vitamin D3, Cepacol, Neurontin, Mucinex, Zofran ODT, Miralax, Salt, Albuterol, Mobic, Zanaflex   Significant/ Recent Events: Discussed the work of Mima Collado and encouraged her to make some of her recipes in her book, “Eat Right, Feel Right.” She has recipes that reduce inflammation which occurs with depression, support vital energy (especially the adrenal glands), and reduce fatigue. Discussed how she reminds us that we need nourishment to pay attention and to focus for sustained periods and to heal from injuries. Discussed how she reminds us that difficulty sleeping is not just about difficulty falling asleep, but it may be about waking up in the middle of the night. She has some  recipes that can help with each. Discussed how she has recipes in her book that would encourage her to eat foods that are calming and to eliminate foods that are stimulating. She reminds people that food preparation is one of the greatest self-care efforts we can make. Discussed the work of Ibis Hannah and her workbook, “Mindful Self-Compassion” and to exercise mindful self-compassion as needed. Encouraged her to learn more about Polyvagal Theory and to consider the work of Nadine Ratliff. Discussed looking at Brainmd.com and consider nutritional supplements to increase the nutritional values in her diet. Discussed how Polyvagal Theory, Positive Psychology (Mindful Self-Compassion), Cognitive Behavioral Theory, Information Processing Theory and Mental Health Nutrition would inform this psychotherapy and how she will develop Cognitive Behavioral Skills and Strategies to decrease and manage anxieties and fears, process her grief, decrease posttraumatic stress, heal from her injuries, lift depression, gain control over her life, develop a healthier lifestyle, increase restful sleep, and find meaning and balance in her life. Discussed how this psychotherapy would focus on her health, wellbeing, and progress at her pace and in a positive direction.   Informed consent issues discussed: Ms. Baker reported that she understood the process of this evaluation and agreed to return to [facility]. Discussed how she expressed her desire to change and was willing to start the process. She reported that she was willing to make changes to her life toward a healthy lifestyle. She reported that she was aware of the problems she experienced and expressed the desire to solve those problems safely. She reported that she had a positive outlook for change. She reported that she had family and friends that were supportive and knew she needed to gain and maintain a healthy network of support.    Assignments/ Homework: Ms. Baker agreed to  initiate some of the strategies discussed today to decrease anxieties and increase restful sleep and increase nutritional values in her diet.    Plan: Ms. Baker agreed to monitor closely her mood and behavior.    Diagnoses: F43.12 Posttraumatic Stress Disorder: F43.81 Complicated Bereavement [] Provisional   Treatment Plan: [x] Continued [] New [] Replaced Referral:   Suicidal [] Yes [x] No [] Medical:    Violence: [] Yes [x] No [] Psychiatric:    Next session:     [] Neurological:            Helder Starr Psy.D.  Clinical Psychologist  [facility]  NPI: [id number]

## 2024-05-31 ENCOUNTER — OFFICE VISIT (OUTPATIENT)
Dept: BEHAVIORAL HEALTH | Facility: CLINIC | Age: 64
End: 2024-05-31
Payer: MEDICARE

## 2024-05-31 DIAGNOSIS — F41.1 GAD (GENERALIZED ANXIETY DISORDER): ICD-10-CM

## 2024-05-31 DIAGNOSIS — F90.0 ADHD (ATTENTION DEFICIT HYPERACTIVITY DISORDER), INATTENTIVE TYPE: ICD-10-CM

## 2024-05-31 DIAGNOSIS — F33.1 MDD (MAJOR DEPRESSIVE DISORDER), RECURRENT EPISODE, MODERATE (HCC): ICD-10-CM

## 2024-05-31 DIAGNOSIS — F98.8 ADD (ATTENTION DEFICIT DISORDER) WITHOUT HYPERACTIVITY: ICD-10-CM

## 2024-05-31 DIAGNOSIS — F41.1 GENERALIZED ANXIETY DISORDER: ICD-10-CM

## 2024-05-31 DIAGNOSIS — F51.01 PRIMARY INSOMNIA: ICD-10-CM

## 2024-05-31 RX ORDER — ZOLPIDEM TARTRATE 10 MG/1
10 TABLET ORAL NIGHTLY
Qty: 30 EACH | Refills: 0 | Status: SHIPPED | OUTPATIENT
Start: 2024-05-31 | End: 2024-06-30

## 2024-05-31 RX ORDER — DEXTROAMPHETAMINE SACCHARATE, AMPHETAMINE ASPARTATE, DEXTROAMPHETAMINE SULFATE AND AMPHETAMINE SULFATE 5; 5; 5; 5 MG/1; MG/1; MG/1; MG/1
20 TABLET ORAL 2 TIMES DAILY
Qty: 60 EACH | Refills: 0 | Status: SHIPPED | OUTPATIENT
Start: 2024-05-31 | End: 2024-06-30

## 2024-05-31 RX ORDER — ALPRAZOLAM 1 MG/1
1 TABLET ORAL 2 TIMES DAILY
Qty: 60 TABLET | Refills: 0 | Status: SHIPPED | OUTPATIENT
Start: 2024-05-31 | End: 2024-06-30

## 2024-05-31 RX ORDER — DULOXETIN HYDROCHLORIDE 60 MG/1
120 CAPSULE, DELAYED RELEASE ORAL 2 TIMES DAILY
Qty: 60 CAPSULE | Refills: 0 | Status: SHIPPED | OUTPATIENT
Start: 2024-05-31 | End: 2024-06-30

## 2024-05-31 NOTE — PROGRESS NOTES
[facility]   Follow Up Assessment     This provider informed the patient their medical records are totally confidential except for the use by other providers involved in their care, or if the patient signs a release, or to report instances of child or elder abuse, or if it is determined they are an immediate risk to harm themselves or others.    Name: Paulette Concepcion  MRN: [id number]  : 1960  Age: 64 y.o.  Date of assessment: 2024  PCP: Iraida Zavala M.D.      Subjective:  Chart reviewed prior to seeing her in my office.  She was last seen on .  We reviewed her medications, purposes, doses, etc.  She prefers to continue Adderall 20 mg twice daily.  Cymbalta has been increased to a total of 120 mg a.m..  Insomnia is a problem and her sleep cycle is off.  Trazodone did not help her when tried previously.  She did find Ambien 10 mg at bedtime worked well.  She is feeling lonely and has not had any contact with her 28-year-old son since April.  She does have 1 female friend in town.    Objective:  She is alert, oriented, and cooperative.  Relatedness is good.  Grooming is good.  Speech is normal rate.  Sad and anxious.  Memory is good.  Insight and judgment are fairly good.  No indication of psychotic thinking.    Current Risk:       Suicidal: Not suicidal       Homicidal: Not homicidal       Self-Harm: No plan to harm self       Relapse: (Low/Moderate/High): Moderate       Crisis Safety Plan Reviewed: Discussed with patient    Diagnosis:   ADD  Major depressive disorder, recurrent  Generalized anxiety disorder  Insomnia    Treatment Plan:  The current treatment plan consists of a follow-up visit in 1 month and then quarterly psychiatric sessions designed to evaluate her ADD, depression, and anxiety.    Duration will be for a minimum of 12 months and will be reviewed at each visit.    Goals: Improvement in ADD symptoms with remission of depression and control of anxiety  and improved sleep in order to prevent relapse due to the chronic nature of her behavioral health problems and mental illness.  Continue Cymbalta 120 mg a.m.  Continue Xanax 1 mg twice daily.  Continue Adderall 20 mg twice daily.  Restart Ambien 10 mg at bedtime.    Daniel Krause M.D.      This note was created using voice recognition software (Dragon). The accuracy of the dictation is limited by the abilities of the software. I have reviewed the note prior to signing, however some errors in grammar and context are still possible. If you have any questions related to this note please do not hesitate to contact our office.

## 2024-06-03 ENCOUNTER — OFFICE VISIT (OUTPATIENT)
Dept: BEHAVIORAL HEALTH | Facility: CLINIC | Age: 64
End: 2024-06-03
Payer: MEDICARE

## 2024-06-03 DIAGNOSIS — F43.12 PROLONGED POSTTRAUMATIC STRESS DISORDER: ICD-10-CM

## 2024-06-03 PROCEDURE — 90837 PSYTX W PT 60 MINUTES: CPT | Performed by: PSYCHOLOGIST

## 2024-06-04 RX ORDER — TIZANIDINE 4 MG/1
4 TABLET ORAL EVERY 6 HOURS PRN
Qty: 100 TABLET | Refills: 1 | Status: SHIPPED | OUTPATIENT
Start: 2024-06-04

## 2024-06-04 NOTE — PROGRESS NOTES
Name: Yessica Baker Date: 6/3/24 : [birth year] Time in session 60 minutes   [] Initial Dx Eval [] Family [] Cancelled/Rescheduled [] Office visit   [x] Individual [] Group [] Late Cancellation [] Virtual Session   [] Couple [] Testing [] No call/No show [] Late   [] Discharge [] Phone [x] On time: 2:00 PM []  (8)   Attended: Ms. Baker   Observations/ Appearance/ Affect: Ms. Baker appeared clean, well-groomed, and dressed in casual clothes. She was oriented to person, place, time, and purpose, was pleasant and cooperative, lucid, and articulate. Her affect was anxious, and mood was sad. No suicidal or homicidal ideation described or reported. No reports of hallucinations or confusions. She participated well in this session.   Content/ Topics: Ms. Baker reported that she had tea with her elderly friend. She reported that she wanted to get tickets to see Alexi and Beronica Rubio at the [facility]. She reported that she finally heard from her sons. She reported that her youngest son was in [place] working and doing okay. She reported that he made no mention of his friend that . She reported that her oldest son was busy and doing okay. She reported that she was anxious about her unkept house and wanted to hire a  but was anxious about finding one she felt safe with. She reported that her back was still sore. She reported that she was using her safe place and positive resources.    Risk issues assessed: Discussed with Ms. Baker strategies to decrease anxiety, lift depression and increase safety. Reviewed how Polyvagal Theory explains how there are shifts in physiological and behavioral states that distort social awareness and establish habitual defensive reactions that replace appropriate spontaneous social behavior. Discussed with her how the body is wired for survival and connection. Discussed how the autonomic nervous system works as our personal surveillance system,  always on guard, always alert to the question: “is it safe?” The work of the autonomic nervous system is to protect us by looking for cues of safety and risk, sensing moment to moment of what is happening in and around our bodies and in the connections, we have to others. Discussed how the state of the nervous system sets the table for how we will respond, act, and feel. Reminded her how affirmations were helpful and skillful thoughts that constructively and effectively change the tone and purpose of an action. Reminded her how affirmations were brief, positively worded statements in the “present tense” about something she wanted to accomplish safely and effectively. Encouraged her to be aware of her feelings of safety and connection here at [facility].   Psychosocial and environmental problems addressed: Ms. Baker reported that she wanted to use Eye Movement Desensitization and Reprocessing (EMDR). She followed the EMDR worksheet protocol. She was reminded to use her hand to signal and say stop and to use her safe place (massage therapy in [place]) to rid herself of any disturbance and feel 100% safe. She indicated that she wanted to use EMDR to rid herself of the disturbance associated with trying to hire a  she could trust. She identified the picture or image that represented the worst part of this experience as an image of her not able to trust her instincts. She identified the negative cognitions that expressed her negative beliefs about herself now as, “I cannot trust myself,” and “I cannot trust my judgment.” She identified what she would like to believe about herself now (Positive Cognitions) were, “I can learn to trust myself,” and “I can trust my judgment.” She indicated that the Validation of Cognition, (on a scale from 1 to 7 where one felt completely false and a seven felt completely true) the positive cognitions felt completely false: 1. She identified the emotions she felt now, when she  brings up that picture and that negative cognition were those of embarrassment, anxiety, frustration and that she kept procrastinating. She reported that on a scale of 0 to 10, where 0 is no disturbance or neutral and 10 is the highest disturbance that she can imagine (Subjective Unit of Disturbance), how disturbing does this feel to her right now, she reported that it was a 6. She reported that when she brings it up, she feels it swirling around in her head. Ms. Baker completed several sets using EMDR. She reported that she realized that she just needed to  the phone and figure it out. She reported that she will get those tickets and she would get a . She reported that she felt calm and relaxed. She reported that the image changed to one of her doing it and making the call. She reported that her AMILCAR rating went down to a 1. She reported that her positive cognitions, “I can learn to trust myself,” and “I can trust my judgment” now felt completely true; a 6. This was installed. She completed a Body Scan to rid herself of any tension, tightness, or unusual sensations. As a relaxation exercise, she used the imagery of her safe place (massage therapy in [place]). The closure debriefing of the experiences was recited and discussed. She reported that she felt good about using EMDR today. Discussed how well she did in this session. She agreed to use her positive resources to relax and calm herself down and distract herself whenever necessary, and that she would practice her Safe Place to feel 100% safe and secure daily.   Current GAF: 55   Current medications: Xanax, Roxicodone, Norvasc, Cymbalta, Prinivil, Vitamin D3, Cepacol, Neurontin, Mucinex, Zofran ODT, Miralax, Salt, Albuterol, Mobic, Zanaflex   Significant/ Recent Events: Discussed the work of Mima Collado and encouraged her to make some of her recipes in her book, “Eat Right, Feel Right.” She has recipes that reduce inflammation which  occurs with depression, support vital energy (especially the adrenal glands), and reduce fatigue. Discussed how she reminds us that we need nourishment to pay attention and to focus for sustained periods and to heal from injuries. Discussed how she reminds us that difficulty sleeping is not just about difficulty falling asleep, but it may be about waking up in the middle of the night. She has some recipes that can help with each. Discussed how she has recipes in her book that would encourage her to eat foods that are calming and to eliminate foods that are stimulating. She reminds people that food preparation is one of the greatest self-care efforts we can make. Discussed the work of Ibis Hannah and her workbook, “Mindful Self-Compassion” and to exercise mindful self-compassion as needed. Encouraged her to learn more about Polyvagal Theory and to consider the work of Nadine Ratliff. Discussed looking at Brainmd.com and consider nutritional supplements to increase the nutritional values in her diet. Discussed how Polyvagal Theory, Positive Psychology (Mindful Self-Compassion), Cognitive Behavioral Theory, Information Processing Theory and Mental Health Nutrition would inform this psychotherapy and how she will develop Cognitive Behavioral Skills and Strategies to decrease and manage anxieties and fears, process her grief, decrease posttraumatic stress, heal from her injuries, lift depression, gain control over her life, develop a healthier lifestyle, increase restful sleep, and find meaning and balance in her life. Discussed how this psychotherapy would focus on her health, wellbeing, and progress at her pace and in a positive direction. She reported that she felt this session was helpful.   Informed consent issues discussed: Ms. Baker reported that she understood the process of this evaluation and agreed to return to [facility]. Discussed how she expressed her desire to change and was willing to start the process. She  reported that she was willing to make changes to her life toward a healthy lifestyle. She reported that she was aware of the problems she experienced and expressed the desire to solve those problems safely. She reported that she had a positive outlook for change. She reported that she had family and friends that were supportive and knew she needed to gain and maintain a healthy network of support.    Assignments/ Homework: Ms. Baker agreed to initiate some of the strategies discussed today to decrease anxieties and increase restful sleep and increase nutritional values in her diet.    Plan: Ms. Baker agreed to monitor closely her mood and behavior.    Diagnoses: F43.12 Posttraumatic Stress Disorder: F43.81 Complicated Bereavement [] Provisional   Treatment Plan: [x] Continued [] New [] Replaced Referral:   Suicidal [] Yes [x] No [] Medical:    Violence: [] Yes [x] No [] Psychiatric:    Next session:     [] Neurological:            Helder Starr Psy.D.  Clinical Psychologist  [facility]  NPI: [id number]

## 2024-06-18 ENCOUNTER — OFFICE VISIT (OUTPATIENT)
Dept: BEHAVIORAL HEALTH | Facility: CLINIC | Age: 64
End: 2024-06-18
Payer: MEDICARE

## 2024-06-18 DIAGNOSIS — F43.12 PROLONGED POSTTRAUMATIC STRESS DISORDER: ICD-10-CM

## 2024-06-18 PROCEDURE — 90837 PSYTX W PT 60 MINUTES: CPT | Performed by: PSYCHOLOGIST

## 2024-06-19 NOTE — PROGRESS NOTES
Name: Yessica Baker Date: 24 : [birth year] Time in session 60 minutes   [] Initial Dx Eval [] Family [] Cancelled/Rescheduled [] Office visit   [x] Individual [] Group [] Late Cancellation [] Virtual Session   [] Couple [] Testing [] No call/No show [] Late   [] Discharge [] Phone [x] On time: 3:00 PM []  (9)   Attended: Ms. Baker   Observations/ Appearance/ Affect: Ms. Baker appeared clean, well-groomed, and dressed in casual clothes. She was oriented to person, place, time, and purpose, was pleasant and cooperative, lucid, and articulate. Her affect was anxious, and mood was sad. No suicidal or homicidal ideation described or reported. No reports of hallucinations or confusions. She participated well in this session.   Content/ Topics: Ms. Baker reported that she was doing well. She reported that she was using her safe place and positive resources. She reported that she felt EMDR was helpful. She reported that she had questions about her medications and asked about [employer].com and what that is. She reported that she wondered what alternatives there were to medications.    Risk issues assessed: Discussed with Ms. Baker strategies to decrease anxiety, lift depression and increase safety. Reviewed how Polyvagal Theory explains how there are shifts in physiological and behavioral states that distort social awareness and establish habitual defensive reactions that replace appropriate spontaneous social behavior. Discussed with her how the body is wired for survival and connection. Discussed how the autonomic nervous system works as our personal surveillance system, always on guard, always alert to the question: “is it safe?” The work of the autonomic nervous system is to protect us by looking for cues of safety and risk, sensing moment to moment of what is happening in and around our bodies and in the connections, we have to others. Discussed how the state of the nervous  system sets the table for how we will respond, act, and feel. Reminded her how affirmations were helpful and skillful thoughts that constructively and effectively change the tone and purpose of an action. Reminded her how affirmations were brief, positively worded statements in the “present tense” about something she wanted to accomplish safely and effectively. Encouraged her to be aware of her feelings of safety and connection here at [facility]. Discussed how EMDR treatment ensures processing of all related historical events, current incidents that elicit distress and future scenarios that will require different responses. The overall goal is producing the most comprehensive and profound treatment effects in the shortest period of time, while simultaneously maintaining a stable client within a balanced system. Discussed how after EMDR processing, clients generally report that the emotional distress related to stressful events and memories have been eliminated, or greatly decreased, and that they have gained important cognitive insights. Importantly, these emotional and cognitive changes usually result in spontaneous behavioral and personal change, which are further enhanced with standard EMDR procedures.    Psychosocial and environmental problems addressed: Discussed with MsTomasa NorbertCarmelinaJamey the use of hyperbaric oxygen therapy (HBOT). Discussed how HBOT was the use of a pressurized compartment with differing amounts of concentrations of oxygen. Hyperbaric oxygen therapy (HBOT) is the medical use of oxygen in a pressurized environment, at a level higher than 1 atmosphere absolute (LAMBERT). Increased pressure allows oxygen to dissolve and saturate the blood plasma (independent of hemoglobin/red blood cells), which yields a broad variety of positive physiological, biochemical, and cellular effects. This noninvasive therapy is the most trusted way to increase oxygen levels to all organs of the body. The typical treatment lasts  for 60-90 minutes, during which the patient lies down and breathes normally. HBOT has been demonstrated in several clinical studies to enhance the body's innate ability to repair and regenerate. It is used as an adjunct therapy to complement and enhance the healing process in both chronic and acute conditions ([employer]Recoup). These chambers were commonly used for decompression from scuba diving related accidents known as the “bends.” Dr. Vega suggests the analogy of a wound that has difficulty healing, brain injuries may be like a nonhealing wound (Concussion Rescue, by Caitlin Vega). Discussed how [facility] provide HBOT here in [place] and encouraged her to talk with her doctors about a referral for symptoms of pain and discomfort. ([facility], [facility], [address], [facility] [address]: [phone] phone; [phone] Fax). Discussed how she might consider SPECT Studies or functional neuroimaging studies to clarify and/ or identify neurological weaknesses, strengths and abilities and support appropriate intervention strategies (Full SPECT Evaluation at the [facility]: [address], [place] [address] [phone]: website: Phase Eight). Discussed how SPECT studies would help to conform neurologically based disorders like Attention-Deficit Disorder and Insomnia, as well as identify the parameters associated with anxiety and depression. Discussed looking at [employer] and consider nutritional supplements to increase the nutritional values in her diet. Discussed the work of Mima Collado and encouraged her to make some of her recipes in her book, “Eat Right, Feel Right.” Discussed how Mima has recipes in her book that encourage her to eat foods that are calming and to eliminate foods that are stimulating. She reminds people that food preparation is one of the greatest self-care efforts we can make. She has  recipes that reduce inflammation which occurs with depression, support vital energy (especially the adrenal glands), and reduce fatigue. Discussed how Mima reminds us that we need nourishment to pay attention and to focus for sustained periods (Mima Collado). Discussed how Mima reminds us that difficulty sleeping is not just about difficulty falling asleep, but it may be about waking up in the middle of the night. She has some recipes that can help with each. Discussed how dietary supplements like Happy Saffron, Lithium Orate, Vitamin D3, Methylfolate, and Omega 3 Power and ProBrainBiotics from [employer] might be helpful. Encouraged her to consult with his doctor, a nutritionally informed physician and/or her pharmacist before adding any supplements.    Current GAF: 55   Current medications: Xanax, Roxicodone, Norvasc, Cymbalta, Prinivil, Vitamin D3, Cepacol, Neurontin, Mucinex, Zofran ODT, Miralax, Salt, Albuterol, Mobic, Zanaflex   Significant/ Recent Events: She agreed to use her safe place (on the [employer] in [place]), to use her positive resources to distract herself from any disturbing material (she would play Candy Crush on her phone, play memory games, watch TV, listen to music, take care of her house plants, garden outside and volunteer at the [employer] [place]), to use her ventral exercises, to use mindful self-compassion daily and to use her affirmations to gain restful sleep, eat healthy, and to use affirmations easily. Discussed the work of Mima Collado and encouraged her to make some of her recipes in her book, “Eat Right, Feel Right.” Discussed the work of Ibis Hannah and her workbook, “Mindful Self-Compassion” and to exercise mindful self-compassion as needed. Encouraged her to learn more about Polyvagal Theory and to consider the work of Nadine Ratliff. Discussed looking at Brainmd.com and consider nutritional supplements to increase the nutritional values in her diet. Discussed how Polyvagal  Theory, Positive Psychology (Mindful Self-Compassion), Cognitive Behavioral Theory, Information Processing Theory and Mental Health Nutrition would inform this psychotherapy and how she will develop Cognitive Behavioral Skills and Strategies to decrease and manage anxieties and fears, process her grief, decrease posttraumatic stress, heal from her injuries, lift depression, gain control over her life, develop a healthier lifestyle, increase restful sleep, and find meaning and balance in her life. Discussed how this psychotherapy would focus on her health, wellbeing, and progress at her pace and in a positive direction. She reported that she felt this session was helpful.   Informed consent issues discussed: Ms. Baker reported that she understood the process of this evaluation and agreed to return to [facility]. Discussed how she expressed her desire to change and was willing to start the process. She reported that she was willing to make changes to her life toward a healthy lifestyle. She reported that she was aware of the problems she experienced and expressed the desire to solve those problems safely. She reported that she had a positive outlook for change. She reported that she had family and friends that were supportive and knew she needed to gain and maintain a healthy network of support.    Assignments/ Homework: Ms. Baker agreed to initiate some of the strategies discussed today to decrease anxieties and increase restful sleep and increase nutritional values in her diet.    Plan: Ms. Baker agreed to monitor closely her mood and behavior.    Diagnoses: F43.12 Posttraumatic Stress Disorder: F43.81 Complicated Bereavement [] Provisional   Treatment Plan: [x] Continued [] New [] Replaced Referral:   Suicidal [] Yes [x] No [] Medical:    Violence: [] Yes [x] No [] Psychiatric:    Next session:     [] Neurological:            Helder Starr Psy.D.  Clinical Psychologist  [facility]  NPI: [id number]

## 2024-06-28 ENCOUNTER — OFFICE VISIT (OUTPATIENT)
Dept: BEHAVIORAL HEALTH | Facility: CLINIC | Age: 64
End: 2024-06-28
Payer: MEDICARE

## 2024-06-28 DIAGNOSIS — F51.01 PRIMARY INSOMNIA: ICD-10-CM

## 2024-06-28 DIAGNOSIS — F33.1 MDD (MAJOR DEPRESSIVE DISORDER), RECURRENT EPISODE, MODERATE (HCC): ICD-10-CM

## 2024-06-28 DIAGNOSIS — F98.8 ADD (ATTENTION DEFICIT DISORDER) WITHOUT HYPERACTIVITY: ICD-10-CM

## 2024-06-28 DIAGNOSIS — F90.0 ADHD (ATTENTION DEFICIT HYPERACTIVITY DISORDER), INATTENTIVE TYPE: ICD-10-CM

## 2024-06-28 DIAGNOSIS — F41.1 GENERALIZED ANXIETY DISORDER: ICD-10-CM

## 2024-06-28 DIAGNOSIS — F41.1 GAD (GENERALIZED ANXIETY DISORDER): ICD-10-CM

## 2024-06-28 RX ORDER — DULOXETIN HYDROCHLORIDE 60 MG/1
120 CAPSULE, DELAYED RELEASE ORAL 2 TIMES DAILY
Qty: 180 CAPSULE | Refills: 0 | Status: SHIPPED | OUTPATIENT
Start: 2024-06-28 | End: 2024-09-26

## 2024-06-28 RX ORDER — DEXTROAMPHETAMINE SACCHARATE, AMPHETAMINE ASPARTATE, DEXTROAMPHETAMINE SULFATE AND AMPHETAMINE SULFATE 5; 5; 5; 5 MG/1; MG/1; MG/1; MG/1
20 TABLET ORAL 2 TIMES DAILY
Qty: 60 EACH | Refills: 0 | Status: SHIPPED | OUTPATIENT
Start: 2024-06-28 | End: 2024-07-28

## 2024-06-28 RX ORDER — ZOLPIDEM TARTRATE 10 MG/1
10 TABLET ORAL NIGHTLY
Qty: 30 EACH | Refills: 0 | Status: SHIPPED | OUTPATIENT
Start: 2024-06-28 | End: 2024-07-28

## 2024-06-28 RX ORDER — ALPRAZOLAM 1 MG/1
1 TABLET ORAL 2 TIMES DAILY
Qty: 60 TABLET | Refills: 0 | Status: SHIPPED | OUTPATIENT
Start: 2024-06-28 | End: 2024-07-28

## 2024-06-28 NOTE — PROGRESS NOTES
[facility]   Follow Up Assessment     This provider informed the patient their medical records are totally confidential except for the use by other providers involved in their care, or if the patient signs a release, or to report instances of child or elder abuse, or if it is determined they are an immediate risk to harm themselves or others.    Name: Paulette Concepcion  MRN: [id number]  : 1960  Age: 64 y.o.  Date of assessment: 2024  PCP: Iraida Zavala M.D.      Subjective:  Chart reviewed prior to seeing her in my office.  She was last seen on May 31.  We reviewed her current medication combination, purposes, doses, etc.  She prefers to not make any changes at this time.    We talked briefly about ketamine treatment for treatment resistant depression.  Her 28-year-old son did send her a message in May.    Objective:  She is alert, oriented, and cooperative.  Relatedness is good.  Grooming is good.  Speech is normal rate.  Anxious.  Memory is good.  Insight and judgment are fairly good.  No indication of psychotic thinking.    Current Risk:       Suicidal: Not suicidal       Homicidal: Not homicidal       Self-Harm: No plan to harm self       Relapse: (Low/Moderate/High): Moderate       Crisis Safety Plan Reviewed: Discussed with patient    Diagnosis:   Major depressive disorder, recurrent  Generalized anxiety disorder  ADD  Insomnia    Treatment Plan:  The current treatment plan consists of quarterly psychiatric sessions designed to evaluate her depression, anxiety, ADD, and insomnia.    Duration will be for a minimum of 12 months and will be reviewed at each visit.    Goals: Remission of depression with control of anxiety and improvement in ADD symptoms and sleep in order to prevent relapse due to the chronic nature of her behavioral health problems and mental illness.  Continue Cymbalta 120 mg a.m.  Continue Xanax 1 mg twice daily.  Continue Adderall 20 mg twice daily.   Continue Ambien 10 mg at bedtime.    Daniel Krause M.D.      This note was created using voice recognition software (Dragon). The accuracy of the dictation is limited by the abilities of the software. I have reviewed the note prior to signing, however some errors in grammar and context are still possible. If you have any questions related to this note please do not hesitate to contact our office.

## 2024-07-01 ENCOUNTER — APPOINTMENT (OUTPATIENT)
Dept: BEHAVIORAL HEALTH | Facility: CLINIC | Age: 64
End: 2024-07-01
Payer: MEDICARE

## 2024-07-01 DIAGNOSIS — F43.12 PROLONGED POSTTRAUMATIC STRESS DISORDER: ICD-10-CM

## 2024-07-01 PROCEDURE — 90837 PSYTX W PT 60 MINUTES: CPT | Performed by: PSYCHOLOGIST

## 2024-07-10 ENCOUNTER — APPOINTMENT (OUTPATIENT)
Dept: MEDICAL GROUP | Facility: PHYSICIAN GROUP | Age: 64
End: 2024-07-10
Payer: MEDICARE

## 2024-07-17 ENCOUNTER — OFFICE VISIT (OUTPATIENT)
Dept: BEHAVIORAL HEALTH | Facility: CLINIC | Age: 64
End: 2024-07-17
Payer: MEDICARE

## 2024-07-17 DIAGNOSIS — F43.12 PROLONGED POSTTRAUMATIC STRESS DISORDER: ICD-10-CM

## 2024-07-17 PROCEDURE — 90837 PSYTX W PT 60 MINUTES: CPT | Performed by: PSYCHOLOGIST

## 2024-07-31 ENCOUNTER — APPOINTMENT (OUTPATIENT)
Dept: BEHAVIORAL HEALTH | Facility: CLINIC | Age: 64
End: 2024-07-31
Payer: MEDICARE

## 2024-07-31 DIAGNOSIS — F43.12 PROLONGED POSTTRAUMATIC STRESS DISORDER: ICD-10-CM

## 2024-07-31 PROCEDURE — 90837 PSYTX W PT 60 MINUTES: CPT | Performed by: PSYCHOLOGIST

## 2024-08-01 NOTE — PROGRESS NOTES
Name: Yessica Baker Date: 24 : [birth year] Time in session 60 minutes   [] Initial Dx Eval [] Family [] Cancelled/Rescheduled [] Office visit   [x] Individual [] Group [] Late Cancellation [] Virtual Session   [] Couple [] Testing [] No call/No show [] Late   [] Discharge [] Phone [x] On time: 4:00 PM []  (12)   Attended: Ms. Baker   Observations/ Appearance/ Affect: Ms. Baker appeared clean, well-groomed, and dressed in casual clothes. She was oriented to person, place, time, and purpose, was pleasant and cooperative, lucid, and articulate. Her affect was anxious, and mood was sad. No suicidal or homicidal ideation described or reported. No reports of hallucinations or confusions. She participated well in this session.   Content/ Topics: Ms. Baker reported that she felt “blah.” She reported that she learned that put any concern about moving on the back burner. She reported that a neighbor just signed a lease for the next two years, so she felt some relief.    Risk issues assessed: Discussed with Ms. Baker strategies to decrease anxiety, lift depression and increase safety. Reviewed how Polyvagal Theory explains how there are shifts in physiological and behavioral states that distort social awareness and establish habitual defensive reactions that replace appropriate spontaneous social behavior. Discussed with her how the body is wired for survival and connection. Discussed how the autonomic nervous system works as our personal surveillance system, always on guard, always alert to the question: “is it safe?” The work of the autonomic nervous system is to protect us by looking for cues of safety and risk, sensing moment to moment of what is happening in and around our bodies and in the connections, we have to others. Discussed how the state of the nervous system sets the table for how we will respond, act, and feel. Reminded her how affirmations were helpful and skillful  thoughts that constructively and effectively change the tone and purpose of an action. Encouraged her to be aware of her feelings of safety and connection here at [facility]. Reminded her how mindful self-compassion is a skilled approach to helping a person to be more self-compassionate in their daily lives. To say aloud what you are feeling and the reasons you are feeling that way and then to be kind toward yourself and to say kind things to yourself and demonstrate self-compassion and self-caring for your wellbeing and welfare.    Psychosocial and environmental problems addressed: Ms. Baker reported that she wanted to use Eye Movement Desensitization and Reprocessing (EMDR). She followed the EMDR worksheet protocol. She was reminded to use her hand to signal and say stop and to use her safe place (on the [employer] table in [place]) to rid herself of any disturbance and feel 100% safe. She indicated that she wanted to use EMDR to reprocess the disturbance associated with the time she had to be hospitalized.  She identified the picture or image that represented the worst part of this incident as an image of her being afraid. She identified the negative cognitions that expressed her negative beliefs about herself now as, “I am not in control,” and “I cannot trust anyone.” She identified what she would like to believe about herself now (Positive Cognitions) were, “I am now in control,” and “I can choose who to trust.” She indicated that the Validation of Cognition, (on a scale from 1 to 7 where one felt completely false and a seven felt completely true) the positive cognitions felt completely false: 1. She identified the emotions she felt now, when she brings up that picture and those negative cognitions, she felt anxiety, fear and sad. She reported that on a scale of 0 to 10, where 0 is no disturbance or neutral and 10 is the highest disturbance that she can imagine (Subjective Unit of Disturbance), how disturbing  does this feel to her right now, she reported that it was an 8. She reported that when she brings it up, she feels it in her chest. Ms. Baker completed several sets using EMDR. She reported that she realized how angry she was about the dog owner who did not have their dog on a leash. She reported that it all began to fade, and it was all in the past. She reported that the image changed and was now an image of herself feeling she was glad it was all over. She reported that her AMILCAR rating went down to a 1. She reported that her positive cognitions, “I am now in control,” felt completely true: a 6, and “I can choose who to trust,” now felt completely true; also, a 6. This was installed. She completed a Body Scan to rid herself of any tension, tightness, or unusual sensations. As a relaxation exercise, we used the imagery of her safe place (on the [employer] table in [place]) The closure debriefing of the experiences was recited and discussed. She reported that she felt good about using EMDR today. Discussed how well she did in this session. She agreed to use her safe place (on the [employer] table in [place]) and her positive resources (that she would play Candy Crush on her phone, play memory games, watch TV, listen to music, take care of her house plants, garden outside and volunteer at the [employer]) to distract herself from any disturbing material, and use her ventral exercises, to use mindful self-compassion daily and to use her affirmations to gain restful sleep, eat healthy, and to use affirmations easily.   Current GAF: 55   Current medications: Xanax, Roxicodone, Norvasc, Cymbalta, Prinivil, Vitamin D3, Cepacol, Neurontin, Mucinex, Zofran ODT, Miralax, Salt, Albuterol, Mobic, Zanaflex   Significant/ Recent Events: Discussed the work of Mima Collado and encouraged her to make some of her recipes in her book, “Eat Right, Feel Right.” Discussed the work of Ibis Hannah and her workbook, “Mindful  Self-Compassion” and to exercise mindful self-compassion as needed. Encouraged her to learn more about Polyvagal Theory and to consider the work of Nadine Ratliff. Discussed looking at Brainmd.com and consider nutritional supplements to increase the nutritional values in her diet. Discussed how Polyvagal Theory, Positive Psychology (Mindful Self-Compassion), Cognitive Behavioral Theory, Information Processing Theory and Mental Health Nutrition would inform this psychotherapy and how she will develop Cognitive Behavioral Skills and Strategies to decrease and manage anxieties and fears, process her grief, decrease posttraumatic stress, heal from her injuries, lift depression, gain control over her life, develop a healthier lifestyle, increase restful sleep, and find meaning and balance in her life. Discussed how this psychotherapy would focus on her health, wellbeing, and progress at her pace and in a positive direction. She reported that she felt this session was helpful.   Informed consent issues discussed: Ms. Baker reported that she understood the process of this supportive psychotherapy and agreed to continue her work. Discussed how she worked toward change and was engaged in the process. She agreed that she was making changes to her life toward a healthy lifestyle. She was aware of the problems she experienced and worked to resolve those problems safely. She had a positive outlook for change. She had family and friends that were supportive and helped her to gain and maintain a healthy network of support.   Assignments/ Homework: Ms. Baker agreed to initiate some of the strategies discussed today to decrease anxieties and increase restful sleep and increase nutritional values in her diet.    Plan: Ms. Baker agreed to monitor closely her mood and behavior.    Diagnoses: F43.12 Posttraumatic Stress Disorder: F43.81 Complicated Bereavement [] Provisional   Treatment Plan: [x] Continued [] New []  Replaced Referral:   Suicidal [] Yes [x] No [] Medical:    Violence: [] Yes [x] No [] Psychiatric:    Next session:     [] Neurological:            Helder Starr Psy.D.  Clinical Psychologist  [facility]  NPI: [id number]

## 2024-08-22 ENCOUNTER — APPOINTMENT (OUTPATIENT)
Dept: BEHAVIORAL HEALTH | Facility: CLINIC | Age: 64
End: 2024-08-22
Payer: MEDICARE

## 2024-08-27 NOTE — TELEPHONE ENCOUNTER
Received request via: Pharmacy    Was the patient seen in the last year in this department? Yes    Does the patient have an active prescription (recently filled or refills available) for medication(s) requested? No    Does the patient have long term Plus and need 100-day supply? (This applies to ALL medications) Yes, quantity updated to 100 days

## 2024-09-06 ENCOUNTER — OFFICE VISIT (OUTPATIENT)
Dept: BEHAVIORAL HEALTH | Facility: CLINIC | Age: 64
End: 2024-09-06
Payer: MEDICARE

## 2024-09-06 DIAGNOSIS — F33.1 MDD (MAJOR DEPRESSIVE DISORDER), RECURRENT EPISODE, MODERATE (HCC): ICD-10-CM

## 2024-09-06 DIAGNOSIS — F98.8 ADD (ATTENTION DEFICIT DISORDER) WITHOUT HYPERACTIVITY: ICD-10-CM

## 2024-09-06 DIAGNOSIS — F41.1 GAD (GENERALIZED ANXIETY DISORDER): ICD-10-CM

## 2024-09-06 DIAGNOSIS — F43.12 PROLONGED POSTTRAUMATIC STRESS DISORDER: ICD-10-CM

## 2024-09-06 PROCEDURE — 90837 PSYTX W PT 60 MINUTES: CPT | Performed by: PSYCHOLOGIST

## 2024-09-06 RX ORDER — DEXTROAMPHETAMINE SACCHARATE, AMPHETAMINE ASPARTATE, DEXTROAMPHETAMINE SULFATE, AND AMPHETAMINE SULFATE 3.75; 3.75; 3.75; 3.75 MG/1; MG/1; MG/1; MG/1
15 TABLET ORAL 2 TIMES DAILY
Qty: 60 TABLET | Refills: 0 | Status: SHIPPED | OUTPATIENT
Start: 2024-09-06 | End: 2024-09-23

## 2024-09-06 RX ORDER — DULOXETIN HYDROCHLORIDE 60 MG/1
120 CAPSULE, DELAYED RELEASE ORAL DAILY
Qty: 60 CAPSULE | Refills: 0 | Status: SHIPPED | OUTPATIENT
Start: 2024-09-06 | End: 2024-09-09

## 2024-09-06 RX ORDER — ALPRAZOLAM 1 MG
1 TABLET ORAL 2 TIMES DAILY
Qty: 60 TABLET | Refills: 0 | Status: SHIPPED | OUTPATIENT
Start: 2024-09-06 | End: 2024-09-23 | Stop reason: SDUPTHER

## 2024-09-09 RX ORDER — DULOXETIN HYDROCHLORIDE 60 MG/1
CAPSULE, DELAYED RELEASE ORAL
Qty: 180 CAPSULE | Refills: 0 | Status: SHIPPED | OUTPATIENT
Start: 2024-09-09 | End: 2024-09-23 | Stop reason: SDUPTHER

## 2024-09-09 NOTE — PROGRESS NOTES
Name: Yessica Baker Date: 24 : [birth year] Time in session 60 minutes   [] Initial Dx Eval [] Family [] Cancelled/Rescheduled [] Office visit   [x] Individual [] Group [] Late Cancellation [] Virtual Session   [] Couple [] Testing [] No call/No show [] Late   [] Discharge [] Phone [x] On time: 3:00 PM []  (13)   Attended: Ms. Baker   Observations/ Appearance/ Affect: Ms. Baker appeared clean, well-groomed, and dressed in casual clothes. She was oriented to person, place, time, and purpose, was pleasant and cooperative, lucid, and articulate. Her affect was anxious, and mood was sad. No suicidal or homicidal ideation described or reported. No reports of hallucinations or confusions. She participated well in this session.   Content/ Topics: Ms. Baker reported that she did not feel like herself. She reported that she felt lonely and sad. She reported that she had a t-shirt that said she was “resetting.” Discussed with Ms. Baker how she developed adequate methods of handling emotional distress and developed good coping skills. Discussed how she had self-soothing activities that she can do safely and easily to relax and calm herself down and distract herself whenever necessary, during or between sessions to rid herself of any disturbing material. Discussed how she had a Safe Place where she could go into her mind where she can feel 100% safe and secure and rid herself of any disturbing material. Ms. Baker reported that she would play Candy Crush on her phone, play memory games, watch TV, listen to music, take care of her house plants, garden outside and volunteer at the [employer]. She identified her safe place as “on the [employer] in [place].”   Risk issues assessed: Discussed with Ms. Baker strategies to decrease anxiety, lift depression and increase safety. Reviewed how Polyvagal Theory explains how there are shifts in physiological and behavioral  states that distort social awareness and establish habitual defensive reactions that replace appropriate spontaneous social behavior. Discussed with her how the body is wired for survival and connection. Discussed how the autonomic nervous system works as our personal surveillance system, always on guard, always alert to the question: “is it safe?” The work of the autonomic nervous system is to protect us by looking for cues of safety and risk, sensing moment to moment of what is happening in and around our bodies and in the connections, we have to others. Discussed how the state of the nervous system sets the table for how we will respond, act, and feel. Reminded her how affirmations were helpful and skillful thoughts that constructively and effectively change the tone and purpose of an action. Encouraged her to be aware of her feelings of safety and connection here at [facility]. Reminded her how mindful self-compassion is a skilled approach to helping a person to be more self-compassionate in their daily lives. To say aloud what you are feeling and the reasons you are feeling that way and then to be kind toward yourself and to say kind things to yourself and demonstrate self-compassion and self-caring for your wellbeing and welfare.    Psychosocial and environmental problems addressed: Ms. Baker reported that she wanted to use Eye Movement Desensitization and Reprocessing (EMDR). She followed the EMDR worksheet protocol. She was reminded to use her hand to signal and say stop and to use her safe place (on the [employer] table in [place]) to rid herself of any disturbance and feel 100% safe. She indicated that she wanted to use EMDR to reprocess the disturbance associated with being told that she had another brother. She identified the picture or image that represented the worst part of this incident as an image of herself with a broken heart. She identified the negative cognition that expressed her negative belies  about herself now as, “I cannot let it out.” She identified what she would like to believe about herself now (Positive Cognition) was, “I can choose who to trust.” She indicated that the Validation of Cognition, (on a scale from 1 to 7 where one felt completely false and a seven felt completely true) the positive cognition felt completely false: 1. She identified the emotions she felt now, when she brought up that picture and that negative cognition, she felt shocked, dismayed, confused, betrayed, sad and excited. She reported that on a scale of 0 to 10, where 0 is no disturbance or neutral and 10 is the highest disturbance that she can imagine (Subjective Unit of Disturbance), how disturbing does this feel to her right now, she reported that it was a 10. She reported that when she brings it up, she feels it in her chest. Ms. Baker completed several sets using EMDR. She reported that she realized she needed to focus on her self-care, focus on things she can change and search for answers. She realized that there were friends she could reach out to for support and information. She reported that the image was now faded and would not completely go away when she gets some answers. She reported that her AMILCAR rating went down to a 2. She reported that her positive cognition, “I can choose who to trust,” now felt completely true; also, a 7. This was installed. She completed a Body Scan to rid herself of any tension, tightness, or unusual sensations. As a relaxation exercise, we used the imagery of her safe place (on the [employer] table in [place]) The closure debriefing of the experiences was recited and discussed. She reported that she felt good about using EMDR today. Discussed how well she did in this session. She agreed to use her safe place (on the [employer] table in [place]) and her positive resources (that she would play Candy Crush on her phone, play memory games, watch TV, listen to music, take care of her house  plants, garden outside and volunteer at the [facility]) to distract herself from any disturbing material, and use her ventral exercises, to use mindful self-compassion daily and to use her affirmations to gain restful sleep, eat healthy, and to use affirmations easily.   Current GAF: 55   Current medications: Xanax, Roxicodone, Norvasc, Cymbalta, Prinivil, Vitamin D3, Cepacol, Neurontin, Mucinex, Zofran ODT, Miralax, Salt, Albuterol, Mobic, Zanaflex   Significant/ Recent Events: Discussed the work of Mima Collado and encouraged her to make some of her recipes in her book, “Eat Right, Feel Right.” Discussed the work of Ibis Hannah and her workbook, “Mindful Self-Compassion” and to exercise mindful self-compassion as needed. Encouraged her to learn more about Polyvagal Theory and to consider the work of Nadine Ratliff. Discussed looking at Brainmd.com and consider nutritional supplements to increase the nutritional values in her diet. Discussed how Polyvagal Theory, Positive Psychology (Mindful Self-Compassion), Cognitive Behavioral Theory, Information Processing Theory and Mental Health Nutrition would inform this psychotherapy and how she will develop Cognitive Behavioral Skills and Strategies to decrease and manage anxieties and fears, process her grief, decrease posttraumatic stress, heal from her injuries, lift depression, gain control over her life, develop a healthier lifestyle, increase restful sleep, and find meaning and balance in her life. Discussed how this psychotherapy would focus on her health, wellbeing, and progress at her pace and in a positive direction. She reported that she felt this session was helpful.   Informed consent issues discussed: Ms. Baker reported that she understood the process of this supportive psychotherapy and agreed to continue her work. Discussed how she worked toward change and was engaged in the process. She agreed that she was making changes to her life toward a  healthy lifestyle. She was aware of the problems she experienced and worked to resolve those problems safely. She had a positive outlook for change. She had family and friends that were supportive and helped her to gain and maintain a healthy network of support.   Assignments/ Homework: Ms. Baker agreed to initiate some of the strategies discussed today to decrease anxieties and increase restful sleep and increase nutritional values in her diet.    Plan: Ms. Baker agreed to monitor closely her mood and behavior.    Diagnoses: F43.12 Posttraumatic Stress Disorder: F43.81 Complicated Bereavement [] Provisional   Treatment Plan: [x] Continued [] New [] Replaced Referral:   Suicidal [] Yes [x] No [] Medical:    Violence: [] Yes [x] No [] Psychiatric:    Next session:     [] Neurological:            Helder Starr Psy.D.  Clinical Psychologist  [facility]  NPI: [id number]

## 2024-09-23 ENCOUNTER — OFFICE VISIT (OUTPATIENT)
Dept: BEHAVIORAL HEALTH | Facility: CLINIC | Age: 64
End: 2024-09-23
Payer: MEDICARE

## 2024-09-23 DIAGNOSIS — F33.1 MAJOR DEPRESSIVE DISORDER, RECURRENT EPISODE, MODERATE (HCC): ICD-10-CM

## 2024-09-23 DIAGNOSIS — F41.1 GAD (GENERALIZED ANXIETY DISORDER): ICD-10-CM

## 2024-09-23 DIAGNOSIS — F98.8 ADD (ATTENTION DEFICIT DISORDER) WITHOUT HYPERACTIVITY: ICD-10-CM

## 2024-09-23 DIAGNOSIS — F51.01 PRIMARY INSOMNIA: ICD-10-CM

## 2024-09-23 DIAGNOSIS — F33.1 MDD (MAJOR DEPRESSIVE DISORDER), RECURRENT EPISODE, MODERATE (HCC): ICD-10-CM

## 2024-09-23 PROCEDURE — 99214 OFFICE O/P EST MOD 30 MIN: CPT | Performed by: PSYCHIATRY & NEUROLOGY

## 2024-09-23 RX ORDER — ALPRAZOLAM 1 MG
1 TABLET ORAL 2 TIMES DAILY
Qty: 60 TABLET | Refills: 0 | Status: SHIPPED | OUTPATIENT
Start: 2024-09-23 | End: 2024-10-23

## 2024-09-23 RX ORDER — ZOLPIDEM TARTRATE 10 MG/1
10 TABLET ORAL NIGHTLY
Qty: 30 EACH | Refills: 0 | Status: SHIPPED | OUTPATIENT
Start: 2024-09-23 | End: 2024-10-23

## 2024-09-23 RX ORDER — DEXTROAMPHETAMINE SACCHARATE, AMPHETAMINE ASPARTATE, DEXTROAMPHETAMINE SULFATE AND AMPHETAMINE SULFATE 5; 5; 5; 5 MG/1; MG/1; MG/1; MG/1
20 TABLET ORAL 2 TIMES DAILY
Qty: 60 TABLET | Refills: 0 | Status: SHIPPED | OUTPATIENT
Start: 2024-09-23 | End: 2024-10-23

## 2024-09-23 RX ORDER — DULOXETIN HYDROCHLORIDE 60 MG/1
60 CAPSULE, DELAYED RELEASE ORAL 2 TIMES DAILY
Qty: 180 CAPSULE | Refills: 0 | Status: SHIPPED | OUTPATIENT
Start: 2024-09-23

## 2024-09-23 NOTE — PROGRESS NOTES
[facility]   Follow Up Assessment     This provider informed the patient their medical records are totally confidential except for the use by other providers involved in their care, or if the patient signs a release, or to report instances of child or elder abuse, or if it is determined they are an immediate risk to harm themselves or others.    Name: Paulette Concepcion  MRN: [id number]  : 1960  Age: 64 y.o.  Date of assessment: 2024  PCP: Iraida Zavala M.D.      Subjective:  Chart reviewed prior to seeing her in my office.  She was last seen on .  We reviewed her current medication combination, purposes, doses, processes, doses, etc.  No changes requested.  Ketamine is apparently not covered by her insurance.  She asked about magnetic brain stimulation which is available on the first floor at our clinic.  A staff member will contact her about that.    Objective:  She is alert, oriented, and cooperative.  Relatedness is good.  Grooming is good.  Speech is normal rate.  Sad and anxious.  Memory is good.  Insight and judgment are fairly good.  No indication of psychotic thinking.    Current Risk:       Suicidal: Not suicidal       Homicidal: Not homicidal       Self-Harm: No plan to harm self       Relapse: (Low/Moderate/High): Moderate       Crisis Safety Plan Reviewed: Discussed with patient    Diagnosis:   Major depressive disorder, recurrent  Generalized anxiety disorder  ADD  Insomnia    Treatment Plan:  The current treatment plan consists of quarterly psychiatric sessions designed to evaluate her depression, anxiety, ADD, and insomnia.    Duration will be for a minimum of 12 months and will be reviewed at each visit.    Goals: Remission of depression with control of anxiety and improvement in ADD and sleep in order to prevent relapse due to the chronic nature of her behavioral health problems and mental illness.  Continue Cymbalta 120 mg a.m.  Continue Xanax 1 mg twice  daily.  Continue Adderall 20 mg twice daily.  Continue Ambien 10 mg at bedtime.    Goals: Remission of depression with control of anxiety and improvement in ADD symptoms and sleep in order to prevent relapse due to the chronic nature of her behavioral health problems and mental illness.        Daniel Krause M.D.      This note was created using voice recognition software (Dragon). The accuracy of the dictation is limited by the abilities of the software. I have reviewed the note prior to signing, however some errors in grammar and context are still possible. If you have any questions related to this note please do not hesitate to contact our office.

## 2024-09-25 ENCOUNTER — OFFICE VISIT (OUTPATIENT)
Dept: BEHAVIORAL HEALTH | Facility: CLINIC | Age: 64
End: 2024-09-25
Payer: MEDICARE

## 2024-09-25 DIAGNOSIS — F43.12 CHRONIC POSTTRAUMATIC STRESS DISORDER: ICD-10-CM

## 2024-09-25 PROCEDURE — 90837 PSYTX W PT 60 MINUTES: CPT | Performed by: PSYCHOLOGIST

## 2024-09-26 NOTE — PROGRESS NOTES
Name: Yessica Baker Date: 24 : [birth year] Time in session 60 minutes   [] Initial Dx Eval [] Family [] Cancelled/Rescheduled [] Office visit   [x] Individual [] Group [] Late Cancellation [] Virtual Session   [] Couple [] Testing [] No call/No show [] Late   [] Discharge [] Phone [x] On time: 4:00 PM []  (14)   Attended: Ms. Baker   Observations/ Appearance/ Affect: Ms. Baker appeared clean, well-groomed, and dressed in casual clothes. She was oriented to person, place, time, and purpose, was pleasant and cooperative, lucid, and articulate. Her affect was anxious, and mood was sad. No suicidal or homicidal ideation described or reported. No reports of hallucinations or confusions. She participated well in this session.   Content/ Topics: Ms. Baker reported that she was doing well enough. She reported that the person she used to help care for moved to [facility], so she was not trying to find something to keep her busy. She reported that she has someone coming to her home to help her clean. She reported that last week her ex- had a stroke. She reported that he lives in [place] and her boys went back there to see him. She reported that she believed he was in a rehabilitation center now. She reported that Dr. Krause thought she might be a candidate for Neurostar. NeuroStar is a non-invasive FDA-cleared therapy that uses short magnetic pulses to stimulate nerve cells in the area of the brain that controls mood. These pulses can have a lasting effect on the brain's neurotransmitter levels, helping people suffering from depression, anxiety symptoms in those with depression, and OCD achieve response, and in some cases, long-term remission from depression symptoms. Because NeuroStar is not a drug, it does not cause the side effects that are often associated with antidepressants. Discussed how she might consider mental health nutrition to support healthy brain function. Discussed how  Hyperbaric Oxygen Therapy might also be an adjunct therapy to consider. Discussed how she might benefit from NeuroStar treatment.   Risk issues assessed: Discussed with Ms. Baker strategies to decrease anxiety, lift depression and increase safety. Reviewed how Polyvagal Theory explains how there are shifts in physiological and behavioral states that distort social awareness and establish habitual defensive reactions that replace appropriate spontaneous social behavior. Discussed with her how the body is wired for survival and connection. Discussed how the autonomic nervous system works as our personal surveillance system, always on guard, always alert to the question: “is it safe?” The work of the autonomic nervous system is to protect us by looking for cues of safety and risk, sensing moment to moment of what is happening in and around our bodies and in the connections, we have to others. Discussed how the state of the nervous system sets the table for how we will respond, act, and feel. Reminded her how affirmations were helpful and skillful thoughts that constructively and effectively change the tone and purpose of an action. Encouraged her to be aware of her feelings of safety and connection here at [facility]. Reminded her how mindful self-compassion is a skilled approach to helping a person to be more self-compassionate in their daily lives. To say aloud what you are feeling and the reasons you are feeling that way and then to be kind toward yourself and to say kind things to yourself and demonstrate self-compassion and self-caring for your wellbeing and welfare.    Psychosocial and environmental problems addressed: Ms. Baker reported that she wanted to use Eye Movement Desensitization and Reprocessing (EMDR). She followed the EMDR worksheet protocol. She was reminded to use her hand to signal and say stop and to use her safe place (on the massage therapist table in [place]) to rid herself of any  disturbance and feel 100% safe. She indicated that she wanted to use EMDR to reprocess the disturbance associated with her family becoming radical Trump supporters and disowning her. She identified the picture or image that represented the worst part of this incident as an image of herself as an orphan. She identified the negative cognition that expressed her negative belies about herself now as, “I am different, I do not belong.” She identified what she would like to believe about herself now (Positive Cognition) was, “I am okay, I do belong.” She indicated that the Validation of Cognition, (on a scale from 1 to 7 where one felt completely false and a seven felt completely true) the positive cognition felt completely false: 1. She identified the emotions she felt now, when she brought up that picture and that negative cognition, she felt heartbroken, sad and lonely. She reported that on a scale of 0 to 10, where 0 is no disturbance or neutral and 10 is the highest disturbance that she can imagine (Subjective Unit of Disturbance), how disturbing does this feel to her right now, she reported that it was a 10. She reported that when she brings it up, she feels it in her chest. Ms. Baker completed several sets using EMDR. She reported that she realized she had to go one day at a time, that she was grateful for her girlfriends, her new landlord, her home, and work on her to do list. She reported that the image was now faded and was now an image of her to do list and her friends and her home. She reported that her AMILCAR rating went down to a 1. She reported that her positive cognition, “I do belong, I am okay,” now felt completely true; also, a 6. This was installed. She completed a Body Scan to rid herself of any tension, tightness, or unusual sensations. As a relaxation exercise, we used the imagery of her safe place (on the massage therapist table in [place]) The closure debriefing of the experiences was recited  and discussed. She reported that she felt good about using EMDR today. Discussed how well she did in this session. She agreed to use her safe place (on the [employer] table in [place]) and her positive resources (that she would play Candy Crush on her phone, play memory games, watch TV, listen to music, take care of her house plants, garden outside and volunteer at the [facility]) to distract herself from any disturbing material, and use her ventral exercises, to use mindful self-compassion daily and to use her affirmations to gain restful sleep, eat healthy, and to use affirmations easily.   Current GAF: 55   Current medications: Xanax, Roxicodone, Norvasc, Cymbalta, Prinivil, Vitamin D3, Cepacol, Neurontin, Mucinex, Zofran ODT, Miralax, Salt, Albuterol, Mobic, Zanaflex   Significant/ Recent Events: Discussed the work of Mima Collado and encouraged her to make some of her recipes in her book, “Eat Right, Feel Right.” Discussed the work of Ibis Hannah and her workbook, “Mindful Self-Compassion” and to exercise mindful self-compassion as needed. Encouraged her to learn more about Polyvagal Theory and to consider the work of Nadine Ratliff. Discussed looking at [employer] and consider nutritional supplements to increase the nutritional values in her diet. Discussed how Polyvagal Theory, Positive Psychology (Mindful Self-Compassion), Cognitive Behavioral Theory, Information Processing Theory and Mental Health Nutrition would inform this psychotherapy and how she will develop Cognitive Behavioral Skills and Strategies to decrease and manage anxieties and fears, process her grief, decrease posttraumatic stress, heal from her injuries, lift depression, gain control over her life, develop a healthier lifestyle, increase restful sleep, and find meaning and balance in her life. Discussed how this psychotherapy would focus on her health, wellbeing, and progress at her pace and in a positive direction. She reported that she felt  this session was helpful.   Informed consent issues discussed: Ms. Baker reported that she understood the process of this supportive psychotherapy and agreed to continue her work. Discussed how she worked toward change and was engaged in the process. She agreed that she was making changes to her life toward a healthy lifestyle. She was aware of the problems she experienced and worked to resolve those problems safely. She had a positive outlook for change. She had family and friends that were supportive and helped her to gain and maintain a healthy network of support.   Assignments/ Homework: Ms. Baker agreed to initiate some of the strategies discussed today to decrease anxieties and increase restful sleep and increase nutritional values in her diet.    Plan: Ms. Baker agreed to monitor closely her mood and behavior.    Diagnoses: F43.12 Posttraumatic Stress Disorder: F43.81 Complicated Bereavement [] Provisional   Treatment Plan: [x] Continued [] New [] Replaced Referral:   Suicidal [] Yes [x] No [] Medical:    Violence: [] Yes [x] No [] Psychiatric:    Next session:     [] Neurological:            Helder Starr Psy.D.  Clinical Psychologist  [facility]  NPI: [id number]

## 2024-10-21 ENCOUNTER — APPOINTMENT (OUTPATIENT)
Dept: BEHAVIORAL HEALTH | Facility: CLINIC | Age: 64
End: 2024-10-21
Payer: MEDICARE

## 2024-11-11 DIAGNOSIS — F98.8 ADD (ATTENTION DEFICIT DISORDER) WITHOUT HYPERACTIVITY: ICD-10-CM

## 2024-11-11 RX ORDER — DEXTROAMPHETAMINE SACCHARATE, AMPHETAMINE ASPARTATE, DEXTROAMPHETAMINE SULFATE AND AMPHETAMINE SULFATE 5; 5; 5; 5 MG/1; MG/1; MG/1; MG/1
20 TABLET ORAL 2 TIMES DAILY
Qty: 60 TABLET | Refills: 0 | Status: SHIPPED | OUTPATIENT
Start: 2024-11-11 | End: 2024-12-11

## 2024-11-11 NOTE — TELEPHONE ENCOUNTER
Received request via: Patient    Was the patient seen in the last year in this department? Yes    Does the patient have an active prescription (recently filled or refills available) for medication(s) requested? No    Pharmacy Name: [facility]     Does the patient have [facility] and need 100-day supply? (This applies to ALL medications) Patient does not have SCP

## 2024-12-16 ENCOUNTER — OFFICE VISIT (OUTPATIENT)
Dept: BEHAVIORAL HEALTH | Facility: CLINIC | Age: 64
End: 2024-12-16
Payer: MEDICARE

## 2024-12-16 DIAGNOSIS — F98.8 ADD (ATTENTION DEFICIT DISORDER) WITHOUT HYPERACTIVITY: ICD-10-CM

## 2024-12-16 DIAGNOSIS — F51.01 PRIMARY INSOMNIA: ICD-10-CM

## 2024-12-16 DIAGNOSIS — F41.1 GAD (GENERALIZED ANXIETY DISORDER): ICD-10-CM

## 2024-12-16 DIAGNOSIS — F33.1 MAJOR DEPRESSIVE DISORDER, RECURRENT EPISODE, MODERATE (HCC): ICD-10-CM

## 2024-12-16 PROCEDURE — 99214 OFFICE O/P EST MOD 30 MIN: CPT | Performed by: PSYCHIATRY & NEUROLOGY

## 2024-12-16 RX ORDER — ZOLPIDEM TARTRATE 10 MG/1
10 TABLET ORAL NIGHTLY
Qty: 30 EACH | Refills: 0 | Status: SHIPPED | OUTPATIENT
Start: 2024-12-16 | End: 2025-01-15

## 2024-12-16 RX ORDER — PAROXETINE 10 MG/1
10 TABLET, FILM COATED ORAL NIGHTLY
Qty: 30 TABLET | Refills: 0 | Status: SHIPPED | OUTPATIENT
Start: 2024-12-16 | End: 2025-01-15

## 2024-12-16 RX ORDER — DEXTROAMPHETAMINE SACCHARATE, AMPHETAMINE ASPARTATE, DEXTROAMPHETAMINE SULFATE AND AMPHETAMINE SULFATE 5; 5; 5; 5 MG/1; MG/1; MG/1; MG/1
20 TABLET ORAL 3 TIMES DAILY
Qty: 90 EACH | Refills: 0 | Status: SHIPPED | OUTPATIENT
Start: 2024-12-16 | End: 2025-01-15

## 2024-12-31 DIAGNOSIS — F33.1 MDD (MAJOR DEPRESSIVE DISORDER), RECURRENT EPISODE, MODERATE (HCC): ICD-10-CM

## 2024-12-31 DIAGNOSIS — F41.1 GAD (GENERALIZED ANXIETY DISORDER): ICD-10-CM

## 2024-12-31 RX ORDER — DULOXETIN HYDROCHLORIDE 60 MG/1
60 CAPSULE, DELAYED RELEASE ORAL 2 TIMES DAILY
Qty: 180 CAPSULE | Refills: 0 | Status: SHIPPED | OUTPATIENT
Start: 2024-12-31

## 2025-01-03 DIAGNOSIS — F41.1 GAD (GENERALIZED ANXIETY DISORDER): ICD-10-CM

## 2025-01-03 RX ORDER — ALPRAZOLAM 1 MG/1
1 TABLET ORAL 2 TIMES DAILY
Qty: 60 TABLET | Refills: 0 | Status: SHIPPED | OUTPATIENT
Start: 2025-01-03 | End: 2025-01-27 | Stop reason: SDUPTHER

## 2025-01-27 ENCOUNTER — OFFICE VISIT (OUTPATIENT)
Dept: BEHAVIORAL HEALTH | Facility: CLINIC | Age: 65
End: 2025-01-27
Payer: MEDICARE

## 2025-01-27 DIAGNOSIS — F41.1 GAD (GENERALIZED ANXIETY DISORDER): ICD-10-CM

## 2025-01-27 DIAGNOSIS — F51.01 PRIMARY INSOMNIA: ICD-10-CM

## 2025-01-27 DIAGNOSIS — F33.1 MDD (MAJOR DEPRESSIVE DISORDER), RECURRENT EPISODE, MODERATE (HCC): ICD-10-CM

## 2025-01-27 DIAGNOSIS — F98.8 ADD (ATTENTION DEFICIT DISORDER) WITHOUT HYPERACTIVITY: ICD-10-CM

## 2025-01-27 PROCEDURE — 99214 OFFICE O/P EST MOD 30 MIN: CPT | Performed by: PSYCHIATRY & NEUROLOGY

## 2025-01-27 RX ORDER — DEXTROAMPHETAMINE SACCHARATE, AMPHETAMINE ASPARTATE, DEXTROAMPHETAMINE SULFATE AND AMPHETAMINE SULFATE 5; 5; 5; 5 MG/1; MG/1; MG/1; MG/1
20 TABLET ORAL 3 TIMES DAILY
Qty: 90 EACH | Refills: 0 | Status: SHIPPED | OUTPATIENT
Start: 2025-01-27 | End: 2025-02-26

## 2025-01-27 RX ORDER — PAROXETINE 20 MG/1
20 TABLET, FILM COATED ORAL NIGHTLY
Qty: 30 TABLET | Refills: 0 | Status: SHIPPED | OUTPATIENT
Start: 2025-01-27 | End: 2025-02-26

## 2025-01-27 RX ORDER — ZOLPIDEM TARTRATE 10 MG/1
10 TABLET ORAL NIGHTLY
Qty: 30 EACH | Refills: 0 | Status: SHIPPED | OUTPATIENT
Start: 2025-01-27 | End: 2025-02-26

## 2025-01-27 RX ORDER — ALPRAZOLAM 1 MG/1
1 TABLET ORAL 2 TIMES DAILY
Qty: 60 TABLET | Refills: 0 | Status: SHIPPED | OUTPATIENT
Start: 2025-01-27 | End: 2025-02-26

## 2025-01-27 NOTE — PROGRESS NOTES
[facility]   Follow Up Assessment     This provider informed the patient their medical records are totally confidential except for the use by other providers involved in their care, or if the patient signs a release, or to report instances of child or elder abuse, or if it is determined they are an immediate risk to harm themselves or others.    Name: Paulette Concepcion  MRN: [id number]  : 1960  Age: 64 y.o.  Date of assessment: 2025  PCP: Iraida Zavala M.D.      Subjective:  Chart reviewed prior to seeing her in my office.  She was last seen on .  We reviewed her current medication combination, purposes, doses, etc.  She would like to increase Paxil to 20 mg at bedtime instead of 10 mg at bedtime.  Ambien 10 mg at bedtime helps her insomnia.  Adderall 20 mg 3 times daily helps her ADD.  She uses Xanax 1 mg twice daily for anxiety.  She was contacted by our first floor staff but has not decided yet about magnetic brain stimulation.    Objective:  She is alert, oriented, and cooperative.  Relatedness is good.  Grooming is good.  Speech is normal rate.  Anxious.  Memory is good.  Insight and judgment are good.  No indication of psychotic thinking.    Current Risk:       Suicidal: Not suicidal       Homicidal: Not homicidal       Self-Harm: No plan to harm self       Relapse: (Low/Moderate/High): Moderate       Crisis Safety Plan Reviewed: Discussed with patient    Diagnosis:   Major depressive disorder, recurrent  Generalized anxiety disorder  ADD  Insomnia    Treatment Plan:  The current treatment plan consists of quarterly psychiatric sessions designed to evaluate her depression, anxiety, ADD, and insomnia.    Duration will be for a minimum of 12 months and will be reviewed at each visit.    Goals: Remission of depression with control of anxiety and improvement in ADD symptoms and sleep in order to prevent relapse due to the chronic nature of her behavioral health  problems and mental illness.  Continue Ambien 10 mg at bedtime.  Continue Adderall 20 mg 3 times daily.  Increase Paxil to 20 mg at bedtime.  Continue Xanax 1.0 mg twice daily    Daniel Krause M.D.      This note was created using voice recognition software (Dragon). The accuracy of the dictation is limited by the abilities of the software. I have reviewed the note prior to signing, however some errors in grammar and context are still possible. If you have any questions related to this note please do not hesitate to contact our office.

## 2025-02-26 DIAGNOSIS — F51.01 PRIMARY INSOMNIA: ICD-10-CM

## 2025-02-27 RX ORDER — PAROXETINE 20 MG/1
20 TABLET, FILM COATED ORAL EVERY EVENING
Qty: 30 TABLET | Refills: 0 | Status: SHIPPED | OUTPATIENT
Start: 2025-02-27

## 2025-02-27 RX ORDER — ZOLPIDEM TARTRATE 10 MG/1
10 TABLET ORAL EVERY EVENING
Qty: 30 TABLET | Refills: 0 | Status: SHIPPED | OUTPATIENT
Start: 2025-02-27 | End: 2025-03-29

## 2025-03-27 DIAGNOSIS — F51.01 PRIMARY INSOMNIA: ICD-10-CM

## 2025-03-27 DIAGNOSIS — F98.8 ADD (ATTENTION DEFICIT DISORDER) WITHOUT HYPERACTIVITY: ICD-10-CM

## 2025-03-27 DIAGNOSIS — F41.1 GAD (GENERALIZED ANXIETY DISORDER): ICD-10-CM

## 2025-03-27 RX ORDER — PAROXETINE 20 MG/1
20 TABLET, FILM COATED ORAL EVERY EVENING
Qty: 30 TABLET | Refills: 0 | Status: SHIPPED | OUTPATIENT
Start: 2025-03-27

## 2025-03-27 RX ORDER — ALPRAZOLAM 1 MG/1
1 TABLET ORAL 2 TIMES DAILY
Qty: 60 TABLET | Refills: 0 | Status: SHIPPED | OUTPATIENT
Start: 2025-03-27 | End: 2025-04-26

## 2025-03-27 RX ORDER — ZOLPIDEM TARTRATE 10 MG/1
10 TABLET ORAL EVERY EVENING
Qty: 30 TABLET | Refills: 0 | Status: SHIPPED | OUTPATIENT
Start: 2025-03-27 | End: 2025-04-26

## 2025-03-27 RX ORDER — DEXTROAMPHETAMINE SACCHARATE, AMPHETAMINE ASPARTATE, DEXTROAMPHETAMINE SULFATE AND AMPHETAMINE SULFATE 5; 5; 5; 5 MG/1; MG/1; MG/1; MG/1
20 TABLET ORAL 3 TIMES DAILY
Qty: 90 EACH | Refills: 0 | Status: SHIPPED | OUTPATIENT
Start: 2025-03-27 | End: 2025-04-26

## 2025-03-27 NOTE — TELEPHONE ENCOUNTER
Received request via: Patient    Was the patient seen in the last year in this department? Yes    Does the patient have an active prescription (recently filled or refills available) for medication(s) requested? No    Pharmacy Name: [facility]    Does the patient have [facility] and need 100-day supply? (This applies to ALL medications) Patient does not have SCP

## 2025-04-28 ENCOUNTER — OFFICE VISIT (OUTPATIENT)
Dept: BEHAVIORAL HEALTH | Facility: CLINIC | Age: 65
End: 2025-04-28
Payer: MEDICARE

## 2025-04-28 DIAGNOSIS — F33.1 MDD (MAJOR DEPRESSIVE DISORDER), RECURRENT EPISODE, MODERATE (HCC): ICD-10-CM

## 2025-04-28 DIAGNOSIS — F98.8 ATTENTION DEFICIT DISORDER WITHOUT HYPERACTIVITY: ICD-10-CM

## 2025-04-28 DIAGNOSIS — F51.01 PRIMARY INSOMNIA: ICD-10-CM

## 2025-04-28 DIAGNOSIS — F41.1 GAD (GENERALIZED ANXIETY DISORDER): ICD-10-CM

## 2025-04-28 RX ORDER — ZOLPIDEM TARTRATE 10 MG/1
10 TABLET ORAL NIGHTLY
Qty: 340 EACH | Refills: 0 | Status: SHIPPED | OUTPATIENT
Start: 2025-04-28 | End: 2026-04-03

## 2025-04-28 RX ORDER — PAROXETINE 20 MG/1
20 TABLET, FILM COATED ORAL 2 TIMES DAILY
Qty: 60 TABLET | Refills: 0 | Status: SHIPPED | OUTPATIENT
Start: 2025-04-28 | End: 2025-05-28

## 2025-04-28 RX ORDER — ALPRAZOLAM 1 MG/1
1 TABLET ORAL 2 TIMES DAILY
Qty: 60 TABLET | Refills: 0 | Status: SHIPPED | OUTPATIENT
Start: 2025-04-28 | End: 2025-05-28

## 2025-04-28 RX ORDER — DEXTROAMPHETAMINE SACCHARATE, AMPHETAMINE ASPARTATE, DEXTROAMPHETAMINE SULFATE AND AMPHETAMINE SULFATE 5; 5; 5; 5 MG/1; MG/1; MG/1; MG/1
20 TABLET ORAL 3 TIMES DAILY
Qty: 90 EACH | Refills: 0 | Status: SHIPPED | OUTPATIENT
Start: 2025-04-28 | End: 2025-05-28

## 2025-04-28 NOTE — PROGRESS NOTES
[facility]   Follow Up Assessment     This provider informed the patient their medical records are totally confidential except for the use by other providers involved in their care, or if the patient signs a release, or to report instances of child or elder abuse, or if it is determined they are an immediate risk to harm themselves or others.    Name: Paulette Concepcion  MRN: [id number]  : 1960  Age: 65 y.o.  Date of assessment: 2025  PCP: Iraida Zavala M.D.      Subjective:  Chart reviewed prior to seeing her in my office.  She was last seen on .  We reviewed her current medication combination, purposes, doses, etc.  She would like to change Paxil to 20 mg twice daily.  No other medication changes requested.    Objective:  She is alert, oriented, and cooperative.  Relatedness is good.  Grooming is good.  Speech is normal rate.  Anxious.  Memory is good.  Insight and judgment are good.  No indication of psychotic thinking.    Current Risk:       Suicidal: Not suicidal       Homicidal: Not homicidal       Self-Harm: No plan to harm self       Relapse: (Low/Moderate/High): Moderate       Crisis Safety Plan Reviewed: Discussed with patient    Diagnosis:   Major depressive disorder, recurrent  Generalized anxiety disorder  ADD  Insomnia    Treatment Plan:  The current treatment plan consists of quarterly psychiatric sessions designed to evaluate her depression, anxiety, ADD, and insomnia.    Duration will be for a minimum of 12 months and will be reviewed at each visit.    Goals: Remission of depression with control of anxiety and improvement in ADD symptoms and sleep in order to prevent relapse due to the chronic nature of her behavioral health problems and mental illness.  Continue Adderall 20 mg 3 times daily.  Change Paxil to 20 mg twice daily.  Use Xanax 1 mg twice daily.  Continue Ambien 10 mg at bedtime.    Daniel Krause M.D.      This note was created using voice  recognition software (Dragon). The accuracy of the dictation is limited by the abilities of the software. I have reviewed the note prior to signing, however some errors in grammar and context are still possible. If you have any questions related to this note please do not hesitate to contact our office.   Other Countries

## 2025-04-30 RX ORDER — ALBUTEROL SULFATE 90 UG/1
1 INHALANT RESPIRATORY (INHALATION) EVERY 4 HOURS PRN
Qty: 18 EACH | Refills: 1 | Status: SHIPPED | OUTPATIENT
Start: 2025-04-30

## 2025-05-07 DIAGNOSIS — F51.01 PRIMARY INSOMNIA: ICD-10-CM

## 2025-05-07 DIAGNOSIS — F41.1 GAD (GENERALIZED ANXIETY DISORDER): ICD-10-CM

## 2025-05-07 RX ORDER — ALPRAZOLAM 1 MG/1
1 TABLET ORAL 2 TIMES DAILY
Qty: 60 TABLET | Refills: 0 | Status: SHIPPED | OUTPATIENT
Start: 2025-05-07 | End: 2025-06-06

## 2025-05-07 RX ORDER — ZOLPIDEM TARTRATE 10 MG/1
10 TABLET ORAL NIGHTLY
Qty: 30 EACH | Refills: 0 | Status: SHIPPED | OUTPATIENT
Start: 2025-05-07 | End: 2025-06-06

## 2025-05-07 NOTE — TELEPHONE ENCOUNTER
Received request via: Patient    Was the patient seen in the last year in this department? Yes    Does the patient have an active prescription (recently filled or refills available) for medication(s) requested? No    Pharmacy Name: [facility]     Does the patient have [facility] and need 100-day supply? (This applies to ALL medications) Patient does not have SCP

## 2025-05-21 RX ORDER — PAROXETINE 20 MG/1
20 TABLET, FILM COATED ORAL 2 TIMES DAILY
Qty: 180 TABLET | Refills: 1 | Status: SHIPPED | OUTPATIENT
Start: 2025-05-21

## 2025-06-01 DIAGNOSIS — I10 PRIMARY HYPERTENSION: ICD-10-CM

## 2025-06-03 RX ORDER — AMLODIPINE BESYLATE 10 MG/1
10 TABLET ORAL
Qty: 100 TABLET | Refills: 2 | Status: SHIPPED | OUTPATIENT
Start: 2025-06-03

## 2025-06-10 DIAGNOSIS — F41.1 GAD (GENERALIZED ANXIETY DISORDER): ICD-10-CM

## 2025-06-10 DIAGNOSIS — F51.01 PRIMARY INSOMNIA: ICD-10-CM

## 2025-06-10 NOTE — TELEPHONE ENCOUNTER
Received request via: Patient    Was the patient seen in the last year in this department? Yes    Does the patient have an active prescription (recently filled or refills available) for medication(s) requested? No    Pharmacy Name: [facility]    Does the patient have [facility] and need 100-day supply? (This applies to ALL medications) Patient does not have SCP

## 2025-06-11 RX ORDER — ZOLPIDEM TARTRATE 10 MG/1
10 TABLET ORAL NIGHTLY
Qty: 30 EACH | Refills: 0 | Status: SHIPPED | OUTPATIENT
Start: 2025-06-11 | End: 2025-07-11

## 2025-06-11 RX ORDER — ALPRAZOLAM 1 MG/1
1 TABLET ORAL 2 TIMES DAILY
Qty: 60 TABLET | Refills: 0 | Status: SHIPPED | OUTPATIENT
Start: 2025-06-11 | End: 2025-07-11

## 2025-07-17 DIAGNOSIS — F41.1 GAD (GENERALIZED ANXIETY DISORDER): ICD-10-CM

## 2025-07-17 DIAGNOSIS — F51.01 PRIMARY INSOMNIA: ICD-10-CM

## 2025-07-17 DIAGNOSIS — F98.8 ATTENTION DEFICIT DISORDER WITHOUT HYPERACTIVITY: ICD-10-CM

## 2025-07-17 RX ORDER — DEXTROAMPHETAMINE SACCHARATE, AMPHETAMINE ASPARTATE, DEXTROAMPHETAMINE SULFATE AND AMPHETAMINE SULFATE 5; 5; 5; 5 MG/1; MG/1; MG/1; MG/1
20 TABLET ORAL 3 TIMES DAILY
Qty: 90 EACH | Refills: 0 | Status: SHIPPED | OUTPATIENT
Start: 2025-07-17 | End: 2025-08-16

## 2025-07-17 RX ORDER — ALPRAZOLAM 1 MG/1
1 TABLET ORAL 2 TIMES DAILY
Qty: 60 TABLET | Refills: 0 | Status: SHIPPED | OUTPATIENT
Start: 2025-07-17 | End: 2025-08-16

## 2025-07-17 RX ORDER — ZOLPIDEM TARTRATE 10 MG/1
10 TABLET ORAL NIGHTLY
Qty: 30 EACH | Refills: 0 | Status: SHIPPED | OUTPATIENT
Start: 2025-07-17 | End: 2025-08-16

## 2025-07-17 NOTE — TELEPHONE ENCOUNTER
Received request via: Patient    Was the patient seen in the last year in this department? Yes    Does the patient have an active prescription (recently filled or refills available) for medication(s) requested? No    Pharmacy Name: [facility]    Does the patient have [facility] and need 100-day supply? (This applies to ALL medications) Patient does not have SCP

## 2025-07-28 ENCOUNTER — APPOINTMENT (OUTPATIENT)
Dept: BEHAVIORAL HEALTH | Facility: CLINIC | Age: 65
End: 2025-07-28
Payer: MEDICARE